# Patient Record
Sex: FEMALE | Race: WHITE | NOT HISPANIC OR LATINO | URBAN - METROPOLITAN AREA
[De-identification: names, ages, dates, MRNs, and addresses within clinical notes are randomized per-mention and may not be internally consistent; named-entity substitution may affect disease eponyms.]

---

## 2017-02-06 ENCOUNTER — OFFICE (OUTPATIENT)
Dept: URBAN - METROPOLITAN AREA CLINIC 64 | Facility: CLINIC | Age: 57
End: 2017-02-06

## 2017-02-06 VITALS
HEIGHT: 63 IN | SYSTOLIC BLOOD PRESSURE: 129 MMHG | WEIGHT: 228 LBS | HEART RATE: 112 BPM | DIASTOLIC BLOOD PRESSURE: 77 MMHG

## 2017-02-06 DIAGNOSIS — K21.9 GASTRO-ESOPHAGEAL REFLUX DISEASE WITHOUT ESOPHAGITIS: ICD-10-CM

## 2017-02-06 DIAGNOSIS — G47.00 INSOMNIA, UNSPECIFIED: ICD-10-CM

## 2017-02-06 DIAGNOSIS — K59.00 CONSTIPATION, UNSPECIFIED: ICD-10-CM

## 2017-02-06 DIAGNOSIS — R11.2 NAUSEA WITH VOMITING, UNSPECIFIED: ICD-10-CM

## 2017-02-06 PROCEDURE — 99213 OFFICE O/P EST LOW 20 MIN: CPT | Performed by: INTERNAL MEDICINE

## 2017-02-06 RX ORDER — PROMETHAZINE HYDROCHLORIDE 50 MG/1
TABLET ORAL
Qty: 90 | Refills: 0 | Status: ACTIVE

## 2017-02-06 RX ORDER — DOXEPIN HYDROCHLORIDE 100 MG/1
100 CAPSULE ORAL
Qty: 30 | Refills: 11 | Status: COMPLETED
Start: 2017-02-06 | End: 2018-05-01

## 2017-06-02 ENCOUNTER — HOSPITAL ENCOUNTER (OUTPATIENT)
Dept: FAMILY MEDICINE CLINIC | Facility: CLINIC | Age: 57
Setting detail: SPECIMEN
Discharge: HOME | End: 2017-06-02
Attending: FAMILY MEDICINE | Admitting: FAMILY MEDICINE

## 2017-06-02 LAB
ALBUMIN SERPL-MCNC: 3.8 G/DL (ref 3.5–4.8)
ALBUMIN/GLOB SERPL: 1.4 {RATIO} (ref 1–1.7)
ALP SERPL-CCNC: 53 IU/L (ref 32–91)
ALT SERPL-CCNC: 19 IU/L (ref 14–54)
ANION GAP SERPL CALC-SCNC: 15.1 MMOL/L (ref 10–20)
AST SERPL-CCNC: 21 IU/L (ref 15–41)
BILIRUB SERPL-MCNC: 0.4 MG/DL (ref 0.3–1.2)
BUN SERPL-MCNC: 13 MG/DL (ref 8–20)
BUN/CREAT SERPL: 14.4 (ref 5.4–26.2)
CALCIUM SERPL-MCNC: 9.5 MG/DL (ref 8.9–10.3)
CHLORIDE SERPL-SCNC: 108 MMOL/L (ref 101–111)
CHOLEST SERPL-MCNC: 133 MG/DL
CHOLEST/HDLC SERPL: 4.4 {RATIO}
CONV CO2: 24 MMOL/L (ref 22–32)
CONV LDL CHOLESTEROL DIRECT: 76 MG/DL (ref 0–100)
CONV TOTAL PROTEIN: 6.5 G/DL (ref 6.1–7.9)
CREAT UR-MCNC: 0.9 MG/DL (ref 0.4–1)
GLOBULIN UR ELPH-MCNC: 2.7 G/DL (ref 2.5–3.8)
GLUCOSE SERPL-MCNC: 130 MG/DL (ref 65–99)
HDLC SERPL-MCNC: 31 MG/DL
LDLC/HDLC SERPL: 2.5 {RATIO}
LIPID INTERPRETATION: ABNORMAL
POTASSIUM SERPL-SCNC: 4.1 MMOL/L (ref 3.6–5.1)
SODIUM SERPL-SCNC: 143 MMOL/L (ref 136–144)
TRIGL SERPL-MCNC: 173 MG/DL
VLDLC SERPL CALC-MCNC: 26.2 MG/DL

## 2017-12-20 ENCOUNTER — HOSPITAL ENCOUNTER (OUTPATIENT)
Dept: FAMILY MEDICINE CLINIC | Facility: CLINIC | Age: 57
Setting detail: SPECIMEN
Discharge: HOME | End: 2017-12-20
Attending: FAMILY MEDICINE | Admitting: FAMILY MEDICINE

## 2017-12-20 LAB
ALBUMIN SERPL-MCNC: 3.8 G/DL (ref 3.5–4.8)
ALBUMIN/GLOB SERPL: 1.4 {RATIO} (ref 1–1.7)
ALP SERPL-CCNC: 53 IU/L (ref 32–91)
ALT SERPL-CCNC: 26 IU/L (ref 14–54)
ANION GAP SERPL CALC-SCNC: 11.9 MMOL/L (ref 10–20)
AST SERPL-CCNC: 27 IU/L (ref 15–41)
BASOPHILS # BLD AUTO: 0 10*3/UL (ref 0–0.2)
BASOPHILS NFR BLD AUTO: 1 % (ref 0–2)
BILIRUB SERPL-MCNC: 0.4 MG/DL (ref 0.3–1.2)
BUN SERPL-MCNC: 12 MG/DL (ref 8–20)
BUN/CREAT SERPL: 15 (ref 5.4–26.2)
CALCIUM SERPL-MCNC: 9.1 MG/DL (ref 8.9–10.3)
CHLORIDE SERPL-SCNC: 106 MMOL/L (ref 101–111)
CHOLEST SERPL-MCNC: 120 MG/DL
CHOLEST/HDLC SERPL: 4.1 {RATIO}
CONV CO2: 25 MMOL/L (ref 22–32)
CONV LDL CHOLESTEROL DIRECT: 62 MG/DL (ref 0–100)
CONV TOTAL PROTEIN: 6.5 G/DL (ref 6.1–7.9)
CREAT UR-MCNC: 0.8 MG/DL (ref 0.4–1)
DIFFERENTIAL METHOD BLD: (no result)
EOSINOPHIL # BLD AUTO: 0.1 10*3/UL (ref 0–0.3)
EOSINOPHIL # BLD AUTO: 2 % (ref 0–3)
ERYTHROCYTE [DISTWIDTH] IN BLOOD BY AUTOMATED COUNT: 14.8 % (ref 11.5–14.5)
GLOBULIN UR ELPH-MCNC: 2.7 G/DL (ref 2.5–3.8)
GLUCOSE SERPL-MCNC: 122 MG/DL (ref 65–99)
HCT VFR BLD AUTO: 40.4 % (ref 35–49)
HDLC SERPL-MCNC: 29 MG/DL
HGB BLD-MCNC: 13.5 G/DL (ref 12–15)
LDLC/HDLC SERPL: 2.1 {RATIO}
LIPID INTERPRETATION: ABNORMAL
LYMPHOCYTES # BLD AUTO: 2.6 10*3/UL (ref 0.8–4.8)
LYMPHOCYTES NFR BLD AUTO: 40 % (ref 18–42)
MCH RBC QN AUTO: 29.7 PG (ref 26–32)
MCHC RBC AUTO-ENTMCNC: 33.3 G/DL (ref 32–36)
MCV RBC AUTO: 89 FL (ref 80–94)
MONOCYTES # BLD AUTO: 0.4 10*3/UL (ref 0.1–1.3)
MONOCYTES NFR BLD AUTO: 6 % (ref 2–11)
NEUTROPHILS # BLD AUTO: 3.4 10*3/UL (ref 2.3–8.6)
NEUTROPHILS NFR BLD AUTO: 51 % (ref 50–75)
NRBC BLD AUTO-RTO: 0 /100{WBCS}
NRBC/RBC NFR BLD MANUAL: 0 10*3/UL
PLATELET # BLD AUTO: 167 10*3/UL (ref 150–450)
PMV BLD AUTO: 8.5 FL (ref 7.4–10.4)
POTASSIUM SERPL-SCNC: 3.9 MMOL/L (ref 3.6–5.1)
RBC # BLD AUTO: 4.54 10*6/UL (ref 4–5.4)
SODIUM SERPL-SCNC: 139 MMOL/L (ref 136–144)
TRIGL SERPL-MCNC: 216 MG/DL
VLDLC SERPL CALC-MCNC: 28.7 MG/DL
WBC # BLD AUTO: 6.6 10*3/UL (ref 4.5–11.5)

## 2018-05-01 ENCOUNTER — OFFICE (OUTPATIENT)
Dept: URBAN - METROPOLITAN AREA CLINIC 64 | Facility: CLINIC | Age: 58
End: 2018-05-01

## 2018-05-01 VITALS
HEART RATE: 101 BPM | HEIGHT: 63 IN | SYSTOLIC BLOOD PRESSURE: 142 MMHG | DIASTOLIC BLOOD PRESSURE: 86 MMHG | WEIGHT: 250 LBS

## 2018-05-01 DIAGNOSIS — K21.9 GASTRO-ESOPHAGEAL REFLUX DISEASE WITHOUT ESOPHAGITIS: ICD-10-CM

## 2018-05-01 DIAGNOSIS — F41.9 ANXIETY DISORDER, UNSPECIFIED: ICD-10-CM

## 2018-05-01 DIAGNOSIS — G47.00 INSOMNIA, UNSPECIFIED: ICD-10-CM

## 2018-05-01 DIAGNOSIS — K59.00 CONSTIPATION, UNSPECIFIED: ICD-10-CM

## 2018-05-01 PROCEDURE — 99214 OFFICE O/P EST MOD 30 MIN: CPT | Performed by: INTERNAL MEDICINE

## 2018-05-01 RX ORDER — LINACLOTIDE 290 UG/1
290 CAPSULE, GELATIN COATED ORAL
Qty: 90 | Refills: 4 | Status: ACTIVE
Start: 2018-05-01

## 2018-05-01 RX ORDER — TRAZODONE HYDROCHLORIDE 100 MG/1
TABLET, FILM COATED ORAL
Qty: 90 | Refills: 4 | Status: ACTIVE
Start: 2018-05-01

## 2018-05-02 ENCOUNTER — HOSPITAL ENCOUNTER (OUTPATIENT)
Dept: FAMILY MEDICINE CLINIC | Facility: CLINIC | Age: 58
Setting detail: SPECIMEN
Discharge: HOME | End: 2018-05-02
Attending: FAMILY MEDICINE | Admitting: FAMILY MEDICINE

## 2018-05-02 LAB
ALBUMIN SERPL-MCNC: 4.1 G/DL (ref 3.5–4.8)
ALBUMIN/GLOB SERPL: 1.4 {RATIO} (ref 1–1.7)
ALP SERPL-CCNC: 66 IU/L (ref 32–91)
ALT SERPL-CCNC: 26 IU/L (ref 14–54)
ANION GAP SERPL CALC-SCNC: 12 MMOL/L (ref 10–20)
AST SERPL-CCNC: 31 IU/L (ref 15–41)
BASOPHILS # BLD AUTO: 0 10*3/UL (ref 0–0.2)
BASOPHILS NFR BLD AUTO: 1 % (ref 0–2)
BILIRUB SERPL-MCNC: 0.6 MG/DL (ref 0.3–1.2)
BUN SERPL-MCNC: 14 MG/DL (ref 8–20)
BUN/CREAT SERPL: 17.5 (ref 5.4–26.2)
CALCIUM SERPL-MCNC: 9.5 MG/DL (ref 8.9–10.3)
CHLORIDE SERPL-SCNC: 103 MMOL/L (ref 101–111)
CHOLEST SERPL-MCNC: 162 MG/DL
CHOLEST/HDLC SERPL: 6.1 {RATIO}
CONV CO2: 26 MMOL/L (ref 22–32)
CONV LDL CHOLESTEROL DIRECT: 108 MG/DL (ref 0–100)
CONV TOTAL PROTEIN: 7.1 G/DL (ref 6.1–7.9)
CREAT UR-MCNC: 0.8 MG/DL (ref 0.4–1)
DIFFERENTIAL METHOD BLD: (no result)
EOSINOPHIL # BLD AUTO: 0.1 10*3/UL (ref 0–0.3)
EOSINOPHIL # BLD AUTO: 1 % (ref 0–3)
ERYTHROCYTE [DISTWIDTH] IN BLOOD BY AUTOMATED COUNT: 15 % (ref 11.5–14.5)
GLOBULIN UR ELPH-MCNC: 3 G/DL (ref 2.5–3.8)
GLUCOSE SERPL-MCNC: 133 MG/DL (ref 65–99)
HCT VFR BLD AUTO: 44.1 % (ref 35–49)
HDLC SERPL-MCNC: 27 MG/DL
HGB BLD-MCNC: 14.8 G/DL (ref 12–15)
LDLC/HDLC SERPL: 4.1 {RATIO}
LIPID INTERPRETATION: ABNORMAL
LYMPHOCYTES # BLD AUTO: 1.7 10*3/UL (ref 0.8–4.8)
LYMPHOCYTES NFR BLD AUTO: 25 % (ref 18–42)
MCH RBC QN AUTO: 29.3 PG (ref 26–32)
MCHC RBC AUTO-ENTMCNC: 33.5 G/DL (ref 32–36)
MCV RBC AUTO: 87.4 FL (ref 80–94)
MONOCYTES # BLD AUTO: 0.3 10*3/UL (ref 0.1–1.3)
MONOCYTES NFR BLD AUTO: 4 % (ref 2–11)
NEUTROPHILS # BLD AUTO: 4.6 10*3/UL (ref 2.3–8.6)
NEUTROPHILS NFR BLD AUTO: 69 % (ref 50–75)
NRBC BLD AUTO-RTO: 0 /100{WBCS}
NRBC/RBC NFR BLD MANUAL: 0 10*3/UL
PLATELET # BLD AUTO: 180 10*3/UL (ref 150–450)
PMV BLD AUTO: 8.2 FL (ref 7.4–10.4)
POTASSIUM SERPL-SCNC: 4 MMOL/L (ref 3.6–5.1)
RBC # BLD AUTO: 5.04 10*6/UL (ref 4–5.4)
SODIUM SERPL-SCNC: 137 MMOL/L (ref 136–144)
TRIGL SERPL-MCNC: 167 MG/DL
VLDLC SERPL CALC-MCNC: 27.5 MG/DL
WBC # BLD AUTO: 6.7 10*3/UL (ref 4.5–11.5)

## 2018-07-19 ENCOUNTER — OFFICE (OUTPATIENT)
Dept: URBAN - METROPOLITAN AREA CLINIC 64 | Facility: CLINIC | Age: 58
End: 2018-07-19

## 2018-07-19 VITALS
DIASTOLIC BLOOD PRESSURE: 94 MMHG | HEIGHT: 63 IN | WEIGHT: 232 LBS | HEART RATE: 102 BPM | SYSTOLIC BLOOD PRESSURE: 147 MMHG

## 2018-07-19 DIAGNOSIS — K59.00 CONSTIPATION, UNSPECIFIED: ICD-10-CM

## 2018-07-19 DIAGNOSIS — K21.9 GASTRO-ESOPHAGEAL REFLUX DISEASE WITHOUT ESOPHAGITIS: ICD-10-CM

## 2018-07-19 DIAGNOSIS — F41.9 ANXIETY DISORDER, UNSPECIFIED: ICD-10-CM

## 2018-07-19 PROCEDURE — 99213 OFFICE O/P EST LOW 20 MIN: CPT | Performed by: INTERNAL MEDICINE

## 2018-07-19 RX ORDER — PROMETHAZINE HYDROCHLORIDE 50 MG/1
TABLET ORAL
Qty: 90 | Refills: 0 | Status: ACTIVE

## 2018-07-19 RX ORDER — TRAZODONE HYDROCHLORIDE 100 MG/1
TABLET, FILM COATED ORAL
Qty: 90 | Refills: 4 | Status: ACTIVE
Start: 2018-05-01

## 2018-10-23 ENCOUNTER — OFFICE (OUTPATIENT)
Dept: URBAN - METROPOLITAN AREA CLINIC 64 | Facility: CLINIC | Age: 58
End: 2018-10-23

## 2018-10-23 VITALS
HEIGHT: 63 IN | DIASTOLIC BLOOD PRESSURE: 86 MMHG | HEART RATE: 103 BPM | WEIGHT: 224 LBS | SYSTOLIC BLOOD PRESSURE: 146 MMHG

## 2018-10-23 DIAGNOSIS — K59.00 CONSTIPATION, UNSPECIFIED: ICD-10-CM

## 2018-10-23 DIAGNOSIS — G47.00 INSOMNIA, UNSPECIFIED: ICD-10-CM

## 2018-10-23 DIAGNOSIS — K21.9 GASTRO-ESOPHAGEAL REFLUX DISEASE WITHOUT ESOPHAGITIS: ICD-10-CM

## 2018-10-23 DIAGNOSIS — F41.9 ANXIETY DISORDER, UNSPECIFIED: ICD-10-CM

## 2018-10-23 PROCEDURE — 99213 OFFICE O/P EST LOW 20 MIN: CPT | Performed by: INTERNAL MEDICINE

## 2018-10-23 RX ORDER — ESOMEPRAZOLE MAGNESIUM 40 MG/1
80 CAPSULE, DELAYED RELEASE ORAL
Qty: 60 | Refills: 12 | Status: COMPLETED
Start: 2018-10-23 | End: 2019-05-09

## 2018-11-06 ENCOUNTER — HOSPITAL ENCOUNTER (OUTPATIENT)
Dept: FAMILY MEDICINE CLINIC | Facility: CLINIC | Age: 58
Setting detail: SPECIMEN
Discharge: HOME | End: 2018-11-06
Attending: FAMILY MEDICINE | Admitting: FAMILY MEDICINE

## 2018-11-06 LAB
ALBUMIN SERPL-MCNC: 4.2 G/DL (ref 3.5–4.8)
ALBUMIN/GLOB SERPL: 1.4 {RATIO} (ref 1–1.7)
ALP SERPL-CCNC: 62 IU/L (ref 32–91)
ALT SERPL-CCNC: 29 IU/L (ref 14–54)
ANION GAP SERPL CALC-SCNC: 11.2 MMOL/L (ref 10–20)
AST SERPL-CCNC: 25 IU/L (ref 15–41)
BASOPHILS # BLD AUTO: 0 10*3/UL (ref 0–0.2)
BASOPHILS NFR BLD AUTO: 1 % (ref 0–2)
BILIRUB SERPL-MCNC: 0.5 MG/DL (ref 0.3–1.2)
BUN SERPL-MCNC: 9 MG/DL (ref 8–20)
BUN/CREAT SERPL: 12.9 (ref 5.4–26.2)
CALCIUM SERPL-MCNC: 9.8 MG/DL (ref 8.9–10.3)
CHLORIDE SERPL-SCNC: 107 MMOL/L (ref 101–111)
CHOLEST SERPL-MCNC: 160 MG/DL
CHOLEST/HDLC SERPL: 4.5 {RATIO}
CONV CO2: 26 MMOL/L (ref 22–32)
CONV LDL CHOLESTEROL DIRECT: 105 MG/DL (ref 0–100)
CONV TOTAL PROTEIN: 7.1 G/DL (ref 6.1–7.9)
CREAT UR-MCNC: 0.7 MG/DL (ref 0.4–1)
DIFFERENTIAL METHOD BLD: (no result)
EOSINOPHIL # BLD AUTO: 0.1 10*3/UL (ref 0–0.3)
EOSINOPHIL # BLD AUTO: 2 % (ref 0–3)
ERYTHROCYTE [DISTWIDTH] IN BLOOD BY AUTOMATED COUNT: 15.2 % (ref 11.5–14.5)
GLOBULIN UR ELPH-MCNC: 2.9 G/DL (ref 2.5–3.8)
GLUCOSE SERPL-MCNC: 120 MG/DL (ref 65–99)
HCT VFR BLD AUTO: 41.9 % (ref 35–49)
HDLC SERPL-MCNC: 36 MG/DL
HGB BLD-MCNC: 14.3 G/DL (ref 12–15)
IRON SATN MFR SERPL: 13 % (ref 15–50)
IRON SERPL-MCNC: 53 UG/DL (ref 28–170)
LDLC/HDLC SERPL: 2.9 {RATIO}
LIPID INTERPRETATION: ABNORMAL
LYMPHOCYTES # BLD AUTO: 1.8 10*3/UL (ref 0.8–4.8)
LYMPHOCYTES NFR BLD AUTO: 25 % (ref 18–42)
MAGNESIUM SERPL-MCNC: 1.7 MG/DL (ref 1.8–2.5)
MCH RBC QN AUTO: 29.9 PG (ref 26–32)
MCHC RBC AUTO-ENTMCNC: 34.1 G/DL (ref 32–36)
MCV RBC AUTO: 87.7 FL (ref 80–94)
MONOCYTES # BLD AUTO: 0.3 10*3/UL (ref 0.1–1.3)
MONOCYTES NFR BLD AUTO: 4 % (ref 2–11)
NEUTROPHILS # BLD AUTO: 4.9 10*3/UL (ref 2.3–8.6)
NEUTROPHILS NFR BLD AUTO: 68 % (ref 50–75)
NRBC BLD AUTO-RTO: 0 /100{WBCS}
NRBC/RBC NFR BLD MANUAL: 0 10*3/UL
PLATELET # BLD AUTO: 189 10*3/UL (ref 150–450)
PMV BLD AUTO: 8 FL (ref 7.4–10.4)
POTASSIUM SERPL-SCNC: 4.2 MMOL/L (ref 3.6–5.1)
RBC # BLD AUTO: 4.77 10*6/UL (ref 4–5.4)
SODIUM SERPL-SCNC: 140 MMOL/L (ref 136–144)
TIBC SERPL-MCNC: 417 UG/DL (ref 228–428)
TRIGL SERPL-MCNC: 160 MG/DL
VLDLC SERPL CALC-MCNC: 19.8 MG/DL
WBC # BLD AUTO: 7.1 10*3/UL (ref 4.5–11.5)

## 2019-05-09 ENCOUNTER — OFFICE (OUTPATIENT)
Dept: URBAN - METROPOLITAN AREA CLINIC 64 | Facility: CLINIC | Age: 59
End: 2019-05-09

## 2019-05-09 VITALS
HEIGHT: 63 IN | SYSTOLIC BLOOD PRESSURE: 165 MMHG | DIASTOLIC BLOOD PRESSURE: 99 MMHG | WEIGHT: 231 LBS | HEART RATE: 89 BPM

## 2019-05-09 DIAGNOSIS — G47.00 INSOMNIA, UNSPECIFIED: ICD-10-CM

## 2019-05-09 DIAGNOSIS — K21.9 GASTRO-ESOPHAGEAL REFLUX DISEASE WITHOUT ESOPHAGITIS: ICD-10-CM

## 2019-05-09 DIAGNOSIS — R11.2 NAUSEA WITH VOMITING, UNSPECIFIED: ICD-10-CM

## 2019-05-09 DIAGNOSIS — K59.00 CONSTIPATION, UNSPECIFIED: ICD-10-CM

## 2019-05-09 PROCEDURE — 99213 OFFICE O/P EST LOW 20 MIN: CPT | Performed by: INTERNAL MEDICINE

## 2019-05-09 RX ORDER — PROMETHAZINE HYDROCHLORIDE 50 MG/1
TABLET ORAL
Qty: 90 | Refills: 0 | Status: ACTIVE

## 2019-05-09 RX ORDER — TRAZODONE HYDROCHLORIDE 100 MG/1
TABLET, FILM COATED ORAL
Qty: 90 | Refills: 4 | Status: ACTIVE
Start: 2018-05-01

## 2019-05-09 RX ORDER — DEXLANSOPRAZOLE 60 MG/1
CAPSULE, DELAYED RELEASE ORAL
Qty: 90 | Refills: 4 | Status: COMPLETED
End: 2021-10-28

## 2019-05-09 RX ORDER — ESOMEPRAZOLE MAGNESIUM 40 MG/1
CAPSULE, DELAYED RELEASE ORAL
Qty: 0 | Refills: 0 | Status: ACTIVE

## 2019-06-06 ENCOUNTER — HOSPITAL ENCOUNTER (OUTPATIENT)
Dept: LAB | Facility: HOSPITAL | Age: 59
Setting detail: SPECIMEN
Discharge: HOME | End: 2019-06-06
Attending: INTERNAL MEDICINE | Admitting: INTERNAL MEDICINE

## 2019-06-06 LAB
BUN SERPL-MCNC: 16 MG/DL (ref 8–20)
CREAT UR-MCNC: 0.8 MG/DL (ref 0.4–1)

## 2019-11-22 RX ORDER — PAROXETINE HYDROCHLORIDE 40 MG/1
40 TABLET, FILM COATED ORAL EVERY 24 HOURS
COMMUNITY
Start: 2018-11-06 | End: 2019-11-22 | Stop reason: SDUPTHER

## 2019-11-22 RX ORDER — PAROXETINE HYDROCHLORIDE 40 MG/1
40 TABLET, FILM COATED ORAL EVERY 24 HOURS
Qty: 30 TABLET | Refills: 0 | Status: SHIPPED | OUTPATIENT
Start: 2019-11-22 | End: 2020-02-26

## 2019-12-31 ENCOUNTER — TRANSCRIBE ORDERS (OUTPATIENT)
Dept: ADMINISTRATIVE | Facility: HOSPITAL | Age: 59
End: 2019-12-31

## 2019-12-31 ENCOUNTER — HOSPITAL ENCOUNTER (OUTPATIENT)
Dept: RESPIRATORY THERAPY | Facility: HOSPITAL | Age: 59
Discharge: HOME | End: 2019-12-31
Admitting: INTERNAL MEDICINE

## 2019-12-31 DIAGNOSIS — R07.9 CHEST PAIN, UNSPECIFIED TYPE: ICD-10-CM

## 2019-12-31 DIAGNOSIS — R07.9 CHEST PAIN, UNSPECIFIED TYPE: Primary | ICD-10-CM

## 2019-12-31 PROCEDURE — 93226 XTRNL ECG REC<48 HR SCAN A/R: CPT

## 2020-01-10 PROCEDURE — 93227 XTRNL ECG REC<48 HR R&I: CPT | Performed by: INTERNAL MEDICINE

## 2020-01-24 ENCOUNTER — TRANSCRIBE ORDERS (OUTPATIENT)
Dept: ADMINISTRATIVE | Facility: HOSPITAL | Age: 60
End: 2020-01-24

## 2020-01-24 DIAGNOSIS — R07.9 CHEST PAIN, UNSPECIFIED TYPE: Primary | ICD-10-CM

## 2020-01-24 DIAGNOSIS — R53.83 LETHARGIC: ICD-10-CM

## 2020-01-24 DIAGNOSIS — R00.2 PALPITATIONS: ICD-10-CM

## 2020-01-31 PROBLEM — J44.9 CHRONIC OBSTRUCTIVE PULMONARY DISEASE (HCC): Status: ACTIVE | Noted: 2020-01-31

## 2020-01-31 PROBLEM — E78.5 HLD (HYPERLIPIDEMIA): Status: ACTIVE | Noted: 2020-01-31

## 2020-01-31 RX ORDER — BLOOD-GLUCOSE METER
KIT MISCELLANEOUS
COMMUNITY
Start: 2018-08-17 | End: 2020-02-26

## 2020-01-31 RX ORDER — PROMETHAZINE HCL 50 MG
50 TABLET ORAL EVERY 6 HOURS PRN
COMMUNITY
Start: 2019-12-23

## 2020-01-31 RX ORDER — NITROGLYCERIN 0.4 MG/1
0.4 TABLET SUBLINGUAL
COMMUNITY
Start: 2019-09-17 | End: 2020-09-16

## 2020-01-31 RX ORDER — DIAZEPAM 10 MG/1
TABLET ORAL
COMMUNITY
Start: 2019-12-13 | End: 2020-02-26

## 2020-01-31 RX ORDER — ESOMEPRAZOLE MAGNESIUM 40 MG/1
CAPSULE, DELAYED RELEASE ORAL
Status: ON HOLD | COMMUNITY
Start: 2019-12-23 | End: 2022-03-29

## 2020-01-31 RX ORDER — VORTIOXETINE 10 MG/1
1 TABLET, FILM COATED ORAL DAILY
Status: ON HOLD | COMMUNITY
Start: 2019-11-17 | End: 2022-03-29

## 2020-01-31 RX ORDER — LISINOPRIL 40 MG/1
40 TABLET ORAL EVERY 24 HOURS
COMMUNITY
Start: 2017-07-24

## 2020-01-31 RX ORDER — MORPHINE SULFATE 30 MG/1
TABLET, FILM COATED, EXTENDED RELEASE ORAL
COMMUNITY
Start: 2019-08-08 | End: 2020-02-26

## 2020-01-31 RX ORDER — LINACLOTIDE 290 UG/1
290 CAPSULE, GELATIN COATED ORAL DAILY PRN
COMMUNITY
Start: 2019-12-21

## 2020-01-31 RX ORDER — DOXEPIN HYDROCHLORIDE 50 MG/1
50 CAPSULE ORAL NIGHTLY PRN
COMMUNITY
Start: 2019-12-16

## 2020-01-31 RX ORDER — AMLODIPINE BESYLATE 10 MG/1
10 TABLET ORAL DAILY
COMMUNITY
Start: 2019-07-16 | End: 2020-02-26

## 2020-01-31 RX ORDER — MULTIVITAMIN
1 TABLET ORAL DAILY
COMMUNITY
End: 2022-11-14

## 2020-01-31 RX ORDER — VENLAFAXINE HYDROCHLORIDE 37.5 MG/1
CAPSULE, EXTENDED RELEASE ORAL
Status: ON HOLD | COMMUNITY
Start: 2018-11-06 | End: 2022-03-29

## 2020-01-31 RX ORDER — HYDROCHLOROTHIAZIDE 25 MG/1
25 TABLET ORAL DAILY
Status: ON HOLD | COMMUNITY
Start: 2017-08-25 | End: 2022-06-27

## 2020-01-31 RX ORDER — GABAPENTIN 300 MG/1
300 CAPSULE ORAL
Status: ON HOLD | COMMUNITY
Start: 2019-05-28 | End: 2022-03-29

## 2020-01-31 RX ORDER — ATORVASTATIN CALCIUM 10 MG/1
TABLET, FILM COATED ORAL EVERY 24 HOURS
Status: ON HOLD | COMMUNITY
Start: 2019-01-14 | End: 2022-03-29

## 2020-01-31 RX ORDER — OXYCODONE AND ACETAMINOPHEN 10; 325 MG/1; MG/1
1 TABLET ORAL EVERY 4 HOURS PRN
COMMUNITY
Start: 2019-12-03

## 2020-01-31 RX ORDER — OMEGA-3-ACID ETHYL ESTERS 1 G/1
1 CAPSULE, LIQUID FILLED ORAL DAILY
Status: ON HOLD | COMMUNITY
Start: 2014-10-20 | End: 2022-03-29

## 2020-01-31 RX ORDER — CA/D3/MAG OX/ZINC/COP/MANG/BOR 600 MG-800
1 TABLET,CHEWABLE ORAL DAILY
COMMUNITY
Start: 2017-09-18

## 2020-01-31 RX ORDER — GEMFIBROZIL 600 MG/1
600 TABLET, FILM COATED ORAL 2 TIMES DAILY
Status: ON HOLD | COMMUNITY
Start: 2019-11-25 | End: 2022-03-29

## 2020-01-31 RX ORDER — TRAZODONE HYDROCHLORIDE 100 MG/1
100 TABLET ORAL NIGHTLY
COMMUNITY
Start: 2019-12-21

## 2020-01-31 RX ORDER — ROPINIROLE 5 MG/1
5 TABLET, FILM COATED ORAL NIGHTLY
Status: ON HOLD | COMMUNITY
Start: 2019-12-21 | End: 2022-06-27

## 2020-01-31 RX ORDER — ALBUTEROL SULFATE 90 UG/1
AEROSOL, METERED RESPIRATORY (INHALATION)
COMMUNITY
Start: 2018-11-06 | End: 2020-02-26

## 2020-01-31 RX ORDER — TOPIRAMATE 50 MG/1
50 TABLET, FILM COATED ORAL DAILY
COMMUNITY
Start: 2019-11-17

## 2020-01-31 RX ORDER — CHLORPROMAZINE HYDROCHLORIDE 100 MG/1
TABLET, FILM COATED ORAL
COMMUNITY
Start: 2019-12-26 | End: 2020-02-26

## 2020-01-31 RX ORDER — ZOLPIDEM TARTRATE 10 MG/1
10 TABLET ORAL NIGHTLY PRN
Status: ON HOLD | COMMUNITY
Start: 2019-12-14 | End: 2022-03-29

## 2020-01-31 RX ORDER — LANCETS 28 GAUGE
EACH MISCELLANEOUS
COMMUNITY
Start: 2018-08-15 | End: 2020-02-26

## 2020-02-13 PROBLEM — G47.33 OBSTRUCTIVE SLEEP APNEA: Status: ACTIVE | Noted: 2018-03-06

## 2020-02-26 ENCOUNTER — OFFICE VISIT (OUTPATIENT)
Dept: CARDIOLOGY | Facility: CLINIC | Age: 60
End: 2020-02-26

## 2020-02-26 VITALS
BODY MASS INDEX: 41.83 KG/M2 | SYSTOLIC BLOOD PRESSURE: 170 MMHG | HEART RATE: 118 BPM | HEIGHT: 64 IN | OXYGEN SATURATION: 87 % | DIASTOLIC BLOOD PRESSURE: 91 MMHG | WEIGHT: 245 LBS

## 2020-02-26 DIAGNOSIS — E11.9 TYPE 2 DIABETES MELLITUS WITHOUT COMPLICATION, WITHOUT LONG-TERM CURRENT USE OF INSULIN (HCC): ICD-10-CM

## 2020-02-26 DIAGNOSIS — E78.00 PURE HYPERCHOLESTEROLEMIA: ICD-10-CM

## 2020-02-26 DIAGNOSIS — R06.02 SHORTNESS OF BREATH: ICD-10-CM

## 2020-02-26 DIAGNOSIS — I10 ESSENTIAL HYPERTENSION: ICD-10-CM

## 2020-02-26 DIAGNOSIS — I20.9 ANGINA PECTORIS (HCC): Primary | ICD-10-CM

## 2020-02-26 DIAGNOSIS — R00.2 PALPITATIONS: ICD-10-CM

## 2020-02-26 PROCEDURE — 93000 ELECTROCARDIOGRAM COMPLETE: CPT | Performed by: INTERNAL MEDICINE

## 2020-02-26 PROCEDURE — 99204 OFFICE O/P NEW MOD 45 MIN: CPT | Performed by: INTERNAL MEDICINE

## 2020-02-26 RX ORDER — CLONIDINE HYDROCHLORIDE 0.2 MG/1
0.2 TABLET ORAL EVERY 6 HOURS PRN
COMMUNITY
Start: 2020-02-21 | End: 2022-04-05 | Stop reason: HOSPADM

## 2020-02-26 NOTE — PROGRESS NOTES
"    Subjective:     Encounter Date:02/26/2020      Patient ID: Ann Álvarez is a 59 y.o. female.    Chief Complaint:  History of Present Illness 59-year-old white female with history of hypertension diabetes hyperlipidemia tobacco use and strong family still coronary disease and probably sleep apnea and COPD presents to my office for new consultation.  Patient has been having symptoms of chest pain or shortness of breath along with some palpitations.  Patient chest pain is mostly substernal with radiation to the neck and to the left arm and also with shortness of breath.  She also has been having some palpitations without any dizziness or syncope.  She has some swelling of the feet.  She is taking her medicines regularly.  She still continues to smoke.  She had a recent Holter monitor which showed occasional PACs.  She had a sinus tachycardia.  /91 (BP Location: Left arm, Patient Position: Sitting)   Pulse 118   Ht 161.3 cm (63.5\")   Wt 111 kg (245 lb)   SpO2 (!) 87%   BMI 42.72 kg/m²     The following portions of the patient's history were reviewed and updated as appropriate: allergies, current medications, past family history, past medical history, past social history, past surgical history and problem list.  Past Medical History:   Diagnosis Date   • Hyperlipidemia    • Hypertension      Past Surgical History:   Procedure Laterality Date   • BACK SURGERY     • CHOLECYSTECTOMY     • HYSTERECTOMY     • TUMOR REMOVAL       Social History     Socioeconomic History   • Marital status:      Spouse name: Not on file   • Number of children: Not on file   • Years of education: Not on file   • Highest education level: Not on file   Tobacco Use   • Smoking status: Current Every Day Smoker   • Smokeless tobacco: Never Used   Substance and Sexual Activity   • Alcohol use: Not Currently     Family History   Problem Relation Age of Onset   • Heart disease Mother    • Heart disease Father        Current " Outpatient Medications:   •  atorvastatin (LIPITOR) 10 MG tablet, Daily., Disp: , Rfl:   •  chlorproMAZINE (THORAZINE) 200 MG tablet, TK 1 T PO QID PRN, Disp: , Rfl:   •  cloNIDine (CATAPRES) 0.2 MG tablet, TK 1 T PO HS PRN, Disp: , Rfl:   •  doxepin (SINEquan) 50 MG capsule, TK 1 TO 2 CS PO HS, Disp: , Rfl:   •  esomeprazole (nexIUM) 40 MG capsule, TK 1 C PO BID, Disp: , Rfl:   •  gabapentin (NEURONTIN) 300 MG capsule, Take 300 mg by mouth., Disp: , Rfl:   •  gemfibrozil (LOPID) 600 MG tablet, Take 600 mg by mouth 2 (Two) Times a Day., Disp: , Rfl:   •  hydroCHLOROthiazide (HYDRODIURIL) 25 MG tablet, Daily., Disp: , Rfl:   •  LINZESS 290 MCG capsule capsule, TK ONE C PO ONCE A DAY 30 MIN AC, Disp: , Rfl:   •  lisinopril (PRINIVIL,ZESTRIL) 40 MG tablet, Daily., Disp: , Rfl:   •  Multiple Vitamin (MULTIVITAMIN) tablet, Take 1 tablet by mouth Daily., Disp: , Rfl:   •  nitroglycerin (NITROSTAT) 0.4 MG SL tablet, Place 0.4 mg under the tongue., Disp: , Rfl:   •  omega-3 acid ethyl esters (LOVAZA) 1 g capsule, , Disp: , Rfl:   •  oxyCODONE-acetaminophen (PERCOCET)  MG per tablet, TK 1 T PO Q 4 H, Disp: , Rfl:   •  Probiotic Product (PROBIOTIC ADVANCED) capsule, PROBIOTIC ADVANCED CAPS, Disp: , Rfl:   •  promethazine (PHENERGAN) 50 MG tablet, TK 1 T PO Q 6 H PRF NAUSEA OR VOM, Disp: , Rfl:   •  rOPINIRole (REQUIP) 5 MG tablet, TK 1 T PO Q NIGHT, Disp: , Rfl:   •  topiramate (TOPAMAX) 50 MG tablet, Take 50 mg by mouth Daily., Disp: , Rfl:   •  traZODone (DESYREL) 100 MG tablet, TK 1 T PO HS FOR 30 DAYS, Disp: , Rfl:   •  TRINTELLIX 10 MG tablet, Take 1 tablet by mouth Daily., Disp: , Rfl:   •  venlafaxine XR (EFFEXOR-XR) 37.5 MG 24 hr capsule, VENLAFAXINE HCL ER 37.5 MG XR24H-CAP, Disp: , Rfl:   •  zolpidem (AMBIEN) 10 MG tablet, TK 1 T PO HS PRN, Disp: , Rfl:   Allergies   Allergen Reactions   • Codeine Hives, Itching and Nausea And Vomiting       Review of Systems   Constitution: Positive for chills. Negative for  fever and malaise/fatigue.   HENT: Negative for ear pain and nosebleeds.    Eyes: Negative for blurred vision and double vision.   Cardiovascular: Positive for chest pain, dyspnea on exertion, leg swelling and palpitations.   Respiratory: Negative for cough and shortness of breath.    Skin: Negative for rash.   Musculoskeletal: Negative for joint pain.   Gastrointestinal: Positive for nausea. Negative for abdominal pain and vomiting.   Neurological: Positive for numbness. Negative for dizziness, focal weakness, headaches and light-headedness.   Psychiatric/Behavioral: Negative for depression. The patient is not nervous/anxious.    All other systems reviewed and are negative.             Objective:     Physical Exam   Constitutional: She appears well-developed and well-nourished.   HENT:   Head: Normocephalic and atraumatic.   Eyes: Pupils are equal, round, and reactive to light. Conjunctivae and EOM are normal. No scleral icterus.   Neck: Normal range of motion. Neck supple. No JVD present. Carotid bruit is not present.   Cardiovascular: Normal rate, regular rhythm, S1 normal, S2 normal, normal heart sounds and intact distal pulses. PMI is not displaced.   Pulmonary/Chest: Effort normal and breath sounds normal. She has no wheezes. She has no rales.   Abdominal: Soft. Bowel sounds are normal.   Musculoskeletal: Normal range of motion.   Neurological: She is alert. She has normal strength.   No focal deficits   Skin: Skin is warm and dry. No rash noted.   Psychiatric: She has a normal mood and affect.         ECG 12 Lead  Date/Time: 2/26/2020 10:32 AM  Performed by: Heraclio Rizzo MD  Authorized by: Heraclio Rizzo MD   Comments: Sinus tachycardia with nonspecific ST segment amenity  Abnormal EKG  No previous EKGs available            Lab Review:       Assessment:          Diagnosis Plan   1. Angina pectoris (CMS/HCC)     2. Palpitations     3. Shortness of breath     4. Pure hypercholesterolemia     5. Essential  hypertension     6. Type 2 diabetes mellitus without complication, without long-term current use of insulin (CMS/[facility])            Plan:       Patient presented with chest pain shortness of breath and palpitations and has risk factors for coronary disease  Patient had Holter monitor which showed sinus tachycardia and occasional PACs  Patient will have an echocardiogram for LV function valve abnormalities  Patient will have a [employer] Myoview because she cannot walk secondary diabetic neuropathy  Patient's blood pressure and heart rate are slightly high and will adjust her medicines mostly with beta-blockers after this test is complete  Patient's lipid levels and sugar levels are followed by the primary care doctor  Patient is advised to stop smoking.

## 2020-03-06 ENCOUNTER — HOSPITAL ENCOUNTER (OUTPATIENT)
Dept: CARDIOLOGY | Facility: HOSPITAL | Age: 60
Discharge: HOME | End: 2020-03-06

## 2020-03-06 ENCOUNTER — HOSPITAL ENCOUNTER (OUTPATIENT)
Dept: CARDIOLOGY | Facility: HOSPITAL | Age: 60
Discharge: HOME | End: 2020-03-06
Admitting: INTERNAL MEDICINE

## 2020-03-06 VITALS
SYSTOLIC BLOOD PRESSURE: 182 MMHG | DIASTOLIC BLOOD PRESSURE: 92 MMHG | WEIGHT: 220 LBS | BODY MASS INDEX: 38.98 KG/M2 | HEIGHT: 63 IN

## 2020-03-06 DIAGNOSIS — I10 ESSENTIAL HYPERTENSION: ICD-10-CM

## 2020-03-06 DIAGNOSIS — R00.2 PALPITATIONS: ICD-10-CM

## 2020-03-06 DIAGNOSIS — I20.9 ANGINA PECTORIS (HCC): ICD-10-CM

## 2020-03-06 DIAGNOSIS — E78.00 PURE HYPERCHOLESTEROLEMIA: ICD-10-CM

## 2020-03-06 DIAGNOSIS — E11.9 TYPE 2 DIABETES MELLITUS WITHOUT COMPLICATION, WITHOUT LONG-TERM CURRENT USE OF INSULIN (HCC): ICD-10-CM

## 2020-03-06 DIAGNOSIS — R06.02 SHORTNESS OF BREATH: ICD-10-CM

## 2020-03-06 LAB
BH CV STRESS COMMENTS STAGE 1: NORMAL
BH CV STRESS DOSE REGADENOSON STAGE 1: 0.4
BH CV STRESS DURATION MIN STAGE 1: 0
BH CV STRESS DURATION SEC STAGE 1: 10
BH CV STRESS PROTOCOL 1: NORMAL
BH CV STRESS RECOVERY BP: NORMAL MMHG
BH CV STRESS RECOVERY HR: 104 BPM
BH CV STRESS STAGE 1: 1
LV EF NUC BP: 56 %
MAXIMAL PREDICTED HEART RATE: 160 BPM
STRESS BASELINE BP: NORMAL MMHG
STRESS BASELINE HR: 98 BPM
STRESS TARGET HR: 136 BPM

## 2020-03-06 PROCEDURE — A9500 TC99M SESTAMIBI: HCPCS | Performed by: INTERNAL MEDICINE

## 2020-03-06 PROCEDURE — 93017 CV STRESS TEST TRACING ONLY: CPT

## 2020-03-06 PROCEDURE — 93016 CV STRESS TEST SUPVJ ONLY: CPT | Performed by: INTERNAL MEDICINE

## 2020-03-06 PROCEDURE — 25010000002 REGADENOSON 0.4 MG/5ML SOLUTION: Performed by: INTERNAL MEDICINE

## 2020-03-06 PROCEDURE — 78452 HT MUSCLE IMAGE SPECT MULT: CPT | Performed by: INTERNAL MEDICINE

## 2020-03-06 PROCEDURE — 78452 HT MUSCLE IMAGE SPECT MULT: CPT

## 2020-03-06 PROCEDURE — 0 TECHNETIUM SESTAMIBI: Performed by: INTERNAL MEDICINE

## 2020-03-06 PROCEDURE — 93018 CV STRESS TEST I&R ONLY: CPT | Performed by: INTERNAL MEDICINE

## 2020-03-06 PROCEDURE — 93306 TTE W/DOPPLER COMPLETE: CPT

## 2020-03-06 RX ADMIN — TECHNETIUM TC 99M SESTAMIBI 1 DOSE: 1 INJECTION INTRAVENOUS at 13:30

## 2020-03-06 RX ADMIN — REGADENOSON 0.4 MG: 0.08 INJECTION, SOLUTION INTRAVENOUS at 14:45

## 2020-03-11 LAB
BH CV ECHO MEAS - AO MAX PG (FULL): 4.1 MMHG
BH CV ECHO MEAS - AO MAX PG: 7 MMHG
BH CV ECHO MEAS - AO MEAN PG (FULL): 2.5 MMHG
BH CV ECHO MEAS - AO MEAN PG: 4.1 MMHG
BH CV ECHO MEAS - AO ROOT AREA: 5.3 CM^2
BH CV ECHO MEAS - AO ROOT DIAM: 2.6 CM
BH CV ECHO MEAS - AO V2 MAX: 132.4 CM/SEC
BH CV ECHO MEAS - AO V2 MEAN: 96.7 CM/SEC
BH CV ECHO MEAS - AO V2 VTI: 27.7 CM
BH CV ECHO MEAS - ASC AORTA: 2.9 CM
BH CV ECHO MEAS - AVA(I,A): 1.7 CM^2
BH CV ECHO MEAS - AVA(I,D): 1.7 CM^2
BH CV ECHO MEAS - AVA(V,A): 1.8 CM^2
BH CV ECHO MEAS - AVA(V,D): 1.8 CM^2
BH CV ECHO MEAS - EDV(CUBED): 99.1 ML
BH CV ECHO MEAS - EDV(MOD-SP4): 44.8 ML
BH CV ECHO MEAS - EDV(TEICH): 98.7 ML
BH CV ECHO MEAS - EF(CUBED): 67.8 %
BH CV ECHO MEAS - EF(MOD-SP4): 58 %
BH CV ECHO MEAS - EF(TEICH): 59.4 %
BH CV ECHO MEAS - ESV(CUBED): 31.9 ML
BH CV ECHO MEAS - ESV(MOD-SP4): 18.8 ML
BH CV ECHO MEAS - ESV(TEICH): 40.1 ML
BH CV ECHO MEAS - FS: 31.5 %
BH CV ECHO MEAS - IVS/LVPW: 1.1
BH CV ECHO MEAS - IVSD: 1.1 CM
BH CV ECHO MEAS - LA DIMENSION: 3.7 CM
BH CV ECHO MEAS - LA/AO: 1.4
BH CV ECHO MEAS - LV MASS(C)D: 169.8 GRAMS
BH CV ECHO MEAS - LV MAX PG: 2.9 MMHG
BH CV ECHO MEAS - LV MEAN PG: 1.6 MMHG
BH CV ECHO MEAS - LV V1 MAX: 85.6 CM/SEC
BH CV ECHO MEAS - LV V1 MEAN: 60 CM/SEC
BH CV ECHO MEAS - LV V1 VTI: 17.4 CM
BH CV ECHO MEAS - LVIDD: 4.6 CM
BH CV ECHO MEAS - LVIDS: 3.2 CM
BH CV ECHO MEAS - LVOT AREA: 2.8 CM^2
BH CV ECHO MEAS - LVOT DIAM: 1.9 CM
BH CV ECHO MEAS - LVPWD: 0.97 CM
BH CV ECHO MEAS - MV A MAX VEL: 77.9 CM/SEC
BH CV ECHO MEAS - MV DEC SLOPE: 477.4 CM/SEC^2
BH CV ECHO MEAS - MV DEC TIME: 0.16 SEC
BH CV ECHO MEAS - MV E MAX VEL: 76.9 CM/SEC
BH CV ECHO MEAS - MV E/A: 0.99
BH CV ECHO MEAS - MV MAX PG: 3.9 MMHG
BH CV ECHO MEAS - MV MEAN PG: 2.5 MMHG
BH CV ECHO MEAS - MV V2 MAX: 98.4 CM/SEC
BH CV ECHO MEAS - MV V2 MEAN: 76.5 CM/SEC
BH CV ECHO MEAS - MV V2 VTI: 18.7 CM
BH CV ECHO MEAS - MVA(VTI): 2.6 CM^2
BH CV ECHO MEAS - RAP SYSTOLE: 3 MMHG
BH CV ECHO MEAS - RVDD: 2.7 CM
BH CV ECHO MEAS - RVSP: 47.6 MMHG
BH CV ECHO MEAS - SV(AO): 146.6 ML
BH CV ECHO MEAS - SV(CUBED): 67.2 ML
BH CV ECHO MEAS - SV(LVOT): 48.3 ML
BH CV ECHO MEAS - SV(MOD-SP4): 26 ML
BH CV ECHO MEAS - SV(TEICH): 58.6 ML
BH CV ECHO MEAS - TR MAX VEL: 332.6 CM/SEC
MAXIMAL PREDICTED HEART RATE: 160 BPM
STRESS TARGET HR: 136 BPM

## 2020-03-11 PROCEDURE — 93306 TTE W/DOPPLER COMPLETE: CPT | Performed by: INTERNAL MEDICINE

## 2020-09-08 ENCOUNTER — OFFICE (OUTPATIENT)
Dept: URBAN - METROPOLITAN AREA CLINIC 64 | Facility: CLINIC | Age: 60
End: 2020-09-08

## 2020-09-08 VITALS
HEART RATE: 123 BPM | WEIGHT: 220 LBS | SYSTOLIC BLOOD PRESSURE: 196 MMHG | DIASTOLIC BLOOD PRESSURE: 103 MMHG | HEIGHT: 63 IN

## 2020-09-08 DIAGNOSIS — K59.00 CONSTIPATION, UNSPECIFIED: ICD-10-CM

## 2020-09-08 DIAGNOSIS — K21.9 GASTRO-ESOPHAGEAL REFLUX DISEASE WITHOUT ESOPHAGITIS: ICD-10-CM

## 2020-09-08 DIAGNOSIS — R10.13 EPIGASTRIC PAIN: ICD-10-CM

## 2020-09-08 DIAGNOSIS — R11.2 NAUSEA WITH VOMITING, UNSPECIFIED: ICD-10-CM

## 2020-09-08 PROCEDURE — 99214 OFFICE O/P EST MOD 30 MIN: CPT | Performed by: INTERNAL MEDICINE

## 2020-09-08 RX ORDER — TRAZODONE HYDROCHLORIDE 100 MG/1
TABLET, FILM COATED ORAL
Qty: 90 | Refills: 4 | Status: ACTIVE
Start: 2018-05-01

## 2020-09-08 RX ORDER — ONDANSETRON HYDROCHLORIDE 4 MG/1
24 TABLET, FILM COATED ORAL
Qty: 60 | Refills: 6 | Status: ACTIVE
Start: 2020-09-08

## 2020-09-08 RX ORDER — LINACLOTIDE 290 UG/1
290 CAPSULE, GELATIN COATED ORAL
Qty: 90 | Refills: 4 | Status: ACTIVE
Start: 2018-05-01

## 2020-09-08 RX ORDER — DEXLANSOPRAZOLE 60 MG/1
CAPSULE, DELAYED RELEASE ORAL
Qty: 90 | Refills: 4 | Status: COMPLETED
End: 2021-10-28

## 2020-09-08 RX ORDER — DOXYCYCLINE 100 MG/1
200 TABLET, FILM COATED ORAL
Qty: 20 | Refills: 1 | Status: COMPLETED
Start: 2020-09-08 | End: 2021-04-20

## 2021-04-20 ENCOUNTER — OFFICE (OUTPATIENT)
Dept: URBAN - METROPOLITAN AREA CLINIC 64 | Facility: CLINIC | Age: 61
End: 2021-04-20

## 2021-04-20 VITALS
HEART RATE: 66 BPM | WEIGHT: 230 LBS | SYSTOLIC BLOOD PRESSURE: 183 MMHG | DIASTOLIC BLOOD PRESSURE: 105 MMHG | HEIGHT: 63 IN

## 2021-04-20 DIAGNOSIS — K59.00 CONSTIPATION, UNSPECIFIED: ICD-10-CM

## 2021-04-20 DIAGNOSIS — R10.32 LEFT LOWER QUADRANT PAIN: ICD-10-CM

## 2021-04-20 DIAGNOSIS — R11.2 NAUSEA WITH VOMITING, UNSPECIFIED: ICD-10-CM

## 2021-04-20 DIAGNOSIS — K21.9 GASTRO-ESOPHAGEAL REFLUX DISEASE WITHOUT ESOPHAGITIS: ICD-10-CM

## 2021-04-20 PROCEDURE — 99213 OFFICE O/P EST LOW 20 MIN: CPT | Performed by: INTERNAL MEDICINE

## 2021-04-20 RX ORDER — DICYCLOMINE HYDROCHLORIDE 20 MG/1
TABLET ORAL
Qty: 0 | Refills: 0 | Status: ACTIVE

## 2021-04-20 RX ORDER — PROMETHAZINE HYDROCHLORIDE 50 MG/1
TABLET ORAL
Qty: 90 | Refills: 0 | Status: ACTIVE

## 2021-10-28 ENCOUNTER — TRANSCRIBE ORDERS (OUTPATIENT)
Dept: ADMINISTRATIVE | Facility: HOSPITAL | Age: 61
End: 2021-10-28

## 2021-10-28 ENCOUNTER — OFFICE (OUTPATIENT)
Dept: URBAN - METROPOLITAN AREA CLINIC 64 | Facility: CLINIC | Age: 61
End: 2021-10-28

## 2021-10-28 VITALS
HEIGHT: 63 IN | HEART RATE: 116 BPM | WEIGHT: 224 LBS | DIASTOLIC BLOOD PRESSURE: 101 MMHG | SYSTOLIC BLOOD PRESSURE: 154 MMHG

## 2021-10-28 DIAGNOSIS — R11.2 NAUSEA WITH VOMITING, UNSPECIFIED: ICD-10-CM

## 2021-10-28 DIAGNOSIS — R10.32 LEFT LOWER QUADRANT PAIN: ICD-10-CM

## 2021-10-28 DIAGNOSIS — R10.32 ABDOMINAL PAIN, LEFT LOWER QUADRANT: Primary | ICD-10-CM

## 2021-10-28 DIAGNOSIS — K59.00 CONSTIPATION, UNSPECIFIED: ICD-10-CM

## 2021-10-28 DIAGNOSIS — K21.9 GASTRO-ESOPHAGEAL REFLUX DISEASE WITHOUT ESOPHAGITIS: ICD-10-CM

## 2021-10-28 PROCEDURE — 99214 OFFICE O/P EST MOD 30 MIN: CPT | Performed by: INTERNAL MEDICINE

## 2021-10-28 RX ORDER — SCOLOPAMINE TRANSDERMAL SYSTEM 1 MG/1
PATCH, EXTENDED RELEASE TRANSDERMAL
Qty: 0 | Refills: 0 | Status: ACTIVE

## 2021-11-10 ENCOUNTER — APPOINTMENT (OUTPATIENT)
Dept: CT IMAGING | Facility: HOSPITAL | Age: 61
End: 2021-11-10

## 2021-12-27 ENCOUNTER — TRANSCRIBE ORDERS (OUTPATIENT)
Dept: ADMINISTRATIVE | Facility: HOSPITAL | Age: 61
End: 2021-12-27

## 2021-12-27 DIAGNOSIS — M54.16 LUMBAR RADICULOPATHY: Primary | ICD-10-CM

## 2022-01-14 ENCOUNTER — APPOINTMENT (OUTPATIENT)
Dept: MRI IMAGING | Facility: HOSPITAL | Age: 62
End: 2022-01-14

## 2022-03-28 ENCOUNTER — APPOINTMENT (OUTPATIENT)
Dept: GENERAL RADIOLOGY | Facility: HOSPITAL | Age: 62
End: 2022-03-28

## 2022-03-28 ENCOUNTER — APPOINTMENT (OUTPATIENT)
Dept: CT IMAGING | Facility: HOSPITAL | Age: 62
End: 2022-03-28

## 2022-03-28 ENCOUNTER — APPOINTMENT (OUTPATIENT)
Dept: CARDIOLOGY | Facility: HOSPITAL | Age: 62
End: 2022-03-28

## 2022-03-28 ENCOUNTER — HOSPITAL ENCOUNTER (INPATIENT)
Facility: HOSPITAL | Age: 62
LOS: 7 days | Discharge: HOME | End: 2022-04-05
Attending: EMERGENCY MEDICINE | Admitting: INTERNAL MEDICINE

## 2022-03-28 DIAGNOSIS — N39.0 ACUTE UTI: ICD-10-CM

## 2022-03-28 DIAGNOSIS — J44.1 COPD EXACERBATION: Primary | ICD-10-CM

## 2022-03-28 DIAGNOSIS — I16.0 HYPERTENSIVE URGENCY: ICD-10-CM

## 2022-03-28 DIAGNOSIS — R06.02 SHORTNESS OF BREATH: ICD-10-CM

## 2022-03-28 DIAGNOSIS — R07.9 CHEST PAIN, UNSPECIFIED TYPE: ICD-10-CM

## 2022-03-28 DIAGNOSIS — R06.2 WHEEZING: ICD-10-CM

## 2022-03-28 LAB
ALBUMIN SERPL-MCNC: 3.6 G/DL (ref 3.5–5.2)
ALBUMIN/GLOB SERPL: 1.2 G/DL
ALP SERPL-CCNC: 66 U/L (ref 39–117)
ALT SERPL W P-5'-P-CCNC: 15 U/L (ref 1–33)
ANION GAP SERPL CALCULATED.3IONS-SCNC: 8 MMOL/L (ref 5–15)
AST SERPL-CCNC: 15 U/L (ref 1–32)
B PARAPERT DNA SPEC QL NAA+PROBE: NOT DETECTED
B PERT DNA SPEC QL NAA+PROBE: NOT DETECTED
BACTERIA UR QL AUTO: ABNORMAL /HPF
BASOPHILS # BLD AUTO: 0.1 10*3/MM3 (ref 0–0.2)
BASOPHILS NFR BLD AUTO: 2.2 % (ref 0–1.5)
BH CV LOWER VASCULAR LEFT COMMON FEMORAL AUGMENT: NORMAL
BH CV LOWER VASCULAR LEFT COMMON FEMORAL COMPETENT: NORMAL
BH CV LOWER VASCULAR LEFT COMMON FEMORAL COMPRESS: NORMAL
BH CV LOWER VASCULAR LEFT COMMON FEMORAL PHASIC: NORMAL
BH CV LOWER VASCULAR LEFT COMMON FEMORAL SPONT: NORMAL
BH CV LOWER VASCULAR LEFT DISTAL FEMORAL COMPRESS: NORMAL
BH CV LOWER VASCULAR LEFT GASTRONEMIUS COMPRESS: NORMAL
BH CV LOWER VASCULAR LEFT GREATER SAPH AK COMPRESS: NORMAL
BH CV LOWER VASCULAR LEFT GREATER SAPH BK COMPRESS: NORMAL
BH CV LOWER VASCULAR LEFT LESSER SAPH COMPRESS: NORMAL
BH CV LOWER VASCULAR LEFT MID FEMORAL AUGMENT: NORMAL
BH CV LOWER VASCULAR LEFT MID FEMORAL COMPETENT: NORMAL
BH CV LOWER VASCULAR LEFT MID FEMORAL COMPRESS: NORMAL
BH CV LOWER VASCULAR LEFT MID FEMORAL PHASIC: NORMAL
BH CV LOWER VASCULAR LEFT MID FEMORAL SPONT: NORMAL
BH CV LOWER VASCULAR LEFT PERONEAL COMPRESS: NORMAL
BH CV LOWER VASCULAR LEFT POPLITEAL AUGMENT: NORMAL
BH CV LOWER VASCULAR LEFT POPLITEAL COMPETENT: NORMAL
BH CV LOWER VASCULAR LEFT POPLITEAL COMPRESS: NORMAL
BH CV LOWER VASCULAR LEFT POPLITEAL PHASIC: NORMAL
BH CV LOWER VASCULAR LEFT POPLITEAL SPONT: NORMAL
BH CV LOWER VASCULAR LEFT POSTERIOR TIBIAL COMPRESS: NORMAL
BH CV LOWER VASCULAR LEFT PROXIMAL FEMORAL COMPRESS: NORMAL
BH CV LOWER VASCULAR LEFT SAPHENOFEMORAL JUNCTION COMPRESS: NORMAL
BH CV LOWER VASCULAR RIGHT COMMON FEMORAL AUGMENT: NORMAL
BH CV LOWER VASCULAR RIGHT COMMON FEMORAL COMPETENT: NORMAL
BH CV LOWER VASCULAR RIGHT COMMON FEMORAL COMPRESS: NORMAL
BH CV LOWER VASCULAR RIGHT COMMON FEMORAL PHASIC: NORMAL
BH CV LOWER VASCULAR RIGHT COMMON FEMORAL SPONT: NORMAL
BH CV LOWER VASCULAR RIGHT DISTAL FEMORAL COMPRESS: NORMAL
BH CV LOWER VASCULAR RIGHT GASTRONEMIUS COMPRESS: NORMAL
BH CV LOWER VASCULAR RIGHT GREATER SAPH AK COMPRESS: NORMAL
BH CV LOWER VASCULAR RIGHT GREATER SAPH BK COMPRESS: NORMAL
BH CV LOWER VASCULAR RIGHT LESSER SAPH COMPRESS: NORMAL
BH CV LOWER VASCULAR RIGHT MID FEMORAL AUGMENT: NORMAL
BH CV LOWER VASCULAR RIGHT MID FEMORAL COMPETENT: NORMAL
BH CV LOWER VASCULAR RIGHT MID FEMORAL COMPRESS: NORMAL
BH CV LOWER VASCULAR RIGHT MID FEMORAL PHASIC: NORMAL
BH CV LOWER VASCULAR RIGHT MID FEMORAL SPONT: NORMAL
BH CV LOWER VASCULAR RIGHT PERONEAL COMPRESS: NORMAL
BH CV LOWER VASCULAR RIGHT POPLITEAL AUGMENT: NORMAL
BH CV LOWER VASCULAR RIGHT POPLITEAL COMPETENT: NORMAL
BH CV LOWER VASCULAR RIGHT POPLITEAL COMPRESS: NORMAL
BH CV LOWER VASCULAR RIGHT POPLITEAL PHASIC: NORMAL
BH CV LOWER VASCULAR RIGHT POPLITEAL SPONT: NORMAL
BH CV LOWER VASCULAR RIGHT POSTERIOR TIBIAL COMPRESS: NORMAL
BH CV LOWER VASCULAR RIGHT PROXIMAL FEMORAL COMPRESS: NORMAL
BH CV LOWER VASCULAR RIGHT SAPHENOFEMORAL JUNCTION COMPRESS: NORMAL
BILIRUB SERPL-MCNC: 0.2 MG/DL (ref 0–1.2)
BILIRUB UR QL STRIP: NEGATIVE
BUN SERPL-MCNC: 9 MG/DL (ref 8–23)
BUN/CREAT SERPL: 13.2 (ref 7–25)
C PNEUM DNA NPH QL NAA+NON-PROBE: NOT DETECTED
CALCIUM SPEC-SCNC: 9.2 MG/DL (ref 8.6–10.5)
CHLORIDE SERPL-SCNC: 100 MMOL/L (ref 98–107)
CLARITY UR: ABNORMAL
CO2 SERPL-SCNC: 34 MMOL/L (ref 22–29)
COLOR UR: YELLOW
CREAT SERPL-MCNC: 0.68 MG/DL (ref 0.57–1)
CRP SERPL-MCNC: 2.87 MG/DL (ref 0–0.5)
D DIMER PPP FEU-MCNC: 1.22 MG/L (FEU) (ref 0–0.59)
DEPRECATED RDW RBC AUTO: 40.7 FL (ref 37–54)
EGFRCR SERPLBLD CKD-EPI 2021: 98.6 ML/MIN/1.73
EOSINOPHIL # BLD AUTO: 0.1 10*3/MM3 (ref 0–0.4)
EOSINOPHIL NFR BLD AUTO: 1.7 % (ref 0.3–6.2)
ERYTHROCYTE [DISTWIDTH] IN BLOOD BY AUTOMATED COUNT: 14 % (ref 12.3–15.4)
FLUAV SUBTYP SPEC NAA+PROBE: NOT DETECTED
FLUBV RNA ISLT QL NAA+PROBE: NOT DETECTED
GLOBULIN UR ELPH-MCNC: 3 GM/DL
GLUCOSE SERPL-MCNC: 190 MG/DL (ref 65–99)
GLUCOSE UR STRIP-MCNC: NEGATIVE MG/DL
HADV DNA SPEC NAA+PROBE: NOT DETECTED
HCOV 229E RNA SPEC QL NAA+PROBE: NOT DETECTED
HCOV HKU1 RNA SPEC QL NAA+PROBE: NOT DETECTED
HCOV NL63 RNA SPEC QL NAA+PROBE: NOT DETECTED
HCOV OC43 RNA SPEC QL NAA+PROBE: NOT DETECTED
HCT VFR BLD AUTO: 33.4 % (ref 34–46.6)
HGB BLD-MCNC: 11.6 G/DL (ref 12–15.9)
HGB UR QL STRIP.AUTO: ABNORMAL
HMPV RNA NPH QL NAA+NON-PROBE: NOT DETECTED
HPIV1 RNA ISLT QL NAA+PROBE: NOT DETECTED
HPIV2 RNA SPEC QL NAA+PROBE: NOT DETECTED
HPIV3 RNA NPH QL NAA+PROBE: DETECTED
HPIV4 P GENE NPH QL NAA+PROBE: NOT DETECTED
HYALINE CASTS UR QL AUTO: ABNORMAL /LPF
KETONES UR QL STRIP: ABNORMAL
LEUKOCYTE ESTERASE UR QL STRIP.AUTO: ABNORMAL
LYMPHOCYTES # BLD AUTO: 1.9 10*3/MM3 (ref 0.7–3.1)
LYMPHOCYTES NFR BLD AUTO: 29.3 % (ref 19.6–45.3)
M PNEUMO IGG SER IA-ACNC: NOT DETECTED
MAXIMAL PREDICTED HEART RATE: 158 BPM
MCH RBC QN AUTO: 28.4 PG (ref 26.6–33)
MCHC RBC AUTO-ENTMCNC: 34.6 G/DL (ref 31.5–35.7)
MCV RBC AUTO: 81.8 FL (ref 79–97)
MONOCYTES # BLD AUTO: 0.2 10*3/MM3 (ref 0.1–0.9)
MONOCYTES NFR BLD AUTO: 3.2 % (ref 5–12)
NEUTROPHILS NFR BLD AUTO: 4.1 10*3/MM3 (ref 1.7–7)
NEUTROPHILS NFR BLD AUTO: 63.6 % (ref 42.7–76)
NITRITE UR QL STRIP: POSITIVE
NRBC BLD AUTO-RTO: 0.2 /100 WBC (ref 0–0.2)
NT-PROBNP SERPL-MCNC: 898.8 PG/ML (ref 0–900)
PH UR STRIP.AUTO: 5.5 [PH] (ref 5–8)
PLATELET # BLD AUTO: 223 10*3/MM3 (ref 140–450)
PMV BLD AUTO: 6.8 FL (ref 6–12)
POTASSIUM SERPL-SCNC: 4 MMOL/L (ref 3.5–5.2)
PROT SERPL-MCNC: 6.6 G/DL (ref 6–8.5)
PROT UR QL STRIP: ABNORMAL
RBC # BLD AUTO: 4.08 10*6/MM3 (ref 3.77–5.28)
RBC # UR STRIP: ABNORMAL /HPF
REF LAB TEST METHOD: ABNORMAL
RHINOVIRUS RNA SPEC NAA+PROBE: NOT DETECTED
RSV RNA NPH QL NAA+NON-PROBE: NOT DETECTED
SARS-COV-2 RNA NPH QL NAA+NON-PROBE: NOT DETECTED
SODIUM SERPL-SCNC: 142 MMOL/L (ref 136–145)
SP GR UR STRIP: 1.02 (ref 1–1.03)
SQUAMOUS #/AREA URNS HPF: ABNORMAL /HPF
STRESS TARGET HR: 134 BPM
TROPONIN T SERPL-MCNC: <0.01 NG/ML (ref 0–0.03)
TROPONIN T SERPL-MCNC: <0.01 NG/ML (ref 0–0.03)
UROBILINOGEN UR QL STRIP: ABNORMAL
WBC # UR STRIP: ABNORMAL /HPF
WBC NRBC COR # BLD: 6.4 10*3/MM3 (ref 3.4–10.8)

## 2022-03-28 PROCEDURE — G0378 HOSPITAL OBSERVATION PER HR: HCPCS

## 2022-03-28 PROCEDURE — 81001 URINALYSIS AUTO W/SCOPE: CPT | Performed by: PHYSICIAN ASSISTANT

## 2022-03-28 PROCEDURE — 80053 COMPREHEN METABOLIC PANEL: CPT | Performed by: PHYSICIAN ASSISTANT

## 2022-03-28 PROCEDURE — 0 IOPAMIDOL PER 1 ML: Performed by: EMERGENCY MEDICINE

## 2022-03-28 PROCEDURE — 71275 CT ANGIOGRAPHY CHEST: CPT

## 2022-03-28 PROCEDURE — 93005 ELECTROCARDIOGRAM TRACING: CPT

## 2022-03-28 PROCEDURE — 99285 EMERGENCY DEPT VISIT HI MDM: CPT

## 2022-03-28 PROCEDURE — 94799 UNLISTED PULMONARY SVC/PX: CPT

## 2022-03-28 PROCEDURE — 85379 FIBRIN DEGRADATION QUANT: CPT | Performed by: PHYSICIAN ASSISTANT

## 2022-03-28 PROCEDURE — 71045 X-RAY EXAM CHEST 1 VIEW: CPT

## 2022-03-28 PROCEDURE — 84484 ASSAY OF TROPONIN QUANT: CPT | Performed by: PHYSICIAN ASSISTANT

## 2022-03-28 PROCEDURE — 0202U NFCT DS 22 TRGT SARS-COV-2: CPT | Performed by: PHYSICIAN ASSISTANT

## 2022-03-28 PROCEDURE — 85025 COMPLETE CBC W/AUTO DIFF WBC: CPT | Performed by: PHYSICIAN ASSISTANT

## 2022-03-28 PROCEDURE — 93005 ELECTROCARDIOGRAM TRACING: CPT | Performed by: EMERGENCY MEDICINE

## 2022-03-28 PROCEDURE — 87077 CULTURE AEROBIC IDENTIFY: CPT | Performed by: PHYSICIAN ASSISTANT

## 2022-03-28 PROCEDURE — 25010000002 METHYLPREDNISOLONE PER 125 MG: Performed by: PHYSICIAN ASSISTANT

## 2022-03-28 PROCEDURE — 87186 SC STD MICRODIL/AGAR DIL: CPT | Performed by: PHYSICIAN ASSISTANT

## 2022-03-28 PROCEDURE — 93970 EXTREMITY STUDY: CPT

## 2022-03-28 PROCEDURE — 86140 C-REACTIVE PROTEIN: CPT | Performed by: PHYSICIAN ASSISTANT

## 2022-03-28 PROCEDURE — 25010000002 KETOROLAC TROMETHAMINE PER 15 MG: Performed by: PHYSICIAN ASSISTANT

## 2022-03-28 PROCEDURE — 74177 CT ABD & PELVIS W/CONTRAST: CPT

## 2022-03-28 PROCEDURE — 94640 AIRWAY INHALATION TREATMENT: CPT

## 2022-03-28 PROCEDURE — 87086 URINE CULTURE/COLONY COUNT: CPT | Performed by: PHYSICIAN ASSISTANT

## 2022-03-28 PROCEDURE — 83880 ASSAY OF NATRIURETIC PEPTIDE: CPT | Performed by: PHYSICIAN ASSISTANT

## 2022-03-28 RX ORDER — TOPIRAMATE 25 MG/1
50 TABLET ORAL NIGHTLY
Status: DISCONTINUED | OUTPATIENT
Start: 2022-03-28 | End: 2022-03-29

## 2022-03-28 RX ORDER — MAGNESIUM SULFATE HEPTAHYDRATE 40 MG/ML
2 INJECTION, SOLUTION INTRAVENOUS AS NEEDED
Status: DISCONTINUED | OUTPATIENT
Start: 2022-03-28 | End: 2022-04-05 | Stop reason: HOSPADM

## 2022-03-28 RX ORDER — ZOLPIDEM TARTRATE 5 MG/1
12.5 TABLET ORAL NIGHTLY PRN
Status: DISCONTINUED | OUTPATIENT
Start: 2022-03-28 | End: 2022-03-29

## 2022-03-28 RX ORDER — ONDANSETRON 4 MG/1
4 TABLET, FILM COATED ORAL EVERY 6 HOURS PRN
Status: DISCONTINUED | OUTPATIENT
Start: 2022-03-28 | End: 2022-03-31

## 2022-03-28 RX ORDER — POTASSIUM CHLORIDE 20 MEQ/1
40 TABLET, EXTENDED RELEASE ORAL AS NEEDED
Status: DISCONTINUED | OUTPATIENT
Start: 2022-03-28 | End: 2022-04-05 | Stop reason: HOSPADM

## 2022-03-28 RX ORDER — CEFDINIR 300 MG/1
300 CAPSULE ORAL EVERY 12 HOURS SCHEDULED
Status: DISCONTINUED | OUTPATIENT
Start: 2022-03-28 | End: 2022-03-28

## 2022-03-28 RX ORDER — ALBUTEROL SULFATE 90 UG/1
2 AEROSOL, METERED RESPIRATORY (INHALATION) ONCE
Status: COMPLETED | OUTPATIENT
Start: 2022-03-28 | End: 2022-03-28

## 2022-03-28 RX ORDER — IPRATROPIUM BROMIDE AND ALBUTEROL SULFATE 2.5; .5 MG/3ML; MG/3ML
3 SOLUTION RESPIRATORY (INHALATION)
Status: DISCONTINUED | OUTPATIENT
Start: 2022-03-28 | End: 2022-03-30

## 2022-03-28 RX ORDER — NITROGLYCERIN 0.4 MG/1
0.4 TABLET SUBLINGUAL
Status: DISCONTINUED | OUTPATIENT
Start: 2022-03-28 | End: 2022-04-05

## 2022-03-28 RX ORDER — ALUMINA, MAGNESIA, AND SIMETHICONE 2400; 2400; 240 MG/30ML; MG/30ML; MG/30ML
15 SUSPENSION ORAL EVERY 6 HOURS PRN
Status: DISCONTINUED | OUTPATIENT
Start: 2022-03-28 | End: 2022-04-05 | Stop reason: HOSPADM

## 2022-03-28 RX ORDER — GUAIFENESIN 600 MG/1
1200 TABLET, EXTENDED RELEASE ORAL EVERY 12 HOURS
Status: DISCONTINUED | OUTPATIENT
Start: 2022-03-28 | End: 2022-04-05 | Stop reason: HOSPADM

## 2022-03-28 RX ORDER — SODIUM CHLORIDE 0.9 % (FLUSH) 0.9 %
10 SYRINGE (ML) INJECTION EVERY 12 HOURS SCHEDULED
Status: DISCONTINUED | OUTPATIENT
Start: 2022-03-28 | End: 2022-04-05 | Stop reason: HOSPADM

## 2022-03-28 RX ORDER — BENZONATATE 100 MG/1
200 CAPSULE ORAL 3 TIMES DAILY PRN
Status: DISCONTINUED | OUTPATIENT
Start: 2022-03-28 | End: 2022-04-05 | Stop reason: HOSPADM

## 2022-03-28 RX ORDER — ACETAMINOPHEN 325 MG/1
650 TABLET ORAL EVERY 4 HOURS PRN
Status: DISCONTINUED | OUTPATIENT
Start: 2022-03-28 | End: 2022-04-05 | Stop reason: HOSPADM

## 2022-03-28 RX ORDER — CHOLECALCIFEROL (VITAMIN D3) 125 MCG
5 CAPSULE ORAL NIGHTLY PRN
Status: DISCONTINUED | OUTPATIENT
Start: 2022-03-28 | End: 2022-04-05 | Stop reason: HOSPADM

## 2022-03-28 RX ORDER — ACETAMINOPHEN 650 MG/1
650 SUPPOSITORY RECTAL EVERY 4 HOURS PRN
Status: DISCONTINUED | OUTPATIENT
Start: 2022-03-28 | End: 2022-04-05 | Stop reason: HOSPADM

## 2022-03-28 RX ORDER — KETOROLAC TROMETHAMINE 30 MG/ML
30 INJECTION, SOLUTION INTRAMUSCULAR; INTRAVENOUS ONCE
Status: COMPLETED | OUTPATIENT
Start: 2022-03-28 | End: 2022-03-28

## 2022-03-28 RX ORDER — ONDANSETRON 2 MG/ML
4 INJECTION INTRAMUSCULAR; INTRAVENOUS EVERY 6 HOURS PRN
Status: DISCONTINUED | OUTPATIENT
Start: 2022-03-28 | End: 2022-03-31

## 2022-03-28 RX ORDER — IPRATROPIUM BROMIDE AND ALBUTEROL SULFATE 2.5; .5 MG/3ML; MG/3ML
3 SOLUTION RESPIRATORY (INHALATION)
Status: DISCONTINUED | OUTPATIENT
Start: 2022-03-28 | End: 2022-03-31

## 2022-03-28 RX ORDER — SODIUM CHLORIDE 0.9 % (FLUSH) 0.9 %
10 SYRINGE (ML) INJECTION AS NEEDED
Status: DISCONTINUED | OUTPATIENT
Start: 2022-03-28 | End: 2022-04-05 | Stop reason: HOSPADM

## 2022-03-28 RX ORDER — TRAZODONE HYDROCHLORIDE 100 MG/1
100 TABLET ORAL NIGHTLY
Status: DISCONTINUED | OUTPATIENT
Start: 2022-03-28 | End: 2022-03-29

## 2022-03-28 RX ORDER — METHYLPREDNISOLONE SODIUM SUCCINATE 125 MG/2ML
125 INJECTION, POWDER, LYOPHILIZED, FOR SOLUTION INTRAMUSCULAR; INTRAVENOUS ONCE
Status: COMPLETED | OUTPATIENT
Start: 2022-03-28 | End: 2022-03-28

## 2022-03-28 RX ORDER — OXYCODONE HYDROCHLORIDE 5 MG/1
10 TABLET ORAL EVERY 4 HOURS PRN
Status: DISCONTINUED | OUTPATIENT
Start: 2022-03-28 | End: 2022-04-02

## 2022-03-28 RX ORDER — POTASSIUM CHLORIDE 1.5 G/1.77G
40 POWDER, FOR SOLUTION ORAL AS NEEDED
Status: DISCONTINUED | OUTPATIENT
Start: 2022-03-28 | End: 2022-04-05 | Stop reason: HOSPADM

## 2022-03-28 RX ORDER — CEFDINIR 300 MG/1
300 CAPSULE ORAL ONCE
Status: COMPLETED | OUTPATIENT
Start: 2022-03-28 | End: 2022-03-28

## 2022-03-28 RX ORDER — ROPINIROLE 5 MG/1
5 TABLET, FILM COATED ORAL NIGHTLY
Status: DISCONTINUED | OUTPATIENT
Start: 2022-03-28 | End: 2022-03-29

## 2022-03-28 RX ORDER — METHYLPREDNISOLONE SODIUM SUCCINATE 40 MG/ML
40 INJECTION, POWDER, LYOPHILIZED, FOR SOLUTION INTRAMUSCULAR; INTRAVENOUS EVERY 8 HOURS
Status: DISCONTINUED | OUTPATIENT
Start: 2022-03-29 | End: 2022-03-29

## 2022-03-28 RX ORDER — MAGNESIUM SULFATE HEPTAHYDRATE 40 MG/ML
4 INJECTION, SOLUTION INTRAVENOUS AS NEEDED
Status: DISCONTINUED | OUTPATIENT
Start: 2022-03-28 | End: 2022-04-05 | Stop reason: HOSPADM

## 2022-03-28 RX ORDER — ASPIRIN 81 MG/1
324 TABLET, CHEWABLE ORAL ONCE
Status: COMPLETED | OUTPATIENT
Start: 2022-03-28 | End: 2022-03-28

## 2022-03-28 RX ORDER — ACETAMINOPHEN 160 MG/5ML
650 SOLUTION ORAL EVERY 4 HOURS PRN
Status: DISCONTINUED | OUTPATIENT
Start: 2022-03-28 | End: 2022-04-05 | Stop reason: HOSPADM

## 2022-03-28 RX ORDER — CEFDINIR 300 MG/1
300 CAPSULE ORAL EVERY 12 HOURS
Status: DISCONTINUED | OUTPATIENT
Start: 2022-03-29 | End: 2022-03-29

## 2022-03-28 RX ADMIN — Medication 10 ML: at 21:18

## 2022-03-28 RX ADMIN — TOPIRAMATE 50 MG: 25 TABLET, FILM COATED ORAL at 22:59

## 2022-03-28 RX ADMIN — BENZONATATE 200 MG: 100 CAPSULE ORAL at 21:18

## 2022-03-28 RX ADMIN — KETOROLAC TROMETHAMINE 30 MG: 30 INJECTION, SOLUTION INTRAMUSCULAR; INTRAVENOUS at 19:14

## 2022-03-28 RX ADMIN — IOPAMIDOL 100 ML: 755 INJECTION, SOLUTION INTRAVENOUS at 17:43

## 2022-03-28 RX ADMIN — IPRATROPIUM BROMIDE AND ALBUTEROL SULFATE 3 ML: .5; 3 SOLUTION RESPIRATORY (INHALATION) at 22:40

## 2022-03-28 RX ADMIN — Medication 10 ML: at 18:58

## 2022-03-28 RX ADMIN — CEFDINIR 300 MG: 300 CAPSULE ORAL at 18:58

## 2022-03-28 RX ADMIN — TRAZODONE HYDROCHLORIDE 100 MG: 100 TABLET ORAL at 22:59

## 2022-03-28 RX ADMIN — ZOLPIDEM TARTRATE 12.5 MG: 5 TABLET ORAL at 22:59

## 2022-03-28 RX ADMIN — METHYLPREDNISOLONE SODIUM SUCCINATE 125 MG: 125 INJECTION, POWDER, FOR SOLUTION INTRAMUSCULAR; INTRAVENOUS at 18:58

## 2022-03-28 RX ADMIN — ACETAMINOPHEN 650 MG: 325 TABLET ORAL at 21:18

## 2022-03-28 RX ADMIN — ALBUTEROL SULFATE 2 PUFF: 108 INHALANT RESPIRATORY (INHALATION) at 14:48

## 2022-03-28 RX ADMIN — ASPIRIN 324 MG: 81 TABLET, CHEWABLE ORAL at 15:10

## 2022-03-28 RX ADMIN — GUAIFENESIN 1200 MG: 600 TABLET, EXTENDED RELEASE ORAL at 21:18

## 2022-03-28 RX ADMIN — OXYCODONE 10 MG: 5 TABLET ORAL at 22:59

## 2022-03-29 ENCOUNTER — APPOINTMENT (OUTPATIENT)
Dept: CARDIOLOGY | Facility: HOSPITAL | Age: 62
End: 2022-03-29

## 2022-03-29 ENCOUNTER — APPOINTMENT (OUTPATIENT)
Dept: GENERAL RADIOLOGY | Facility: HOSPITAL | Age: 62
End: 2022-03-29

## 2022-03-29 PROBLEM — R73.9 ACUTE HYPERGLYCEMIA: Status: ACTIVE | Noted: 2022-03-29

## 2022-03-29 PROBLEM — R73.03 PREDIABETES: Chronic | Status: ACTIVE | Noted: 2018-11-06

## 2022-03-29 PROBLEM — E87.20 LACTIC ACIDOSIS: Status: ACTIVE | Noted: 2022-03-29

## 2022-03-29 PROBLEM — J96.01 ACUTE RESPIRATORY FAILURE WITH HYPOXIA AND HYPERCAPNIA (HCC): Status: ACTIVE | Noted: 2022-03-29

## 2022-03-29 PROBLEM — E66.01 OBESITY, CLASS III, BMI 40-49.9 (MORBID OBESITY) (HCC): Chronic | Status: ACTIVE | Noted: 2022-03-29

## 2022-03-29 PROBLEM — J96.02 ACUTE RESPIRATORY FAILURE WITH HYPOXIA AND HYPERCAPNIA (HCC): Status: ACTIVE | Noted: 2022-03-29

## 2022-03-29 PROBLEM — J44.9 CHRONIC OBSTRUCTIVE PULMONARY DISEASE (HCC): Chronic | Status: ACTIVE | Noted: 2020-01-31

## 2022-03-29 PROBLEM — J96.22 ACUTE ON CHRONIC RESPIRATORY FAILURE WITH HYPOXIA AND HYPERCAPNIA: Status: ACTIVE | Noted: 2022-03-29

## 2022-03-29 PROBLEM — G25.81 RLS (RESTLESS LEGS SYNDROME): Status: ACTIVE | Noted: 2020-04-22

## 2022-03-29 PROBLEM — N39.0 ACUTE UTI (URINARY TRACT INFECTION): Status: ACTIVE | Noted: 2022-03-29

## 2022-03-29 PROBLEM — J20.4 ACUTE BRONCHITIS DUE TO PARAINFLUENZA VIRUS: Status: ACTIVE | Noted: 2022-03-29

## 2022-03-29 PROBLEM — R09.02 HYPOXIA: Status: ACTIVE | Noted: 2018-03-06

## 2022-03-29 PROBLEM — G25.81 RLS (RESTLESS LEGS SYNDROME): Chronic | Status: ACTIVE | Noted: 2020-04-22

## 2022-03-29 PROBLEM — J96.21 ACUTE ON CHRONIC RESPIRATORY FAILURE WITH HYPOXIA AND HYPERCAPNIA (HCC): Status: ACTIVE | Noted: 2022-03-29

## 2022-03-29 PROBLEM — R73.03 PREDIABETES: Status: ACTIVE | Noted: 2018-11-06

## 2022-03-29 PROBLEM — I16.0 HYPERTENSIVE URGENCY: Status: ACTIVE | Noted: 2022-03-29

## 2022-03-29 PROBLEM — F41.1 GAD (GENERALIZED ANXIETY DISORDER): Status: ACTIVE | Noted: 2022-03-29

## 2022-03-29 PROBLEM — F41.1 GAD (GENERALIZED ANXIETY DISORDER): Chronic | Status: ACTIVE | Noted: 2022-03-29

## 2022-03-29 PROBLEM — J96.11 CHRONIC RESPIRATORY FAILURE WITH HYPOXIA (HCC): Chronic | Status: ACTIVE | Noted: 2018-03-06

## 2022-03-29 PROBLEM — G47.00 INSOMNIA: Chronic | Status: ACTIVE | Noted: 2017-06-02

## 2022-03-29 LAB
ALBUMIN SERPL-MCNC: 3.9 G/DL (ref 3.5–5.2)
ALP SERPL-CCNC: 73 U/L (ref 39–117)
ALT SERPL W P-5'-P-CCNC: 17 U/L (ref 1–33)
ANION GAP SERPL CALCULATED.3IONS-SCNC: 12 MMOL/L (ref 5–15)
ARTERIAL PATENCY WRIST A: POSITIVE
ARTERIAL PATENCY WRIST A: POSITIVE
AST SERPL-CCNC: 19 U/L (ref 1–32)
ATMOSPHERIC PRESS: ABNORMAL MM[HG]
ATMOSPHERIC PRESS: ABNORMAL MM[HG]
BASE EXCESS BLDA CALC-SCNC: 5.1 MMOL/L (ref 0–3)
BASE EXCESS BLDA CALC-SCNC: 5.9 MMOL/L (ref 0–3)
BASOPHILS # BLD AUTO: 0 10*3/MM3 (ref 0–0.2)
BASOPHILS NFR BLD AUTO: 0.7 % (ref 0–1.5)
BDY SITE: ABNORMAL
BDY SITE: ABNORMAL
BH CV ECHO MEAS - ACS: 1.99 CM
BH CV ECHO MEAS - AO MAX PG: 10.1 MMHG
BH CV ECHO MEAS - AO MEAN PG: 6 MMHG
BH CV ECHO MEAS - AO ROOT DIAM: 2.7 CM
BH CV ECHO MEAS - AO V2 MAX: 159 CM/SEC
BH CV ECHO MEAS - AO V2 VTI: 31.7 CM
BH CV ECHO MEAS - AVA(I,D): 1.81 CM2
BH CV ECHO MEAS - EDV(CUBED): 107.2 ML
BH CV ECHO MEAS - EDV(MOD-SP4): 98.1 ML
BH CV ECHO MEAS - EF(MOD-BP): 73 %
BH CV ECHO MEAS - EF(MOD-SP4): 73.6 %
BH CV ECHO MEAS - ESV(CUBED): 30.4 ML
BH CV ECHO MEAS - ESV(MOD-SP4): 25.9 ML
BH CV ECHO MEAS - FS: 34.3 %
BH CV ECHO MEAS - IVS/LVPW: 0.83 CM
BH CV ECHO MEAS - IVSD: 1.08 CM
BH CV ECHO MEAS - LA DIMENSION(2D): 4.3 CM
BH CV ECHO MEAS - LV DIASTOLIC VOL/BSA (35-75): 48 CM2
BH CV ECHO MEAS - LV MASS(C)D: 212 GRAMS
BH CV ECHO MEAS - LV MAX PG: 3.2 MMHG
BH CV ECHO MEAS - LV MEAN PG: 2.16 MMHG
BH CV ECHO MEAS - LV SYSTOLIC VOL/BSA (12-30): 12.7 CM2
BH CV ECHO MEAS - LV V1 MAX: 89 CM/SEC
BH CV ECHO MEAS - LV V1 VTI: 18.2 CM
BH CV ECHO MEAS - LVIDD: 4.8 CM
BH CV ECHO MEAS - LVIDS: 3.1 CM
BH CV ECHO MEAS - LVOT AREA: 3.2 CM2
BH CV ECHO MEAS - LVOT DIAM: 2 CM
BH CV ECHO MEAS - LVPWD: 1.3 CM
BH CV ECHO MEAS - MR MAX PG: 129.6 MMHG
BH CV ECHO MEAS - MR MAX VEL: 566.2 CM/SEC
BH CV ECHO MEAS - MV DEC TIME: 0.12 MSEC
BH CV ECHO MEAS - MV E MAX VEL: 138 CM/SEC
BH CV ECHO MEAS - MV MAX PG: 11.3 MMHG
BH CV ECHO MEAS - MV MEAN PG: 6.7 MMHG
BH CV ECHO MEAS - MV V2 VTI: 35.9 CM
BH CV ECHO MEAS - MVA(VTI): 1.6 CM2
BH CV ECHO MEAS - PA V2 MAX: 145 CM/SEC
BH CV ECHO MEAS - RAP SYSTOLE: 8 MMHG
BH CV ECHO MEAS - RV MAX PG: 1.82 MMHG
BH CV ECHO MEAS - RV V1 MAX: 67.5 CM/SEC
BH CV ECHO MEAS - RV V1 VTI: 10.9 CM
BH CV ECHO MEAS - RVDD: 2.9 CM
BH CV ECHO MEAS - RVSP: 56.6 MMHG
BH CV ECHO MEAS - SI(MOD-SP4): 35.3 ML/M2
BH CV ECHO MEAS - SV(LVOT): 57.4 ML
BH CV ECHO MEAS - SV(MOD-SP4): 72.2 ML
BH CV ECHO MEAS - TR MAX PG: 48.6 MMHG
BH CV ECHO MEAS - TR MAX VEL: 347.9 CM/SEC
BILIRUB CONJ SERPL-MCNC: <0.2 MG/DL (ref 0–0.3)
BILIRUB INDIRECT SERPL-MCNC: NORMAL MG/DL
BILIRUB SERPL-MCNC: 0.3 MG/DL (ref 0–1.2)
BUN SERPL-MCNC: 12 MG/DL (ref 8–23)
BUN/CREAT SERPL: 13.2 (ref 7–25)
CALCIUM SPEC-SCNC: 9.3 MG/DL (ref 8.6–10.5)
CHLORIDE SERPL-SCNC: 93 MMOL/L (ref 98–107)
CO2 BLDA-SCNC: 33.9 MMOL/L (ref 22–29)
CO2 BLDA-SCNC: 36.2 MMOL/L (ref 22–29)
CO2 SERPL-SCNC: 31 MMOL/L (ref 22–29)
CREAT SERPL-MCNC: 0.91 MG/DL (ref 0.57–1)
D-LACTATE SERPL-SCNC: 2.4 MMOL/L (ref 0.5–2)
D-LACTATE SERPL-SCNC: 3.1 MMOL/L (ref 0.5–2)
D-LACTATE SERPL-SCNC: 4 MMOL/L (ref 0.5–2)
D-LACTATE SERPL-SCNC: 4 MMOL/L (ref 0.5–2)
D-LACTATE SERPL-SCNC: 4.3 MMOL/L (ref 0.5–2)
DEPRECATED RDW RBC AUTO: 41.6 FL (ref 37–54)
EGFRCR SERPLBLD CKD-EPI 2021: 71.5 ML/MIN/1.73
EOSINOPHIL # BLD AUTO: 0 10*3/MM3 (ref 0–0.4)
EOSINOPHIL NFR BLD AUTO: 0.6 % (ref 0.3–6.2)
ERYTHROCYTE [DISTWIDTH] IN BLOOD BY AUTOMATED COUNT: 14.1 % (ref 12.3–15.4)
GLUCOSE BLDC GLUCOMTR-MCNC: 211 MG/DL (ref 70–105)
GLUCOSE BLDC GLUCOMTR-MCNC: 241 MG/DL (ref 70–105)
GLUCOSE BLDC GLUCOMTR-MCNC: 241 MG/DL (ref 70–105)
GLUCOSE BLDC GLUCOMTR-MCNC: 265 MG/DL (ref 70–105)
GLUCOSE BLDC GLUCOMTR-MCNC: 326 MG/DL (ref 70–105)
GLUCOSE BLDC GLUCOMTR-MCNC: 339 MG/DL (ref 70–105)
GLUCOSE SERPL-MCNC: 332 MG/DL (ref 65–99)
HBA1C MFR BLD: 6.3 % (ref 3.5–5.6)
HCO3 BLDA-SCNC: 32.1 MMOL/L (ref 21–28)
HCO3 BLDA-SCNC: 34.1 MMOL/L (ref 21–28)
HCT VFR BLD AUTO: 36.1 % (ref 34–46.6)
HEMODILUTION: NO
HEMODILUTION: NO
HGB BLD-MCNC: 12.5 G/DL (ref 12–15.9)
INHALED O2 CONCENTRATION: 28 %
INHALED O2 CONCENTRATION: 36 %
LYMPHOCYTES # BLD AUTO: 0.9 10*3/MM3 (ref 0.7–3.1)
LYMPHOCYTES NFR BLD AUTO: 13.1 % (ref 19.6–45.3)
MAXIMAL PREDICTED HEART RATE: 158 BPM
MCH RBC QN AUTO: 28.4 PG (ref 26.6–33)
MCHC RBC AUTO-ENTMCNC: 34.5 G/DL (ref 31.5–35.7)
MCV RBC AUTO: 82.2 FL (ref 79–97)
MODALITY: ABNORMAL
MODALITY: ABNORMAL
MONOCYTES # BLD AUTO: 0.1 10*3/MM3 (ref 0.1–0.9)
MONOCYTES NFR BLD AUTO: 1.7 % (ref 5–12)
NEUTROPHILS NFR BLD AUTO: 6 10*3/MM3 (ref 1.7–7)
NEUTROPHILS NFR BLD AUTO: 83.9 % (ref 42.7–76)
NRBC BLD AUTO-RTO: 0 /100 WBC (ref 0–0.2)
NT-PROBNP SERPL-MCNC: 741.6 PG/ML (ref 0–900)
PCO2 BLDA: 57.2 MM HG (ref 35–48)
PCO2 BLDA: 67.4 MM HG (ref 35–48)
PH BLDA: 7.31 PH UNITS (ref 7.35–7.45)
PH BLDA: 7.36 PH UNITS (ref 7.35–7.45)
PLATELET # BLD AUTO: 223 10*3/MM3 (ref 140–450)
PMV BLD AUTO: 7 FL (ref 6–12)
PO2 BLDA: 50.7 MM HG (ref 83–108)
PO2 BLDA: 54.9 MM HG (ref 83–108)
POTASSIUM SERPL-SCNC: 4.1 MMOL/L (ref 3.5–5.2)
PROCALCITONIN SERPL-MCNC: 0.05 NG/ML (ref 0–0.25)
PROT SERPL-MCNC: 7 G/DL (ref 6–8.5)
RBC # BLD AUTO: 4.4 10*6/MM3 (ref 3.77–5.28)
SAO2 % BLDCOA: 82.8 % (ref 94–98)
SAO2 % BLDCOA: 83.8 % (ref 94–98)
SODIUM SERPL-SCNC: 136 MMOL/L (ref 136–145)
STRESS TARGET HR: 134 BPM
WBC NRBC COR # BLD: 7.1 10*3/MM3 (ref 3.4–10.8)
WHOLE BLOOD HOLD SPECIMEN: NORMAL

## 2022-03-29 PROCEDURE — 93306 TTE W/DOPPLER COMPLETE: CPT | Performed by: INTERNAL MEDICINE

## 2022-03-29 PROCEDURE — 83605 ASSAY OF LACTIC ACID: CPT | Performed by: EMERGENCY MEDICINE

## 2022-03-29 PROCEDURE — 25010000002 METHYLPREDNISOLONE PER 40 MG: Performed by: PHYSICIAN ASSISTANT

## 2022-03-29 PROCEDURE — 99233 SBSQ HOSP IP/OBS HIGH 50: CPT | Performed by: HOSPITALIST

## 2022-03-29 PROCEDURE — 0 MORPHINE SULFATE 4 MG/ML SOLUTION: Performed by: PHYSICIAN ASSISTANT

## 2022-03-29 PROCEDURE — 83735 ASSAY OF MAGNESIUM: CPT | Performed by: HOSPITALIST

## 2022-03-29 PROCEDURE — 85025 COMPLETE CBC W/AUTO DIFF WBC: CPT | Performed by: NURSE PRACTITIONER

## 2022-03-29 PROCEDURE — 36600 WITHDRAWAL OF ARTERIAL BLOOD: CPT

## 2022-03-29 PROCEDURE — 94799 UNLISTED PULMONARY SVC/PX: CPT

## 2022-03-29 PROCEDURE — 25010000002 VANCOMYCIN 10 G RECONSTITUTED SOLUTION

## 2022-03-29 PROCEDURE — 80048 BASIC METABOLIC PNL TOTAL CA: CPT | Performed by: NURSE PRACTITIONER

## 2022-03-29 PROCEDURE — 94660 CPAP INITIATION&MGMT: CPT

## 2022-03-29 PROCEDURE — 82962 GLUCOSE BLOOD TEST: CPT

## 2022-03-29 PROCEDURE — 83605 ASSAY OF LACTIC ACID: CPT

## 2022-03-29 PROCEDURE — 93306 TTE W/DOPPLER COMPLETE: CPT

## 2022-03-29 PROCEDURE — 63710000001 INSULIN LISPRO (HUMAN) PER 5 UNITS: Performed by: NURSE PRACTITIONER

## 2022-03-29 PROCEDURE — 83880 ASSAY OF NATRIURETIC PEPTIDE: CPT | Performed by: EMERGENCY MEDICINE

## 2022-03-29 PROCEDURE — 80076 HEPATIC FUNCTION PANEL: CPT | Performed by: HOSPITALIST

## 2022-03-29 PROCEDURE — 25010000002 ONDANSETRON PER 1 MG: Performed by: PHYSICIAN ASSISTANT

## 2022-03-29 PROCEDURE — 82803 BLOOD GASES ANY COMBINATION: CPT

## 2022-03-29 PROCEDURE — 25010000002 FUROSEMIDE PER 20 MG: Performed by: PHYSICIAN ASSISTANT

## 2022-03-29 PROCEDURE — 25010000002 METHYLPREDNISOLONE PER 125 MG: Performed by: NURSE PRACTITIONER

## 2022-03-29 PROCEDURE — 94664 DEMO&/EVAL PT USE INHALER: CPT

## 2022-03-29 PROCEDURE — 25010000002 CEFEPIME PER 500 MG: Performed by: PHYSICIAN ASSISTANT

## 2022-03-29 PROCEDURE — 25010000002 HYDRALAZINE PER 20 MG: Performed by: NURSE PRACTITIONER

## 2022-03-29 PROCEDURE — 87040 BLOOD CULTURE FOR BACTERIA: CPT | Performed by: PHYSICIAN ASSISTANT

## 2022-03-29 PROCEDURE — 83036 HEMOGLOBIN GLYCOSYLATED A1C: CPT | Performed by: PHYSICIAN ASSISTANT

## 2022-03-29 PROCEDURE — 25010000002 CEFEPIME PER 500 MG: Performed by: NURSE PRACTITIONER

## 2022-03-29 PROCEDURE — 85025 COMPLETE CBC W/AUTO DIFF WBC: CPT | Performed by: PHYSICIAN ASSISTANT

## 2022-03-29 PROCEDURE — 84145 PROCALCITONIN (PCT): CPT | Performed by: NURSE PRACTITIONER

## 2022-03-29 PROCEDURE — 83605 ASSAY OF LACTIC ACID: CPT | Performed by: NURSE PRACTITIONER

## 2022-03-29 PROCEDURE — 80048 BASIC METABOLIC PNL TOTAL CA: CPT | Performed by: PHYSICIAN ASSISTANT

## 2022-03-29 PROCEDURE — 63710000001 INSULIN LISPRO (HUMAN) PER 5 UNITS: Performed by: PHYSICIAN ASSISTANT

## 2022-03-29 PROCEDURE — 71045 X-RAY EXAM CHEST 1 VIEW: CPT

## 2022-03-29 RX ORDER — INSULIN LISPRO 100 [IU]/ML
0-9 INJECTION, SOLUTION INTRAVENOUS; SUBCUTANEOUS
Status: DISCONTINUED | OUTPATIENT
Start: 2022-03-29 | End: 2022-03-29

## 2022-03-29 RX ORDER — OXYCODONE HYDROCHLORIDE 5 MG/1
10 TABLET ORAL EVERY 4 HOURS PRN
Status: DISCONTINUED | OUTPATIENT
Start: 2022-03-29 | End: 2022-03-29

## 2022-03-29 RX ORDER — INSULIN LISPRO 100 [IU]/ML
0-24 INJECTION, SOLUTION INTRAVENOUS; SUBCUTANEOUS AS NEEDED
Status: DISCONTINUED | OUTPATIENT
Start: 2022-03-29 | End: 2022-04-05 | Stop reason: HOSPADM

## 2022-03-29 RX ORDER — CLONIDINE HYDROCHLORIDE 0.1 MG/1
0.2 TABLET ORAL EVERY 12 HOURS SCHEDULED
Status: DISCONTINUED | OUTPATIENT
Start: 2022-03-29 | End: 2022-04-01

## 2022-03-29 RX ORDER — METOPROLOL TARTRATE 50 MG/1
50 TABLET, FILM COATED ORAL EVERY 12 HOURS SCHEDULED
Status: DISCONTINUED | OUTPATIENT
Start: 2022-03-29 | End: 2022-03-31

## 2022-03-29 RX ORDER — ZOLPIDEM TARTRATE 12.5 MG/1
12.5 TABLET, FILM COATED, EXTENDED RELEASE ORAL NIGHTLY PRN
COMMUNITY
End: 2022-11-14

## 2022-03-29 RX ORDER — LISINOPRIL 20 MG/1
40 TABLET ORAL EVERY 24 HOURS
Status: DISCONTINUED | OUTPATIENT
Start: 2022-03-29 | End: 2022-04-05 | Stop reason: HOSPADM

## 2022-03-29 RX ORDER — OLANZAPINE 10 MG/2ML
1 INJECTION, POWDER, LYOPHILIZED, FOR SOLUTION INTRAMUSCULAR
Status: DISCONTINUED | OUTPATIENT
Start: 2022-03-29 | End: 2022-04-05 | Stop reason: HOSPADM

## 2022-03-29 RX ORDER — DEXTROSE MONOHYDRATE 25 G/50ML
25 INJECTION, SOLUTION INTRAVENOUS
Status: DISCONTINUED | OUTPATIENT
Start: 2022-03-29 | End: 2022-04-05 | Stop reason: HOSPADM

## 2022-03-29 RX ORDER — NICOTINE POLACRILEX 4 MG
15 LOZENGE BUCCAL
Status: DISCONTINUED | OUTPATIENT
Start: 2022-03-29 | End: 2022-03-29 | Stop reason: SDUPTHER

## 2022-03-29 RX ORDER — SCOLOPAMINE TRANSDERMAL SYSTEM 1 MG/1
1 PATCH, EXTENDED RELEASE TRANSDERMAL
Status: DISCONTINUED | OUTPATIENT
Start: 2022-03-29 | End: 2022-04-05 | Stop reason: HOSPADM

## 2022-03-29 RX ORDER — TRAZODONE HYDROCHLORIDE 100 MG/1
100 TABLET ORAL NIGHTLY
Status: DISCONTINUED | OUTPATIENT
Start: 2022-03-29 | End: 2022-03-31

## 2022-03-29 RX ORDER — INSULIN LISPRO 100 [IU]/ML
0-9 INJECTION, SOLUTION INTRAVENOUS; SUBCUTANEOUS
Status: DISCONTINUED | OUTPATIENT
Start: 2022-03-29 | End: 2022-03-29 | Stop reason: ALTCHOICE

## 2022-03-29 RX ORDER — LABETALOL HYDROCHLORIDE 5 MG/ML
20 INJECTION, SOLUTION INTRAVENOUS ONCE
Status: COMPLETED | OUTPATIENT
Start: 2022-03-29 | End: 2022-03-29

## 2022-03-29 RX ORDER — DEXTROSE MONOHYDRATE 25 G/50ML
25 INJECTION, SOLUTION INTRAVENOUS
Status: DISCONTINUED | OUTPATIENT
Start: 2022-03-29 | End: 2022-03-29 | Stop reason: SDUPTHER

## 2022-03-29 RX ORDER — INSULIN LISPRO 100 [IU]/ML
0-9 INJECTION, SOLUTION INTRAVENOUS; SUBCUTANEOUS AS NEEDED
Status: DISCONTINUED | OUTPATIENT
Start: 2022-03-29 | End: 2022-03-29

## 2022-03-29 RX ORDER — INSULIN LISPRO 100 [IU]/ML
0-24 INJECTION, SOLUTION INTRAVENOUS; SUBCUTANEOUS
Status: DISCONTINUED | OUTPATIENT
Start: 2022-03-29 | End: 2022-04-05 | Stop reason: HOSPADM

## 2022-03-29 RX ORDER — INSULIN LISPRO 100 [IU]/ML
0-9 INJECTION, SOLUTION INTRAVENOUS; SUBCUTANEOUS AS NEEDED
Status: DISCONTINUED | OUTPATIENT
Start: 2022-03-29 | End: 2022-03-29 | Stop reason: ALTCHOICE

## 2022-03-29 RX ORDER — METHYLPREDNISOLONE SODIUM SUCCINATE 125 MG/2ML
60 INJECTION, POWDER, LYOPHILIZED, FOR SOLUTION INTRAMUSCULAR; INTRAVENOUS EVERY 8 HOURS
Status: DISCONTINUED | OUTPATIENT
Start: 2022-03-29 | End: 2022-04-01

## 2022-03-29 RX ORDER — OLANZAPINE 10 MG/2ML
1 INJECTION, POWDER, LYOPHILIZED, FOR SOLUTION INTRAMUSCULAR
Status: DISCONTINUED | OUTPATIENT
Start: 2022-03-29 | End: 2022-03-29 | Stop reason: SDUPTHER

## 2022-03-29 RX ORDER — SODIUM CHLORIDE 9 MG/ML
100 INJECTION, SOLUTION INTRAVENOUS CONTINUOUS
Status: DISCONTINUED | OUTPATIENT
Start: 2022-03-29 | End: 2022-03-31

## 2022-03-29 RX ORDER — HYDRALAZINE HYDROCHLORIDE 20 MG/ML
20 INJECTION INTRAMUSCULAR; INTRAVENOUS EVERY 6 HOURS PRN
Status: DISCONTINUED | OUTPATIENT
Start: 2022-03-29 | End: 2022-04-05 | Stop reason: HOSPADM

## 2022-03-29 RX ORDER — ZOLPIDEM TARTRATE 5 MG/1
5 TABLET ORAL NIGHTLY PRN
Status: DISCONTINUED | OUTPATIENT
Start: 2022-03-29 | End: 2022-04-04

## 2022-03-29 RX ORDER — LIDOCAINE 50 MG/G
2 PATCH TOPICAL
Status: DISCONTINUED | OUTPATIENT
Start: 2022-03-29 | End: 2022-04-05 | Stop reason: HOSPADM

## 2022-03-29 RX ORDER — DOXEPIN HYDROCHLORIDE 25 MG/1
50 CAPSULE ORAL NIGHTLY PRN
Status: DISCONTINUED | OUTPATIENT
Start: 2022-03-29 | End: 2022-03-29

## 2022-03-29 RX ORDER — HYDROCHLOROTHIAZIDE 25 MG/1
25 TABLET ORAL DAILY
Status: DISCONTINUED | OUTPATIENT
Start: 2022-03-29 | End: 2022-03-29

## 2022-03-29 RX ORDER — ROPINIROLE 5 MG/1
5 TABLET, FILM COATED ORAL NIGHTLY
Status: DISCONTINUED | OUTPATIENT
Start: 2022-03-29 | End: 2022-04-05 | Stop reason: HOSPADM

## 2022-03-29 RX ORDER — OLANZAPINE 10 MG/2ML
1 INJECTION, POWDER, LYOPHILIZED, FOR SOLUTION INTRAMUSCULAR AS NEEDED
Status: DISCONTINUED | OUTPATIENT
Start: 2022-03-29 | End: 2022-04-05 | Stop reason: HOSPADM

## 2022-03-29 RX ORDER — LACTULOSE 10 G/15ML
20 SOLUTION ORAL 2 TIMES DAILY
Status: DISCONTINUED | OUTPATIENT
Start: 2022-03-29 | End: 2022-04-05 | Stop reason: HOSPADM

## 2022-03-29 RX ORDER — MORPHINE SULFATE 4 MG/ML
4 INJECTION, SOLUTION INTRAMUSCULAR; INTRAVENOUS ONCE AS NEEDED
Status: COMPLETED | OUTPATIENT
Start: 2022-03-29 | End: 2022-03-29

## 2022-03-29 RX ORDER — NICOTINE POLACRILEX 4 MG
15 LOZENGE BUCCAL
Status: DISCONTINUED | OUTPATIENT
Start: 2022-03-29 | End: 2022-04-05 | Stop reason: HOSPADM

## 2022-03-29 RX ORDER — KETOROLAC TROMETHAMINE 15 MG/ML
15 INJECTION, SOLUTION INTRAMUSCULAR; INTRAVENOUS EVERY 6 HOURS PRN
Status: DISCONTINUED | OUTPATIENT
Start: 2022-03-29 | End: 2022-03-31

## 2022-03-29 RX ORDER — ZOLPIDEM TARTRATE 5 MG/1
5 TABLET ORAL NIGHTLY
Status: DISCONTINUED | OUTPATIENT
Start: 2022-03-29 | End: 2022-03-31

## 2022-03-29 RX ORDER — NICOTINE POLACRILEX 4 MG
15 LOZENGE BUCCAL
Status: DISCONTINUED | OUTPATIENT
Start: 2022-03-29 | End: 2022-03-29 | Stop reason: ALTCHOICE

## 2022-03-29 RX ORDER — CLONIDINE HYDROCHLORIDE 0.1 MG/1
0.2 TABLET ORAL EVERY 6 HOURS PRN
Status: DISCONTINUED | OUTPATIENT
Start: 2022-03-29 | End: 2022-03-29

## 2022-03-29 RX ORDER — TOPIRAMATE 25 MG/1
50 TABLET ORAL DAILY
Status: DISCONTINUED | OUTPATIENT
Start: 2022-03-29 | End: 2022-04-05 | Stop reason: HOSPADM

## 2022-03-29 RX ORDER — LACTULOSE 10 G/15ML
20 SOLUTION ORAL ONCE
Status: COMPLETED | OUTPATIENT
Start: 2022-03-29 | End: 2022-03-29

## 2022-03-29 RX ORDER — FUROSEMIDE 10 MG/ML
80 INJECTION INTRAMUSCULAR; INTRAVENOUS ONCE
Status: COMPLETED | OUTPATIENT
Start: 2022-03-29 | End: 2022-03-29

## 2022-03-29 RX ADMIN — MORPHINE SULFATE 4 MG: 4 INJECTION INTRAVENOUS at 11:22

## 2022-03-29 RX ADMIN — FUROSEMIDE 80 MG: 10 INJECTION, SOLUTION INTRAMUSCULAR; INTRAVENOUS at 01:16

## 2022-03-29 RX ADMIN — SODIUM CHLORIDE 1000 ML: 9 INJECTION, SOLUTION INTRAVENOUS at 15:25

## 2022-03-29 RX ADMIN — TRAZODONE HYDROCHLORIDE 100 MG: 100 TABLET ORAL at 20:58

## 2022-03-29 RX ADMIN — IPRATROPIUM BROMIDE AND ALBUTEROL SULFATE 3 ML: .5; 3 SOLUTION RESPIRATORY (INHALATION) at 18:40

## 2022-03-29 RX ADMIN — GUAIFENESIN 1200 MG: 600 TABLET, EXTENDED RELEASE ORAL at 20:56

## 2022-03-29 RX ADMIN — CEFEPIME HYDROCHLORIDE 2 G: 2 INJECTION, POWDER, FOR SOLUTION INTRAVENOUS at 12:42

## 2022-03-29 RX ADMIN — METHYLPREDNISOLONE SODIUM SUCCINATE 40 MG: 40 INJECTION, POWDER, FOR SOLUTION INTRAMUSCULAR; INTRAVENOUS at 01:16

## 2022-03-29 RX ADMIN — ROPINIROLE 5 MG: 5 TABLET, FILM COATED ORAL at 20:56

## 2022-03-29 RX ADMIN — Medication 10 ML: at 20:57

## 2022-03-29 RX ADMIN — INSULIN LISPRO 4 UNITS: 100 INJECTION, SOLUTION INTRAVENOUS; SUBCUTANEOUS at 12:41

## 2022-03-29 RX ADMIN — CEFEPIME HYDROCHLORIDE 2 G: 2 INJECTION, POWDER, FOR SOLUTION INTRAVENOUS at 02:27

## 2022-03-29 RX ADMIN — CLONIDINE HYDROCHLORIDE 0.2 MG: 0.1 TABLET ORAL at 11:22

## 2022-03-29 RX ADMIN — CLONIDINE HYDROCHLORIDE 0.2 MG: 0.1 TABLET ORAL at 20:57

## 2022-03-29 RX ADMIN — SCOPALAMINE 1 PATCH: 1 PATCH, EXTENDED RELEASE TRANSDERMAL at 20:55

## 2022-03-29 RX ADMIN — IPRATROPIUM BROMIDE AND ALBUTEROL SULFATE 3 ML: .5; 3 SOLUTION RESPIRATORY (INHALATION) at 10:55

## 2022-03-29 RX ADMIN — LISINOPRIL 40 MG: 20 TABLET ORAL at 11:22

## 2022-03-29 RX ADMIN — Medication 10 ML: at 08:30

## 2022-03-29 RX ADMIN — HYDRALAZINE HYDROCHLORIDE 20 MG: 20 INJECTION INTRAMUSCULAR; INTRAVENOUS at 18:34

## 2022-03-29 RX ADMIN — INSULIN LISPRO 7 UNITS: 100 INJECTION, SOLUTION INTRAVENOUS; SUBCUTANEOUS at 01:16

## 2022-03-29 RX ADMIN — NITROGLYCERIN 1 INCH: 20 OINTMENT TOPICAL at 15:51

## 2022-03-29 RX ADMIN — LIDOCAINE 2 PATCH: 50 PATCH TOPICAL at 20:55

## 2022-03-29 RX ADMIN — CEFEPIME HYDROCHLORIDE 2 G: 2 INJECTION, POWDER, FOR SOLUTION INTRAVENOUS at 20:57

## 2022-03-29 RX ADMIN — GUAIFENESIN 1200 MG: 600 TABLET, EXTENDED RELEASE ORAL at 08:30

## 2022-03-29 RX ADMIN — ACETAMINOPHEN 650 MG: 325 TABLET ORAL at 05:21

## 2022-03-29 RX ADMIN — METHYLPREDNISOLONE SODIUM SUCCINATE 40 MG: 40 INJECTION, POWDER, FOR SOLUTION INTRAMUSCULAR; INTRAVENOUS at 10:29

## 2022-03-29 RX ADMIN — OXYCODONE 10 MG: 5 TABLET ORAL at 15:24

## 2022-03-29 RX ADMIN — INSULIN LISPRO 8 UNITS: 100 INJECTION, SOLUTION INTRAVENOUS; SUBCUTANEOUS at 17:06

## 2022-03-29 RX ADMIN — Medication 1500 MG: at 03:10

## 2022-03-29 RX ADMIN — HYDRALAZINE HYDROCHLORIDE 20 MG: 20 INJECTION INTRAMUSCULAR; INTRAVENOUS at 11:22

## 2022-03-29 RX ADMIN — IPRATROPIUM BROMIDE AND ALBUTEROL SULFATE 3 ML: .5; 3 SOLUTION RESPIRATORY (INHALATION) at 02:30

## 2022-03-29 RX ADMIN — LABETALOL 20 MG/4 ML (5 MG/ML) INTRAVENOUS SYRINGE 20 MG: at 00:19

## 2022-03-29 RX ADMIN — OXYCODONE 10 MG: 5 TABLET ORAL at 20:56

## 2022-03-29 RX ADMIN — LACTULOSE 20 G: 20 SOLUTION ORAL at 12:42

## 2022-03-29 RX ADMIN — IPRATROPIUM BROMIDE AND ALBUTEROL SULFATE 3 ML: .5; 3 SOLUTION RESPIRATORY (INHALATION) at 23:52

## 2022-03-29 RX ADMIN — ONDANSETRON 4 MG: 2 INJECTION INTRAMUSCULAR; INTRAVENOUS at 10:29

## 2022-03-29 RX ADMIN — OXYCODONE 10 MG: 5 TABLET ORAL at 05:21

## 2022-03-29 RX ADMIN — TOPIRAMATE 50 MG: 25 TABLET, FILM COATED ORAL at 11:23

## 2022-03-29 RX ADMIN — IPRATROPIUM BROMIDE AND ALBUTEROL SULFATE 3 ML: .5; 3 SOLUTION RESPIRATORY (INHALATION) at 07:02

## 2022-03-29 RX ADMIN — LABETALOL 20 MG/4 ML (5 MG/ML) INTRAVENOUS SYRINGE 20 MG: at 05:21

## 2022-03-29 RX ADMIN — METOPROLOL TARTRATE 50 MG: 50 TABLET, FILM COATED ORAL at 15:51

## 2022-03-29 RX ADMIN — SODIUM CHLORIDE 100 ML/HR: 9 INJECTION, SOLUTION INTRAVENOUS at 15:30

## 2022-03-29 RX ADMIN — INSULIN LISPRO 6 UNITS: 100 INJECTION, SOLUTION INTRAVENOUS; SUBCUTANEOUS at 08:30

## 2022-03-29 RX ADMIN — METHYLPREDNISOLONE SODIUM SUCCINATE 60 MG: 125 INJECTION, POWDER, FOR SOLUTION INTRAMUSCULAR; INTRAVENOUS at 17:06

## 2022-03-29 RX ADMIN — ZOLPIDEM TARTRATE 5 MG: 5 TABLET ORAL at 20:56

## 2022-03-30 LAB
ANION GAP SERPL CALCULATED.3IONS-SCNC: 13 MMOL/L (ref 5–15)
ANION GAP SERPL CALCULATED.3IONS-SCNC: 14 MMOL/L (ref 5–15)
ARTERIAL PATENCY WRIST A: POSITIVE
ATMOSPHERIC PRESS: ABNORMAL MM[HG]
BACTERIA SPEC AEROBE CULT: ABNORMAL
BASE EXCESS BLDA CALC-SCNC: 3.3 MMOL/L (ref 0–3)
BASOPHILS # BLD AUTO: 0 10*3/MM3 (ref 0–0.2)
BASOPHILS NFR BLD AUTO: 0.2 % (ref 0–1.5)
BDY SITE: ABNORMAL
BUN SERPL-MCNC: 19 MG/DL (ref 8–23)
BUN SERPL-MCNC: 21 MG/DL (ref 8–23)
BUN/CREAT SERPL: 20.2 (ref 7–25)
BUN/CREAT SERPL: 20.4 (ref 7–25)
CA-I BLDA-SCNC: 1.18 MMOL/L (ref 1.15–1.33)
CALCIUM SPEC-SCNC: 8.6 MG/DL (ref 8.6–10.5)
CALCIUM SPEC-SCNC: 9.1 MG/DL (ref 8.6–10.5)
CHLORIDE SERPL-SCNC: 100 MMOL/L (ref 98–107)
CHLORIDE SERPL-SCNC: 97 MMOL/L (ref 98–107)
CO2 BLDA-SCNC: 31.7 MMOL/L (ref 22–29)
CO2 SERPL-SCNC: 27 MMOL/L (ref 22–29)
CO2 SERPL-SCNC: 29 MMOL/L (ref 22–29)
CREAT SERPL-MCNC: 0.94 MG/DL (ref 0.57–1)
CREAT SERPL-MCNC: 1.03 MG/DL (ref 0.57–1)
D-LACTATE SERPL-SCNC: 2 MMOL/L (ref 0.5–2)
D-LACTATE SERPL-SCNC: 2.3 MMOL/L (ref 0.5–2)
D-LACTATE SERPL-SCNC: 2.4 MMOL/L (ref 0.5–2)
D-LACTATE SERPL-SCNC: 2.6 MMOL/L (ref 0.5–2)
D-LACTATE SERPL-SCNC: 3.2 MMOL/L (ref 0.5–2)
DEPRECATED RDW RBC AUTO: 41.6 FL (ref 37–54)
DEPRECATED RDW RBC AUTO: 44.2 FL (ref 37–54)
EGFRCR SERPLBLD CKD-EPI 2021: 61.6 ML/MIN/1.73
EGFRCR SERPLBLD CKD-EPI 2021: 68.7 ML/MIN/1.73
EOSINOPHIL # BLD AUTO: 0 10*3/MM3 (ref 0–0.4)
EOSINOPHIL NFR BLD AUTO: 0 % (ref 0.3–6.2)
ERYTHROCYTE [DISTWIDTH] IN BLOOD BY AUTOMATED COUNT: 14.1 % (ref 12.3–15.4)
ERYTHROCYTE [DISTWIDTH] IN BLOOD BY AUTOMATED COUNT: 14.6 % (ref 12.3–15.4)
GLUCOSE BLDC GLUCOMTR-MCNC: 201 MG/DL (ref 70–105)
GLUCOSE BLDC GLUCOMTR-MCNC: 207 MG/DL (ref 70–105)
GLUCOSE BLDC GLUCOMTR-MCNC: 214 MG/DL (ref 70–105)
GLUCOSE BLDC GLUCOMTR-MCNC: 217 MG/DL (ref 70–105)
GLUCOSE BLDC GLUCOMTR-MCNC: 238 MG/DL (ref 74–100)
GLUCOSE BLDC GLUCOMTR-MCNC: 238 MG/DL (ref 74–100)
GLUCOSE SERPL-MCNC: 236 MG/DL (ref 65–99)
GLUCOSE SERPL-MCNC: 264 MG/DL (ref 65–99)
HCO3 BLDA-SCNC: 30 MMOL/L (ref 21–28)
HCT VFR BLD AUTO: 35 % (ref 34–46.6)
HCT VFR BLD AUTO: 36.8 % (ref 34–46.6)
HCT VFR BLDA CALC: 39 % (ref 38–51)
HEMODILUTION: NO
HGB BLD-MCNC: 11.9 G/DL (ref 12–15.9)
HGB BLD-MCNC: 12.1 G/DL (ref 12–15.9)
HGB BLDA-MCNC: 13.3 G/DL (ref 12–17)
INHALED O2 CONCENTRATION: 30 %
LYMPHOCYTES # BLD AUTO: 0.8 10*3/MM3 (ref 0.7–3.1)
LYMPHOCYTES NFR BLD AUTO: 7 % (ref 19.6–45.3)
MAGNESIUM SERPL-MCNC: 1.6 MG/DL (ref 1.6–2.4)
MCH RBC QN AUTO: 27.8 PG (ref 26.6–33)
MCH RBC QN AUTO: 28.6 PG (ref 26.6–33)
MCHC RBC AUTO-ENTMCNC: 32.8 G/DL (ref 31.5–35.7)
MCHC RBC AUTO-ENTMCNC: 34 G/DL (ref 31.5–35.7)
MCV RBC AUTO: 84 FL (ref 79–97)
MCV RBC AUTO: 84.9 FL (ref 79–97)
MODALITY: ABNORMAL
MONOCYTES # BLD AUTO: 0.2 10*3/MM3 (ref 0.1–0.9)
MONOCYTES NFR BLD AUTO: 1.5 % (ref 5–12)
NEUTROPHILS NFR BLD AUTO: 10.9 10*3/MM3 (ref 1.7–7)
NEUTROPHILS NFR BLD AUTO: 91.3 % (ref 42.7–76)
NRBC BLD AUTO-RTO: 0.1 /100 WBC (ref 0–0.2)
PCO2 BLDA: 53.5 MM HG (ref 35–48)
PEEP RESPIRATORY: 6 CM[H2O]
PH BLDA: 7.36 PH UNITS (ref 7.35–7.45)
PLATELET # BLD AUTO: 263 10*3/MM3 (ref 140–450)
PLATELET # BLD AUTO: 280 10*3/MM3 (ref 140–450)
PMV BLD AUTO: 7.2 FL (ref 6–12)
PMV BLD AUTO: 7.3 FL (ref 6–12)
PO2 BLDA: 57.1 MM HG (ref 83–108)
POTASSIUM BLDA-SCNC: 4 MMOL/L (ref 3.5–4.5)
POTASSIUM SERPL-SCNC: 4.2 MMOL/L (ref 3.5–5.2)
POTASSIUM SERPL-SCNC: 4.2 MMOL/L (ref 3.5–5.2)
QT INTERVAL: 322 MS
RBC # BLD AUTO: 4.17 10*6/MM3 (ref 3.77–5.28)
RBC # BLD AUTO: 4.34 10*6/MM3 (ref 3.77–5.28)
RESPIRATORY RATE: 16
SAO2 % BLDCOA: 87.4 % (ref 94–98)
SODIUM BLD-SCNC: 141 MMOL/L (ref 138–146)
SODIUM SERPL-SCNC: 139 MMOL/L (ref 136–145)
SODIUM SERPL-SCNC: 141 MMOL/L (ref 136–145)
WBC NRBC COR # BLD: 11.9 10*3/MM3 (ref 3.4–10.8)
WBC NRBC COR # BLD: 14.4 10*3/MM3 (ref 3.4–10.8)

## 2022-03-30 PROCEDURE — 80048 BASIC METABOLIC PNL TOTAL CA: CPT | Performed by: HOSPITALIST

## 2022-03-30 PROCEDURE — 94660 CPAP INITIATION&MGMT: CPT

## 2022-03-30 PROCEDURE — 94799 UNLISTED PULMONARY SVC/PX: CPT

## 2022-03-30 PROCEDURE — 80051 ELECTROLYTE PANEL: CPT

## 2022-03-30 PROCEDURE — 36600 WITHDRAWAL OF ARTERIAL BLOOD: CPT

## 2022-03-30 PROCEDURE — 85027 COMPLETE CBC AUTOMATED: CPT | Performed by: HOSPITALIST

## 2022-03-30 PROCEDURE — 82962 GLUCOSE BLOOD TEST: CPT

## 2022-03-30 PROCEDURE — 25010000002 PROMETHAZINE PER 50 MG: Performed by: HOSPITALIST

## 2022-03-30 PROCEDURE — 99233 SBSQ HOSP IP/OBS HIGH 50: CPT | Performed by: HOSPITALIST

## 2022-03-30 PROCEDURE — 25010000002 METHYLPREDNISOLONE PER 125 MG: Performed by: NURSE PRACTITIONER

## 2022-03-30 PROCEDURE — 83605 ASSAY OF LACTIC ACID: CPT | Performed by: NURSE PRACTITIONER

## 2022-03-30 PROCEDURE — 94761 N-INVAS EAR/PLS OXIMETRY MLT: CPT

## 2022-03-30 PROCEDURE — 63710000001 INSULIN REGULAR HUMAN PER 5 UNITS: Performed by: HOSPITALIST

## 2022-03-30 PROCEDURE — 25010000002 CEFTRIAXONE SODIUM-DEXTROSE 1-3.74 GM-%(50ML) RECONSTITUTED SOLUTION: Performed by: HOSPITALIST

## 2022-03-30 PROCEDURE — 25010000002 CEFEPIME PER 500 MG: Performed by: NURSE PRACTITIONER

## 2022-03-30 PROCEDURE — 25010000002 ONDANSETRON PER 1 MG: Performed by: PHYSICIAN ASSISTANT

## 2022-03-30 PROCEDURE — 82330 ASSAY OF CALCIUM: CPT

## 2022-03-30 PROCEDURE — 25010000002 HYDRALAZINE PER 20 MG: Performed by: NURSE PRACTITIONER

## 2022-03-30 PROCEDURE — 85018 HEMOGLOBIN: CPT

## 2022-03-30 PROCEDURE — 63710000001 INSULIN LISPRO (HUMAN) PER 5 UNITS: Performed by: NURSE PRACTITIONER

## 2022-03-30 PROCEDURE — 25010000002 VANCOMYCIN HCL 1.25 G RECONSTITUTED SOLUTION 1 EACH VIAL: Performed by: NURSE PRACTITIONER

## 2022-03-30 PROCEDURE — 83605 ASSAY OF LACTIC ACID: CPT

## 2022-03-30 PROCEDURE — 82803 BLOOD GASES ANY COMBINATION: CPT

## 2022-03-30 RX ORDER — METOPROLOL TARTRATE 5 MG/5ML
10 INJECTION INTRAVENOUS
Status: DISCONTINUED | OUTPATIENT
Start: 2022-03-30 | End: 2022-04-05 | Stop reason: HOSPADM

## 2022-03-30 RX ORDER — CEFTRIAXONE 1 G/50ML
1 INJECTION, SOLUTION INTRAVENOUS EVERY 24 HOURS
Status: DISCONTINUED | OUTPATIENT
Start: 2022-03-30 | End: 2022-04-04

## 2022-03-30 RX ORDER — IPRATROPIUM BROMIDE AND ALBUTEROL SULFATE 2.5; .5 MG/3ML; MG/3ML
3 SOLUTION RESPIRATORY (INHALATION)
Status: DISCONTINUED | OUTPATIENT
Start: 2022-03-30 | End: 2022-03-31

## 2022-03-30 RX ADMIN — SODIUM CHLORIDE 100 ML/HR: 9 INJECTION, SOLUTION INTRAVENOUS at 18:16

## 2022-03-30 RX ADMIN — NITROGLYCERIN 1 INCH: 20 OINTMENT TOPICAL at 17:05

## 2022-03-30 RX ADMIN — METOPROLOL TARTRATE 10 MG: 1 INJECTION, SOLUTION INTRAVENOUS at 20:01

## 2022-03-30 RX ADMIN — ONDANSETRON 4 MG: 2 INJECTION INTRAMUSCULAR; INTRAVENOUS at 12:38

## 2022-03-30 RX ADMIN — LISINOPRIL 40 MG: 20 TABLET ORAL at 11:44

## 2022-03-30 RX ADMIN — METOPROLOL TARTRATE 50 MG: 50 TABLET, FILM COATED ORAL at 21:39

## 2022-03-30 RX ADMIN — SODIUM CHLORIDE 100 ML/HR: 9 INJECTION, SOLUTION INTRAVENOUS at 07:12

## 2022-03-30 RX ADMIN — Medication 10 ML: at 11:16

## 2022-03-30 RX ADMIN — TRAZODONE HYDROCHLORIDE 100 MG: 100 TABLET ORAL at 20:09

## 2022-03-30 RX ADMIN — OXYCODONE 10 MG: 5 TABLET ORAL at 17:05

## 2022-03-30 RX ADMIN — Medication 10 ML: at 20:08

## 2022-03-30 RX ADMIN — METOPROLOL TARTRATE 10 MG: 1 INJECTION, SOLUTION INTRAVENOUS at 17:05

## 2022-03-30 RX ADMIN — TOPIRAMATE 50 MG: 25 TABLET, FILM COATED ORAL at 11:14

## 2022-03-30 RX ADMIN — HYDRALAZINE HYDROCHLORIDE 20 MG: 20 INJECTION INTRAMUSCULAR; INTRAVENOUS at 11:26

## 2022-03-30 RX ADMIN — IPRATROPIUM BROMIDE AND ALBUTEROL SULFATE 3 ML: .5; 3 SOLUTION RESPIRATORY (INHALATION) at 06:51

## 2022-03-30 RX ADMIN — VANCOMYCIN HYDROCHLORIDE 1250 MG: 1.25 INJECTION, POWDER, LYOPHILIZED, FOR SOLUTION INTRAVENOUS at 05:07

## 2022-03-30 RX ADMIN — OXYCODONE 10 MG: 5 TABLET ORAL at 11:14

## 2022-03-30 RX ADMIN — CLONIDINE HYDROCHLORIDE 0.2 MG: 0.1 TABLET ORAL at 21:39

## 2022-03-30 RX ADMIN — GUAIFENESIN 1200 MG: 600 TABLET, EXTENDED RELEASE ORAL at 11:14

## 2022-03-30 RX ADMIN — CEFEPIME HYDROCHLORIDE 2 G: 2 INJECTION, POWDER, FOR SOLUTION INTRAVENOUS at 06:31

## 2022-03-30 RX ADMIN — INSULIN LISPRO 8 UNITS: 100 INJECTION, SOLUTION INTRAVENOUS; SUBCUTANEOUS at 18:16

## 2022-03-30 RX ADMIN — METHYLPREDNISOLONE SODIUM SUCCINATE 60 MG: 125 INJECTION, POWDER, FOR SOLUTION INTRAMUSCULAR; INTRAVENOUS at 11:14

## 2022-03-30 RX ADMIN — NITROGLYCERIN 1 INCH: 20 OINTMENT TOPICAL at 11:38

## 2022-03-30 RX ADMIN — INSULIN LISPRO 8 UNITS: 100 INJECTION, SOLUTION INTRAVENOUS; SUBCUTANEOUS at 11:14

## 2022-03-30 RX ADMIN — IPRATROPIUM BROMIDE AND ALBUTEROL SULFATE 3 ML: .5; 3 SOLUTION RESPIRATORY (INHALATION) at 15:39

## 2022-03-30 RX ADMIN — ZOLPIDEM TARTRATE 5 MG: 5 TABLET ORAL at 20:09

## 2022-03-30 RX ADMIN — NITROGLYCERIN 1 INCH: 20 OINTMENT TOPICAL at 06:31

## 2022-03-30 RX ADMIN — METHYLPREDNISOLONE SODIUM SUCCINATE 60 MG: 125 INJECTION, POWDER, FOR SOLUTION INTRAMUSCULAR; INTRAVENOUS at 17:05

## 2022-03-30 RX ADMIN — INSULIN HUMAN 6 UNITS: 100 INJECTION, SOLUTION PARENTERAL at 17:05

## 2022-03-30 RX ADMIN — HYDRALAZINE HYDROCHLORIDE 20 MG: 20 INJECTION INTRAMUSCULAR; INTRAVENOUS at 22:24

## 2022-03-30 RX ADMIN — ROPINIROLE 5 MG: 5 TABLET, FILM COATED ORAL at 20:09

## 2022-03-30 RX ADMIN — PROMETHAZINE HYDROCHLORIDE 12.5 MG: 25 INJECTION INTRAMUSCULAR; INTRAVENOUS at 15:45

## 2022-03-30 RX ADMIN — METHYLPREDNISOLONE SODIUM SUCCINATE 60 MG: 125 INJECTION, POWDER, FOR SOLUTION INTRAMUSCULAR; INTRAVENOUS at 02:57

## 2022-03-30 RX ADMIN — METOPROLOL TARTRATE 10 MG: 1 INJECTION, SOLUTION INTRAVENOUS at 23:01

## 2022-03-30 RX ADMIN — IPRATROPIUM BROMIDE AND ALBUTEROL SULFATE 3 ML: .5; 3 SOLUTION RESPIRATORY (INHALATION) at 03:44

## 2022-03-30 RX ADMIN — IPRATROPIUM BROMIDE AND ALBUTEROL SULFATE 3 ML: .5; 3 SOLUTION RESPIRATORY (INHALATION) at 20:52

## 2022-03-30 RX ADMIN — ONDANSETRON 4 MG: 2 INJECTION INTRAMUSCULAR; INTRAVENOUS at 21:37

## 2022-03-30 RX ADMIN — CLONIDINE HYDROCHLORIDE 0.2 MG: 0.1 TABLET ORAL at 11:14

## 2022-03-30 RX ADMIN — CEFTRIAXONE 1 G: 1 INJECTION, SOLUTION INTRAVENOUS at 13:21

## 2022-03-30 RX ADMIN — LIDOCAINE 2 PATCH: 50 PATCH TOPICAL at 11:15

## 2022-03-30 RX ADMIN — METOPROLOL TARTRATE 50 MG: 50 TABLET, FILM COATED ORAL at 11:14

## 2022-03-30 RX ADMIN — PROMETHAZINE HYDROCHLORIDE 12.5 MG: 25 INJECTION INTRAMUSCULAR; INTRAVENOUS at 22:42

## 2022-03-30 RX ADMIN — GUAIFENESIN 1200 MG: 600 TABLET, EXTENDED RELEASE ORAL at 20:09

## 2022-03-31 ENCOUNTER — APPOINTMENT (OUTPATIENT)
Dept: GENERAL RADIOLOGY | Facility: HOSPITAL | Age: 62
End: 2022-03-31

## 2022-03-31 LAB
ARTERIAL PATENCY WRIST A: ABNORMAL
ATMOSPHERIC PRESS: ABNORMAL MM[HG]
ATMOSPHERIC PRESS: ABNORMAL MM[HG]
BASE EXCESS BLDA CALC-SCNC: 4.8 MMOL/L (ref 0–3)
BASE EXCESS BLDV CALC-SCNC: 3.4 MMOL/L (ref -2–2)
BDY SITE: ABNORMAL
BDY SITE: ABNORMAL
CO2 BLDA-SCNC: 33.1 MMOL/L (ref 22–29)
CO2 BLDA-SCNC: 33.4 MMOL/L (ref 22–29)
D-LACTATE SERPL-SCNC: 0.7 MMOL/L (ref 0.5–2)
D-LACTATE SERPL-SCNC: 1.2 MMOL/L (ref 0.5–2)
GLUCOSE BLDC GLUCOMTR-MCNC: 172 MG/DL (ref 70–105)
GLUCOSE BLDC GLUCOMTR-MCNC: 183 MG/DL (ref 70–105)
GLUCOSE BLDC GLUCOMTR-MCNC: 192 MG/DL (ref 70–105)
GLUCOSE BLDC GLUCOMTR-MCNC: 208 MG/DL (ref 70–105)
HCO3 BLDA-SCNC: 31.7 MMOL/L (ref 21–28)
HCO3 BLDV-SCNC: 31.2 MMOL/L (ref 22–26)
HEMODILUTION: NO
INHALED O2 CONCENTRATION: 30 %
INHALED O2 CONCENTRATION: 40 %
MODALITY: ABNORMAL
MODALITY: ABNORMAL
PAW @ PEAK INSP FLOW SETTING VENT: 14 CMH2O
PCO2 BLDA: 56.1 MM HG (ref 35–48)
PCO2 BLDV: 61.4 MM HG (ref 42–51)
PEEP RESPIRATORY: 6 CM[H2O]
PH BLDA: 7.36 PH UNITS (ref 7.35–7.45)
PH BLDV: 7.31 PH UNITS (ref 7.32–7.43)
PO2 BLDA: 62.7 MM HG (ref 83–108)
PO2 BLDV: 45.1 MM HG (ref 40–42)
SAO2 % BLDCOA: 90.1 % (ref 94–98)
SAO2 % BLDCOV: 75.2 % (ref 45–75)

## 2022-03-31 PROCEDURE — 25010000002 ONDANSETRON PER 1 MG: Performed by: PHYSICIAN ASSISTANT

## 2022-03-31 PROCEDURE — 94799 UNLISTED PULMONARY SVC/PX: CPT

## 2022-03-31 PROCEDURE — 84145 PROCALCITONIN (PCT): CPT | Performed by: HOSPITALIST

## 2022-03-31 PROCEDURE — 0 MORPHINE SULFATE 4 MG/ML SOLUTION: Performed by: HOSPITALIST

## 2022-03-31 PROCEDURE — 25010000002 ENOXAPARIN PER 10 MG: Performed by: NURSE PRACTITIONER

## 2022-03-31 PROCEDURE — 84100 ASSAY OF PHOSPHORUS: CPT | Performed by: HOSPITALIST

## 2022-03-31 PROCEDURE — 99222 1ST HOSP IP/OBS MODERATE 55: CPT | Performed by: PHYSICIAN ASSISTANT

## 2022-03-31 PROCEDURE — 94761 N-INVAS EAR/PLS OXIMETRY MLT: CPT

## 2022-03-31 PROCEDURE — 25010000002 METHYLPREDNISOLONE PER 125 MG: Performed by: NURSE PRACTITIONER

## 2022-03-31 PROCEDURE — 84484 ASSAY OF TROPONIN QUANT: CPT | Performed by: HOSPITALIST

## 2022-03-31 PROCEDURE — 82962 GLUCOSE BLOOD TEST: CPT

## 2022-03-31 PROCEDURE — 80048 BASIC METABOLIC PNL TOTAL CA: CPT | Performed by: HOSPITALIST

## 2022-03-31 PROCEDURE — 63710000001 PROMETHAZINE PER 25 MG: Performed by: HOSPITALIST

## 2022-03-31 PROCEDURE — 4A133J1 MONITORING OF ARTERIAL PULSE, PERIPHERAL, PERCUTANEOUS APPROACH: ICD-10-PCS | Performed by: NURSE PRACTITIONER

## 2022-03-31 PROCEDURE — 99233 SBSQ HOSP IP/OBS HIGH 50: CPT | Performed by: HOSPITALIST

## 2022-03-31 PROCEDURE — 25010000002 MORPHINE PER 10 MG: Performed by: HOSPITALIST

## 2022-03-31 PROCEDURE — 25010000002 HYDRALAZINE PER 20 MG: Performed by: NURSE PRACTITIONER

## 2022-03-31 PROCEDURE — 63710000001 INSULIN LISPRO (HUMAN) PER 5 UNITS: Performed by: NURSE PRACTITIONER

## 2022-03-31 PROCEDURE — 83735 ASSAY OF MAGNESIUM: CPT | Performed by: HOSPITALIST

## 2022-03-31 PROCEDURE — 25010000002 CEFTRIAXONE SODIUM-DEXTROSE 1-3.74 GM-%(50ML) RECONSTITUTED SOLUTION: Performed by: HOSPITALIST

## 2022-03-31 PROCEDURE — 63710000001 INSULIN REGULAR HUMAN PER 5 UNITS: Performed by: HOSPITALIST

## 2022-03-31 PROCEDURE — 25010000002 HALOPERIDOL LACTATE PER 5 MG: Performed by: PHYSICIAN ASSISTANT

## 2022-03-31 PROCEDURE — 36620 INSERTION CATHETER ARTERY: CPT | Performed by: NURSE PRACTITIONER

## 2022-03-31 PROCEDURE — 25010000002 ONDANSETRON PER 1 MG: Performed by: NURSE PRACTITIONER

## 2022-03-31 PROCEDURE — 82803 BLOOD GASES ANY COMBINATION: CPT

## 2022-03-31 PROCEDURE — 93005 ELECTROCARDIOGRAM TRACING: CPT | Performed by: HOSPITALIST

## 2022-03-31 PROCEDURE — 4A133B1 MONITORING OF ARTERIAL PRESSURE, PERIPHERAL, PERCUTANEOUS APPROACH: ICD-10-PCS | Performed by: NURSE PRACTITIONER

## 2022-03-31 PROCEDURE — 94660 CPAP INITIATION&MGMT: CPT

## 2022-03-31 PROCEDURE — 83605 ASSAY OF LACTIC ACID: CPT | Performed by: NURSE PRACTITIONER

## 2022-03-31 PROCEDURE — 71045 X-RAY EXAM CHEST 1 VIEW: CPT

## 2022-03-31 RX ORDER — HALOPERIDOL 5 MG/ML
2 INJECTION INTRAMUSCULAR EVERY 6 HOURS PRN
Status: DISCONTINUED | OUTPATIENT
Start: 2022-03-31 | End: 2022-04-05 | Stop reason: HOSPADM

## 2022-03-31 RX ORDER — HYDRALAZINE HYDROCHLORIDE 25 MG/1
50 TABLET, FILM COATED ORAL EVERY 12 HOURS SCHEDULED
Status: DISCONTINUED | OUTPATIENT
Start: 2022-03-31 | End: 2022-04-03

## 2022-03-31 RX ORDER — MORPHINE SULFATE 4 MG/ML
5 INJECTION, SOLUTION INTRAMUSCULAR; INTRAVENOUS EVERY 4 HOURS PRN
Status: DISCONTINUED | OUTPATIENT
Start: 2022-03-31 | End: 2022-04-05 | Stop reason: HOSPADM

## 2022-03-31 RX ORDER — METOPROLOL TARTRATE 50 MG/1
100 TABLET, FILM COATED ORAL EVERY 12 HOURS SCHEDULED
Status: DISCONTINUED | OUTPATIENT
Start: 2022-03-31 | End: 2022-03-31

## 2022-03-31 RX ORDER — ZOLPIDEM TARTRATE 5 MG/1
10 TABLET ORAL NIGHTLY
Status: DISCONTINUED | OUTPATIENT
Start: 2022-03-31 | End: 2022-04-05 | Stop reason: HOSPADM

## 2022-03-31 RX ORDER — METOPROLOL TARTRATE 50 MG/1
100 TABLET, FILM COATED ORAL EVERY 12 HOURS SCHEDULED
Status: DISCONTINUED | OUTPATIENT
Start: 2022-03-31 | End: 2022-04-05 | Stop reason: HOSPADM

## 2022-03-31 RX ORDER — PROMETHAZINE HYDROCHLORIDE 25 MG/1
50 TABLET ORAL EVERY 8 HOURS
Status: DISCONTINUED | OUTPATIENT
Start: 2022-03-31 | End: 2022-04-05 | Stop reason: HOSPADM

## 2022-03-31 RX ORDER — FENTANYL 12 UG/H
1 PATCH TRANSDERMAL
Status: DISCONTINUED | OUTPATIENT
Start: 2022-03-31 | End: 2022-04-02

## 2022-03-31 RX ORDER — BUDESONIDE 0.5 MG/2ML
0.5 INHALANT ORAL
Status: DISCONTINUED | OUTPATIENT
Start: 2022-03-31 | End: 2022-04-05 | Stop reason: HOSPADM

## 2022-03-31 RX ORDER — ONDANSETRON 2 MG/ML
8 INJECTION INTRAMUSCULAR; INTRAVENOUS EVERY 6 HOURS PRN
Status: DISCONTINUED | OUTPATIENT
Start: 2022-03-31 | End: 2022-04-05 | Stop reason: HOSPADM

## 2022-03-31 RX ORDER — NITROGLYCERIN 20 MG/100ML
5-200 INJECTION INTRAVENOUS
Status: DISCONTINUED | OUTPATIENT
Start: 2022-03-31 | End: 2022-04-01

## 2022-03-31 RX ORDER — IPRATROPIUM BROMIDE AND ALBUTEROL SULFATE 2.5; .5 MG/3ML; MG/3ML
3 SOLUTION RESPIRATORY (INHALATION) EVERY 4 HOURS PRN
Status: DISCONTINUED | OUTPATIENT
Start: 2022-03-31 | End: 2022-04-05 | Stop reason: HOSPADM

## 2022-03-31 RX ORDER — HYDROCHLOROTHIAZIDE 25 MG/1
25 TABLET ORAL DAILY
Status: DISCONTINUED | OUTPATIENT
Start: 2022-03-31 | End: 2022-04-05 | Stop reason: HOSPADM

## 2022-03-31 RX ORDER — ARFORMOTEROL TARTRATE 15 UG/2ML
15 SOLUTION RESPIRATORY (INHALATION)
Status: DISCONTINUED | OUTPATIENT
Start: 2022-03-31 | End: 2022-04-04

## 2022-03-31 RX ORDER — METOPROLOL TARTRATE 50 MG/1
50 TABLET, FILM COATED ORAL ONCE
Status: COMPLETED | OUTPATIENT
Start: 2022-03-31 | End: 2022-03-31

## 2022-03-31 RX ORDER — PANTOPRAZOLE SODIUM 40 MG/10ML
40 INJECTION, POWDER, LYOPHILIZED, FOR SOLUTION INTRAVENOUS
Status: DISCONTINUED | OUTPATIENT
Start: 2022-03-31 | End: 2022-03-31

## 2022-03-31 RX ORDER — PROMETHAZINE HYDROCHLORIDE 25 MG/1
50 TABLET ORAL EVERY 6 HOURS PRN
Status: DISCONTINUED | OUTPATIENT
Start: 2022-03-31 | End: 2022-03-31

## 2022-03-31 RX ORDER — ONDANSETRON 4 MG/1
4 TABLET, FILM COATED ORAL EVERY 6 HOURS PRN
Status: DISCONTINUED | OUTPATIENT
Start: 2022-03-31 | End: 2022-04-05 | Stop reason: HOSPADM

## 2022-03-31 RX ADMIN — INSULIN HUMAN 6 UNITS: 100 INJECTION, SOLUTION PARENTERAL at 02:10

## 2022-03-31 RX ADMIN — NITROGLYCERIN 1 INCH: 20 OINTMENT TOPICAL at 08:49

## 2022-03-31 RX ADMIN — BUDESONIDE 0.5 MG: 0.5 INHALANT RESPIRATORY (INHALATION) at 21:09

## 2022-03-31 RX ADMIN — INSULIN LISPRO 4 UNITS: 100 INJECTION, SOLUTION INTRAVENOUS; SUBCUTANEOUS at 17:53

## 2022-03-31 RX ADMIN — HYDRALAZINE HYDROCHLORIDE 50 MG: 25 TABLET, FILM COATED ORAL at 20:18

## 2022-03-31 RX ADMIN — METOPROLOL TARTRATE 10 MG: 1 INJECTION, SOLUTION INTRAVENOUS at 05:52

## 2022-03-31 RX ADMIN — INSULIN HUMAN 12 UNITS: 100 INJECTION, SOLUTION PARENTERAL at 17:53

## 2022-03-31 RX ADMIN — METHYLPREDNISOLONE SODIUM SUCCINATE 60 MG: 125 INJECTION, POWDER, FOR SOLUTION INTRAMUSCULAR; INTRAVENOUS at 09:02

## 2022-03-31 RX ADMIN — CLONIDINE HYDROCHLORIDE 0.2 MG: 0.1 TABLET ORAL at 08:49

## 2022-03-31 RX ADMIN — GUAIFENESIN 1200 MG: 600 TABLET, EXTENDED RELEASE ORAL at 08:49

## 2022-03-31 RX ADMIN — ZOLPIDEM TARTRATE 10 MG: 5 TABLET ORAL at 20:17

## 2022-03-31 RX ADMIN — METHYLPREDNISOLONE SODIUM SUCCINATE 60 MG: 125 INJECTION, POWDER, FOR SOLUTION INTRAMUSCULAR; INTRAVENOUS at 02:09

## 2022-03-31 RX ADMIN — CLONIDINE HYDROCHLORIDE 0.2 MG: 0.1 TABLET ORAL at 20:18

## 2022-03-31 RX ADMIN — HALOPERIDOL LACTATE 2 MG: 5 INJECTION, SOLUTION INTRAMUSCULAR at 23:17

## 2022-03-31 RX ADMIN — NICARDIPINE HYDROCHLORIDE 15 MG/HR: 25 INJECTION, SOLUTION INTRAVENOUS at 22:19

## 2022-03-31 RX ADMIN — IPRATROPIUM BROMIDE AND ALBUTEROL SULFATE 3 ML: .5; 3 SOLUTION RESPIRATORY (INHALATION) at 11:42

## 2022-03-31 RX ADMIN — METOPROLOL TARTRATE 10 MG: 1 INJECTION, SOLUTION INTRAVENOUS at 11:24

## 2022-03-31 RX ADMIN — NICARDIPINE HYDROCHLORIDE 15 MG/HR: 25 INJECTION, SOLUTION INTRAVENOUS at 20:17

## 2022-03-31 RX ADMIN — ONDANSETRON 8 MG: 2 INJECTION INTRAMUSCULAR; INTRAVENOUS at 20:34

## 2022-03-31 RX ADMIN — Medication 10 ML: at 08:50

## 2022-03-31 RX ADMIN — ENOXAPARIN SODIUM 40 MG: 40 INJECTION SUBCUTANEOUS at 20:18

## 2022-03-31 RX ADMIN — LISINOPRIL 40 MG: 20 TABLET ORAL at 10:32

## 2022-03-31 RX ADMIN — MORPHINE SULFATE 5 MG: 4 INJECTION INTRAVENOUS at 13:00

## 2022-03-31 RX ADMIN — HYDROCHLOROTHIAZIDE 25 MG: 25 TABLET ORAL at 15:56

## 2022-03-31 RX ADMIN — INSULIN LISPRO 4 UNITS: 100 INJECTION, SOLUTION INTRAVENOUS; SUBCUTANEOUS at 11:20

## 2022-03-31 RX ADMIN — ONDANSETRON 4 MG: 2 INJECTION INTRAMUSCULAR; INTRAVENOUS at 13:24

## 2022-03-31 RX ADMIN — METOPROLOL TARTRATE 10 MG: 1 INJECTION, SOLUTION INTRAVENOUS at 02:09

## 2022-03-31 RX ADMIN — NITROGLYCERIN 5 MCG/MIN: 20 INJECTION INTRAVENOUS at 10:17

## 2022-03-31 RX ADMIN — FENTANYL 1 PATCH: 12 PATCH, EXTENDED RELEASE TRANSDERMAL at 13:53

## 2022-03-31 RX ADMIN — INSULIN LISPRO 4 UNITS: 100 INJECTION, SOLUTION INTRAVENOUS; SUBCUTANEOUS at 08:49

## 2022-03-31 RX ADMIN — NICARDIPINE HYDROCHLORIDE 5 MG/HR: 25 INJECTION, SOLUTION INTRAVENOUS at 15:01

## 2022-03-31 RX ADMIN — HYDRALAZINE HYDROCHLORIDE 20 MG: 20 INJECTION INTRAMUSCULAR; INTRAVENOUS at 12:34

## 2022-03-31 RX ADMIN — TOPIRAMATE 50 MG: 25 TABLET, FILM COATED ORAL at 08:49

## 2022-03-31 RX ADMIN — METOPROLOL TARTRATE 50 MG: 50 TABLET, FILM COATED ORAL at 08:50

## 2022-03-31 RX ADMIN — Medication 10 ML: at 20:19

## 2022-03-31 RX ADMIN — GUAIFENESIN 1200 MG: 600 TABLET, EXTENDED RELEASE ORAL at 20:18

## 2022-03-31 RX ADMIN — ARFORMOTEROL TARTRATE 15 MCG: 15 SOLUTION RESPIRATORY (INHALATION) at 12:24

## 2022-03-31 RX ADMIN — HYDRALAZINE HYDROCHLORIDE 50 MG: 25 TABLET, FILM COATED ORAL at 15:56

## 2022-03-31 RX ADMIN — ARFORMOTEROL TARTRATE 15 MCG: 15 SOLUTION RESPIRATORY (INHALATION) at 21:09

## 2022-03-31 RX ADMIN — PROMETHAZINE HYDROCHLORIDE 50 MG: 25 TABLET ORAL at 13:53

## 2022-03-31 RX ADMIN — METOPROLOL TARTRATE 100 MG: 50 TABLET, FILM COATED ORAL at 20:17

## 2022-03-31 RX ADMIN — INSULIN HUMAN 6 UNITS: 100 INJECTION, SOLUTION PARENTERAL at 09:02

## 2022-03-31 RX ADMIN — MORPHINE SULFATE 5 MG: 4 INJECTION INTRAVENOUS at 20:17

## 2022-03-31 RX ADMIN — METOPROLOL TARTRATE 50 MG: 50 TABLET, FILM COATED ORAL at 17:53

## 2022-03-31 RX ADMIN — OXYCODONE 10 MG: 5 TABLET ORAL at 11:20

## 2022-03-31 RX ADMIN — ROPINIROLE 5 MG: 5 TABLET, FILM COATED ORAL at 20:18

## 2022-03-31 RX ADMIN — METHYLPREDNISOLONE SODIUM SUCCINATE 60 MG: 125 INJECTION, POWDER, FOR SOLUTION INTRAMUSCULAR; INTRAVENOUS at 17:53

## 2022-03-31 RX ADMIN — LIDOCAINE 2 PATCH: 50 PATCH TOPICAL at 08:51

## 2022-03-31 RX ADMIN — NICARDIPINE HYDROCHLORIDE 15 MG/HR: 25 INJECTION, SOLUTION INTRAVENOUS at 17:56

## 2022-03-31 RX ADMIN — CEFTRIAXONE 1 G: 1 INJECTION, SOLUTION INTRAVENOUS at 12:34

## 2022-03-31 RX ADMIN — IPRATROPIUM BROMIDE AND ALBUTEROL SULFATE 3 ML: .5; 3 SOLUTION RESPIRATORY (INHALATION) at 07:01

## 2022-04-01 LAB
ANION GAP SERPL CALCULATED.3IONS-SCNC: 10 MMOL/L (ref 5–15)
BASOPHILS # BLD AUTO: 0.1 10*3/MM3 (ref 0–0.2)
BASOPHILS NFR BLD AUTO: 0.5 % (ref 0–1.5)
BUN SERPL-MCNC: 20 MG/DL (ref 8–23)
BUN/CREAT SERPL: 28.6 (ref 7–25)
CALCIUM SPEC-SCNC: 8.5 MG/DL (ref 8.6–10.5)
CHLORIDE SERPL-SCNC: 99 MMOL/L (ref 98–107)
CO2 SERPL-SCNC: 26 MMOL/L (ref 22–29)
CREAT SERPL-MCNC: 0.7 MG/DL (ref 0.57–1)
DEPRECATED RDW RBC AUTO: 43.8 FL (ref 37–54)
EGFRCR SERPLBLD CKD-EPI 2021: 97.9 ML/MIN/1.73
EOSINOPHIL # BLD AUTO: 0 10*3/MM3 (ref 0–0.4)
EOSINOPHIL NFR BLD AUTO: 0.3 % (ref 0.3–6.2)
ERYTHROCYTE [DISTWIDTH] IN BLOOD BY AUTOMATED COUNT: 14.6 % (ref 12.3–15.4)
GLUCOSE BLDC GLUCOMTR-MCNC: 164 MG/DL (ref 70–105)
GLUCOSE BLDC GLUCOMTR-MCNC: 177 MG/DL (ref 70–105)
GLUCOSE BLDC GLUCOMTR-MCNC: 244 MG/DL (ref 70–105)
GLUCOSE BLDC GLUCOMTR-MCNC: 276 MG/DL (ref 70–105)
GLUCOSE SERPL-MCNC: 178 MG/DL (ref 65–99)
HCT VFR BLD AUTO: 31.9 % (ref 34–46.6)
HGB BLD-MCNC: 10.8 G/DL (ref 12–15.9)
LYMPHOCYTES # BLD AUTO: 0.9 10*3/MM3 (ref 0.7–3.1)
LYMPHOCYTES NFR BLD AUTO: 9.1 % (ref 19.6–45.3)
MAGNESIUM SERPL-MCNC: 1.9 MG/DL (ref 1.6–2.4)
MCH RBC QN AUTO: 28.4 PG (ref 26.6–33)
MCHC RBC AUTO-ENTMCNC: 33.7 G/DL (ref 31.5–35.7)
MCV RBC AUTO: 84.3 FL (ref 79–97)
MONOCYTES # BLD AUTO: 0.5 10*3/MM3 (ref 0.1–0.9)
MONOCYTES NFR BLD AUTO: 4.6 % (ref 5–12)
NEUTROPHILS NFR BLD AUTO: 8.8 10*3/MM3 (ref 1.7–7)
NEUTROPHILS NFR BLD AUTO: 85.5 % (ref 42.7–76)
NRBC BLD AUTO-RTO: 0.1 /100 WBC (ref 0–0.2)
PHOSPHATE SERPL-MCNC: 2.3 MG/DL (ref 2.5–4.5)
PLATELET # BLD AUTO: 217 10*3/MM3 (ref 140–450)
PMV BLD AUTO: 6.7 FL (ref 6–12)
POTASSIUM SERPL-SCNC: 4 MMOL/L (ref 3.5–5.2)
PROCALCITONIN SERPL-MCNC: 0.05 NG/ML (ref 0–0.25)
RBC # BLD AUTO: 3.79 10*6/MM3 (ref 3.77–5.28)
SODIUM SERPL-SCNC: 135 MMOL/L (ref 136–145)
TROPONIN T SERPL-MCNC: <0.01 NG/ML (ref 0–0.03)
VIT B12 BLD-MCNC: 489 PG/ML (ref 211–946)
WBC NRBC COR # BLD: 10.3 10*3/MM3 (ref 3.4–10.8)

## 2022-04-01 PROCEDURE — 85025 COMPLETE CBC W/AUTO DIFF WBC: CPT | Performed by: HOSPITALIST

## 2022-04-01 PROCEDURE — 63710000001 PROMETHAZINE PER 25 MG: Performed by: HOSPITALIST

## 2022-04-01 PROCEDURE — 25010000002 METHYLPREDNISOLONE PER 125 MG: Performed by: NURSE PRACTITIONER

## 2022-04-01 PROCEDURE — 94799 UNLISTED PULMONARY SVC/PX: CPT

## 2022-04-01 PROCEDURE — 82962 GLUCOSE BLOOD TEST: CPT

## 2022-04-01 PROCEDURE — 25010000002 CEFTRIAXONE SODIUM-DEXTROSE 1-3.74 GM-%(50ML) RECONSTITUTED SOLUTION: Performed by: HOSPITALIST

## 2022-04-01 PROCEDURE — 94660 CPAP INITIATION&MGMT: CPT

## 2022-04-01 PROCEDURE — 25010000002 ENOXAPARIN PER 10 MG: Performed by: NURSE PRACTITIONER

## 2022-04-01 PROCEDURE — 63710000001 INSULIN LISPRO (HUMAN) PER 5 UNITS: Performed by: NURSE PRACTITIONER

## 2022-04-01 PROCEDURE — 63710000001 INSULIN REGULAR HUMAN PER 5 UNITS: Performed by: HOSPITALIST

## 2022-04-01 PROCEDURE — 82607 VITAMIN B-12: CPT | Performed by: HOSPITALIST

## 2022-04-01 PROCEDURE — 99232 SBSQ HOSP IP/OBS MODERATE 35: CPT | Performed by: HOSPITALIST

## 2022-04-01 PROCEDURE — 25010000002 METHYLPREDNISOLONE PER 40 MG: Performed by: INTERNAL MEDICINE

## 2022-04-01 PROCEDURE — 99232 SBSQ HOSP IP/OBS MODERATE 35: CPT | Performed by: PSYCHIATRY & NEUROLOGY

## 2022-04-01 RX ORDER — RISPERIDONE 0.25 MG/1
0.25 TABLET ORAL 2 TIMES DAILY PRN
Status: DISCONTINUED | OUTPATIENT
Start: 2022-04-01 | End: 2022-04-03

## 2022-04-01 RX ORDER — CLONIDINE HYDROCHLORIDE 0.1 MG/1
0.2 TABLET ORAL EVERY 8 HOURS SCHEDULED
Status: DISCONTINUED | OUTPATIENT
Start: 2022-04-01 | End: 2022-04-02

## 2022-04-01 RX ORDER — METHYLPREDNISOLONE SODIUM SUCCINATE 40 MG/ML
40 INJECTION, POWDER, LYOPHILIZED, FOR SOLUTION INTRAMUSCULAR; INTRAVENOUS EVERY 12 HOURS
Status: DISCONTINUED | OUTPATIENT
Start: 2022-04-01 | End: 2022-04-05 | Stop reason: HOSPADM

## 2022-04-01 RX ORDER — AMLODIPINE BESYLATE 5 MG/1
5 TABLET ORAL
Status: DISCONTINUED | OUTPATIENT
Start: 2022-04-01 | End: 2022-04-04

## 2022-04-01 RX ADMIN — METOPROLOL TARTRATE 100 MG: 50 TABLET, FILM COATED ORAL at 09:01

## 2022-04-01 RX ADMIN — INSULIN LISPRO 12 UNITS: 100 INJECTION, SOLUTION INTRAVENOUS; SUBCUTANEOUS at 11:26

## 2022-04-01 RX ADMIN — INSULIN LISPRO 4 UNITS: 100 INJECTION, SOLUTION INTRAVENOUS; SUBCUTANEOUS at 17:23

## 2022-04-01 RX ADMIN — NICARDIPINE HYDROCHLORIDE 7.5 MG/HR: 25 INJECTION, SOLUTION INTRAVENOUS at 03:21

## 2022-04-01 RX ADMIN — AMLODIPINE BESYLATE 5 MG: 5 TABLET ORAL at 13:32

## 2022-04-01 RX ADMIN — LISINOPRIL 40 MG: 20 TABLET ORAL at 11:26

## 2022-04-01 RX ADMIN — ENOXAPARIN SODIUM 40 MG: 40 INJECTION SUBCUTANEOUS at 20:35

## 2022-04-01 RX ADMIN — CLONIDINE HYDROCHLORIDE 0.2 MG: 0.1 TABLET ORAL at 21:02

## 2022-04-01 RX ADMIN — OXYCODONE 10 MG: 5 TABLET ORAL at 19:07

## 2022-04-01 RX ADMIN — INSULIN HUMAN 12 UNITS: 100 INJECTION, SOLUTION PARENTERAL at 02:53

## 2022-04-01 RX ADMIN — METHYLPREDNISOLONE SODIUM SUCCINATE 60 MG: 125 INJECTION, POWDER, FOR SOLUTION INTRAMUSCULAR; INTRAVENOUS at 09:02

## 2022-04-01 RX ADMIN — NICARDIPINE HYDROCHLORIDE 10 MG/HR: 25 INJECTION, SOLUTION INTRAVENOUS at 14:46

## 2022-04-01 RX ADMIN — HYDROCHLOROTHIAZIDE 25 MG: 25 TABLET ORAL at 09:03

## 2022-04-01 RX ADMIN — INSULIN LISPRO 4 UNITS: 100 INJECTION, SOLUTION INTRAVENOUS; SUBCUTANEOUS at 09:02

## 2022-04-01 RX ADMIN — ARFORMOTEROL TARTRATE 15 MCG: 15 SOLUTION RESPIRATORY (INHALATION) at 19:57

## 2022-04-01 RX ADMIN — GUAIFENESIN 1200 MG: 600 TABLET, EXTENDED RELEASE ORAL at 20:36

## 2022-04-01 RX ADMIN — BUDESONIDE 0.5 MG: 0.5 INHALANT RESPIRATORY (INHALATION) at 06:30

## 2022-04-01 RX ADMIN — HYDRALAZINE HYDROCHLORIDE 50 MG: 25 TABLET, FILM COATED ORAL at 20:35

## 2022-04-01 RX ADMIN — CLONIDINE HYDROCHLORIDE 0.2 MG: 0.1 TABLET ORAL at 14:46

## 2022-04-01 RX ADMIN — METOPROLOL TARTRATE 100 MG: 50 TABLET, FILM COATED ORAL at 20:36

## 2022-04-01 RX ADMIN — BUDESONIDE 0.5 MG: 0.5 INHALANT RESPIRATORY (INHALATION) at 19:57

## 2022-04-01 RX ADMIN — INSULIN HUMAN 12 UNITS: 100 INJECTION, SOLUTION PARENTERAL at 09:02

## 2022-04-01 RX ADMIN — LIDOCAINE 2 PATCH: 50 PATCH TOPICAL at 09:10

## 2022-04-01 RX ADMIN — OXYCODONE 10 MG: 5 TABLET ORAL at 13:32

## 2022-04-01 RX ADMIN — SCOPALAMINE 1 PATCH: 1 PATCH, EXTENDED RELEASE TRANSDERMAL at 19:08

## 2022-04-01 RX ADMIN — PROMETHAZINE HYDROCHLORIDE 50 MG: 25 TABLET ORAL at 20:34

## 2022-04-01 RX ADMIN — PROMETHAZINE HYDROCHLORIDE 50 MG: 25 TABLET ORAL at 07:16

## 2022-04-01 RX ADMIN — ENOXAPARIN SODIUM 40 MG: 40 INJECTION SUBCUTANEOUS at 09:01

## 2022-04-01 RX ADMIN — GUAIFENESIN 1200 MG: 600 TABLET, EXTENDED RELEASE ORAL at 09:02

## 2022-04-01 RX ADMIN — NICARDIPINE HYDROCHLORIDE 15 MG/HR: 25 INJECTION, SOLUTION INTRAVENOUS at 00:45

## 2022-04-01 RX ADMIN — INSULIN HUMAN 8 UNITS: 100 INJECTION, SOLUTION PARENTERAL at 20:38

## 2022-04-01 RX ADMIN — ROPINIROLE 5 MG: 5 TABLET, FILM COATED ORAL at 20:36

## 2022-04-01 RX ADMIN — METHYLPREDNISOLONE SODIUM SUCCINATE 40 MG: 40 INJECTION, POWDER, FOR SOLUTION INTRAMUSCULAR; INTRAVENOUS at 20:35

## 2022-04-01 RX ADMIN — ARFORMOTEROL TARTRATE 15 MCG: 15 SOLUTION RESPIRATORY (INHALATION) at 06:30

## 2022-04-01 RX ADMIN — NICARDIPINE HYDROCHLORIDE 10 MG/HR: 25 INJECTION, SOLUTION INTRAVENOUS at 23:42

## 2022-04-01 RX ADMIN — CLONIDINE HYDROCHLORIDE 0.2 MG: 0.1 TABLET ORAL at 09:02

## 2022-04-01 RX ADMIN — NICARDIPINE HYDROCHLORIDE 5 MG/HR: 25 INJECTION, SOLUTION INTRAVENOUS at 17:25

## 2022-04-01 RX ADMIN — METHYLPREDNISOLONE SODIUM SUCCINATE 60 MG: 125 INJECTION, POWDER, FOR SOLUTION INTRAMUSCULAR; INTRAVENOUS at 02:53

## 2022-04-01 RX ADMIN — NICARDIPINE HYDROCHLORIDE 10 MG/HR: 25 INJECTION, SOLUTION INTRAVENOUS at 11:26

## 2022-04-01 RX ADMIN — TOPIRAMATE 50 MG: 25 TABLET, FILM COATED ORAL at 09:01

## 2022-04-01 RX ADMIN — CEFTRIAXONE 1 G: 1 INJECTION, SOLUTION INTRAVENOUS at 13:33

## 2022-04-01 RX ADMIN — NICARDIPINE HYDROCHLORIDE 10 MG/HR: 25 INJECTION, SOLUTION INTRAVENOUS at 07:14

## 2022-04-01 RX ADMIN — BENZONATATE 200 MG: 100 CAPSULE ORAL at 20:34

## 2022-04-01 RX ADMIN — Medication 10 ML: at 09:03

## 2022-04-01 RX ADMIN — HYDRALAZINE HYDROCHLORIDE 50 MG: 25 TABLET, FILM COATED ORAL at 09:02

## 2022-04-01 RX ADMIN — ZOLPIDEM TARTRATE 10 MG: 5 TABLET ORAL at 20:36

## 2022-04-01 RX ADMIN — NICARDIPINE HYDROCHLORIDE 15 MG/HR: 25 INJECTION, SOLUTION INTRAVENOUS at 09:15

## 2022-04-01 RX ADMIN — Medication 10 ML: at 23:43

## 2022-04-01 RX ADMIN — NICARDIPINE HYDROCHLORIDE 12.5 MG/HR: 25 INJECTION, SOLUTION INTRAVENOUS at 20:58

## 2022-04-01 RX ADMIN — PROMETHAZINE HYDROCHLORIDE 50 MG: 25 TABLET ORAL at 13:32

## 2022-04-01 NOTE — PLAN OF CARE
Goal Outcome Evaluation:     Pt A/Ox4 on 5L NC. Pt has not had any confused episodes today. Cardene gtt running at 5mg/hr BP is doing better. VSS with no complaints.    Problem: Adult Inpatient Plan of Care  Goal: Plan of Care Review  Outcome: Ongoing, Progressing

## 2022-04-01 NOTE — PLAN OF CARE
Goal Outcome Evaluation:  Plan of Care Reviewed With: patient        Progress: no change  Outcome Evaluation: pt oriented to self only, but able to carry on conversations. Pt became extremely confused and wanting to get out of bed to use the bathroom. Pt was not able to void on own so bladder scanner was used. Pt was symptomatic of urinary retention so nurse straight cathed and got out 500 one time and 550 the next. after that pt was able to void on own with external cath on with no issues. when pt was confused and restless she was given haldol which calmed her down and helped her go to sleep. pt still has arterial line in and cardene gtt on. cardene gtt was titrated multiple times throughout shift. pt complained of chest pain; stat ekg and troponin were ordered. troponin was normal and ekg showed normal sinur rhythm. pt did not complain of chest pain anymore afterwards, but went to sleep. pt on 5 liters of oxygen. daughter remained at bedside throughout the night.

## 2022-04-01 NOTE — CASE MANAGEMENT/SOCIAL WORK
Continued Stay Note  ROBBIE Krueger     Patient Name: Ann Álvarez  MRN: [id number]  Today's Date: 4/1/2022    Admit Date: 3/28/2022     Discharge Plan     Row Name 04/01/22 1609       Plan    Plan DC Plan: Routine Home with family. HomeO2@2L with [employer]    Plan Comments O2 up to 6 L pnc. Cardene gtt. Psych consult.            Chart review only.                 Isaac David RN

## 2022-04-01 NOTE — PROGRESS NOTES
Daily Progress Note        Acute on chronic respiratory failure with hypoxia and hypercapnia (HCC)    Chronic obstructive pulmonary disease (HCC)    Primary hypertension    Tobacco abuse    Obesity, Class III, BMI 40-49.9 (morbid obesity) (HCC)    COPD exacerbation (HCC)    Acute bronchitis due to parainfluenza virus    Hypertensive urgency    Acute UTI (urinary tract infection)    Lactic acidosis    Insomnia    PTSD (post-traumatic stress disorder)    Chronic back pain    Chronic respiratory failure with hypoxia (HCC)    Prediabetes    RLS (restless legs syndrome)    KELTON (generalized anxiety disorder)    Acute hyperglycemia      Assessment  Acute on chronic respiratory failure with hypoxia and hypercapnia  COPD exacerbation  Sepsis  UTI  HTN  Hypertensive urgency  PTSD  Insomnia  RLS  Chronic constipation     Home oxygen at 2L with [employer]  Current smoker  Dyslipidemia  Lower extremity edema  Obesity  Arthritis  Vitamin D deficiency     3/28/2022 respiratory panel positive for parainfluenza virus 3  3/28/2022 urine culture positive for E. coli        Echo 3/29/2022  -EF 66 to 70%  -Right ventricular cavity is mildly dilated  -Moderate tricuspid valve regurg  -RVSP 56.6    Plan    Continue to titrate oxygen- Currently on 4L NC and BiPAP at bedtime/as needed  Rocephin-finishes 4/5  Solu-Medrol 60 mg every 8 hours: Decreased to 40 mg every 12  Pulmicort nebulizer 2 times a day  Brovana nebulizers 3 times a day  DuoNebs as needed  Mucinex    Cardene for BP control: Add amlodipine and increased dose of clonidine and try to wean off Cardene in preparation for discharge in a day or 2  -A-line in place for more accurate monitoring    Electrolyte/Glycemic control  DVT/GI Prophylaxis-Lovenox and Protonix     DC plan-return home with family.     3/31/2022                                                             3/29/2022       3/28/2022       LOS: 3 days     Subjective     Patient feeling better today.  She looks a lot  better today.  She is wanting to go home.    Objective     Vital signs for last 24 hours:  Vitals:    04/01/22 0636 04/01/22 0641 04/01/22 0900 04/01/22 0949   BP:   166/66 144/49   BP Location:   Other (Comment)    Patient Position:   Lying    Pulse: 74 73 77 79   Resp: 18 18 22 18   Temp:    98.3 °F (36.8 °C)   TempSrc:    Oral   SpO2: 97% 97% 91% 93%   Weight:       Height:           Intake/Output last 3 shifts:  I/O last 3 completed shifts:  In: 120 [P.O.:120]  Out: 2450 [Urine:2450]  Intake/Output this shift:  I/O this shift:  In: 240 [P.O.:240]  Out: -       Radiology  Imaging Results (Last 24 Hours)     ** No results found for the last 24 hours. **          Labs:  Results from last 7 days   Lab Units 04/01/22  0256   WBC 10*3/mm3 10.30   HEMOGLOBIN g/dL 10.8*   HEMATOCRIT % 31.9*   PLATELETS 10*3/mm3 217     Results from last 7 days   Lab Units 03/31/22  2333 03/29/22  2337 03/29/22  0014   SODIUM mmol/L 135*   < > 136   POTASSIUM mmol/L 4.0   < > 4.1   CHLORIDE mmol/L 99   < > 93*   CO2 mmol/L 26.0   < > 31.0*   BUN mg/dL 20   < > 12   CREATININE mg/dL 0.70   < > 0.91   CALCIUM mg/dL 8.5*   < > 9.3   BILIRUBIN mg/dL  --   --  0.3   ALK PHOS U/L  --   --  73   ALT (SGPT) U/L  --   --  17   AST (SGOT) U/L  --   --  19   GLUCOSE mg/dL 178*   < > 332*    < > = values in this interval not displayed.     Results from last 7 days   Lab Units 03/31/22  1524   PH, ARTERIAL pH units 7.360   PO2 ART mm Hg 62.7*   PCO2, ARTERIAL mm Hg 56.1*   HCO3 ART mmol/L 31.7*     Results from last 7 days   Lab Units 03/29/22  0014 03/28/22  1525   ALBUMIN g/dL 3.90 3.60     Results from last 7 days   Lab Units 03/31/22  2333 03/28/22  2109 03/28/22  1525   TROPONIN T ng/mL <0.010 <0.010 <0.010         Results from last 7 days   Lab Units 03/31/22  2333   MAGNESIUM mg/dL 1.9                   Meds:   SCHEDULE  arformoterol, 15 mcg, Nebulization, BID - RT  budesonide, 0.5 mg, Nebulization, BID - RT  cefTRIAXone, 1 g, Intravenous,  Q24H  fentaNYL, 1 patch, Transdermal, Q72H   And  Check Fentanyl Patch Placement, 1 each, Does not apply, Q12H  cloNIDine, 0.2 mg, Oral, Q12H  enoxaparin, 40 mg, Subcutaneous, Q12H  guaiFENesin, 1,200 mg, Oral, Q12H  hydrALAZINE, 50 mg, Oral, Q12H  hydroCHLOROthiazide, 25 mg, Oral, Daily  insulin lispro, 0-24 Units, Subcutaneous, TID AC  insulin regular, 12 Units, Subcutaneous, Q8H  lactulose, 20 g, Oral, BID  lidocaine, 2 patch, Transdermal, Q24H  lisinopril, 40 mg, Oral, Q24H  methylPREDNISolone sodium succinate, 60 mg, Intravenous, Q8H  metoprolol tartrate, 100 mg, Oral, Q12H  promethazine, 50 mg, Oral, Q8H  rOPINIRole, 5 mg, Oral, Nightly  Scopolamine, 1 patch, Transdermal, Q72H  sodium chloride, 10 mL, Intravenous, Q12H  topiramate, 50 mg, Oral, Daily  zolpidem, 10 mg, Oral, Nightly      Infusions  niCARdipine, 5-15 mg/hr, Last Rate: 15 mg/hr (04/01/22 0915)  Pharmacy Consult - Steroid Insulin Protocol,   Pharmacy to Dose enoxaparin (LOVENOX),       PRNs  •  acetaminophen **OR** acetaminophen **OR** acetaminophen  •  aluminum-magnesium hydroxide-simethicone  •  benzonatate  •  dextrose  •  dextrose  •  dextrose  •  glucagon (human recombinant)  •  glucagon (human recombinant)  •  haloperidol lactate  •  hydrALAZINE  •  insulin lispro **AND** insulin lispro  •  ipratropium-albuterol  •  magnesium sulfate **OR** magnesium sulfate **OR** magnesium sulfate  •  melatonin  •  metoprolol tartrate  •  Morphine  •  nitroglycerin  •  ondansetron **OR** ondansetron  •  oxyCODONE  •  Pharmacy Consult - Steroid Insulin Protocol  •  Pharmacy to Dose enoxaparin (LOVENOX)  •  potassium chloride  •  potassium chloride  •  promethazine  •  sodium chloride  •  sodium chloride  •  zolpidem    Physical Exam:  Physical Exam  General Appearance:  Alert.  Obese.  HEENT:  Normocephalic, without obvious abnormality, Conjunctiva/corneas clear,.  Normal external ear canals, Nares normal, no drainage     Neck:  Supple, symmetrical, trachea  midline. No JVD.  Lungs /Chest wall: Minimal bilateral wheezing, labored respirations, using abdominal muscles heart:  Regular rate and rhythm, systolic murmur PMI left sternal border  Abdomen: Soft, non-tender, no masses, no organomegaly.    Extremities: Positive edema bilateral hands, no edema bilateral lower extremities, no clubbing or cyanosis      ROS  Review of Systems  Constitutional: Negative for chills, fever and malaise/fatigue.   HENT: Negative.    Eyes: Negative.    Cardiovascular: Negative.    Respiratory: Positive for cough and shortness of breath.    Skin: Negative.    Musculoskeletal: Negative.    Gastrointestinal: Negative.    Genitourinary: Negative.    Neurological: Negative.    Psychiatric/Behavioral: Negative.        I have reviewed current clinicals.     Electronically signed by CORI Bruce, 04/01/22, 10:48 AM EDT.     The NP scribed the note for me, I have examined the patient and reviewed and verified the above findings and and I formulated the assessment and plan as documented

## 2022-04-01 NOTE — PROGRESS NOTES
[facility] Medicine Services Daily Progress Note    Patient Name: Ann Álvarez  : 1960  MRN: [id number]  Primary Care Physician:  Jose Enrique Walters MD  Date of admission: 3/28/2022      Subjective      Chief Complaint:  Shortness of breath      2022: Patient reports that her chronic pain in her back is more tolerable with the current pain medication regimen.  No complaints of headache, chest pain, shortness of breath, sweats, GI or  complaints.  Reports cough and shortness of breath have improved as well.      Review of Systems   Respiratory: Positive for cough and shortness of breath.    Musculoskeletal: Positive for back pain.   All other systems reviewed and are negative.      Objective      Vitals:   Temp:  [97.8 °F (36.6 °C)-98.3 °F (36.8 °C)] 98 °F (36.7 °C)  Heart Rate:  [55-79] 75  Resp:  [18-24] 18  BP: (101-169)/(46-95) 162/67  Flow (L/min):  [5-6] 6    Physical Exam    Vital signs and nurses notes reviewed.  Well-developed overnourished female sitting up in bed eating, comfortable on nasal cannula O2, awake and alert in no acute distress; sclera anicteric, mucous membranes moist; lungs with decreased air entry and scattered rhonchi bilaterally; CV tachycardic; abdomen soft and nontender nondistended; extremities with decreased bilateral lower leg and pedal edema; no cyanosis or calf tenderness; no Ramires catheter.    Result Review    Result Review:  I have personally reviewed the results from the time of this admission to 2022 16:52 EDT and agree with these findings:  [x]  Laboratory  [x]  Microbiology  [x]  Radiology  []  EKG/Telemetry   []  Cardiology/Vascular   []  Pathology  [x]  Old records  []  Other:    Most notable findings discussed in the assessment and plan.    Assessment/Plan      Brief Patient Summary:  Ann Álvarez is a 61 y.o. female with a history of COPD, chronic hypoxic respiratory failure on home oxygen as needed, HTN, chronic back pain, PTSD, anxiety,  and insomnia who presented to the ER at [facility] on 03/28/2022 complaining of shortness of breath. Workup in the ER revealed COPD exacerbation and acute urinary tract infection. The patient was admitted to the ED Observation Unit for further treatment.  Dates of follow-up visits:  03/29/22:  The patient's respiratory status worsened overnight and she was found to have worsening lactic acidosis. She was given Lasix for possible fluid overload, but her lactic acid continues to elevate and she appears dry. She was admitted to the Hospitalist group for further evaluation and treatment. An ABG was repeated and she was placed on BiPAP. She will be given IV fluids and labs repeated later this afternoon. She was started on empiric antibiotics for possible sepsis.   3/30/2022  Patient was seen earlier and reported feeling improvement in her shortness of air.  She reported ongoing chronic back pain.  I was called back after a fast team was called because the patient was having tremors. The patient was able to talk during these tremor episodes.  There was no evidence of any seizure like activity.  3/31/2022  Patient complained of severe uncontrolled acute on chronic back pain.  She was having tremors and tachycardia which was felt to be due to uncontrolled pain.  She also had accelerated hypertension.      Inpatient medications include:  amLODIPine, 5 mg, Oral, Q24H  arformoterol, 15 mcg, Nebulization, BID - RT  budesonide, 0.5 mg, Nebulization, BID - RT  cefTRIAXone, 1 g, Intravenous, Q24H  fentaNYL, 1 patch, Transdermal, Q72H   And  Check Fentanyl Patch Placement, 1 each, Does not apply, Q12H  cloNIDine, 0.2 mg, Oral, Q8H  enoxaparin, 40 mg, Subcutaneous, Q12H  guaiFENesin, 1,200 mg, Oral, Q12H  hydrALAZINE, 50 mg, Oral, Q12H  hydroCHLOROthiazide, 25 mg, Oral, Daily  insulin lispro, 0-24 Units, Subcutaneous, TID AC  insulin regular, 8 Units, Subcutaneous, Q12H  lactulose, 20 g, Oral, BID  lidocaine, 2 patch,  Transdermal, Q24H  lisinopril, 40 mg, Oral, Q24H  methylPREDNISolone sodium succinate, 40 mg, Intravenous, Q12H  metoprolol tartrate, 100 mg, Oral, Q12H  promethazine, 50 mg, Oral, Q8H  rOPINIRole, 5 mg, Oral, Nightly  Scopolamine, 1 patch, Transdermal, Q72H  sodium chloride, 10 mL, Intravenous, Q12H  topiramate, 50 mg, Oral, Daily  zolpidem, 10 mg, Oral, Nightly       niCARdipine, 5-15 mg/hr, Last Rate: 10 mg/hr (04/01/22 1446)  Pharmacy Consult - Steroid Insulin Protocol,   Pharmacy to Dose enoxaparin (LOVENOX),          Active Hospital Problems:  Active Hospital Problems    Diagnosis    • **Acute on chronic respiratory failure with hypoxia and hypercapnia ([facility])    • Acute bronchitis due to parainfluenza virus    • Hypertensive urgency    • Acute UTI (urinary tract infection)    • Lactic acidosis    • Obesity, Class III, BMI 40-49.9 (morbid obesity) ([facility])    • KELTON (generalized anxiety disorder)    • Acute hyperglycemia    • PTSD (post-traumatic stress disorder)    • Chronic back pain    • COPD exacerbation ([facility])    • RLS (restless legs syndrome)    • Chronic obstructive pulmonary disease ([facility])    • Prediabetes    • Chronic respiratory failure with hypoxia ([facility])    • Insomnia    • Tobacco abuse    • Primary hypertension      Plan:     Hypertensive emergency on top of chronic essential hypertension  -Blood pressure has been very difficult to control despite clonidine, lisinopril and metoprolol with nitroglycerin ointment  -Nitroglycerin ointment discontinued 3/31/2022 and Tridil drip initiated for improved blood pressure control  -Oral hydralazine added 3/31/2022  -arterial line necessary for closer BP monitoring and the cuff was pumping up to such high pressure it was severely painful  -Tridil drip was not controlling the patient's blood pressure so Cardene drip was initiated  -Amlodipine was added on 4/1/2022  -Wean Cardene as tolerated    Episodes of tremors, new problem 3/30/2022 and fast team was called  -No  "evidence of seizure activity  -Likely secondary to severe uncontrolled pain and anxiety  -Patient and nursing staff were counseled and reassured    Acute on chronic respiratory failure with hypoxia and hypercapnia due to COPD exacerbation and Acute bronchitis due to parainfluenza virus  Chronic hypoxic respiratory failure  Tobacco abuse  -baseline:  O2 @ 2 L per NC PRN  -currently on BiPAP  -titrate O2 to keep sat 90-95%  -IV steroids; wean as tolerated  -scheduled and PRN DuoNebs  -Mucinex BID  -Continue Brovana and budesonide  -contact/droplet isolation   -encourage tobacco cessation     Acute E. coli UTI (urinary tract infection)  -patient is asymptomatic  -urine culture showed pansensitive E. coli  -Cefepime and vancomycin discontinued and Rocephin initiated; Rocephin subsequently changed to Omnicef patient completed antibiotic course    Lactic acidosis felt to be secondary to hypoxia; sepsis is felt to have been ruled out  -likely secondary to respiratory failure, worsened with diuresis  -Patient received IV fluids  -blood cultures no growth and pending  -cefepime and vancomycin discontinued    Acute hyperglycemia with underlying Prediabetes  -likely exacerbated by steroids  -A1c 6.3%  -accu checks AC&HS with SSI  -pharmacy consulted for steroid-induced hyperglycemia protocol    Obesity, Class III, BMI 40-49.9 (morbid obesity)   -BMI 40.39 on admission  -encourage lifestyle modifications     Insomnia  -continue Ambien (INSPECT reviewed)    PTSD (post-traumatic stress disorder) / KELTON (generalized anxiety disorder) /depression  -PRN clonidine on hold; on scheduled as above  -continue Topamax and trazodone    -Psychiatry consulting: \"She is still on steroids, c/o increased anxiety,   QTc 449, use low dose of risperidone 0.25 mg po BID PRN for anxiety   No benzo recommended due to opioid use and respiratory problems   The pt is off her regular antidepressants now, trintellix is not on formulary in the " "hospital\"    Chronic back pain  -continue oxycodone (INSPECT reviewed)  -Low-dose fentanyl patch added  -IV morphine as needed for breakthrough pain    RLS (restless legs syndrome)  -continue Requip     This patient is high risk.      DVT prophylaxis:  Medical and mechanical DVT prophylaxis orders are present.    CODE STATUS:    Code Status (Patient has no pulse and is not breathing): CPR (Attempt to Resuscitate)  Medical Interventions (Patient has pulse or is breathing): Full Support      Disposition:  I expect patient to be discharged 2-3 days.    This patient has been examined wearing appropriate Personal Protective Equipment. 04/01/22      Electronically signed by Rafaela Nagy MD, 04/01/22, 16:52 EDT.  [facility] Hospitalist Team           "

## 2022-04-01 NOTE — PROGRESS NOTES
Chief complaint anxiety     Subjective .     History of present illness:  The patient is a 61 y.o. female who was admitted secondary to  COPD/sepsis. PMHx: arthritis, back pain, COPD, HTN, PTSD, obesity, HLD, RLS and insomnia. Psych consult was requested by Dr Yakov watson to combative behavior.  The pt was confused, experienced visual hallucinations, was combative and required soft restraints for safety.   Today the pt reported long hx of depression/ anxiety, was on different meds in the past, sees Dr Maddox via telehealth at [facility], taking effexor, trintellix and topamax, trazodone and ambien .  Today the pt was alert and awake, she had uneventful night,  C/o increased anxiety, tension, unable to stop worrying about her health , difficult to relax, when anxious - increased SOA   Past psych hx: depression, anxiety, ptsd , no hx of SAs       Review of Systems   All systems were reviewed and negative except for:  Constitution:  positive for fatigue  Respiratory: positive for  shortness of air  Gastrointestinal: positive for  nausea  Musculoskeletal: positive for  back pain  Behavioral/Psych: positive for  anxiety    History       Medications Prior to Admission   Medication Sig Dispense Refill Last Dose   • cloNIDine (CATAPRES) 0.2 MG tablet Take 0.2 mg by mouth Every 6 (Six) Hours As Needed (anxiety/stress).      • doxepin (SINEquan) 50 MG capsule Take 50 mg by mouth At Night As Needed.   3/27/2022 at 2100   • hydroCHLOROthiazide (HYDRODIURIL) 25 MG tablet Take 25 mg by mouth Daily.   3/28/2022 at 0900   • LINZESS 290 MCG capsule capsule Take 290 mcg by mouth Daily As Needed.   Past Month at Unknown time   • lisinopril (PRINIVIL,ZESTRIL) 40 MG tablet Take 40 mg by mouth Daily.   3/28/2022 at 0900   • Multiple Vitamin (MULTIVITAMIN) tablet Take 1 tablet by mouth Daily.   3/28/2022 at 0900   • oxyCODONE-acetaminophen (PERCOCET)  MG per tablet Take 1 tablet by mouth Every 4 (Four) Hours As Needed for  Moderate Pain .   3/27/2022 at 2100   • Probiotic Product (PROBIOTIC ADVANCED) capsule Take 1 capsule by mouth Daily.   3/28/2022 at 0900   • rOPINIRole (REQUIP) 5 MG tablet Take 5 mg by mouth Every Night.   3/27/2022 at 2100   • topiramate (TOPAMAX) 50 MG tablet Take 50 mg by mouth Daily.   3/27/2022 at 2100   • traZODone (DESYREL) 100 MG tablet Take 100 mg by mouth Every Night.   3/27/2022 at 2100   • zolpidem CR (Ambien CR) 12.5 MG CR tablet Take 12.5 mg by mouth At Night As Needed for Sleep.      • promethazine (PHENERGAN) 50 MG tablet Take 50 mg by mouth Every 6 (Six) Hours As Needed.   3/26/2022        Scheduled Meds:  arformoterol, 15 mcg, Nebulization, BID - RT  budesonide, 0.5 mg, Nebulization, BID - RT  cefTRIAXone, 1 g, Intravenous, Q24H  fentaNYL, 1 patch, Transdermal, Q72H   And  Check Fentanyl Patch Placement, 1 each, Does not apply, Q12H  cloNIDine, 0.2 mg, Oral, Q12H  enoxaparin, 40 mg, Subcutaneous, Q12H  guaiFENesin, 1,200 mg, Oral, Q12H  hydrALAZINE, 50 mg, Oral, Q12H  hydroCHLOROthiazide, 25 mg, Oral, Daily  insulin lispro, 0-24 Units, Subcutaneous, TID AC  insulin regular, 12 Units, Subcutaneous, Q8H  lactulose, 20 g, Oral, BID  lidocaine, 2 patch, Transdermal, Q24H  lisinopril, 40 mg, Oral, Q24H  methylPREDNISolone sodium succinate, 60 mg, Intravenous, Q8H  metoprolol tartrate, 100 mg, Oral, Q12H  promethazine, 50 mg, Oral, Q8H  rOPINIRole, 5 mg, Oral, Nightly  Scopolamine, 1 patch, Transdermal, Q72H  sodium chloride, 10 mL, Intravenous, Q12H  topiramate, 50 mg, Oral, Daily  zolpidem, 10 mg, Oral, Nightly         Continuous Infusions:  niCARdipine, 5-15 mg/hr, Last Rate: 15 mg/hr (04/01/22 0915)  Pharmacy Consult - Steroid Insulin Protocol,   Pharmacy to Dose enoxaparin (LOVENOX),         PRN Meds:  •  acetaminophen **OR** acetaminophen **OR** acetaminophen  •  aluminum-magnesium hydroxide-simethicone  •  benzonatate  •  dextrose  •  dextrose  •  dextrose  •  glucagon (human recombinant)  •   "glucagon (human recombinant)  •  haloperidol lactate  •  hydrALAZINE  •  insulin lispro **AND** insulin lispro  •  ipratropium-albuterol  •  magnesium sulfate **OR** magnesium sulfate **OR** magnesium sulfate  •  melatonin  •  metoprolol tartrate  •  Morphine  •  nitroglycerin  •  ondansetron **OR** ondansetron  •  oxyCODONE  •  Pharmacy Consult - Steroid Insulin Protocol  •  Pharmacy to Dose enoxaparin (LOVENOX)  •  potassium chloride  •  potassium chloride  •  promethazine  •  sodium chloride  •  sodium chloride  •  zolpidem      Allergies:  Codeine      Objective     Vital Signs   /49   Pulse 79   Temp 98.3 °F (36.8 °C) (Oral)   Resp 18   Ht 160 cm (63\")   Wt 103 kg (227 lb 15.3 oz)   SpO2 93%   BMI 40.38 kg/m²     Physical Exam:     General Appearance:    In NAD       Mental Status Exam:    Hygiene:   good  Cooperation:  Cooperative  Eye Contact:  Good  Psychomotor Behavior:  Appropriate  Affect:  Appropriate   Mood: anxious   Hopelessness: Denies  Speech:  Normal  Thought Progress:  Goal directed and Linear  Thought Content:  Mood congruent  Suicidal:  None  Homicidal:  None  Hallucinations:  None  Delusion:  None  Memory:  Intact  Orientation:  Person, Place, Time and Situation  Reliability:  fair  Insight:  Fair  Judgement:  Fair  Impulse Control:  Fair  Physical/Medical Issues:  Yes      Medications and allergies reviewed     Lab Results   Component Value Date    GLUCOSE 178 (H) 03/31/2022    CALCIUM 8.5 (L) 03/31/2022     (L) 03/31/2022    K 4.0 03/31/2022    CO2 26.0 03/31/2022    CL 99 03/31/2022    BUN 20 03/31/2022    CREATININE 0.70 03/31/2022    BCR 28.6 (H) 03/31/2022    ANIONGAP 10.0 03/31/2022       Last Urine Toxicity    There is no flowsheet data to display.         No results found for: PHENYTOIN, PHENOBARB, VALPROATE, CBMZ    Lab Results   Component Value Date     (L) 03/31/2022    BUN 20 03/31/2022    CREATININE 0.70 03/31/2022    WBC 10.30 04/01/2022       Brief Urine " Lab Results  (Last result in the past 365 days)      Color   Clarity   Blood   Leuk Est   Nitrite   Protein   CREAT   Urine HCG        03/28/22 1730 Yellow   Cloudy   Small (1+)   Moderate (2+)   Positive   >=300 mg/dL (3+)               EKG 4/1/22 , QTc 449    Assessment/Plan       Acute on chronic respiratory failure with hypoxia and hypercapnia (HCC)    Chronic obstructive pulmonary disease (HCC)    Primary hypertension    Tobacco abuse    Obesity, Class III, BMI 40-49.9 (morbid obesity) (HCC)    COPD exacerbation (HCC)    Acute bronchitis due to parainfluenza virus    Hypertensive urgency    Acute UTI (urinary tract infection)    Lactic acidosis    Insomnia    PTSD (post-traumatic stress disorder)    Chronic back pain    Chronic respiratory failure with hypoxia (HCC)    Prediabetes    RLS (restless legs syndrome)    KELTON (generalized anxiety disorder)    Acute hyperglycemia       Assessment: Delirium due to medical condition (Hypoxia/sepsis) vs medication (steroids) resolving   PTSD chronic   Generalized anxiety d/o   Treatment Plan: the pt is alert and awake, she had uneventful night  She is still on steroids , c/o increased anxiety ,   QTc 449, use low dose of risperidone 0.25 mg po BID PRN for anxiety   No benzo recommended due to opioid use and respiratory problems   The pt is off her regular antidepressants now, trintellix is not on formulary in the hospital   The pt can be d/c when medically stable  Will follow       Treatment Plan discussed with: Patient and nursing     I discussed the patients findings and my recommendations with patient and nursing staff    I have reviewed and approved the behavioral health treatment plans and problem list. Yes     Referring MD has access to consult report and progress notes in EMR     Elissa Freed MD  04/01/22  10:35 EDT

## 2022-04-02 LAB
GLUCOSE BLDC GLUCOMTR-MCNC: 189 MG/DL (ref 70–105)
GLUCOSE BLDC GLUCOMTR-MCNC: 209 MG/DL (ref 70–105)
GLUCOSE BLDC GLUCOMTR-MCNC: 230 MG/DL (ref 70–105)
GLUCOSE BLDC GLUCOMTR-MCNC: 278 MG/DL (ref 70–105)
QT INTERVAL: 449 MS

## 2022-04-02 PROCEDURE — 94761 N-INVAS EAR/PLS OXIMETRY MLT: CPT

## 2022-04-02 PROCEDURE — 94799 UNLISTED PULMONARY SVC/PX: CPT

## 2022-04-02 PROCEDURE — 82962 GLUCOSE BLOOD TEST: CPT

## 2022-04-02 PROCEDURE — 25010000002 ENOXAPARIN PER 10 MG: Performed by: NURSE PRACTITIONER

## 2022-04-02 PROCEDURE — 94660 CPAP INITIATION&MGMT: CPT

## 2022-04-02 PROCEDURE — 25010000002 CEFTRIAXONE SODIUM-DEXTROSE 1-3.74 GM-%(50ML) RECONSTITUTED SOLUTION: Performed by: HOSPITALIST

## 2022-04-02 PROCEDURE — 63710000001 INSULIN LISPRO (HUMAN) PER 5 UNITS: Performed by: NURSE PRACTITIONER

## 2022-04-02 PROCEDURE — 63710000001 PROMETHAZINE PER 25 MG: Performed by: HOSPITALIST

## 2022-04-02 PROCEDURE — 0 MORPHINE SULFATE 4 MG/ML SOLUTION: Performed by: HOSPITALIST

## 2022-04-02 PROCEDURE — 25010000002 METHYLPREDNISOLONE PER 40 MG: Performed by: INTERNAL MEDICINE

## 2022-04-02 PROCEDURE — 99232 SBSQ HOSP IP/OBS MODERATE 35: CPT | Performed by: HOSPITALIST

## 2022-04-02 PROCEDURE — 63710000001 INSULIN REGULAR HUMAN PER 5 UNITS: Performed by: HOSPITALIST

## 2022-04-02 RX ORDER — FENTANYL 25 UG/H
1 PATCH TRANSDERMAL
Status: DISCONTINUED | OUTPATIENT
Start: 2022-04-02 | End: 2022-04-05 | Stop reason: HOSPADM

## 2022-04-02 RX ORDER — CLONIDINE HYDROCHLORIDE 0.1 MG/1
0.3 TABLET ORAL EVERY 8 HOURS SCHEDULED
Status: DISCONTINUED | OUTPATIENT
Start: 2022-04-02 | End: 2022-04-05 | Stop reason: HOSPADM

## 2022-04-02 RX ORDER — HYDROCODONE BITARTRATE AND ACETAMINOPHEN 10; 325 MG/1; MG/1
1 TABLET ORAL EVERY 4 HOURS PRN
Status: DISCONTINUED | OUTPATIENT
Start: 2022-04-02 | End: 2022-04-05 | Stop reason: HOSPADM

## 2022-04-02 RX ADMIN — METOPROLOL TARTRATE 100 MG: 50 TABLET, FILM COATED ORAL at 09:37

## 2022-04-02 RX ADMIN — NICARDIPINE HYDROCHLORIDE 15 MG/HR: 25 INJECTION, SOLUTION INTRAVENOUS at 18:09

## 2022-04-02 RX ADMIN — Medication 10 ML: at 20:14

## 2022-04-02 RX ADMIN — HYDROCODONE BITARTRATE AND ACETAMINOPHEN 1 TABLET: 10; 325 TABLET ORAL at 20:13

## 2022-04-02 RX ADMIN — Medication 10 ML: at 09:37

## 2022-04-02 RX ADMIN — NICARDIPINE HYDROCHLORIDE 15 MG/HR: 25 INJECTION, SOLUTION INTRAVENOUS at 13:59

## 2022-04-02 RX ADMIN — NICARDIPINE HYDROCHLORIDE 10 MG/HR: 25 INJECTION, SOLUTION INTRAVENOUS at 07:49

## 2022-04-02 RX ADMIN — AMLODIPINE BESYLATE 5 MG: 5 TABLET ORAL at 09:37

## 2022-04-02 RX ADMIN — NICARDIPINE HYDROCHLORIDE 15 MG/HR: 25 INJECTION, SOLUTION INTRAVENOUS at 15:54

## 2022-04-02 RX ADMIN — BUDESONIDE 0.5 MG: 0.5 INHALANT RESPIRATORY (INHALATION) at 07:16

## 2022-04-02 RX ADMIN — BUDESONIDE 0.5 MG: 0.5 INHALANT RESPIRATORY (INHALATION) at 18:57

## 2022-04-02 RX ADMIN — HYDROCHLOROTHIAZIDE 25 MG: 25 TABLET ORAL at 09:37

## 2022-04-02 RX ADMIN — MORPHINE SULFATE 5 MG: 4 INJECTION INTRAVENOUS at 16:19

## 2022-04-02 RX ADMIN — MORPHINE SULFATE 5 MG: 4 INJECTION INTRAVENOUS at 21:36

## 2022-04-02 RX ADMIN — CLONIDINE HYDROCHLORIDE 0.3 MG: 0.1 TABLET ORAL at 13:21

## 2022-04-02 RX ADMIN — FENTANYL 1 PATCH: 25 PATCH, EXTENDED RELEASE TRANSDERMAL at 16:42

## 2022-04-02 RX ADMIN — INSULIN LISPRO 4 UNITS: 100 INJECTION, SOLUTION INTRAVENOUS; SUBCUTANEOUS at 07:48

## 2022-04-02 RX ADMIN — ENOXAPARIN SODIUM 40 MG: 40 INJECTION SUBCUTANEOUS at 09:37

## 2022-04-02 RX ADMIN — INSULIN HUMAN 8 UNITS: 100 INJECTION, SOLUTION PARENTERAL at 09:37

## 2022-04-02 RX ADMIN — ARFORMOTEROL TARTRATE 15 MCG: 15 SOLUTION RESPIRATORY (INHALATION) at 07:12

## 2022-04-02 RX ADMIN — NICARDIPINE HYDROCHLORIDE 10 MG/HR: 25 INJECTION, SOLUTION INTRAVENOUS at 04:37

## 2022-04-02 RX ADMIN — METHYLPREDNISOLONE SODIUM SUCCINATE 40 MG: 40 INJECTION, POWDER, FOR SOLUTION INTRAMUSCULAR; INTRAVENOUS at 20:13

## 2022-04-02 RX ADMIN — ARFORMOTEROL TARTRATE 15 MCG: 15 SOLUTION RESPIRATORY (INHALATION) at 18:52

## 2022-04-02 RX ADMIN — NICARDIPINE HYDROCHLORIDE 15 MG/HR: 25 INJECTION, SOLUTION INTRAVENOUS at 11:36

## 2022-04-02 RX ADMIN — PROMETHAZINE HYDROCHLORIDE 50 MG: 25 TABLET ORAL at 13:55

## 2022-04-02 RX ADMIN — NICARDIPINE HYDROCHLORIDE 10 MG/HR: 25 INJECTION, SOLUTION INTRAVENOUS at 01:50

## 2022-04-02 RX ADMIN — CLONIDINE HYDROCHLORIDE 0.3 MG: 0.1 TABLET ORAL at 22:08

## 2022-04-02 RX ADMIN — GUAIFENESIN 1200 MG: 600 TABLET, EXTENDED RELEASE ORAL at 09:37

## 2022-04-02 RX ADMIN — OXYCODONE 10 MG: 5 TABLET ORAL at 06:14

## 2022-04-02 RX ADMIN — NICARDIPINE HYDROCHLORIDE 15 MG/HR: 25 INJECTION, SOLUTION INTRAVENOUS at 20:31

## 2022-04-02 RX ADMIN — INSULIN HUMAN 16 UNITS: 100 INJECTION, SOLUTION PARENTERAL at 22:16

## 2022-04-02 RX ADMIN — PROMETHAZINE HYDROCHLORIDE 50 MG: 25 TABLET ORAL at 06:14

## 2022-04-02 RX ADMIN — METHYLPREDNISOLONE SODIUM SUCCINATE 40 MG: 40 INJECTION, POWDER, FOR SOLUTION INTRAMUSCULAR; INTRAVENOUS at 09:37

## 2022-04-02 RX ADMIN — HYDRALAZINE HYDROCHLORIDE 50 MG: 25 TABLET, FILM COATED ORAL at 09:37

## 2022-04-02 RX ADMIN — LISINOPRIL 40 MG: 20 TABLET ORAL at 11:31

## 2022-04-02 RX ADMIN — HYDRALAZINE HYDROCHLORIDE 50 MG: 25 TABLET, FILM COATED ORAL at 20:11

## 2022-04-02 RX ADMIN — ENOXAPARIN SODIUM 40 MG: 40 INJECTION SUBCUTANEOUS at 20:11

## 2022-04-02 RX ADMIN — ROPINIROLE 5 MG: 5 TABLET, FILM COATED ORAL at 20:14

## 2022-04-02 RX ADMIN — TOPIRAMATE 50 MG: 25 TABLET, FILM COATED ORAL at 09:37

## 2022-04-02 RX ADMIN — ZOLPIDEM TARTRATE 10 MG: 5 TABLET ORAL at 20:31

## 2022-04-02 RX ADMIN — GUAIFENESIN 1200 MG: 600 TABLET, EXTENDED RELEASE ORAL at 20:11

## 2022-04-02 RX ADMIN — PROMETHAZINE HYDROCHLORIDE 50 MG: 25 TABLET ORAL at 22:17

## 2022-04-02 RX ADMIN — METOPROLOL TARTRATE 100 MG: 50 TABLET, FILM COATED ORAL at 20:13

## 2022-04-02 RX ADMIN — INSULIN LISPRO 8 UNITS: 100 INJECTION, SOLUTION INTRAVENOUS; SUBCUTANEOUS at 17:01

## 2022-04-02 RX ADMIN — INSULIN LISPRO 8 UNITS: 100 INJECTION, SOLUTION INTRAVENOUS; SUBCUTANEOUS at 11:31

## 2022-04-02 RX ADMIN — LACTULOSE 20 G: 20 SOLUTION ORAL at 09:37

## 2022-04-02 RX ADMIN — LIDOCAINE 2 PATCH: 50 PATCH TOPICAL at 09:38

## 2022-04-02 RX ADMIN — CEFTRIAXONE 1 G: 1 INJECTION, SOLUTION INTRAVENOUS at 13:21

## 2022-04-02 RX ADMIN — OXYCODONE 10 MG: 5 TABLET ORAL at 13:55

## 2022-04-02 RX ADMIN — CLONIDINE HYDROCHLORIDE 0.2 MG: 0.1 TABLET ORAL at 06:13

## 2022-04-02 NOTE — PROGRESS NOTES
[facility] Medicine Services Daily Progress Note    Patient Name: Ann Álvarez  : 1960  MRN: [id number]  Primary Care Physician:  Jose Enrique Walters MD  Date of admission: 3/28/2022      Subjective      Chief Complaint:  Shortness of breath      2022:   Patient reports ongoing intolerable back pain which she reports is her usual chronic pain but worse because of being in the hospital bed.  She reports occasional cough and improvement in her shortness of breath.  She reports that she has had a bowel movement recently and urinating normally.  Patient's blood pressure is fluctuating and she is still requiring a Cardene drip.  Work-up for secondary causes of hypertension has been ordered, and cardiology has been consulted.      Review of Systems   Respiratory: Positive for cough and shortness of breath.    Musculoskeletal: Positive for back pain.   All other systems reviewed and are negative.      Objective      Vitals:   Temp:  [97.8 °F (36.6 °C)-98.1 °F (36.7 °C)] 98.1 °F (36.7 °C)  Heart Rate:  [51-79] 66  Resp:  [16-20] 20  BP: ()/(42-71) 166/68  Flow (L/min):  [5-6] 5    Physical Exam    Vital signs and nurses notes reviewed.  Well-developed overnourished female sitting up in bed eating, comfortable on nasal cannula O2, awake and alert in no acute distress; sclera anicteric, mucous membranes moist; lungs with decreased air entry and scattered rhonchi bilaterally; CV tachycardic; abdomen soft and nontender nondistended; extremities with decreased bilateral lower leg and pedal edema; no cyanosis or calf tenderness; no Ramires catheter.  Exam unchanged from 2022    Result Review    Result Review:  I have personally reviewed the results from the time of this admission to 2022 10:17 EDT and agree with these findings:  [x]  Laboratory  [x]  Microbiology  [x]  Radiology  []  EKG/Telemetry   []  Cardiology/Vascular   []  Pathology  [x]  Old records  []  Other:    Most notable  findings discussed in the assessment and plan.    Assessment/Plan      Brief Patient Summary:  Ann Álvarez is a 61 y.o. female with a history of COPD, chronic hypoxic respiratory failure on home oxygen as needed, HTN, chronic back pain, PTSD, anxiety, and insomnia who presented to the ER at [facility] on 03/28/2022 complaining of shortness of breath. Workup in the ER revealed COPD exacerbation and acute urinary tract infection. The patient was admitted to the ED Observation Unit for further treatment.  Dates of follow-up visits:  03/29/22:  The patient's respiratory status worsened overnight and she was found to have worsening lactic acidosis. She was given Lasix for possible fluid overload, but her lactic acid continues to elevate and she appears dry. She was admitted to the Hospitalist group for further evaluation and treatment. An ABG was repeated and she was placed on BiPAP. She will be given IV fluids and labs repeated later this afternoon. She was started on empiric antibiotics for possible sepsis.   3/30/2022  Patient was seen earlier and reported feeling improvement in her shortness of air.  She reported ongoing chronic back pain.  I was called back after a fast team was called because the patient was having tremors. The patient was able to talk during these tremor episodes.  There was no evidence of any seizure like activity.  3/31/2022  Patient complained of severe uncontrolled acute on chronic back pain.  She was having tremors and tachycardia which was felt to be due to uncontrolled pain.  She also had accelerated hypertension.  4/1/2022: Patient reports that her chronic pain in her back is more tolerable with the current pain medication regimen.  No complaints of headache, chest pain, shortness of breath, sweats, GI or  complaints.  Reports cough and shortness of breath have improved as well.    Inpatient medications include:  amLODIPine, 5 mg, Oral, Q24H  arformoterol, 15 mcg, Nebulization,  BID - RT  budesonide, 0.5 mg, Nebulization, BID - RT  cefTRIAXone, 1 g, Intravenous, Q24H  fentaNYL, 1 patch, Transdermal, Q72H   And  Check Fentanyl Patch Placement, 1 each, Does not apply, Q12H  cloNIDine, 0.2 mg, Oral, Q8H  enoxaparin, 40 mg, Subcutaneous, Q12H  guaiFENesin, 1,200 mg, Oral, Q12H  hydrALAZINE, 50 mg, Oral, Q12H  hydroCHLOROthiazide, 25 mg, Oral, Daily  insulin lispro, 0-24 Units, Subcutaneous, TID AC  insulin regular, 8 Units, Subcutaneous, Q12H  lactulose, 20 g, Oral, BID  lidocaine, 2 patch, Transdermal, Q24H  lisinopril, 40 mg, Oral, Q24H  methylPREDNISolone sodium succinate, 40 mg, Intravenous, Q12H  metoprolol tartrate, 100 mg, Oral, Q12H  promethazine, 50 mg, Oral, Q8H  rOPINIRole, 5 mg, Oral, Nightly  Scopolamine, 1 patch, Transdermal, Q72H  sodium chloride, 10 mL, Intravenous, Q12H  topiramate, 50 mg, Oral, Daily  zolpidem, 10 mg, Oral, Nightly       niCARdipine, 5-15 mg/hr, Last Rate: 10 mg/hr (04/02/22 0749)  Pharmacy Consult - Steroid Insulin Protocol,   Pharmacy to Dose enoxaparin (LOVENOX),          Active Hospital Problems:  Active Hospital Problems    Diagnosis    • **Acute on chronic respiratory failure with hypoxia and hypercapnia ([facility])    • Acute bronchitis due to parainfluenza virus    • Hypertensive urgency    • Acute UTI (urinary tract infection)    • Lactic acidosis    • Obesity, Class III, BMI 40-49.9 (morbid obesity) ([facility])    • KELTON (generalized anxiety disorder)    • Acute hyperglycemia    • PTSD (post-traumatic stress disorder)    • Chronic back pain    • COPD exacerbation ([facility])    • RLS (restless legs syndrome)    • Chronic obstructive pulmonary disease ([facility])    • Prediabetes    • Chronic respiratory failure with hypoxia ([facility])    • Insomnia    • Tobacco abuse    • Primary hypertension      Plan:     Hypertensive emergency on top of chronic essential hypertension, rule out secondary causes for hypertension  -Blood pressure has been very difficult to control despite  clonidine, lisinopril and metoprolol with nitroglycerin ointment  -Nitroglycerin ointment discontinued 3/31/2022 and Tridil drip initiated for improved blood pressure control  -Oral hydralazine added 3/31/2022  -arterial line necessary for closer BP monitoring and the cuff was pumping up to such high pressure it was severely painful  -Tridil drip was not controlling the patient's blood pressure so Cardene drip was initiated  -Amlodipine was added on 4/1/2022  -Wean Cardene as tolerated  -24-hour urine catecholamines/VMA and renal perfusion study ordered    Episodes of tremors, new problem 3/30/2022 and fast team was called  -No evidence of seizure activity  -Likely secondary to severe uncontrolled pain and anxiety  -These episodes resolve when the patient's pain and anxiety were improved    Acute on chronic respiratory failure with hypoxia and hypercapnia due to COPD exacerbation and Acute bronchitis due to parainfluenza virus  Chronic hypoxic respiratory failure  Tobacco abuse  -baseline:  O2 @ 2 L per NC PRN  -currently on BiPAP  -titrate O2 to keep sat 90-95%  -IV steroids; wean as tolerated  -scheduled and PRN DuoNebs  -Mucinex BID  -Continue Brovana and budesonide  -contact/droplet isolation   -encourage tobacco cessation     Acute E. coli urinary tract infection  -patient is asymptomatic  -urine culture showed pansensitive E. coli  -Cefepime and vancomycin discontinued and Rocephin initiated; Rocephin subsequently changed to Omnicef patient completed antibiotic course    Lactic acidosis felt to be secondary to hypoxia; sepsis is felt to have been ruled out  -likely secondary to respiratory failure, worsened with diuresis  -Patient received IV fluids  -blood cultures no growth and pending  -cefepime and vancomycin discontinued    Acute hyperglycemia with underlying Prediabetes  -likely exacerbated by steroids  -A1c 6.3%  -accu checks AC&HS with SSI  -pharmacy consulted for steroid-induced hyperglycemia  "protocol    Obesity, Class III, BMI 40-49.9 (morbid obesity)   -BMI 40.39 on admission  -encourage lifestyle modifications     Insomnia  -continue Ambien (INSPECT reviewed)    Post-traumatic stress disorder / generalized anxiety disorder / depression  -PRN clonidine on hold; on scheduled as above  -continue Topamax and trazodone    -Psychiatry consulting: \"She is still on steroids, c/o increased anxiety,   QTc 449, use low dose of risperidone 0.25 mg po BID PRN for anxiety   No benzo recommended due to opioid use and respiratory problems   The pt is off her regular antidepressants now, trintellix is not on formulary in the hospital\"    Chronic back pain  -Patient complains that her usual home oxycodone is not working for her anymore and it was changed to hydrocodone  -Low-dose fentanyl patch added and dosage increased on 4/2/2022  -IV morphine as needed for breakthrough pain    RLS (restless legs syndrome)  -continue Requip     This patient is high risk.      DVT prophylaxis:  Medical and mechanical DVT prophylaxis orders are present.    CODE STATUS:    Code Status (Patient has no pulse and is not breathing): CPR (Attempt to Resuscitate)  Medical Interventions (Patient has pulse or is breathing): Full Support      Disposition:  I expect patient to be discharged 2-3 days.    This patient has been examined wearing appropriate Personal Protective Equipment. 04/02/22      Electronically signed by Rafaela Nagy MD, 04/02/22, 10:17 EDT.  [facility] Hospitalist Team           "

## 2022-04-02 NOTE — PROGRESS NOTES
Daily Progress Note        Acute on chronic respiratory failure with hypoxia and hypercapnia (HCC)    Chronic obstructive pulmonary disease (HCC)    Primary hypertension    Tobacco abuse    Obesity, Class III, BMI 40-49.9 (morbid obesity) (HCC)    COPD exacerbation (HCC)    Acute bronchitis due to parainfluenza virus    Hypertensive urgency    Acute UTI (urinary tract infection)    Lactic acidosis    Insomnia    PTSD (post-traumatic stress disorder)    Chronic back pain    Chronic respiratory failure with hypoxia (HCC)    Prediabetes    RLS (restless legs syndrome)    KELTON (generalized anxiety disorder)    Acute hyperglycemia      Assessment  Acute on chronic respiratory failure with hypoxia and hypercapnia  COPD exacerbation  Sepsis  UTI  HTN  Hypertensive urgency  PTSD  Insomnia  RLS  Chronic constipation     Home oxygen at 2L with [employer]  Current smoker  Dyslipidemia  Lower extremity edema  Obesity  Arthritis  Vitamin D deficiency     3/28/2022 respiratory panel positive for parainfluenza virus 3  3/28/2022 urine culture positive for E. coli        Echo 3/29/2022  -EF 66 to 70%  -Right ventricular cavity is mildly dilated  -Moderate tricuspid valve regurg  -RVSP 56.6    Plan    Continue to titrate oxygen- Currently on 4L NC and BiPAP at bedtime/as needed  Rocephin-finishes 4/5  Solu-Medrol 40 mg every 12  Pulmicort nebulizer 2 times a day  Brovana nebulizers 3 times a day  DuoNebs as needed  Mucinex  Hydrochlorothiazide 25 mg daily     BP control: amlodipine and increased dose of clonidine again and try to wean off Cardene in preparation for discharge in a day or 2  -A-line in place for more accurate monitoring    Electrolyte/Glycemic control  DVT/GI Prophylaxis-Lovenox and Protonix     DC plan-return home with family.     3/31/2022                                                             3/29/2022       3/28/2022       LOS: 4 days     Subjective     Patient reports not feeling as well today.    Objective      Vital signs for last 24 hours:  Vitals:    04/02/22 0715 04/02/22 0716 04/02/22 0719 04/02/22 0933   BP:    166/68   BP Location:       Patient Position:       Pulse: 68 70 69 66   Resp: 16 16 16 20   Temp:    98.1 °F (36.7 °C)   TempSrc:    Oral   SpO2: 95% 95%  94%   Weight:       Height:           Intake/Output last 3 shifts:  I/O last 3 completed shifts:  In: 600 [P.O.:600]  Out: 4200 [Urine:4200]  Intake/Output this shift:  No intake/output data recorded.      Radiology  Imaging Results (Last 24 Hours)     ** No results found for the last 24 hours. **          Labs:  Results from last 7 days   Lab Units 04/01/22  0256   WBC 10*3/mm3 10.30   HEMOGLOBIN g/dL 10.8*   HEMATOCRIT % 31.9*   PLATELETS 10*3/mm3 217     Results from last 7 days   Lab Units 03/31/22  2333 03/29/22 2337 03/29/22  0014   SODIUM mmol/L 135*   < > 136   POTASSIUM mmol/L 4.0   < > 4.1   CHLORIDE mmol/L 99   < > 93*   CO2 mmol/L 26.0   < > 31.0*   BUN mg/dL 20   < > 12   CREATININE mg/dL 0.70   < > 0.91   CALCIUM mg/dL 8.5*   < > 9.3   BILIRUBIN mg/dL  --   --  0.3   ALK PHOS U/L  --   --  73   ALT (SGPT) U/L  --   --  17   AST (SGOT) U/L  --   --  19   GLUCOSE mg/dL 178*   < > 332*    < > = values in this interval not displayed.     Results from last 7 days   Lab Units 03/31/22  1524   PH, ARTERIAL pH units 7.360   PO2 ART mm Hg 62.7*   PCO2, ARTERIAL mm Hg 56.1*   HCO3 ART mmol/L 31.7*     Results from last 7 days   Lab Units 03/29/22  0014 03/28/22  1525   ALBUMIN g/dL 3.90 3.60     Results from last 7 days   Lab Units 03/31/22  2333 03/28/22  2109 03/28/22  1525   TROPONIN T ng/mL <0.010 <0.010 <0.010         Results from last 7 days   Lab Units 03/31/22  2333   MAGNESIUM mg/dL 1.9                   Meds:   SCHEDULE  amLODIPine, 5 mg, Oral, Q24H  arformoterol, 15 mcg, Nebulization, BID - RT  budesonide, 0.5 mg, Nebulization, BID - RT  cefTRIAXone, 1 g, Intravenous, Q24H  fentaNYL, 1 patch, Transdermal, Q72H   And  Check Fentanyl Patch  Placement, 1 each, Does not apply, Q12H  cloNIDine, 0.2 mg, Oral, Q8H  enoxaparin, 40 mg, Subcutaneous, Q12H  guaiFENesin, 1,200 mg, Oral, Q12H  hydrALAZINE, 50 mg, Oral, Q12H  hydroCHLOROthiazide, 25 mg, Oral, Daily  insulin lispro, 0-24 Units, Subcutaneous, TID AC  insulin regular, 8 Units, Subcutaneous, Q12H  lactulose, 20 g, Oral, BID  lidocaine, 2 patch, Transdermal, Q24H  lisinopril, 40 mg, Oral, Q24H  methylPREDNISolone sodium succinate, 40 mg, Intravenous, Q12H  metoprolol tartrate, 100 mg, Oral, Q12H  promethazine, 50 mg, Oral, Q8H  rOPINIRole, 5 mg, Oral, Nightly  Scopolamine, 1 patch, Transdermal, Q72H  sodium chloride, 10 mL, Intravenous, Q12H  topiramate, 50 mg, Oral, Daily  zolpidem, 10 mg, Oral, Nightly      Infusions  niCARdipine, 5-15 mg/hr, Last Rate: 10 mg/hr (04/02/22 0749)  Pharmacy Consult - Steroid Insulin Protocol,   Pharmacy to Dose enoxaparin (LOVENOX),       PRNs  •  acetaminophen **OR** acetaminophen **OR** acetaminophen  •  aluminum-magnesium hydroxide-simethicone  •  benzonatate  •  dextrose  •  dextrose  •  dextrose  •  glucagon (human recombinant)  •  glucagon (human recombinant)  •  haloperidol lactate  •  hydrALAZINE  •  insulin lispro **AND** insulin lispro  •  ipratropium-albuterol  •  magnesium sulfate **OR** magnesium sulfate **OR** magnesium sulfate  •  melatonin  •  metoprolol tartrate  •  Morphine  •  nitroglycerin  •  ondansetron **OR** ondansetron  •  oxyCODONE  •  Pharmacy Consult - Steroid Insulin Protocol  •  Pharmacy to Dose enoxaparin (LOVENOX)  •  potassium chloride  •  potassium chloride  •  promethazine  •  risperiDONE  •  sodium chloride  •  sodium chloride  •  zolpidem    Physical Exam:  Physical Exam  General Appearance:  Alert.  Obese.  No distress noted.  HEENT:  Normocephalic, without obvious abnormality, Conjunctiva/corneas clear,.  Normal external ear canals, Nares normal, no drainage     Neck:  Supple, symmetrical, trachea midline. No JVD.  Lungs /Chest  wall:  bilateral wheezing, labored respirations, using abdominal muscles heart:  Regular rate and rhythm, systolic murmur PMI left sternal border  Abdomen: Soft, non-tender, no masses, no organomegaly.    Extremities: Positive edema bilateral hands, no edema bilateral lower extremities, no clubbing or cyanosis      ROS  Review of Systems  Constitutional: Negative for chills, fever and malaise/fatigue.   HENT: Negative.    Eyes: Negative.    Cardiovascular: Negative.    Respiratory: Positive for cough and shortness of breath.    Skin: Negative.    Musculoskeletal: Negative.    Gastrointestinal: Negative.    Genitourinary: Negative.    Neurological: Negative.    Psychiatric/Behavioral: Negative.      I have reviewed current clinicals.     Electronically signed by CORI Bruce, 04/02/22, 10:28 AM EDT.     The NP scribed the note for me, I have examined the patient and reviewed and verified the above findings and and I formulated the assessment and plan as documented

## 2022-04-02 NOTE — PLAN OF CARE
Problem: Adult Inpatient Plan of Care  Goal: Plan of Care Review  Outcome: Ongoing, Progressing  Goal: Patient-Specific Goal (Individualized)  Outcome: Ongoing, Progressing  Goal: Absence of Hospital-Acquired Illness or Injury  Outcome: Ongoing, Progressing  Intervention: Identify and Manage Fall Risk  Recent Flowsheet Documentation  Taken 4/2/2022 0000 by Alessia Landry RN  Safety Promotion/Fall Prevention:   safety round/check completed   room organization consistent   nonskid shoes/slippers when out of bed   assistive device/personal items within reach   clutter free environment maintained   fall prevention program maintained  Taken 4/1/2022 2200 by Alessia Landry RN  Safety Promotion/Fall Prevention:   safety round/check completed   room organization consistent   clutter free environment maintained   assistive device/personal items within reach   fall prevention program maintained   nonskid shoes/slippers when out of bed  Taken 4/1/2022 2000 by Alessia Landry RN  Safety Promotion/Fall Prevention:   safety round/check completed   room organization consistent   nonskid shoes/slippers when out of bed   fall prevention program maintained   clutter free environment maintained   assistive device/personal items within reach  Intervention: Prevent Skin Injury  Recent Flowsheet Documentation  Taken 4/1/2022 2000 by Alessia Landry RN  Skin Protection:   adhesive use limited   tubing/devices free from skin contact  Intervention: Prevent Infection  Recent Flowsheet Documentation  Taken 4/1/2022 2200 by Alessia Landry RN  Infection Prevention:   single patient room provided   personal protective equipment utilized   hand hygiene promoted   equipment surfaces disinfected  Taken 4/1/2022 2000 by Alessia Landry RN  Infection Prevention:   single patient room provided   personal protective equipment utilized   hand hygiene promoted   equipment surfaces disinfected  Goal: Optimal Comfort and  Wellbeing  Outcome: Ongoing, Progressing  Intervention: Provide Person-Centered Care  Recent Flowsheet Documentation  Taken 4/1/2022 2000 by Alessia Landry RN  Trust Relationship/Rapport:   care explained   questions answered  Goal: Readiness for Transition of Care  Outcome: Ongoing, Progressing     Problem: Breathing Pattern Ineffective  Goal: Effective Breathing Pattern  Outcome: Ongoing, Progressing     Problem: Pain Chronic (Persistent)  Goal: Acceptable Pain Control and Functional Ability  Outcome: Ongoing, Progressing  Intervention: Manage Persistent Pain  Recent Flowsheet Documentation  Taken 4/1/2022 2200 by Alessia Landry RN  Medication Review/Management: medications reviewed  Taken 4/1/2022 2000 by Alessia Landry RN  Bowel Elimination Promotion: adequate fluid intake promoted  Medication Review/Management: medications reviewed     Problem: Adjustment to Illness COPD (Chronic Obstructive Pulmonary Disease)  Goal: Optimal Chronic Illness Coping  Outcome: Ongoing, Progressing     Problem: Functional Ability Impaired COPD (Chronic Obstructive Pulmonary Disease)  Goal: Optimal Level of Functional [place]  Outcome: Ongoing, Progressing     Problem: Infection COPD (Chronic Obstructive Pulmonary Disease)  Goal: Absence of Infection Signs and Symptoms  Outcome: Ongoing, Progressing  Intervention: Prevent or Manage Infection  Recent Flowsheet Documentation  Taken 4/2/2022 0000 by Alessia Landry RN  Isolation Precautions:   precautions maintained   droplet   contact  Taken 4/1/2022 2200 by Alessia Landry RN  Isolation Precautions:   precautions maintained   contact   droplet  Taken 4/1/2022 2000 by Alessia Landry RN  Isolation Precautions:   precautions maintained   droplet   contact     Problem: Oral Intake Inadequate COPD (Chronic Obstructive Pulmonary Disease)  Goal: Improved Nutrition Intake  Outcome: Ongoing, Progressing     Problem: Respiratory Compromise COPD (Chronic  Obstructive Pulmonary Disease)  Goal: Effective Oxygenation and Ventilation  Outcome: Ongoing, Progressing  Intervention: Promote Airway Secretion Clearance  Recent Flowsheet Documentation  Taken 4/1/2022 2000 by Alessia Landry RN  Cough And Deep Breathing: done independently per patient     Problem: Behavioral Health Comorbidity  Goal: Maintenance of Behavioral Health Symptom Control  Outcome: Ongoing, Progressing  Intervention: Maintain Behavioral Health Symptom Control  Recent Flowsheet Documentation  Taken 4/1/2022 2200 by Alessia Landry RN  Medication Review/Management: medications reviewed  Taken 4/1/2022 2000 by Alessia Landry RN  Medication Review/Management: medications reviewed     Problem: COPD (Chronic Obstructive Pulmonary Disease) Comorbidity  Goal: Maintenance of COPD Symptom Control  Outcome: Ongoing, Progressing  Intervention: Maintain COPD-Symptom Control  Recent Flowsheet Documentation  Taken 4/1/2022 2200 by Alessia Landry RN  Medication Review/Management: medications reviewed  Taken 4/1/2022 2000 by Alessia Landry RN  Medication Review/Management: medications reviewed     Problem: Diabetes Comorbidity  Goal: Blood Glucose Level Within Targeted Range  Outcome: Ongoing, Progressing  Intervention: Monitor and Manage Glycemia  Recent Flowsheet Documentation  Taken 4/1/2022 2000 by Alessia Landry RN  Glycemic Management:   blood glucose monitored   supplemental insulin given     Problem: Hypertension Comorbidity  Goal: Blood Pressure in Desired Range  Outcome: Ongoing, Progressing  Intervention: Maintain Blood Pressure Management  Recent Flowsheet Documentation  Taken 4/1/2022 2200 by Alessia Landry RN  Medication Review/Management: medications reviewed  Taken 4/1/2022 2000 by Alessia Landry RN  Medication Review/Management: medications reviewed     Problem: Pain Chronic (Persistent) (Comorbidity Management)  Goal: Acceptable Pain Control and Functional  Ability  Outcome: Ongoing, Progressing  Intervention: Manage Persistent Pain  Recent Flowsheet Documentation  Taken 4/1/2022 2200 by Alessia Landry RN  Medication Review/Management: medications reviewed  Taken 4/1/2022 2000 by Alessia Landry RN  Bowel Elimination Promotion: adequate fluid intake promoted  Medication Review/Management: medications reviewed     Problem: Fall Injury Risk  Goal: Absence of Fall and Fall-Related Injury  Outcome: Ongoing, Progressing  Intervention: Identify and Manage Contributors  Recent Flowsheet Documentation  Taken 4/1/2022 2200 by Alessia Landry RN  Medication Review/Management: medications reviewed  Taken 4/1/2022 2000 by Alessia Landry RN  Medication Review/Management: medications reviewed  Intervention: Promote Injury-Free Environment  Recent Flowsheet Documentation  Taken 4/2/2022 0000 by Alessia Landry RN  Safety Promotion/Fall Prevention:   safety round/check completed   room organization consistent   nonskid shoes/slippers when out of bed   assistive device/personal items within reach   clutter free environment maintained   fall prevention program maintained  Taken 4/1/2022 2200 by Alessia Landry RN  Safety Promotion/Fall Prevention:   safety round/check completed   room organization consistent   clutter free environment maintained   assistive device/personal items within reach   fall prevention program maintained   nonskid shoes/slippers when out of bed  Taken 4/1/2022 2000 by Alessia Landry RN  Safety Promotion/Fall Prevention:   safety round/check completed   room organization consistent   nonskid shoes/slippers when out of bed   fall prevention program maintained   clutter free environment maintained   assistive device/personal items within reach     Problem: Restraint, Nonbehavioral (Nonviolent)  Goal: Absence of Harm or Injury  Outcome: Ongoing, Progressing  Intervention: Implement Least Restrictive Safety Strategies  Recent Flowsheet  Documentation  Taken 4/2/2022 0000 by Alessia Landry RN  Medical Device Protection: tubing secured  Taken 4/1/2022 2200 by Alessia Landry RN  Medical Device Protection: tubing secured  Taken 4/1/2022 2000 by Alessia Landry RN  Medical Device Protection: tubing secured  Intervention: Protect Dignity, Rights, and Personal Wellbeing  Recent Flowsheet Documentation  Taken 4/1/2022 2000 by Alessia Landry RN  Trust Relationship/Rapport:   care explained   questions answered     Problem: Skin Injury Risk Increased  Goal: Skin Health and Integrity  Outcome: Ongoing, Progressing  Intervention: Optimize Skin Protection  Recent Flowsheet Documentation  Taken 4/1/2022 2000 by Alessia Landry RN  Pressure Reduction Techniques: frequent weight shift encouraged  Pressure Reduction Devices: pressure-redistributing mattress utilized  Skin Protection:   adhesive use limited   tubing/devices free from skin contact   Goal Outcome Evaluation:

## 2022-04-03 ENCOUNTER — APPOINTMENT (OUTPATIENT)
Dept: NUCLEAR MEDICINE | Facility: HOSPITAL | Age: 62
End: 2022-04-03

## 2022-04-03 ENCOUNTER — APPOINTMENT (OUTPATIENT)
Dept: CT IMAGING | Facility: HOSPITAL | Age: 62
End: 2022-04-03

## 2022-04-03 LAB
BACTERIA SPEC AEROBE CULT: NORMAL
BACTERIA SPEC AEROBE CULT: NORMAL
GLUCOSE BLDC GLUCOMTR-MCNC: 172 MG/DL (ref 70–105)
GLUCOSE BLDC GLUCOMTR-MCNC: 173 MG/DL (ref 70–105)

## 2022-04-03 PROCEDURE — A9562 TC99M MERTIATIDE: HCPCS | Performed by: HOSPITALIST

## 2022-04-03 PROCEDURE — 63710000001 ONDANSETRON PER 8 MG: Performed by: NURSE PRACTITIONER

## 2022-04-03 PROCEDURE — 78707 K FLOW/FUNCT IMAGE W/O DRUG: CPT

## 2022-04-03 PROCEDURE — 0 TECHNETIUM MERTIATIDE: Performed by: HOSPITALIST

## 2022-04-03 PROCEDURE — 94799 UNLISTED PULMONARY SVC/PX: CPT

## 2022-04-03 PROCEDURE — 99233 SBSQ HOSP IP/OBS HIGH 50: CPT | Performed by: HOSPITALIST

## 2022-04-03 PROCEDURE — 82962 GLUCOSE BLOOD TEST: CPT

## 2022-04-03 PROCEDURE — 0 IOPAMIDOL PER 1 ML: Performed by: HOSPITALIST

## 2022-04-03 PROCEDURE — C1751 CATH, INF, PER/CENT/MIDLINE: HCPCS

## 2022-04-03 PROCEDURE — 36410 VNPNXR 3YR/> PHY/QHP DX/THER: CPT

## 2022-04-03 PROCEDURE — 74175 CTA ABDOMEN W/CONTRAST: CPT

## 2022-04-03 PROCEDURE — 63710000001 INSULIN REGULAR HUMAN PER 5 UNITS: Performed by: HOSPITALIST

## 2022-04-03 PROCEDURE — 94660 CPAP INITIATION&MGMT: CPT

## 2022-04-03 PROCEDURE — 0 MORPHINE SULFATE 4 MG/ML SOLUTION: Performed by: HOSPITALIST

## 2022-04-03 PROCEDURE — 63710000001 PROMETHAZINE PER 25 MG: Performed by: HOSPITALIST

## 2022-04-03 PROCEDURE — 84585 ASSAY OF URINE VMA: CPT | Performed by: HOSPITALIST

## 2022-04-03 PROCEDURE — 25010000002 ENOXAPARIN PER 10 MG: Performed by: NURSE PRACTITIONER

## 2022-04-03 PROCEDURE — 25010000002 PROMETHAZINE PER 50 MG: Performed by: HOSPITALIST

## 2022-04-03 PROCEDURE — 25010000002 METHYLPREDNISOLONE PER 40 MG: Performed by: INTERNAL MEDICINE

## 2022-04-03 PROCEDURE — 82384 ASSAY THREE CATECHOLAMINES: CPT | Performed by: HOSPITALIST

## 2022-04-03 PROCEDURE — 82570 ASSAY OF URINE CREATININE: CPT | Performed by: HOSPITALIST

## 2022-04-03 PROCEDURE — 63710000001 INSULIN LISPRO (HUMAN) PER 5 UNITS: Performed by: NURSE PRACTITIONER

## 2022-04-03 PROCEDURE — 99222 1ST HOSP IP/OBS MODERATE 55: CPT | Performed by: INTERNAL MEDICINE

## 2022-04-03 PROCEDURE — 81050 URINALYSIS VOLUME MEASURE: CPT | Performed by: HOSPITALIST

## 2022-04-03 PROCEDURE — 25010000002 CEFTRIAXONE SODIUM-DEXTROSE 1-3.74 GM-%(50ML) RECONSTITUTED SOLUTION: Performed by: HOSPITALIST

## 2022-04-03 PROCEDURE — 99232 SBSQ HOSP IP/OBS MODERATE 35: CPT | Performed by: PHYSICIAN ASSISTANT

## 2022-04-03 RX ORDER — HYDRALAZINE HYDROCHLORIDE 25 MG/1
25 TABLET, FILM COATED ORAL ONCE
Status: COMPLETED | OUTPATIENT
Start: 2022-04-03 | End: 2022-04-03

## 2022-04-03 RX ORDER — SODIUM CHLORIDE 0.9 % (FLUSH) 0.9 %
10 SYRINGE (ML) INJECTION EVERY 12 HOURS SCHEDULED
Status: DISCONTINUED | OUTPATIENT
Start: 2022-04-03 | End: 2022-04-05 | Stop reason: HOSPADM

## 2022-04-03 RX ORDER — RISPERIDONE 0.25 MG/1
0.5 TABLET ORAL EVERY 12 HOURS SCHEDULED
Status: DISCONTINUED | OUTPATIENT
Start: 2022-04-03 | End: 2022-04-05 | Stop reason: HOSPADM

## 2022-04-03 RX ORDER — HYDRALAZINE HYDROCHLORIDE 25 MG/1
75 TABLET, FILM COATED ORAL EVERY 12 HOURS SCHEDULED
Status: DISCONTINUED | OUTPATIENT
Start: 2022-04-03 | End: 2022-04-05 | Stop reason: HOSPADM

## 2022-04-03 RX ORDER — SODIUM CHLORIDE 0.9 % (FLUSH) 0.9 %
10 SYRINGE (ML) INJECTION AS NEEDED
Status: DISCONTINUED | OUTPATIENT
Start: 2022-04-03 | End: 2022-04-05 | Stop reason: HOSPADM

## 2022-04-03 RX ADMIN — CLONIDINE HYDROCHLORIDE 0.3 MG: 0.1 TABLET ORAL at 06:24

## 2022-04-03 RX ADMIN — ROPINIROLE 5 MG: 5 TABLET, FILM COATED ORAL at 23:15

## 2022-04-03 RX ADMIN — PROMETHAZINE HYDROCHLORIDE 12.5 MG: 25 INJECTION INTRAMUSCULAR; INTRAVENOUS at 10:42

## 2022-04-03 RX ADMIN — TECHNESCAN TC 99M MERTIATIDE 1 DOSE: 1 INJECTION, POWDER, LYOPHILIZED, FOR SOLUTION INTRAVENOUS at 12:36

## 2022-04-03 RX ADMIN — GUAIFENESIN 1200 MG: 600 TABLET, EXTENDED RELEASE ORAL at 08:04

## 2022-04-03 RX ADMIN — Medication 10 ML: at 07:30

## 2022-04-03 RX ADMIN — PROMETHAZINE HYDROCHLORIDE 50 MG: 25 TABLET ORAL at 17:37

## 2022-04-03 RX ADMIN — CLONIDINE HYDROCHLORIDE 0.3 MG: 0.1 TABLET ORAL at 13:31

## 2022-04-03 RX ADMIN — HYDROCODONE BITARTRATE AND ACETAMINOPHEN 1 TABLET: 10; 325 TABLET ORAL at 07:19

## 2022-04-03 RX ADMIN — AMLODIPINE BESYLATE 5 MG: 5 TABLET ORAL at 08:04

## 2022-04-03 RX ADMIN — GUAIFENESIN 1200 MG: 600 TABLET, EXTENDED RELEASE ORAL at 23:12

## 2022-04-03 RX ADMIN — ARFORMOTEROL TARTRATE 15 MCG: 15 SOLUTION RESPIRATORY (INHALATION) at 20:45

## 2022-04-03 RX ADMIN — HYDRALAZINE HYDROCHLORIDE 75 MG: 25 TABLET, FILM COATED ORAL at 23:13

## 2022-04-03 RX ADMIN — BUDESONIDE 0.5 MG: 0.5 INHALANT RESPIRATORY (INHALATION) at 08:19

## 2022-04-03 RX ADMIN — INSULIN HUMAN 16 UNITS: 100 INJECTION, SOLUTION PARENTERAL at 08:05

## 2022-04-03 RX ADMIN — PROMETHAZINE HYDROCHLORIDE 50 MG: 25 TABLET ORAL at 06:25

## 2022-04-03 RX ADMIN — RISPERIDONE 0.5 MG: 0.25 TABLET ORAL at 13:31

## 2022-04-03 RX ADMIN — BUDESONIDE 0.5 MG: 0.5 INHALANT RESPIRATORY (INHALATION) at 20:51

## 2022-04-03 RX ADMIN — ENOXAPARIN SODIUM 40 MG: 40 INJECTION SUBCUTANEOUS at 23:12

## 2022-04-03 RX ADMIN — MORPHINE SULFATE 5 MG: 4 INJECTION INTRAVENOUS at 10:41

## 2022-04-03 RX ADMIN — LIDOCAINE 2 PATCH: 50 PATCH TOPICAL at 08:03

## 2022-04-03 RX ADMIN — CEFTRIAXONE 1 G: 1 INJECTION, SOLUTION INTRAVENOUS at 13:30

## 2022-04-03 RX ADMIN — IOPAMIDOL 100 ML: 755 INJECTION, SOLUTION INTRAVENOUS at 13:23

## 2022-04-03 RX ADMIN — METOPROLOL TARTRATE 100 MG: 50 TABLET, FILM COATED ORAL at 23:15

## 2022-04-03 RX ADMIN — INSULIN LISPRO 4 UNITS: 100 INJECTION, SOLUTION INTRAVENOUS; SUBCUTANEOUS at 07:20

## 2022-04-03 RX ADMIN — Medication 10 ML: at 23:18

## 2022-04-03 RX ADMIN — ZOLPIDEM TARTRATE 10 MG: 5 TABLET ORAL at 23:15

## 2022-04-03 RX ADMIN — ENOXAPARIN SODIUM 40 MG: 40 INJECTION SUBCUTANEOUS at 08:04

## 2022-04-03 RX ADMIN — HYDROCHLOROTHIAZIDE 25 MG: 25 TABLET ORAL at 08:04

## 2022-04-03 RX ADMIN — CLONIDINE HYDROCHLORIDE 0.3 MG: 0.1 TABLET ORAL at 23:11

## 2022-04-03 RX ADMIN — TOPIRAMATE 50 MG: 25 TABLET, FILM COATED ORAL at 08:04

## 2022-04-03 RX ADMIN — Medication 10 ML: at 13:43

## 2022-04-03 RX ADMIN — LISINOPRIL 40 MG: 20 TABLET ORAL at 10:41

## 2022-04-03 RX ADMIN — METHYLPREDNISOLONE SODIUM SUCCINATE 40 MG: 40 INJECTION, POWDER, FOR SOLUTION INTRAMUSCULAR; INTRAVENOUS at 23:14

## 2022-04-03 RX ADMIN — INSULIN HUMAN 20 UNITS: 100 INJECTION, SOLUTION PARENTERAL at 23:13

## 2022-04-03 RX ADMIN — RISPERIDONE 0.5 MG: 0.25 TABLET ORAL at 23:27

## 2022-04-03 RX ADMIN — ARFORMOTEROL TARTRATE 15 MCG: 15 SOLUTION RESPIRATORY (INHALATION) at 08:14

## 2022-04-03 RX ADMIN — NICARDIPINE HYDROCHLORIDE 5 MG/HR: 25 INJECTION, SOLUTION INTRAVENOUS at 23:19

## 2022-04-03 RX ADMIN — INSULIN LISPRO 16 UNITS: 100 INJECTION, SOLUTION INTRAVENOUS; SUBCUTANEOUS at 18:13

## 2022-04-03 RX ADMIN — NICARDIPINE HYDROCHLORIDE 15 MG/HR: 25 INJECTION, SOLUTION INTRAVENOUS at 01:21

## 2022-04-03 RX ADMIN — INSULIN LISPRO 4 UNITS: 100 INJECTION, SOLUTION INTRAVENOUS; SUBCUTANEOUS at 14:05

## 2022-04-03 RX ADMIN — METHYLPREDNISOLONE SODIUM SUCCINATE 40 MG: 40 INJECTION, POWDER, FOR SOLUTION INTRAMUSCULAR; INTRAVENOUS at 10:43

## 2022-04-03 RX ADMIN — ONDANSETRON HYDROCHLORIDE 4 MG: 4 TABLET, FILM COATED ORAL at 07:19

## 2022-04-03 RX ADMIN — MORPHINE SULFATE 5 MG: 4 INJECTION INTRAVENOUS at 21:43

## 2022-04-03 RX ADMIN — NICARDIPINE HYDROCHLORIDE 15 MG/HR: 25 INJECTION, SOLUTION INTRAVENOUS at 07:04

## 2022-04-03 RX ADMIN — METOPROLOL TARTRATE 10 MG: 1 INJECTION, SOLUTION INTRAVENOUS at 14:05

## 2022-04-03 RX ADMIN — HYDRALAZINE HYDROCHLORIDE 25 MG: 25 TABLET, FILM COATED ORAL at 13:31

## 2022-04-03 RX ADMIN — HYDRALAZINE HYDROCHLORIDE 50 MG: 25 TABLET, FILM COATED ORAL at 08:04

## 2022-04-03 RX ADMIN — MORPHINE SULFATE 5 MG: 4 INJECTION INTRAVENOUS at 15:23

## 2022-04-03 RX ADMIN — HYDROCODONE BITARTRATE AND ACETAMINOPHEN 1 TABLET: 10; 325 TABLET ORAL at 14:17

## 2022-04-03 RX ADMIN — PROMETHAZINE HYDROCHLORIDE 50 MG: 25 TABLET ORAL at 21:43

## 2022-04-03 RX ADMIN — METOPROLOL TARTRATE 100 MG: 50 TABLET, FILM COATED ORAL at 08:04

## 2022-04-03 RX ADMIN — NICARDIPINE HYDROCHLORIDE 15 MG/HR: 25 INJECTION, SOLUTION INTRAVENOUS at 04:17

## 2022-04-03 NOTE — PLAN OF CARE
Problem: Adult Inpatient Plan of Care  Goal: Plan of Care Review  Outcome: Unable to Meet, Plan Revised  Goal: Patient-Specific Goal (Individualized)  Outcome: Unable to Meet, Plan Revised  Goal: Absence of Hospital-Acquired Illness or Injury  Outcome: Unable to Meet, Plan Revised  Intervention: Identify and Manage Fall Risk  Recent Flowsheet Documentation  Taken 4/3/2022 0600 by Silvina Cook RN  Safety Promotion/Fall Prevention: safety round/check completed  Taken 4/3/2022 0400 by Silvina Cook RN  Safety Promotion/Fall Prevention: safety round/check completed  Taken 4/3/2022 0200 by Silvina Cook RN  Safety Promotion/Fall Prevention: safety round/check completed  Taken 4/3/2022 0030 by Silvina Cook RN  Safety Promotion/Fall Prevention: assistive device/personal items within reach  Taken 4/2/2022 2200 by Silvina Cook RN  Safety Promotion/Fall Prevention: assistive device/personal items within reach  Taken 4/2/2022 2030 by Silvina Cook RN  Safety Promotion/Fall Prevention: assistive device/personal items within reach  Intervention: Prevent Skin Injury  Recent Flowsheet Documentation  Taken 4/3/2022 0400 by Silvina Cook RN  Body Position: position changed independently  Skin Protection: adhesive use limited  Taken 4/3/2022 0030 by Silvina Cook RN  Body Position: position changed independently  Skin Protection: adhesive use limited  Taken 4/2/2022 2030 by Silvina Cook RN  Body Position: position changed independently  Skin Protection: adhesive use limited  Intervention: Prevent and Manage VTE (Venous Thromboembolism) Risk  Recent Flowsheet Documentation  Taken 4/3/2022 0400 by Silvina Cook RN  Activity Management: activity adjusted per tolerance  VTE Prevention/Management: dorsiflexion/plantar flexion performed  Taken 4/3/2022 0030 by Silvina Cook RN  Activity Management: activity adjusted per tolerance  Taken 4/2/2022 2030 by  Silvina Cook RN  Activity Management: activity adjusted per tolerance  VTE Prevention/Management:   bilateral   dorsiflexion/plantar flexion performed  Intervention: Prevent Infection  Recent Flowsheet Documentation  Taken 4/3/2022 0400 by Silvina Cook RN  Infection Prevention: cohorting utilized  Taken 4/3/2022 0030 by Silvina Cook RN  Infection Prevention: cohorting utilized  Taken 4/2/2022 2030 by Silvina Cook RN  Infection Prevention: cohorting utilized  Goal: Optimal Comfort and Wellbeing  Outcome: Unable to Meet, Plan Revised  Intervention: Provide Person-Centered Care  Recent Flowsheet Documentation  Taken 4/3/2022 0400 by Silvina Cook RN  Trust Relationship/Rapport: care explained  Taken 4/3/2022 0030 by Silvina Cook RN  Trust Relationship/Rapport: care explained  Taken 4/2/2022 2030 by Silvina Cook RN  Trust Relationship/Rapport: care explained  Goal: Readiness for Transition of Care  Outcome: Unable to Meet, Plan Revised   Goal Outcome Evaluation:                 Problem: Adult Inpatient Plan of Care  Goal: Plan of Care Review  Outcome: Unable to Meet, Plan Revised  Goal: Patient-Specific Goal (Individualized)  Outcome: Unable to Meet, Plan Revised  Goal: Absence of Hospital-Acquired Illness or Injury  Outcome: Unable to Meet, Plan Revised  Goal: Optimal Comfort and Wellbeing  Outcome: Unable to Meet, Plan Revised  Goal: Readiness for Transition of Care  Outcome: Unable to Meet, Plan Revised     Problem: Breathing Pattern Ineffective  Goal: Effective Breathing Pattern  Outcome: Unable to Meet, Plan Revised     Problem: Pain Chronic (Persistent)  Goal: Acceptable Pain Control and Functional Ability  Outcome: Unable to Meet, Plan Revised     Problem: Adjustment to Illness COPD (Chronic Obstructive Pulmonary Disease)  Goal: Optimal Chronic Illness Coping  Outcome: Unable to Meet, Plan Revised     Problem: Functional Ability Impaired COPD (Chronic  Obstructive Pulmonary Disease)  Goal: Optimal Level of Functional [place]  Outcome: Unable to Meet, Plan Revised     Problem: Infection COPD (Chronic Obstructive Pulmonary Disease)  Goal: Absence of Infection Signs and Symptoms  Outcome: Unable to Meet, Plan Revised     Problem: Oral Intake Inadequate COPD (Chronic Obstructive Pulmonary Disease)  Goal: Improved Nutrition Intake  Outcome: Unable to Meet, Plan Revised     Problem: Respiratory Compromise COPD (Chronic Obstructive Pulmonary Disease)  Goal: Effective Oxygenation and Ventilation  Outcome: Unable to Meet, Plan Revised     Problem: Behavioral Health Comorbidity  Goal: Maintenance of Behavioral Health Symptom Control  Outcome: Unable to Meet, Plan Revised     Problem: COPD (Chronic Obstructive Pulmonary Disease) Comorbidity  Goal: Maintenance of COPD Symptom Control  Outcome: Unable to Meet, Plan Revised     Problem: Diabetes Comorbidity  Goal: Blood Glucose Level Within Targeted Range  Outcome: Unable to Meet, Plan Revised     Problem: Hypertension Comorbidity  Goal: Blood Pressure in Desired Range  Outcome: Unable to Meet, Plan Revised     Problem: Pain Chronic (Persistent) (Comorbidity Management)  Goal: Acceptable Pain Control and Functional Ability  Outcome: Unable to Meet, Plan Revised     Problem: Fall Injury Risk  Goal: Absence of Fall and Fall-Related Injury  Outcome: Unable to Meet, Plan Revised     Problem: Restraint, Nonbehavioral (Nonviolent)  Goal: Absence of Harm or Injury  Outcome: Unable to Meet, Plan Revised     Problem: Skin Injury Risk Increased  Goal: Skin Health and Integrity  Outcome: Unable to Meet, Plan Revised

## 2022-04-03 NOTE — NURSING NOTE
Around 2230 patient alarm went off she was out of bed over on c [facility] iv out and has an a line on left wrist.   Got her back in bed she is confused.    Then 2340 she got out of bed on the right side still waiting for new iv. She is non compliant at this time will not do or say what you ask her notified  and he is coming back to stay with her.  Iv was put in at 0030 right . [facility] here about 0130  Patient fell asleep before he got here.

## 2022-04-03 NOTE — PROGRESS NOTES
[facility] Daily Progress Note    Patient Name: Ann Álvarez  : 1960  MRN: [id number]  Primary Care Physician:  Jose Enrique Walters MD  Date of admission: 3/28/2022      Subjective      Chief Complaint:  Shortness of breath      4/3/2022: Patient reports feeling some better today but she is discouraged because she had to be put back on the Cardene for her hypertension.  She reports that her chronic back pain is inadequately controlled.  She feels the oxycodone's effectiveness has waned over time since she has been on it long-term.  Her and her  were counseled extensively and all questions were answered.      Review of Systems   Musculoskeletal: Positive for back pain.   All other systems reviewed and are negative.      Objective      Vitals:   Temp:  [97.5 °F (36.4 °C)-98.4 °F (36.9 °C)] 98.2 °F (36.8 °C)  Heart Rate:  [] 76  Resp:  [16-24] 20  BP: (120-178)/() 166/105  Flow (L/min):  [2-5] 2    Physical Exam    Vital signs and nurses notes reviewed.  Well-developed overnourished female sitting up in bed eating, comfortable on nasal cannula O2, awake and alert in no acute distress; sclera anicteric, mucous membranes moist; lungs with decreased air entry and scattered rhonchi bilaterally; CV tachycardic; abdomen soft and nontender nondistended; extremities with decreased bilateral lower leg and pedal edema; no cyanosis or calf tenderness; no Ramires catheter.  Exam unchanged from 2022.    Result Review    Result Review:  I have personally reviewed the results from the time of this admission to 4/3/2022 14:14 EDT and agree with these findings:  [x]  Laboratory  [x]  Microbiology  [x]  Radiology  []  EKG/Telemetry   []  Cardiology/Vascular   []  Pathology  [x]  Old records  []  Other:    Most notable findings discussed in the assessment and plan.    Assessment/Plan      Brief Patient Summary:  Ann Álvarez is a 61 y.o. female with a history of COPD, chronic  hypoxic respiratory failure on home oxygen as needed, HTN, chronic back pain, PTSD, anxiety, and insomnia who presented to the ER at [facility] on 03/28/2022 complaining of shortness of breath. Workup in the ER revealed COPD exacerbation and acute urinary tract infection. The patient was admitted to the ED Observation Unit for further treatment.  Dates of follow-up visits:  03/29/22:  The patient's respiratory status worsened overnight and she was found to have worsening lactic acidosis. She was given Lasix for possible fluid overload, but her lactic acid continues to elevate and she appears dry. She was admitted to the Hospitalist group for further evaluation and treatment. An ABG was repeated and she was placed on BiPAP. She will be given IV fluids and labs repeated later this afternoon. She was started on empiric antibiotics for possible sepsis.   3/30/2022  Patient was seen earlier and reported feeling improvement in her shortness of air.  She reported ongoing chronic back pain.  I was called back after a fast team was called because the patient was having tremors. The patient was able to talk during these tremor episodes.  There was no evidence of any seizure like activity.  3/31/2022  Patient complained of severe uncontrolled acute on chronic back pain.  She was having tremors and tachycardia which was felt to be due to uncontrolled pain.  She also had accelerated hypertension.  4/1/2022: Patient reports that her chronic pain in her back is more tolerable with the current pain medication regimen.  No complaints of headache, chest pain, shortness of breath, sweats, GI or  complaints.  Reports cough and shortness of breath have improved as well.  4/2/2022:   Patient reports ongoing intolerable back pain which she reports is her usual chronic pain but worse because of being in the hospital bed.  She reports occasional cough and improvement in her shortness of breath.  She reports that she has had a  bowel movement recently and urinating normally.  Patient's blood pressure is fluctuating and she is still requiring a Cardene drip.  Work-up for secondary causes of hypertension has been ordered, and cardiology has been consulted.      Inpatient medications include:  amLODIPine, 5 mg, Oral, Q24H  arformoterol, 15 mcg, Nebulization, BID - RT  budesonide, 0.5 mg, Nebulization, BID - RT  cefTRIAXone, 1 g, Intravenous, Q24H  Check Fentanyl Patch Placement, 1 each, Does not apply, Q12H  fentaNYL, 1 patch, Transdermal, Q72H   And  Check Fentanyl Patch Placement, 1 each, Does not apply, Q12H  cloNIDine, 0.3 mg, Oral, Q8H  enoxaparin, 40 mg, Subcutaneous, Q12H  guaiFENesin, 1,200 mg, Oral, Q12H  hydrALAZINE, 75 mg, Oral, Q12H  hydroCHLOROthiazide, 25 mg, Oral, Daily  insulin lispro, 0-24 Units, Subcutaneous, TID AC  insulin regular, 20 Units, Subcutaneous, Q12H  lactulose, 20 g, Oral, BID  lidocaine, 2 patch, Transdermal, Q24H  lisinopril, 40 mg, Oral, Q24H  methylPREDNISolone sodium succinate, 40 mg, Intravenous, Q12H  metoprolol tartrate, 100 mg, Oral, Q12H  promethazine, 50 mg, Oral, Q8H  risperiDONE, 0.5 mg, Oral, Q12H  rOPINIRole, 5 mg, Oral, Nightly  Scopolamine, 1 patch, Transdermal, Q72H  sodium chloride, 10 mL, Intravenous, Q12H  sodium chloride, 10 mL, Intravenous, Q12H  topiramate, 50 mg, Oral, Daily  zolpidem, 10 mg, Oral, Nightly       niCARdipine, 5-15 mg/hr, Last Rate: Stopped (04/03/22 0730)  Pharmacy Consult - Steroid Insulin Protocol,   Pharmacy to Dose enoxaparin (LOVENOX),          Active Hospital Problems:  Active Hospital Problems    Diagnosis    • **Acute on chronic respiratory failure with hypoxia and hypercapnia (HCC)    • Acute bronchitis due to parainfluenza virus    • Hypertensive urgency    • Acute UTI (urinary tract infection)    • Lactic acidosis    • Obesity, Class III, BMI 40-49.9 (morbid obesity) ([facility])    • KELTON (generalized anxiety disorder)    • Acute hyperglycemia    • PTSD  (post-traumatic stress disorder)    • Chronic back pain    • COPD exacerbation ([facility])    • RLS (restless legs syndrome)    • Chronic obstructive pulmonary disease (HCC)    • Prediabetes    • Chronic respiratory failure with hypoxia ([facility])    • Insomnia    • Tobacco abuse    • Primary hypertension      Plan:     Hypertensive emergency on top of chronic essential hypertension, rule out secondary causes for hypertension  -Blood pressure has been very difficult to control despite clonidine, lisinopril and metoprolol with nitroglycerin ointment  -Nitroglycerin ointment discontinued 3/31/2022 and Tridil drip initiated for improved blood pressure control  -Oral hydralazine added 3/31/2022  -arterial line necessary for closer BP monitoring and the cuff was pumping up to such high pressure it was severely painful  -Tridil drip was not controlling the patient's blood pressure so Cardene drip was initiated  -Amlodipine was added on 4/1/2022  -Wean Cardene as tolerated  -24-hour urine catecholamines/VMA and renal perfusion study ordered    Episodes of tremors, new problem 3/30/2022 and fast team was called now resolved  -No evidence of seizure activity  -Likely secondary to severe uncontrolled pain and anxiety  -These episodes resolved when the patient's pain and anxiety were improved    Acute on chronic respiratory failure with hypoxia and hypercapnia due to COPD exacerbation and Acute bronchitis due to parainfluenza virus  Chronic hypoxic respiratory failure  Tobacco abuse  -baseline:  O2 @ 2 L per NC PRN  -currently on BiPAP  -titrate O2 to keep sat 90-95%  -IV steroids; wean as tolerated  -scheduled and PRN DuoNebs  -Mucinex BID  -Continue Brovana and budesonide  -contact/droplet isolation   -encourage tobacco cessation     Acute E. coli urinary tract infection  -patient is asymptomatic  -urine culture showed pansensitive E. coli  -Cefepime and vancomycin discontinued and Rocephin initiated; Rocephin subsequently changed to  "Omnicef patient completed antibiotic course    Lactic acidosis felt to be secondary to hypoxia; sepsis is felt to have been ruled out  -likely secondary to respiratory failure, worsened with diuresis  -Patient received IV fluids  -blood cultures no growth and pending  -cefepime and vancomycin discontinued    Acute hyperglycemia with underlying Prediabetes  -likely exacerbated by steroids  -A1c 6.3%  -accu checks AC&HS with SSI  -pharmacy consulted for steroid-induced hyperglycemia protocol    Obesity, Class III, BMI 40-49.9 (morbid obesity)   -BMI 40.39 on admission  -encourage lifestyle modifications     Insomnia  -continue Ambien (INSPECT reviewed)    Post-traumatic stress disorder / generalized anxiety disorder / depression  -PRN clonidine on hold; on scheduled as above  -continue Topamax and trazodone    -Psychiatry consulting: \"She is still on steroids, c/o increased anxiety,   QTc 449, use low dose of risperidone 0.25 mg po BID PRN for anxiety   No benzo recommended due to opioid use and respiratory problems   The pt is off her regular antidepressants now, trintellix is not on formulary in the hospital\"  -Risperidone was subsequently increased because of uncontrolled symptoms    Chronic back pain  -Patient complains that her usual home oxycodone is not working for her anymore and it was changed to hydrocodone  -Low-dose fentanyl patch added and dosage increased on 4/2/2022 to 25 mcg daily  -IV morphine as needed for breakthrough pain    RLS (restless legs syndrome)  -continue Requip     This patient is high risk.      DVT prophylaxis:  Medical and mechanical DVT prophylaxis orders are present.    CODE STATUS:    Code Status (Patient has no pulse and is not breathing): CPR (Attempt to Resuscitate)  Medical Interventions (Patient has pulse or is breathing): Full Support      Disposition:  I expect patient to be discharged 2-3 days.    This patient has been examined wearing appropriate Personal Protective " Equipment. 04/03/22      Electronically signed by Rafaela Nagy MD, 04/03/22, 14:14 EDT.  [facility] Hospitalist Team

## 2022-04-03 NOTE — PROGRESS NOTES
Daily Progress Note        Acute on chronic respiratory failure with hypoxia and hypercapnia (HCC)    Chronic obstructive pulmonary disease (HCC)    Primary hypertension    Tobacco abuse    Obesity, Class III, BMI 40-49.9 (morbid obesity) (HCC)    COPD exacerbation (HCC)    Acute bronchitis due to parainfluenza virus    Hypertensive urgency    Acute UTI (urinary tract infection)    Lactic acidosis    Insomnia    PTSD (post-traumatic stress disorder)    Chronic back pain    Chronic respiratory failure with hypoxia (HCC)    Prediabetes    RLS (restless legs syndrome)    KELTON (generalized anxiety disorder)    Acute hyperglycemia      Assessment  Acute on chronic respiratory failure with hypoxia and hypercapnia  COPD exacerbation  Sepsis  UTI  HTN  Hypertensive urgency  PTSD  Insomnia  RLS  Chronic constipation     Home oxygen at 2L with [employer]  Current smoker  Dyslipidemia  Lower extremity edema  Obesity  Arthritis  Vitamin D deficiency     3/28/2022 respiratory panel positive for parainfluenza virus 3  3/28/2022 urine culture positive for E. coli        Echo 3/29/2022  -EF 66 to 70%  -Right ventricular cavity is mildly dilated  -Moderate tricuspid valve regurg  -RVSP 56.6    Plan    Continue to titrate oxygen- Currently on  2 L NC and BiPAP at bedtime/as needed  Rocephin-finishes 4/5  Solu-Medrol 40 mg every 12  Pulmicort nebulizer 2 times a day  Brovana nebulizers 3 times a day  DuoNebs as needed  Mucinex  Hydrochlorothiazide 25 mg daily     BP control: amlodipine and clonidine and try to wean off Cardene in preparation for discharge in a day or 2  -A-line in place for more accurate monitoring    Electrolyte/Glycemic control  DVT/GI Prophylaxis-Lovenox and Protonix     DC plan-return home with family.     3/31/2022                                                             3/29/2022       3/28/2022       LOS: 5 days     Subjective     Patient reports not feeling as well today.    Objective     Vital signs for last  24 hours:  Vitals:    04/03/22 0818 04/03/22 0819 04/03/22 0824 04/03/22 0944   BP:    177/71   BP Location:    Other (Comment)   Patient Position:    Lying   Pulse: 69 69 73 74   Resp: 16 16 16 20   Temp:    98.2 °F (36.8 °C)   TempSrc:    Oral   SpO2: 99% 99% 100% 96%   Weight:       Height:           Intake/Output last 3 shifts:  I/O last 3 completed shifts:  In: 1440 [P.O.:1440]  Out: 2200 [Urine:2200]  Intake/Output this shift:  I/O this shift:  In: 240 [P.O.:240]  Out: -       Radiology  Imaging Results (Last 24 Hours)     ** No results found for the last 24 hours. **          Labs:  Results from last 7 days   Lab Units 04/01/22  0256   WBC 10*3/mm3 10.30   HEMOGLOBIN g/dL 10.8*   HEMATOCRIT % 31.9*   PLATELETS 10*3/mm3 217     Results from last 7 days   Lab Units 03/31/22  2333 03/29/22  2337 03/29/22  0014   SODIUM mmol/L 135*   < > 136   POTASSIUM mmol/L 4.0   < > 4.1   CHLORIDE mmol/L 99   < > 93*   CO2 mmol/L 26.0   < > 31.0*   BUN mg/dL 20   < > 12   CREATININE mg/dL 0.70   < > 0.91   CALCIUM mg/dL 8.5*   < > 9.3   BILIRUBIN mg/dL  --   --  0.3   ALK PHOS U/L  --   --  73   ALT (SGPT) U/L  --   --  17   AST (SGOT) U/L  --   --  19   GLUCOSE mg/dL 178*   < > 332*    < > = values in this interval not displayed.     Results from last 7 days   Lab Units 03/31/22  1524   PH, ARTERIAL pH units 7.360   PO2 ART mm Hg 62.7*   PCO2, ARTERIAL mm Hg 56.1*   HCO3 ART mmol/L 31.7*     Results from last 7 days   Lab Units 03/29/22  0014 03/28/22  1525   ALBUMIN g/dL 3.90 3.60     Results from last 7 days   Lab Units 03/31/22  2333 03/28/22  2109 03/28/22  1525   TROPONIN T ng/mL <0.010 <0.010 <0.010         Results from last 7 days   Lab Units 03/31/22  2333   MAGNESIUM mg/dL 1.9                   Meds:   SCHEDULE  amLODIPine, 5 mg, Oral, Q24H  arformoterol, 15 mcg, Nebulization, BID - RT  budesonide, 0.5 mg, Nebulization, BID - RT  cefTRIAXone, 1 g, Intravenous, Q24H  Check Fentanyl Patch Placement, 1 each, Does not  apply, Q12H  fentaNYL, 1 patch, Transdermal, Q72H   And  Check Fentanyl Patch Placement, 1 each, Does not apply, Q12H  cloNIDine, 0.3 mg, Oral, Q8H  enoxaparin, 40 mg, Subcutaneous, Q12H  guaiFENesin, 1,200 mg, Oral, Q12H  hydrALAZINE, 50 mg, Oral, Q12H  hydroCHLOROthiazide, 25 mg, Oral, Daily  insulin lispro, 0-24 Units, Subcutaneous, TID AC  insulin regular, 16 Units, Subcutaneous, Q12H  lactulose, 20 g, Oral, BID  lidocaine, 2 patch, Transdermal, Q24H  lisinopril, 40 mg, Oral, Q24H  methylPREDNISolone sodium succinate, 40 mg, Intravenous, Q12H  metoprolol tartrate, 100 mg, Oral, Q12H  promethazine, 50 mg, Oral, Q8H  rOPINIRole, 5 mg, Oral, Nightly  Scopolamine, 1 patch, Transdermal, Q72H  sodium chloride, 10 mL, Intravenous, Q12H  topiramate, 50 mg, Oral, Daily  zolpidem, 10 mg, Oral, Nightly      Infusions  niCARdipine, 5-15 mg/hr, Last Rate: Stopped (04/03/22 0730)  Pharmacy Consult - Steroid Insulin Protocol,   Pharmacy to Dose enoxaparin (LOVENOX),       PRNs  •  acetaminophen **OR** acetaminophen **OR** acetaminophen  •  aluminum-magnesium hydroxide-simethicone  •  benzonatate  •  dextrose  •  dextrose  •  dextrose  •  glucagon (human recombinant)  •  glucagon (human recombinant)  •  haloperidol lactate  •  hydrALAZINE  •  HYDROcodone-acetaminophen  •  insulin lispro **AND** insulin lispro  •  ipratropium-albuterol  •  magnesium sulfate **OR** magnesium sulfate **OR** magnesium sulfate  •  melatonin  •  metoprolol tartrate  •  Morphine  •  nitroglycerin  •  ondansetron **OR** ondansetron  •  Pharmacy Consult - Steroid Insulin Protocol  •  Pharmacy to Dose enoxaparin (LOVENOX)  •  potassium chloride  •  potassium chloride  •  promethazine  •  risperiDONE  •  sodium chloride  •  sodium chloride  •  zolpidem    Physical Exam:  Physical Exam  General Appearance:  Alert.  Obese.  No distress noted.  HEENT:  Normocephalic, without obvious abnormality, Conjunctiva/corneas clear,.  Normal external ear canals, Nares  normal, no drainage     Neck:  Supple, symmetrical, trachea midline. No JVD.  Lungs /Chest wall: CTA bilaterally, labored respirations, using abdominal muscles heart:  Regular rate and rhythm, systolic murmur PMI left sternal border  Abdomen: Soft, non-tender, no masses, no organomegaly.    Extremities: Positive edema bilateral hands and improving, no edema bilateral lower extremities, no clubbing or cyanosis      ROS  Review of Systems  Constitutional: Negative for chills, fever and malaise/fatigue.   HENT: Negative.    Eyes: Negative.    Cardiovascular: Negative.    Respiratory: Positive for cough and shortness of breath.    Skin: Negative.    Musculoskeletal: Negative.    Gastrointestinal: Negative.    Genitourinary: Negative.    Neurological: Negative.    Psychiatric/Behavioral: Negative.      I have reviewed current clinicals.     Electronically signed by OCRI Bruce, 04/03/22, 10:28 AM EDT.     The NP scribed the note for me, I have examined the patient and reviewed and verified the above findings and and I formulated the assessment and plan as documented

## 2022-04-03 NOTE — NURSING NOTE
Patient trying to get out of bed. Confused the left wrist where art line is  It is backing up.  Called nurse from mickie Carrasquillo rn could not flush line..  He changed artline.  Patient did cry little from taking dressing off.

## 2022-04-03 NOTE — PROGRESS NOTES
Chief complaint Anxiety, Hallucinations    Subjective .     History of present illness:  The patient is a 61 y.o. female who was admitted secondary to  COPD/sepsis. PMHx: arthritis, back pain, COPD, HTN, PTSD, obesity, HLD, RLS and insomnia.   Psych consult was requested by Dr Nagy secondary to combative behavior.  The patient was confused, experienced visual hallucinations, was combative at initial consult and required soft restraints for safety. Patient had reported long hx of depression/ anxiety, was on different meds in the past, sees Dr Maddox via telehealth at [facility], taking Effexor, Trintellix and Topamax, Trazodone and Ambien prior to admission.    4/3/22, today the patient was alert but has been more anxious, agitated, confused with her sister at bedside. Per nursing, she has been trying to get out of bed today.  Her sister said she has been adamant about wanting to go to [facility], does not think she is in the hospital, has been seeing spiders and faces on the ceiling.  According to her sister, last night she was paranoid and told their Dad that she is being held captive.   She was given Haldol a couple of nights ago, which her sister said calmed her.  The PRN Risperdal has not been given at all.     Past Psych History: PTSD, anxiety, depression; no hospitalizations, no history of SAs     Review of Systems   All systems were reviewed and negative except for:  Constitution:  positive for fatigue  Respiratory: positive for  shortness of air  Gastrointestinal: positive for  nausea  Musculoskeletal: positive for  back pain  Behavioral/Psych: positive for  anxiety, agitation, hallucinations, paranoia.     History       Medications Prior to Admission   Medication Sig Dispense Refill Last Dose   • cloNIDine (CATAPRES) 0.2 MG tablet Take 0.2 mg by mouth Every 6 (Six) Hours As Needed (anxiety/stress).      • doxepin (SINEquan) 50 MG capsule Take 50 mg by mouth At Night As Needed.   3/27/2022 at 2100   •  hydroCHLOROthiazide (HYDRODIURIL) 25 MG tablet Take 25 mg by mouth Daily.   3/28/2022 at 0900   • LINZESS 290 MCG capsule capsule Take 290 mcg by mouth Daily As Needed.   Past Month at Unknown time   • lisinopril (PRINIVIL,ZESTRIL) 40 MG tablet Take 40 mg by mouth Daily.   3/28/2022 at 0900   • Multiple Vitamin (MULTIVITAMIN) tablet Take 1 tablet by mouth Daily.   3/28/2022 at 0900   • oxyCODONE-acetaminophen (PERCOCET)  MG per tablet Take 1 tablet by mouth Every 4 (Four) Hours As Needed for Moderate Pain .   3/27/2022 at 2100   • Probiotic Product (PROBIOTIC ADVANCED) capsule Take 1 capsule by mouth Daily.   3/28/2022 at 0900   • rOPINIRole (REQUIP) 5 MG tablet Take 5 mg by mouth Every Night.   3/27/2022 at 2100   • topiramate (TOPAMAX) 50 MG tablet Take 50 mg by mouth Daily.   3/27/2022 at 2100   • traZODone (DESYREL) 100 MG tablet Take 100 mg by mouth Every Night.   3/27/2022 at 2100   • zolpidem CR (Ambien CR) 12.5 MG CR tablet Take 12.5 mg by mouth At Night As Needed for Sleep.      • promethazine (PHENERGAN) 50 MG tablet Take 50 mg by mouth Every 6 (Six) Hours As Needed.   3/26/2022        Scheduled Meds:  amLODIPine, 5 mg, Oral, Q24H  arformoterol, 15 mcg, Nebulization, BID - RT  budesonide, 0.5 mg, Nebulization, BID - RT  cefTRIAXone, 1 g, Intravenous, Q24H  Check Fentanyl Patch Placement, 1 each, Does not apply, Q12H  fentaNYL, 1 patch, Transdermal, Q72H   And  Check Fentanyl Patch Placement, 1 each, Does not apply, Q12H  cloNIDine, 0.3 mg, Oral, Q8H  enoxaparin, 40 mg, Subcutaneous, Q12H  guaiFENesin, 1,200 mg, Oral, Q12H  hydrALAZINE, 25 mg, Oral, Once  hydrALAZINE, 75 mg, Oral, Q12H  hydroCHLOROthiazide, 25 mg, Oral, Daily  insulin lispro, 0-24 Units, Subcutaneous, TID AC  insulin regular, 20 Units, Subcutaneous, Q12H  iopamidol, 100 mL, Intravenous, Once in imaging  lactulose, 20 g, Oral, BID  lidocaine, 2 patch, Transdermal, Q24H  lisinopril, 40 mg, Oral, Q24H  methylPREDNISolone sodium  "succinate, 40 mg, Intravenous, Q12H  metoprolol tartrate, 100 mg, Oral, Q12H  promethazine, 50 mg, Oral, Q8H  rOPINIRole, 5 mg, Oral, Nightly  Scopolamine, 1 patch, Transdermal, Q72H  sodium chloride, 10 mL, Intravenous, Q12H  sodium chloride, 10 mL, Intravenous, Q12H  topiramate, 50 mg, Oral, Daily  zolpidem, 10 mg, Oral, Nightly         Continuous Infusions:  niCARdipine, 5-15 mg/hr, Last Rate: Stopped (04/03/22 0730)  Pharmacy Consult - Steroid Insulin Protocol,   Pharmacy to Dose enoxaparin (LOVENOX),         PRN Meds:  •  acetaminophen **OR** acetaminophen **OR** acetaminophen  •  aluminum-magnesium hydroxide-simethicone  •  benzonatate  •  dextrose  •  dextrose  •  dextrose  •  glucagon (human recombinant)  •  glucagon (human recombinant)  •  haloperidol lactate  •  hydrALAZINE  •  HYDROcodone-acetaminophen  •  insulin lispro **AND** insulin lispro  •  ipratropium-albuterol  •  magnesium sulfate **OR** magnesium sulfate **OR** magnesium sulfate  •  melatonin  •  metoprolol tartrate  •  Morphine  •  nitroglycerin  •  ondansetron **OR** ondansetron  •  Pharmacy Consult - Steroid Insulin Protocol  •  Pharmacy to Dose enoxaparin (LOVENOX)  •  potassium chloride  •  potassium chloride  •  promethazine  •  risperiDONE  •  sodium chloride  •  sodium chloride  •  sodium chloride  •  zolpidem      Allergies:  Codeine      Objective     Vital Signs   /79   Pulse 74   Temp 98.2 °F (36.8 °C) (Oral)   Resp 20   Ht 160 cm (63\")   Wt 105 kg (232 lb 5.8 oz)   SpO2 96%   BMI 41.16 kg/m²     Physical Exam:                  General Appearance:    Ill appearing, NAD   Head:    Normocephalic, without obvious abnormality, atraumatic   Eyes:            Lids and lashes normal, conjunctivae and sclerae normal, no   icterus, no pallor, corneas clear, PERRLA   Skin:   No bleeding, bruising or rash          Neurologic:   Cranial nerves 2 - 12 grossly intact, sensation intact, DTR       present and equal bilaterally           "     Musculoskeletal: Muscle tone and strength WNL; gait not assessed, supine in bed    Mental Status Exam:   Hygiene:   good  Cooperation:  Cooperative  Eye Contact:  Fair  Psychomotor Behavior:  Restless  Affect:  Blunted  Mood: anxious   Hopelessness: Denies  Speech:  Normal  Thought Progress:  Goal directed and Linear  Thought Content:  Mood congruent  Suicidal:  None  Homicidal:  None  Hallucinations:  Visual hallucination today  Delusion:  Paranoia  Memory:  Intact  Orientation:  Person  Reliability:  fair  Insight:  Poor  Judgement:  Impaired  Impulse Control:  Impaired  Physical/Medical Issues:  Yes      Medications and allergies reviewed     Lab Results   Component Value Date    GLUCOSE 178 (H) 03/31/2022    CALCIUM 8.5 (L) 03/31/2022     (L) 03/31/2022    K 4.0 03/31/2022    CO2 26.0 03/31/2022    CL 99 03/31/2022    BUN 20 03/31/2022    CREATININE 0.70 03/31/2022    BCR 28.6 (H) 03/31/2022    ANIONGAP 10.0 03/31/2022       Last Urine Toxicity    There is no flowsheet data to display.         No results found for: PHENYTOIN, PHENOBARB, VALPROATE, CBMZ    Lab Results   Component Value Date     (L) 03/31/2022    BUN 20 03/31/2022    CREATININE 0.70 03/31/2022    WBC 10.30 04/01/2022       Brief Urine Lab Results  (Last result in the past 365 days)      Color   Clarity   Blood   Leuk Est   Nitrite   Protein   CREAT   Urine HCG        03/28/22 1730 Yellow   Cloudy   Small (1+)   Moderate (2+)   Positive   >=300 mg/dL (3+)               EKG 4/1/22 , QTc 449    Assessment/Plan       Acute on chronic respiratory failure with hypoxia and hypercapnia (HCC)    Chronic obstructive pulmonary disease (HCC)    Primary hypertension    Tobacco abuse    Obesity, Class III, BMI 40-49.9 (morbid obesity) (HCC)    COPD exacerbation (HCC)    Acute bronchitis due to parainfluenza virus    Hypertensive urgency    Acute UTI (urinary tract infection)    Lactic acidosis    Insomnia    PTSD (post-traumatic stress  disorder)    Chronic back pain    Chronic respiratory failure with hypoxia (HCC)    Prediabetes    RLS (restless legs syndrome)    KELTON (generalized anxiety disorder)    Acute hyperglycemia       Assessment: Delirium due to medical condition (Hypoxia/sepsis) vs medication (steroids) resolving   Chronic Post-traumatic stress disorder  Generalized anxiety disorder    Treatment Plan:   The patient is alert but more confused, irritable, with some visual hallucinations.    She is still on steroids, continued anxiety and now more agitation.   Change Risperidone 0.25 mg po BID PRN to 0.5 mg BID routinely for anxiety/ agitation and check EKG in the AM  No benzo recommended due to opioid use and respiratory problems   The patient is off her regular antidepressants now, trintellix is not on formulary in the hospital.  Continue Topamax and Ambien at her home dosages  Will follow       Treatment Plan discussed with: Patient and nursing and sister.     I discussed the patients findings and my recommendations with patient and nursing staff    I have reviewed and approved the behavioral health treatment plans and problem list. Yes     Referring MD has access to consult report and progress notes in EMR     Tiffany Mcgill PA-C  04/03/22  13:07 EDT

## 2022-04-03 NOTE — CONSULTS
PICC team note: Called to obtain IV access on pt with Hx of difficulties gaining vascular access. Pt noted with infiltration of fluids to prior site at right AC fossa. No appropriate vessels noted to right upper arm. Left arm surveyed with only viable option being the basilic vein. After a procedure timeout, a single lumen Power Glide midline was placed to the left upper arm under sterile technique. Pt tolerated the procedure well. Primary nurse notified that the line is good to use at this time.

## 2022-04-03 NOTE — PLAN OF CARE
Problem: Adult Inpatient Plan of Care  Goal: Plan of Care Review  Outcome: Unable to Meet, Plan Revised  Goal: Patient-Specific Goal (Individualized)  Outcome: Unable to Meet, Plan Revised  Goal: Absence of Hospital-Acquired Illness or Injury  Outcome: Unable to Meet, Plan Revised  Intervention: Identify and Manage Fall Risk  Recent Flowsheet Documentation  Taken 4/3/2022 0600 by Silvina Cook RN  Safety Promotion/Fall Prevention: safety round/check completed  Taken 4/3/2022 0400 by Silvina Cook RN  Safety Promotion/Fall Prevention: safety round/check completed  Taken 4/3/2022 0200 by Silvina Cook RN  Safety Promotion/Fall Prevention: safety round/check completed  Taken 4/3/2022 0030 by Silvina Cook RN  Safety Promotion/Fall Prevention: assistive device/personal items within reach  Taken 4/2/2022 2200 by Silvina Cook RN  Safety Promotion/Fall Prevention: assistive device/personal items within reach  Taken 4/2/2022 2030 by Silvina Cook RN  Safety Promotion/Fall Prevention: assistive device/personal items within reach  Intervention: Prevent Skin Injury  Recent Flowsheet Documentation  Taken 4/3/2022 0400 by Silvina Cook RN  Body Position: position changed independently  Skin Protection: adhesive use limited  Taken 4/3/2022 0030 by Silvina Cook RN  Body Position: position changed independently  Skin Protection: adhesive use limited  Taken 4/2/2022 2030 by Silvina Cook RN  Body Position: position changed independently  Skin Protection: adhesive use limited  Intervention: Prevent and Manage VTE (Venous Thromboembolism) Risk  Recent Flowsheet Documentation  Taken 4/3/2022 0400 by Silvina Cook RN  Activity Management: activity adjusted per tolerance  VTE Prevention/Management: dorsiflexion/plantar flexion performed  Taken 4/3/2022 0030 by Silvina Cook RN  Activity Management: activity adjusted per tolerance  Taken 4/2/2022 2030 by  Silvina Cook RN  Activity Management: activity adjusted per tolerance  VTE Prevention/Management:   bilateral   dorsiflexion/plantar flexion performed  Intervention: Prevent Infection  Recent Flowsheet Documentation  Taken 4/3/2022 0400 by Silvina Cook RN  Infection Prevention: cohorting utilized  Taken 4/3/2022 0030 by Silvina Cook RN  Infection Prevention: cohorting utilized  Taken 4/2/2022 2030 by Silvina Cook RN  Infection Prevention: cohorting utilized  Goal: Optimal Comfort and Wellbeing  Outcome: Unable to Meet, Plan Revised  Intervention: Provide Person-Centered Care  Recent Flowsheet Documentation  Taken 4/3/2022 0400 by Silvina Cook RN  Trust Relationship/Rapport: care explained  Taken 4/3/2022 0030 by Silvina Cook RN  Trust Relationship/Rapport: care explained  Taken 4/2/2022 2030 by Silvina Cook RN  Trust Relationship/Rapport: care explained  Goal: Readiness for Transition of Care  Outcome: Unable to Meet, Plan Revised   Goal Outcome Evaluation:                 Problem: Adult Inpatient Plan of Care  Goal: Plan of Care Review  Outcome: Unable to Meet, Plan Revised  Goal: Patient-Specific Goal (Individualized)  Outcome: Unable to Meet, Plan Revised  Goal: Absence of Hospital-Acquired Illness or Injury  Outcome: Unable to Meet, Plan Revised  Goal: Optimal Comfort and Wellbeing  Outcome: Unable to Meet, Plan Revised  Goal: Readiness for Transition of Care  Outcome: Unable to Meet, Plan Revised     Problem: Breathing Pattern Ineffective  Goal: Effective Breathing Pattern  Outcome: Unable to Meet, Plan Revised     Problem: Pain Chronic (Persistent)  Goal: Acceptable Pain Control and Functional Ability  Outcome: Unable to Meet, Plan Revised     Problem: Adjustment to Illness COPD (Chronic Obstructive Pulmonary Disease)  Goal: Optimal Chronic Illness Coping  Outcome: Unable to Meet, Plan Revised     Problem: Functional Ability Impaired COPD (Chronic  Obstructive Pulmonary Disease)  Goal: Optimal Level of Functional [place]  Outcome: Unable to Meet, Plan Revised     Problem: Infection COPD (Chronic Obstructive Pulmonary Disease)  Goal: Absence of Infection Signs and Symptoms  Outcome: Unable to Meet, Plan Revised     Problem: Oral Intake Inadequate COPD (Chronic Obstructive Pulmonary Disease)  Goal: Improved Nutrition Intake  Outcome: Unable to Meet, Plan Revised     Problem: Respiratory Compromise COPD (Chronic Obstructive Pulmonary Disease)  Goal: Effective Oxygenation and Ventilation  Outcome: Unable to Meet, Plan Revised     Problem: Behavioral Health Comorbidity  Goal: Maintenance of Behavioral Health Symptom Control  Outcome: Unable to Meet, Plan Revised     Problem: COPD (Chronic Obstructive Pulmonary Disease) Comorbidity  Goal: Maintenance of COPD Symptom Control  Outcome: Unable to Meet, Plan Revised     Problem: Diabetes Comorbidity  Goal: Blood Glucose Level Within Targeted Range  Outcome: Unable to Meet, Plan Revised     Problem: Hypertension Comorbidity  Goal: Blood Pressure in Desired Range  Outcome: Unable to Meet, Plan Revised     Problem: Pain Chronic (Persistent) (Comorbidity Management)  Goal: Acceptable Pain Control and Functional Ability  Outcome: Unable to Meet, Plan Revised     Problem: Fall Injury Risk  Goal: Absence of Fall and Fall-Related Injury  Outcome: Unable to Meet, Plan Revised     Problem: Restraint, Nonbehavioral (Nonviolent)  Goal: Absence of Harm or Injury  Outcome: Unable to Meet, Plan Revised     Problem: Skin Injury Risk Increased  Goal: Skin Health and Integrity  Outcome: Unable to Meet, Plan Revised

## 2022-04-03 NOTE — PLAN OF CARE
Problem: Skin Injury Risk Increased  Goal: Skin Health and Integrity  Outcome: Ongoing, Progressing     Problem: Fall Injury Risk  Goal: Absence of Fall and Fall-Related Injury  Outcome: Ongoing, Progressing  Intervention: Promote Injury-Free Environment  Recent Flowsheet Documentation  Taken 4/3/2022 1400 by Kenia Oro RN  Safety Promotion/Fall Prevention:   safety round/check completed   nonskid shoes/slippers when out of bed  Taken 4/3/2022 1200 by Kenia Oro RN  Safety Promotion/Fall Prevention: patient off unit  Taken 4/3/2022 1000 by Kenia Oro RN  Safety Promotion/Fall Prevention:   safety round/check completed   nonskid shoes/slippers when out of bed  Taken 4/3/2022 0800 by Kenia Oro RN  Safety Promotion/Fall Prevention:   safety round/check completed   nonskid shoes/slippers when out of bed   Goal Outcome Evaluation:   Pt off cardene gtt. BP controlled with PO BP medications and prn metoprolol. Pt requiring multiple pain medications for back pain control. Will continue to monitor BP.

## 2022-04-03 NOTE — CONSULTS
Cardiology Consult Note      REQUESTING PHYSICIAN    Rafaela Nagy MD    PATIENT IDENTIFICATION  Name: Ann Álvarez  Age: 61 y.o.  Sex: female  :  1960  MRN: [id number]             REASON FOR CONSULTATION:  61-year-old female with no known history of coronary occlusive disease.  She does have risk factors that include essential hypertension, diabetes mellitus 2, dyslipidemia, tobacco use, strong family history of coronary disease, COPD,?  Sleep apnea.    Patient was seen in new consultation by Dr. Rizzo 2020 and full ischemic work-up was completed at that time.  No follow-up noted.    TTE 3/28/2022: EF 66-70%, RV mildly dilated, mild-mod mitral valve insufficiency, moderate tricuspid valve insufficiency, technically difficult study    Nuclear stress testing 3/6/2020: Normal myocardial perfusion with no evidence of ischemia, low risk study.      CC:  Hypertensive urgency  Uncontrolled hypertension    HISTORY OF PRESENT ILLNESS:   Patient presented to the emergency department at [facility] 3/28/2022 with complaint of shortness of breath worsening over the past 2 weeks and exacerbated proximately 24 hours prior to arrival.  She is on chronic O2 at 2 L and had increased to 4 L at home.  She continues to smoke.  She denies any chest pain, pressure, tightness or palpitations.  She has been seen in consultation by pulmonology and was initially admitted to the intensive care unit.  Respiratory panel positive for parainfluenza virus 3, urine culture positive for E. coli.  She has had labile and difficult to manage blood pressure since admission with highest reading 234/126.  Blood pressure today 170 systolic.  She is currently on amlodipine 5, clonidine 0.3 every 8 hours, hydralazine 50 every 12, lisinopril 40 daily, Lopressor 100 every 12.  Work-up for secondary hypertension initiated by attending.  Cardiology was consulted for further evaluation and recommendations.  Upon my evaluation,  patient is sitting up in bed, awake alert and oriented.  She is able to answer questions but does drift back to sleep.  She is on 2 L nasal cannula with SPO2 96%.    TTE 3/28/2022: EF 66-70%, moderate TR, RVSP 56.6 mmHg      REVIEW OF SYSTEMS:    Review of Systems   Constitutional: Negative for chills and fever.   HENT: Negative for ear discharge and nosebleeds.    Eyes: Negative for discharge and redness.   Cardiovascular: Positive for leg swelling. Negative for chest pain, orthopnea, palpitations, paroxysmal nocturnal dyspnea and syncope.   Respiratory: Positive for shortness of breath. Negative for cough and wheezing.    Endocrine: Negative for heat intolerance.   Skin: Negative for rash.   Musculoskeletal: Positive for arthritis, back pain and joint pain. Negative for myalgias.   Gastrointestinal: Negative for abdominal pain, melena, nausea and vomiting.   Genitourinary: Negative for dysuria and hematuria.   Neurological: Negative for dizziness, light-headedness, numbness and tremors.   Psychiatric/Behavioral: Negative for depression. The patient is not nervous/anxious.            OBJECTIVE       ASSESSMENT  Hypertensive urgency/uncontrolled hypertension  Acute on chronic COPD exacerbation  Parainfluenza  Tricuspid valve insufficiency  Pulmonary hypertension  Tobacco abuse  UTI-E. coli  Chronic back pain      PLAN  Patient admitted with acute on chronic hypoxic/hypercapnic respiratory failure with parainfluenza and respiratory distress.  Blood pressure has been difficult to manage, on multiple antihypertensives.  Work-up initiated for evaluation of secondary hypertension contributing to patient's uncontrolled high blood pressure.  We will also add CTA renals.  Recent echo with tricuspid valve insufficiency/pulmonary hypertension  Patient needs complete smoking cessation, risk factor modification  Continue current antihypertensive plan for now  Antibiotics per attending service/pulmonary  Further recommendations  "following results of diagnostics and evaluation by cardiologist  Dr. Rizzo to resume care in am    Patient is seen and examined and findings are verified.  All data is reviewed by me personally.  Assessment and plan formulated by APC was done after discussion with attending.  I spent more than 50% of time in taking care of the patient.      Patient is presented with shortness of breath.  It is worsening over.  Of 2 to 3 weeks.  Patient denies any active chest pain.  Her blood pressure is noted to be very high.  Patient was put on Cardene.  Cardiology consultation is requested    Blood pressure is still high.  IV for the patient gone bad.  PICC line is being placed.  Intravenous Cardene is on hold.    Normal S1 and S2.  No pericardial rub or murmur abdominal exam is benign leg edema is present    MDM:    1.  Accelerated hypertension:    I would recommend to increase hydralazine to 75 mg twice daily.  I would recommend that patient should have work-up for secondary causes of hypertension.  I would recommend to proceed with CT angiography of renal arteries    2.  Shortness of breath:    I would recommend gentle diuresis.  Diuretics would be added.  Start Lasix 40 mg daily    3.  Ischemic evaluation:    Patient had previous stress test in 2020 and it was unremarkable.  Echocardiogram at that time was normal.    4.  Diabetes mellitus:    Patient is on insulin.    5.  Sleep apnea:    I strongly believe patient has sleep apnea and that should be considered for sleep studies as a outpatient    Electronically signed by Darrin Gant MD, 04/03/22, 12:03 PM EDT.      Vital Signs  Visit Vitals  /79   Pulse 74   Temp 98.2 °F (36.8 °C) (Oral)   Resp 20   Ht 160 cm (63\")   Wt 105 kg (232 lb 5.8 oz)   SpO2 96%   BMI 41.16 kg/m²     Oxygen Therapy  SpO2: 96 %  Pulse Oximetry Type: Continuous  Device (Oxygen Therapy): nasal cannula  Flow (L/min): 2  Oxygen Concentration (%): 36  Flowsheet Rows    Flowsheet Row First Filed " "Value   Admission Height 160 cm (63\") Documented at 03/28/2022 1425   Admission Weight 99.8 kg (220 lb) Documented at 03/28/2022 1425        Intake & Output (last 3 days)       03/31 0701 04/01 0700 04/01 0701  04/02 0700 04/02 0701  04/03 0700 04/03 0701  04/04 0700    P.O.  240    Total Intake(mL/kg) 120 (1.2) 600 (5.8) 1440 (13.7) 240 (2.3)    Urine (mL/kg/hr) 2450 (1) 1750 (0.7) 1450 (0.6) 425 (0.8)    Total Output 2450 1750 1450 425    Net -2330 -1150 -10 -185            Urine Unmeasured Occurrence 2 x           Lines, Drains & Airways     Active LDAs     Name Placement date Placement time Site Days    Peripheral IV 04/03/22 0030 Right Antecubital 04/03/22  0030  Antecubital  less than 1    Arterial Line 03/31/22 Left 03/31/22  1450  --  2                MEDICAL HISTORY    Past Medical History:   Diagnosis Date   • Anxiety    • Arthritis    • Chronic back pain    • Chronic obstructive pulmonary disease ([facility]) 01/31/2020   • Chronic respiratory failure with hypoxia ([facility]) 03/06/2018    O2 @ 2 L per NC as needed   • Dyslipidemia 10/21/2014   • Edema, lower extremity 07/16/2014   • Hypertension    • Insomnia    • Obesity, Class III, BMI 40-49.9 (morbid obesity) ([facility]) 03/29/2022   • Prediabetes 11/06/2018   • Primary hypertension 05/30/2012   • PTSD (post-traumatic stress disorder)    • RLS (restless legs syndrome) 04/22/2020   • Stroke ([facility]) 2018   • Tobacco abuse 10/21/2014   • Vitamin D deficiency 05/30/2012        SURGICAL HISTORY    Past Surgical History:   Procedure Laterality Date   • BACK SURGERY     • CHOLECYSTECTOMY     • HYSTERECTOMY     • TUMOR REMOVAL      benign breats tumor        FAMILY HISTORY    Family History   Problem Relation Age of Onset   • Heart disease Mother    • Heart disease Father        SOCIAL HISTORY    Social History     Tobacco Use   • Smoking status: Current Every Day Smoker     Types: Cigarettes   • Smokeless tobacco: Never Used   Substance Use Topics   • Alcohol use: " "Not Currently        ALLERGIES    Allergies   Allergen Reactions   • Codeine Hives, Itching and Nausea And Vomiting              /79   Pulse 74   Temp 98.2 °F (36.8 °C) (Oral)   Resp 20   Ht 160 cm (63\")   Wt 105 kg (232 lb 5.8 oz)   SpO2 96%   BMI 41.16 kg/m²   Intake/Output last 3 shifts:  I/O last 3 completed shifts:  In: 1440 [P.O.:1440]  Out: 2200 [Urine:2200]  Intake/Output this shift:  I/O this shift:  In: 240 [P.O.:240]  Out: 425 [Urine:425]    PHYSICAL EXAM:    Head: Atraumatic, normocephalic  Eyes: No conjunctival injection or redness noted.  No discharge  Neck: No JVD, no lymphadenopathy or carotid bruit  Chest: No obvious deformity noted  Lungs: Air entry is present in all lung zones.  Decreased breath sounds at the bases.  No crackles or rhonchi  Heart: Normal S1 and S2.  No pericardial rub or murmur  Neuro: Neurological exam was grossly intact.  No focal deficit.  Psychiatric: Patient appears to be emotionally stable.  No depression or anxiety noted  Extremities: Trace leg edema noted      Scheduled Meds:      amLODIPine, 5 mg, Oral, Q24H  arformoterol, 15 mcg, Nebulization, BID - RT  budesonide, 0.5 mg, Nebulization, BID - RT  cefTRIAXone, 1 g, Intravenous, Q24H  Check Fentanyl Patch Placement, 1 each, Does not apply, Q12H  fentaNYL, 1 patch, Transdermal, Q72H   And  Check Fentanyl Patch Placement, 1 each, Does not apply, Q12H  cloNIDine, 0.3 mg, Oral, Q8H  enoxaparin, 40 mg, Subcutaneous, Q12H  guaiFENesin, 1,200 mg, Oral, Q12H  hydrALAZINE, 25 mg, Oral, Once  hydrALAZINE, 75 mg, Oral, Q12H  hydroCHLOROthiazide, 25 mg, Oral, Daily  insulin lispro, 0-24 Units, Subcutaneous, TID AC  insulin regular, 20 Units, Subcutaneous, Q12H  lactulose, 20 g, Oral, BID  lidocaine, 2 patch, Transdermal, Q24H  lisinopril, 40 mg, Oral, Q24H  methylPREDNISolone sodium succinate, 40 mg, Intravenous, Q12H  metoprolol tartrate, 100 mg, Oral, Q12H  promethazine, 50 mg, Oral, Q8H  rOPINIRole, 5 mg, Oral, " Nightly  Scopolamine, 1 patch, Transdermal, Q72H  sodium chloride, 10 mL, Intravenous, Q12H  sodium chloride, 10 mL, Intravenous, Q12H  topiramate, 50 mg, Oral, Daily  zolpidem, 10 mg, Oral, Nightly        Continuous Infusions:    niCARdipine, 5-15 mg/hr, Last Rate: Stopped (04/03/22 0730)  Pharmacy Consult - Steroid Insulin Protocol,   Pharmacy to Dose enoxaparin (LOVENOX),         PRN Meds:    •  acetaminophen **OR** acetaminophen **OR** acetaminophen  •  aluminum-magnesium hydroxide-simethicone  •  benzonatate  •  dextrose  •  dextrose  •  dextrose  •  glucagon (human recombinant)  •  glucagon (human recombinant)  •  haloperidol lactate  •  hydrALAZINE  •  HYDROcodone-acetaminophen  •  insulin lispro **AND** insulin lispro  •  ipratropium-albuterol  •  magnesium sulfate **OR** magnesium sulfate **OR** magnesium sulfate  •  melatonin  •  metoprolol tartrate  •  Morphine  •  nitroglycerin  •  ondansetron **OR** ondansetron  •  Pharmacy Consult - Steroid Insulin Protocol  •  Pharmacy to Dose enoxaparin (LOVENOX)  •  potassium chloride  •  potassium chloride  •  promethazine  •  risperiDONE  •  sodium chloride  •  sodium chloride  •  sodium chloride  •  zolpidem        Results Review:     I reviewed the patient's new clinical results.    CBC    Results from last 7 days   Lab Units 04/01/22  0256 03/30/22  1142 03/30/22  0813 03/29/22  2337 03/29/22  0014 03/28/22  1525   WBC 10*3/mm3 10.30  --  14.40* 11.90* 7.10 6.40   HEMOGLOBIN g/dL 10.8*  --  12.1 11.9* 12.5 11.6*   HEMOGLOBIN, POC g/dL  --  13.3  --   --   --   --    PLATELETS 10*3/mm3 217  --  280 263 223 223     Cr Clearance Estimated Creatinine Clearance: 96.6 mL/min (by C-G formula based on SCr of 0.7 mg/dL).  Coag     HbA1C   Lab Results   Component Value Date    HGBA1C 6.3 (H) 03/29/2022     Blood Glucose   Glucose   Date/Time Value Ref Range Status   04/03/2022 0711 172 (H) 70 - 105 mg/dL Final     Comment:     Serial Number: [id number]:  [id number]    04/02/2022 2050 278 (H) 70 - 105 mg/dL Final     Comment:     Serial Number: [id number]:  [id number]   04/02/2022 1646 209 (H) 70 - 105 mg/dL Final     Comment:     Serial Number: [id number]:  [id number]   04/02/2022 1107 230 (H) 70 - 105 mg/dL Final     Comment:     Serial Number: [id number]:  [id number]   04/02/2022 0727 189 (H) 70 - 105 mg/dL Final     Comment:     Serial Number: [id number]:  [id number]   04/01/2022 2005 244 (H) 70 - 105 mg/dL Final     Comment:     Serial Number: [id number]:  [id number]   04/01/2022 1634 177 (H) 70 - 105 mg/dL Final     Comment:     Serial Number: [id number]:  [id number]   04/01/2022 1120 276 (H) 70 - 105 mg/dL Final     Comment:     Serial Number: [id number]:  [id number]     Infection   Results from last 7 days   Lab Units 03/31/22  2333 03/29/22  0204 03/29/22  0014 03/28/22  1730   BLOODCX   --  No growth at 5 days  No growth at 5 days  --   --    URINECX   --   --   --  >100,000 CFU/mL Escherichia coli*   PROCALCITONIN ng/mL 0.05  --  0.05  --      CMP   Results from last 7 days   Lab Units 03/31/22  2333 03/30/22  0813 03/29/22  2337 03/29/22  0014 03/28/22  1525   SODIUM mmol/L 135* 141 139 136 142   POTASSIUM mmol/L 4.0 4.2 4.2 4.1 4.0   CHLORIDE mmol/L 99 100 97* 93* 100   CO2 mmol/L 26.0 27.0 29.0 31.0* 34.0*   BUN mg/dL 20 21 19 12 9   CREATININE mg/dL 0.70 1.03* 0.94 0.91 0.68   GLUCOSE mg/dL 178* 236* 264* 332* 190*   ALBUMIN g/dL  --   --   --  3.90 3.60   BILIRUBIN mg/dL  --   --   --  0.3 0.2   ALK PHOS U/L  --   --   --  73 66   AST (SGOT) U/L  --   --   --  19 15   ALT (SGPT) U/L  --   --   --  17 15     ABG    Results from last 7 days   Lab Units 03/31/22  1524 03/30/22  1142 03/29/22  1209 03/29/22  0044   PH, ARTERIAL pH units 7.360 7.357 7.357 7.312*   PCO2, ARTERIAL mm Hg 56.1* 53.5* 57.2* 67.4*   PO2 ART mm Hg 62.7* 57.1* 50.7* 54.9*   O2 SATURATION ART % 90.1* 87.4* 82.8* 83.8*   BASE EXCESS ART mmol/L 4.8*  3.3* 5.1* 5.9*     UA    Results from last 7 days   Lab Units 03/28/22  1730   NITRITE UA  Positive*   WBC UA /HPF Too Numerous to Count*   BACTERIA UA /HPF 3+*   SQUAM EPITHEL UA /HPF 0-2   URINECX  >100,000 CFU/mL Escherichia coli*     MARY JO  No results found for: POCMETH, POCAMPHET, POCBARBITUR, POCBENZO, POCCOCAINE, POCOPIATES, POCOXYCODO, POCPHENCYC, POCPROPOXY, POCTHC, POCTRICYC  Lysis Labs   Results from last 7 days   Lab Units 04/01/22  0256 03/31/22  2333 03/30/22  1142 03/30/22  0813 03/29/22  2337 03/29/22  0014 03/28/22  1525   HEMOGLOBIN g/dL 10.8*  --   --  12.1 11.9* 12.5 11.6*   HEMOGLOBIN, POC g/dL  --   --  13.3  --   --   --   --    PLATELETS 10*3/mm3 217  --   --  280 263 223 223   CREATININE mg/dL  --  0.70  --  1.03* 0.94 0.91 0.68     Radiology(recent) No radiology results for the last day      Results from last 7 days   Lab Units 03/31/22  2333   TROPONIN T ng/mL <0.010       Xrays, labs reviewed personally by physician.    ECG/EMG Results (most recent)     Procedure Component Value Units Date/Time    SCANNED - TELEMETRY   [[id number]] Resulted: 03/28/22     Updated: 03/29/22 1420    Adult Transthoracic Echo Complete W/ Cont if Necessary Per Protocol [[id number]] Resulted: 03/29/22 1818     Updated: 03/29/22 1819     Target HR (85%) 134 bpm      Max. Pred. HR (100%) 158 bpm      ACS 1.99 cm      Ao root diam 2.7 cm      Ao pk richard 159.0 cm/sec      Ao V2 VTI 31.7 cm      KONSTANTIN(I,D) 1.81 cm2      EDV(cubed) 107.2 ml      EDV(MOD-sp4) 98.1 ml      EF(MOD-bp) 73.0 %      EF(MOD-sp4) 73.6 %      ESV(cubed) 30.4 ml      ESV(MOD-sp4) 25.9 ml      IVS/LVPW 0.83 cm      LV mass(C)d 212.0 grams      LV V1 max PG 3.2 mmHg      LV V1 mean PG 2.16 mmHg      LV V1 max 89.0 cm/sec      LVPWd 1.30 cm      MR max .6 mmHg      MV dec time 0.12 msec      MV V2 VTI 35.9 cm      MVA(VTI) 1.60 cm2      PA V2 max 145.0 cm/sec      RAP systole 8.0 mmHg      RV V1 max PG 1.82 mmHg      RV V1 max 67.5 cm/sec      RV  V1 VTI 10.9 cm      RVIDd 2.9 cm      RVSP(TR) 56.6 mmHg      SI(MOD-sp4) 35.3 ml/m2      SV(LVOT) 57.4 ml      SV(MOD-sp4) 72.2 ml      TR max PG 48.6 mmHg      Ao max PG 10.1 mmHg      Ao mean PG 6.0 mmHg      FS 34.3 %      IVSd 1.08 cm      LA dimension(2D) 4.3 cm      LV V1 VTI 18.2 cm      LVIDd 4.8 cm      LVIDs 3.1 cm      LVOT area 3.2 cm2      LVOT diam 2.00 cm      MV max PG 11.3 mmHg      MV mean PG 6.7 mmHg      MR max richard 566.2 cm/sec      MV E max richard 138.0 cm/sec      TR max richard 347.9 cm/sec      LV Chahal Vol (BSA corrected) 48.0 cm2      LV Sys Vol (BSA corrected) 12.7 cm2     Narrative:      · Left ventricular ejection fraction appears to be 66 - 70%.  · The right ventricular cavity is mildly dilated.  · Moderate tricuspid valve regurgitation is present.  · No pericardial effusion noted       SCANNED - TELEMETRY   [[id number]] Resulted: 03/28/22     Updated: 03/30/22 1052    SCANNED - TELEMETRY   [[id number]] Resulted: 03/28/22     Updated: 03/30/22 1100    SCANNED - TELEMETRY   [[id number]] Resulted: 03/28/22     Updated: 03/30/22 1113    SCANNED - TELEMETRY   [[id number]] Resulted: 03/28/22     Updated: 03/30/22 1129    SCANNED - TELEMETRY   [[id number]] Resulted: 03/28/22     Updated: 03/30/22 1135    SCANNED - TELEMETRY   [[id number]] Resulted: 03/28/22     Updated: 03/30/22 1136    SCANNED - TELEMETRY   [[id number]] Resulted: 03/28/22     Updated: 03/30/22 1151    SCANNED - TELEMETRY   [[id number]] Resulted: 03/28/22     Updated: 03/30/22 1151    ECG 12 Lead [[id number]] Collected: 03/28/22 1429     Updated: 03/30/22 1312     QT Interval 322 ms     Narrative:      HEART RATE= 120  bpm  RR Interval= 500  ms  IN Interval= 152  ms  P Horizontal Axis= -36  deg  P Front Axis= 104  deg  QRSD Interval= 99  ms  QT Interval= 322  ms  QRS Axis= 27  deg  T Wave Axis= 121  deg  - ABNORMAL ECG -  Sinus tachycardia  Nonspecific T abnormalities, lateral leads  When compared with ECG of 05-Nov-2014  7:54:45,  Significant rate increase  Electronically Signed By: Mahamed Chappell ([facility]) 30-Mar-2022 13:12:28  Date and Time of Study: 2022-03-28 14:29:09    SCANNED - TELEMETRY   [[id number]] Resulted: 03/28/22     Updated: 03/30/22 1428    SCANNED - TELEMETRY   [[id number]] Resulted: 03/28/22     Updated: 03/31/22 1107    SCANNED - TELEMETRY   [[id number]] Resulted: 03/28/22     Updated: 03/31/22 1147    SCANNED - TELEMETRY   [[id number]] Resulted: 03/28/22     Updated: 03/31/22 1212    SCANNED - TELEMETRY   [[id number]] Resulted: 03/28/22     Updated: 03/31/22 1318    SCANNED - TELEMETRY   [[id number]] Resulted: 03/28/22     Updated: 03/31/22 1444    SCANNED - TELEMETRY   [[id number]] Resulted: 03/28/22     Updated: 04/01/22 1254    SCANNED - TELEMETRY   [[id number]] Resulted: 03/28/22     Updated: 04/01/22 1301    SCANNED - TELEMETRY   [[id number]] Resulted: 03/28/22     Updated: 04/01/22 1423    SCANNED - TELEMETRY   [[id number]] Resulted: 03/28/22     Updated: 04/01/22 1432    ECG 12 Lead [[id number]] Collected: 04/01/22 0106     Updated: 04/02/22 1719     QT Interval 449 ms     Narrative:      HEART RATE= 60  bpm  RR Interval= 992  ms  OH Interval= 162  ms  P Horizontal Axis= 142  deg  P Front Axis= 12  deg  QRSD Interval= 87  ms  QT Interval= 449  ms  QRS Axis= 34  deg  T Wave Axis= 92  deg  - NORMAL ECG -  Sinus rhythm  When compared with ECG of 28-Mar-2022 14:29:09,  Significant rate decrease  Significant repolarization change  Electronically Signed By: Darrin Gant ([facility]) 02-Apr-2022 17:19:36  Date and Time of Study: 2022-04-01 01:06:08            Medication Review:   I have reviewed the patient's current medication list  Scheduled Meds:amLODIPine, 5 mg, Oral, Q24H  arformoterol, 15 mcg, Nebulization, BID - RT  budesonide, 0.5 mg, Nebulization, BID - RT  cefTRIAXone, 1 g, Intravenous, Q24H  Check Fentanyl Patch Placement, 1 each, Does not apply, Q12H  fentaNYL, 1 patch, Transdermal, Q72H   And  Check Fentanyl Patch Placement,  1 each, Does not apply, Q12H  cloNIDine, 0.3 mg, Oral, Q8H  enoxaparin, 40 mg, Subcutaneous, Q12H  guaiFENesin, 1,200 mg, Oral, Q12H  hydrALAZINE, 25 mg, Oral, Once  hydrALAZINE, 75 mg, Oral, Q12H  hydroCHLOROthiazide, 25 mg, Oral, Daily  insulin lispro, 0-24 Units, Subcutaneous, TID AC  insulin regular, 20 Units, Subcutaneous, Q12H  lactulose, 20 g, Oral, BID  lidocaine, 2 patch, Transdermal, Q24H  lisinopril, 40 mg, Oral, Q24H  methylPREDNISolone sodium succinate, 40 mg, Intravenous, Q12H  metoprolol tartrate, 100 mg, Oral, Q12H  promethazine, 50 mg, Oral, Q8H  rOPINIRole, 5 mg, Oral, Nightly  Scopolamine, 1 patch, Transdermal, Q72H  sodium chloride, 10 mL, Intravenous, Q12H  sodium chloride, 10 mL, Intravenous, Q12H  topiramate, 50 mg, Oral, Daily  zolpidem, 10 mg, Oral, Nightly      Continuous Infusions:niCARdipine, 5-15 mg/hr, Last Rate: Stopped (04/03/22 0730)  Pharmacy Consult - Steroid Insulin Protocol,   Pharmacy to Dose enoxaparin (LOVENOX),       PRN Meds:.•  acetaminophen **OR** acetaminophen **OR** acetaminophen  •  aluminum-magnesium hydroxide-simethicone  •  benzonatate  •  dextrose  •  dextrose  •  dextrose  •  glucagon (human recombinant)  •  glucagon (human recombinant)  •  haloperidol lactate  •  hydrALAZINE  •  HYDROcodone-acetaminophen  •  insulin lispro **AND** insulin lispro  •  ipratropium-albuterol  •  magnesium sulfate **OR** magnesium sulfate **OR** magnesium sulfate  •  melatonin  •  metoprolol tartrate  •  Morphine  •  nitroglycerin  •  ondansetron **OR** ondansetron  •  Pharmacy Consult - Steroid Insulin Protocol  •  Pharmacy to Dose enoxaparin (LOVENOX)  •  potassium chloride  •  potassium chloride  •  promethazine  •  risperiDONE  •  sodium chloride  •  sodium chloride  •  sodium chloride  •  zolpidem    Imaging:  Imaging Results (Last 72 Hours)     ** No results found for the last 72 hours. **            Darrin Gant MD  04/03/22  11:49 EDT       EMR Dragon/Transcription:  "  \"Dictated utilizing Dragon dictation\".                 Electronically signed by CORI Sutton, 04/03/22, 7:34 AM EDT.    "

## 2022-04-04 LAB
ALBUMIN SERPL-MCNC: 3.9 G/DL (ref 3.5–5.2)
ALP SERPL-CCNC: 55 U/L (ref 39–117)
ALT SERPL W P-5'-P-CCNC: 76 U/L (ref 1–33)
AST SERPL-CCNC: 35 U/L (ref 1–32)
BILIRUB CONJ SERPL-MCNC: <0.2 MG/DL (ref 0–0.3)
BILIRUB INDIRECT SERPL-MCNC: ABNORMAL MG/DL
BILIRUB SERPL-MCNC: 0.3 MG/DL (ref 0–1.2)
COLLECT DURATION TIME UR: 24 HRS
CREAT UR-MCNC: 43.3 MG/DL
CREATINE 24H UR-MRATE: 0.52 G/24 HR (ref 0.7–1.6)
GLUCOSE BLDC GLUCOMTR-MCNC: 147 MG/DL (ref 70–105)
GLUCOSE BLDC GLUCOMTR-MCNC: 179 MG/DL (ref 70–105)
GLUCOSE BLDC GLUCOMTR-MCNC: 226 MG/DL (ref 70–105)
GLUCOSE BLDC GLUCOMTR-MCNC: 239 MG/DL (ref 70–105)
GLUCOSE BLDC GLUCOMTR-MCNC: 288 MG/DL (ref 70–105)
GLUCOSE BLDC GLUCOMTR-MCNC: 302 MG/DL (ref 70–105)
PROT SERPL-MCNC: 6.3 G/DL (ref 6–8.5)
QT INTERVAL: 427 MS
SPECIMEN VOL 24H UR: 1200 ML

## 2022-04-04 PROCEDURE — 63710000001 INSULIN REGULAR HUMAN PER 5 UNITS: Performed by: HOSPITALIST

## 2022-04-04 PROCEDURE — 93005 ELECTROCARDIOGRAM TRACING: CPT | Performed by: PHYSICIAN ASSISTANT

## 2022-04-04 PROCEDURE — 63710000001 PROMETHAZINE PER 25 MG: Performed by: HOSPITALIST

## 2022-04-04 PROCEDURE — 0 MORPHINE SULFATE 4 MG/ML SOLUTION: Performed by: HOSPITALIST

## 2022-04-04 PROCEDURE — 99232 SBSQ HOSP IP/OBS MODERATE 35: CPT | Performed by: INTERNAL MEDICINE

## 2022-04-04 PROCEDURE — 99231 SBSQ HOSP IP/OBS SF/LOW 25: CPT | Performed by: PHYSICIAN ASSISTANT

## 2022-04-04 PROCEDURE — 25010000002 CEFTRIAXONE PER 250 MG: Performed by: HOSPITALIST

## 2022-04-04 PROCEDURE — 63710000001 INSULIN LISPRO (HUMAN) PER 5 UNITS: Performed by: NURSE PRACTITIONER

## 2022-04-04 PROCEDURE — 82962 GLUCOSE BLOOD TEST: CPT

## 2022-04-04 PROCEDURE — 94799 UNLISTED PULMONARY SVC/PX: CPT

## 2022-04-04 PROCEDURE — 25010000002 ENOXAPARIN PER 10 MG: Performed by: NURSE PRACTITIONER

## 2022-04-04 PROCEDURE — 94660 CPAP INITIATION&MGMT: CPT

## 2022-04-04 PROCEDURE — 25010000002 METHYLPREDNISOLONE PER 40 MG: Performed by: INTERNAL MEDICINE

## 2022-04-04 PROCEDURE — 80076 HEPATIC FUNCTION PANEL: CPT | Performed by: INTERNAL MEDICINE

## 2022-04-04 PROCEDURE — 25010000002 HYDRALAZINE PER 20 MG: Performed by: NURSE PRACTITIONER

## 2022-04-04 RX ORDER — AMLODIPINE BESYLATE 5 MG/1
10 TABLET ORAL
Status: DISCONTINUED | OUTPATIENT
Start: 2022-04-05 | End: 2022-04-05 | Stop reason: HOSPADM

## 2022-04-04 RX ADMIN — Medication 10 ML: at 09:28

## 2022-04-04 RX ADMIN — LISINOPRIL 40 MG: 20 TABLET ORAL at 13:12

## 2022-04-04 RX ADMIN — MORPHINE SULFATE 5 MG: 4 INJECTION INTRAVENOUS at 22:18

## 2022-04-04 RX ADMIN — NICARDIPINE HYDROCHLORIDE 5 MG/HR: 25 INJECTION, SOLUTION INTRAVENOUS at 06:43

## 2022-04-04 RX ADMIN — PROMETHAZINE HYDROCHLORIDE 50 MG: 25 TABLET ORAL at 06:42

## 2022-04-04 RX ADMIN — Medication 10 ML: at 22:24

## 2022-04-04 RX ADMIN — INSULIN LISPRO 8 UNITS: 100 INJECTION, SOLUTION INTRAVENOUS; SUBCUTANEOUS at 17:57

## 2022-04-04 RX ADMIN — HYDROCODONE BITARTRATE AND ACETAMINOPHEN 1 TABLET: 10; 325 TABLET ORAL at 09:26

## 2022-04-04 RX ADMIN — INSULIN HUMAN 20 UNITS: 100 INJECTION, SOLUTION PARENTERAL at 09:27

## 2022-04-04 RX ADMIN — MORPHINE SULFATE 5 MG: 4 INJECTION INTRAVENOUS at 13:12

## 2022-04-04 RX ADMIN — Medication 5 MG: at 22:23

## 2022-04-04 RX ADMIN — CLONIDINE HYDROCHLORIDE 0.3 MG: 0.1 TABLET ORAL at 13:11

## 2022-04-04 RX ADMIN — ENOXAPARIN SODIUM 40 MG: 40 INJECTION SUBCUTANEOUS at 22:23

## 2022-04-04 RX ADMIN — GUAIFENESIN 1200 MG: 600 TABLET, EXTENDED RELEASE ORAL at 22:21

## 2022-04-04 RX ADMIN — SCOPALAMINE 1 PATCH: 1 PATCH, EXTENDED RELEASE TRANSDERMAL at 18:02

## 2022-04-04 RX ADMIN — HYDROCODONE BITARTRATE AND ACETAMINOPHEN 1 TABLET: 10; 325 TABLET ORAL at 17:57

## 2022-04-04 RX ADMIN — INSULIN HUMAN 20 UNITS: 100 INJECTION, SOLUTION PARENTERAL at 22:21

## 2022-04-04 RX ADMIN — HYDRALAZINE HYDROCHLORIDE 20 MG: 20 INJECTION INTRAMUSCULAR; INTRAVENOUS at 17:58

## 2022-04-04 RX ADMIN — AMLODIPINE BESYLATE 5 MG: 5 TABLET ORAL at 09:26

## 2022-04-04 RX ADMIN — METOPROLOL TARTRATE 100 MG: 50 TABLET, FILM COATED ORAL at 22:21

## 2022-04-04 RX ADMIN — METHYLPREDNISOLONE SODIUM SUCCINATE 40 MG: 40 INJECTION, POWDER, FOR SOLUTION INTRAMUSCULAR; INTRAVENOUS at 09:26

## 2022-04-04 RX ADMIN — ZOLPIDEM TARTRATE 10 MG: 5 TABLET ORAL at 22:21

## 2022-04-04 RX ADMIN — METHYLPREDNISOLONE SODIUM SUCCINATE 40 MG: 40 INJECTION, POWDER, FOR SOLUTION INTRAMUSCULAR; INTRAVENOUS at 22:21

## 2022-04-04 RX ADMIN — BUDESONIDE 0.5 MG: 0.5 INHALANT RESPIRATORY (INHALATION) at 19:09

## 2022-04-04 RX ADMIN — HYDRALAZINE HYDROCHLORIDE 75 MG: 25 TABLET, FILM COATED ORAL at 22:23

## 2022-04-04 RX ADMIN — METOPROLOL TARTRATE 100 MG: 50 TABLET, FILM COATED ORAL at 09:26

## 2022-04-04 RX ADMIN — CEFTRIAXONE 1 G: 1 INJECTION, POWDER, FOR SOLUTION INTRAMUSCULAR; INTRAVENOUS at 13:12

## 2022-04-04 RX ADMIN — GUAIFENESIN 1200 MG: 600 TABLET, EXTENDED RELEASE ORAL at 09:25

## 2022-04-04 RX ADMIN — ENOXAPARIN SODIUM 40 MG: 40 INJECTION SUBCUTANEOUS at 09:27

## 2022-04-04 RX ADMIN — PROMETHAZINE HYDROCHLORIDE 50 MG: 25 TABLET ORAL at 22:19

## 2022-04-04 RX ADMIN — ROPINIROLE 5 MG: 5 TABLET, FILM COATED ORAL at 22:21

## 2022-04-04 RX ADMIN — CLONIDINE HYDROCHLORIDE 0.3 MG: 0.1 TABLET ORAL at 22:20

## 2022-04-04 RX ADMIN — MORPHINE SULFATE 5 MG: 4 INJECTION INTRAVENOUS at 17:57

## 2022-04-04 RX ADMIN — LIDOCAINE 2 PATCH: 50 PATCH TOPICAL at 09:27

## 2022-04-04 RX ADMIN — PROMETHAZINE HYDROCHLORIDE 50 MG: 25 TABLET ORAL at 13:11

## 2022-04-04 RX ADMIN — TOPIRAMATE 50 MG: 25 TABLET, FILM COATED ORAL at 09:26

## 2022-04-04 RX ADMIN — CLONIDINE HYDROCHLORIDE 0.3 MG: 0.1 TABLET ORAL at 06:38

## 2022-04-04 RX ADMIN — INSULIN LISPRO 4 UNITS: 100 INJECTION, SOLUTION INTRAVENOUS; SUBCUTANEOUS at 13:12

## 2022-04-04 RX ADMIN — Medication 10 ML: at 23:34

## 2022-04-04 RX ADMIN — HYDROCHLOROTHIAZIDE 25 MG: 25 TABLET ORAL at 09:26

## 2022-04-04 RX ADMIN — RISPERIDONE 0.5 MG: 0.25 TABLET ORAL at 22:20

## 2022-04-04 RX ADMIN — BENZONATATE 200 MG: 100 CAPSULE ORAL at 22:21

## 2022-04-04 RX ADMIN — RISPERIDONE 0.5 MG: 0.25 TABLET ORAL at 09:25

## 2022-04-04 RX ADMIN — HYDRALAZINE HYDROCHLORIDE 75 MG: 25 TABLET, FILM COATED ORAL at 09:25

## 2022-04-04 NOTE — PLAN OF CARE
Goal Outcome Evaluation:         Patient requests pain meds for back pain. Cardene gtt stopped and blood pressure medications adjusted. ART line to be removed. Will continue to monitor.

## 2022-04-04 NOTE — PROGRESS NOTES
[facility] Medicine Services Daily Progress Note    Patient Name: Ann Álvarez  : 1960  MRN: [id number]  Primary Care Physician:  Jose Enrique Walters MD  Date of admission: 3/28/2022    Subjective      Chief Complaint: Respiratory failure parainfluenza infection urinary tract infection hallucinations uncontrolled blood pressure      Patient Reports she is doing much better.  Does not recall any hallucinations.  Blood pressure under 160 systolic now.    ROS negative except as above      Objective      Vitals:   Temp:  [97 °F (36.1 °C)-98.6 °F (37 °C)] 98.6 °F (37 °C)  Heart Rate:  [69-85] 74  Resp:  [16-22] 18  BP: (102-193)/(55-95) 193/73  Flow (L/min):  [2] 2    Physical Exam  Vitals reviewed.   Constitutional:       Comments: Wearing nasal cannula   at bedside   HENT:      Head: Normocephalic.      Nose: Nose normal.      Mouth/Throat:      Mouth: Mucous membranes are moist.   Cardiovascular:      Rate and Rhythm: Normal rate.      Pulses: Normal pulses.   Pulmonary:      Effort: Pulmonary effort is normal.   Abdominal:      General: Abdomen is flat.   Musculoskeletal:         General: Normal range of motion.      Cervical back: Normal range of motion.   Skin:     General: Skin is warm.      Capillary Refill: Capillary refill takes less than 2 seconds.   Neurological:      General: No focal deficit present.      Mental Status: She is alert and oriented to person, place, and time.   Psychiatric:         Mood and Affect: Mood normal.         Behavior: Behavior normal.             Result Review    Result Review:  I have personally reviewed the results from the time of this admission to 2022 17:51 EDT and agree with these findings:  [x]  Laboratory  []  Microbiology  []  Radiology  []  EKG/Telemetry   []  Cardiology/Vascular   []  Pathology  []  Old records  []  Other:  Most notable findings include: Mild anemia of 10.8 hemoglobin        Assessment/Plan       Brief Patient  Summary:  Ann Álvarez is a 61 y.o. female with a history of COPD, chronic hypoxic respiratory failure on home oxygen as needed, HTN, chronic back pain, PTSD, anxiety, and insomnia who presented to the ER at [facility] on 03/28/2022 complaining of shortness of breath. Workup in the ER revealed COPD exacerbation and acute urinary tract infection. The patient was admitted to the ED Observation Unit for further treatment.  Dates of follow-up visits:  03/29/22:  The patient's respiratory status worsened overnight and she was found to have worsening lactic acidosis. She was given Lasix for possible fluid overload, but her lactic acid continues to elevate and she appears dry. She was admitted to the Hospitalist group for further evaluation and treatment. An ABG was repeated and she was placed on BiPAP. She will be given IV fluids and labs repeated later this afternoon. She was started on empiric antibiotics for possible sepsis.   3/30/2022  Patient was seen earlier and reported feeling improvement in her shortness of air.  She reported ongoing chronic back pain.  I was called back after a fast team was called because the patient was having tremors. The patient was able to talk during these tremor episodes.  There was no evidence of any seizure like activity.  3/31/2022  Patient complained of severe uncontrolled acute on chronic back pain.  She was having tremors and tachycardia which was felt to be due to uncontrolled pain.  She also had accelerated hypertension.  4/1/2022: Patient reports that her chronic pain in her back is more tolerable with the current pain medication regimen.  No complaints of headache, chest pain, shortness of breath, sweats, GI or  complaints.  Reports cough and shortness of breath have improved as well.  4/2/2022:   Patient reports ongoing intolerable back pain which she reports is her usual chronic pain but worse because of being in the hospital bed.  She reports occasional cough and  improvement in her shortness of breath.  She reports that she has had a bowel movement recently and urinating normally.  Patient's blood pressure is fluctuating and she is still requiring a Cardene drip.  Work-up for secondary causes of hypertension has been ordered, and cardiology has been consulted.    4/3 blood pressure seems to be improving.  Hallucinations seem to be improving.  Getting closer to discharge.  Appreciate pulmonary and cardiology assistance.      [START ON 4/5/2022] amLODIPine, 10 mg, Oral, Q24H  budesonide, 0.5 mg, Nebulization, BID - RT  Check Fentanyl Patch Placement, 1 each, Does not apply, Q12H  fentaNYL, 1 patch, Transdermal, Q72H   And  Check Fentanyl Patch Placement, 1 each, Does not apply, Q12H  cloNIDine, 0.3 mg, Oral, Q8H  enoxaparin, 40 mg, Subcutaneous, Q12H  guaiFENesin, 1,200 mg, Oral, Q12H  hydrALAZINE, 75 mg, Oral, Q12H  hydroCHLOROthiazide, 25 mg, Oral, Daily  insulin lispro, 0-24 Units, Subcutaneous, TID AC  insulin regular, 20 Units, Subcutaneous, Q12H  lactulose, 20 g, Oral, BID  lidocaine, 2 patch, Transdermal, Q24H  lisinopril, 40 mg, Oral, Q24H  methylPREDNISolone sodium succinate, 40 mg, Intravenous, Q12H  metoprolol tartrate, 100 mg, Oral, Q12H  promethazine, 50 mg, Oral, Q8H  risperiDONE, 0.5 mg, Oral, Q12H  rOPINIRole, 5 mg, Oral, Nightly  Scopolamine, 1 patch, Transdermal, Q72H  sodium chloride, 10 mL, Intravenous, Q12H  sodium chloride, 10 mL, Intravenous, Q12H  topiramate, 50 mg, Oral, Daily  zolpidem, 10 mg, Oral, Nightly       Pharmacy Consult - Steroid Insulin Protocol,   Pharmacy to Dose enoxaparin (LOVENOX),          Active Hospital Problems:  Active Hospital Problems    Diagnosis    • **Acute on chronic respiratory failure with hypoxia and hypercapnia (HCC)    • Acute bronchitis due to parainfluenza virus    • Hypertensive urgency    • Acute UTI (urinary tract infection)    • Lactic acidosis    • Obesity, Class III, BMI 40-49.9 (morbid obesity) ([facility])    • KELTON  (generalized anxiety disorder)    • Acute hyperglycemia    • PTSD (post-traumatic stress disorder)    • Chronic back pain    • COPD exacerbation ([facility])    • RLS (restless legs syndrome)    • Chronic obstructive pulmonary disease ([facility])    • Prediabetes    • Chronic respiratory failure with hypoxia ([facility])    • Insomnia    • Tobacco abuse    • Primary hypertension      Plan:      Hypertensive emergency on top of chronic essential hypertension, rule out secondary causes for hypertension  -Blood pressure has been very difficult to control despite clonidine, lisinopril and metoprolol with nitroglycerin ointment  -Nitroglycerin ointment discontinued 3/31/2022 and Tridil drip initiated for improved blood pressure control  -Oral hydralazine added 3/31/2022  -arterial line necessary for closer BP monitoring and the cuff was pumping up to such high pressure it was severely painful  -Tridil drip was not controlling the patient's blood pressure so Cardene drip was initiated  -Amlodipine was added on 4/1/2022  -Wean Cardene as tolerated  -24-hour urine catecholamines/VMA and renal perfusion study ordered  -Blood pressure better on April 4 under 160 systolic     Episodes of tremors, new problem 3/30/2022 and fast team was called now resolved  -No evidence of seizure activity  -Likely secondary to severe uncontrolled pain and anxiety  -These episodes resolved when the patient's pain and anxiety were improved     Acute on chronic respiratory failure with hypoxia and hypercapnia due to COPD exacerbation and Acute bronchitis due to parainfluenza virus  Chronic hypoxic respiratory failure  Tobacco abuse  -baseline:  O2 @ 2 L per NC PRN  -currently on BiPAP  -titrate O2 to keep sat 90-95%  -IV steroids; wean as tolerated  -scheduled and PRN DuoNebs  -Mucinex BID  -Continue Brovana and budesonide  -contact/droplet isolation   -encourage tobacco cessation      Acute E. coli urinary tract infection  -patient is asymptomatic  -urine culture  "showed pansensitive E. coli  -Cefepime and vancomycin discontinued and Rocephin initiated; Rocephin subsequently changed to Omnicef patient completed antibiotic course     Lactic acidosis felt to be secondary to hypoxia; sepsis is felt to have been ruled out  -likely secondary to respiratory failure, worsened with diuresis  -Patient received IV fluids  -blood cultures no growth and pending  -cefepime and vancomycin discontinued     Acute hyperglycemia with underlying Prediabetes  -likely exacerbated by steroids  -A1c 6.3%  -accu checks AC&HS with SSI  -pharmacy consulted for steroid-induced hyperglycemia protocol     Obesity, Class III, BMI 40-49.9 (morbid obesity)   -BMI 40.39 on admission  -encourage lifestyle modifications      Insomnia  -continue Ambien (INSPECT reviewed)  -Patient has been on this medicine for many years and this is unlikely the cause of her hallucinations     Post-traumatic stress disorder / generalized anxiety disorder / depression  -PRN clonidine on hold; on scheduled as above  -continue Topamax and trazodone    -Psychiatry consulting: \"She is still on steroids, c/o increased anxiety,   QTc 449, use low dose of risperidone 0.25 mg po BID PRN for anxiety   No benzo recommended due to opioid use and respiratory problems   The pt is off her regular antidepressants now, trintellix is not on formulary in the hospital\"  -Risperidone was subsequently increased because of uncontrolled symptoms     Chronic back pain  -Patient complains that her usual home oxycodone is not working for her anymore and it was changed to hydrocodone  -Low-dose fentanyl patch added and dosage increased on 4/2/2022 to 25 mcg daily  -IV morphine as needed for breakthrough pain     RLS (restless legs syndrome)  -continue Requip        DVT prophylaxis:  Medical and mechanical DVT prophylaxis orders are present.     CODE STATUS:    Code Status (Patient has no pulse and is not breathing): CPR (Attempt to Resuscitate)  Medical " Interventions (Patient has pulse or is breathing): Full Support          This patient has been examined wearing appropriate Personal Protective Equipment.  04/04/22      Electronically signed by Gabriel Ye MD, 04/04/22, 17:51 EDT.  [facility] Hospitalist Team

## 2022-04-04 NOTE — PROGRESS NOTES
Daily Progress Note        Acute on chronic respiratory failure with hypoxia and hypercapnia (HCC)    Chronic obstructive pulmonary disease (HCC)    Primary hypertension    Tobacco abuse    Obesity, Class III, BMI 40-49.9 (morbid obesity) (HCC)    COPD exacerbation (HCC)    Acute bronchitis due to parainfluenza virus    Hypertensive urgency    Acute UTI (urinary tract infection)    Lactic acidosis    Insomnia    PTSD (post-traumatic stress disorder)    Chronic back pain    Chronic respiratory failure with hypoxia (HCC)    Prediabetes    RLS (restless legs syndrome)    KELTON (generalized anxiety disorder)    Acute hyperglycemia      Assessment    Acute on chronic respiratory failure with hypoxia and hypercapnia  COPD exacerbation, wears 2 L home O2  3/28/2022 respiratory panel positive for parainfluenza virus 3  Pulmonary hypertension    Hypertensive urgency    Urinary tract infection, E. coli    PTSD  Insomnia  Restless leg syndrome  Chronic constipation  Dyslipidemia  Obesity  Arthritis  Vitamin D deficiency  Tobacco dependency     Echo 3/29/2022  -EF 66 to 70%  -Right ventricular cavity is mildly dilated  -Moderate tricuspid valve regurg  -RVSP 56.6     Plan    We will need work-up for obstructive sleep apnea at discharge     Continue to titrate oxygen- Currently on  2 L NC and BiPAP at bedtime/as needed    Discontinue Brovana    Rocephin-finishes 4/5  Solu-Medrol 40 mg every 12   Pulmicort nebulizer 2 times a day  DuoNebs as needed  Mucinex  Hydrochlorothiazide 25 mg daily     BP control  -A-line in place for more accurate monitoring     Electrolyte/Glycemic control  DVT/GI Prophylaxis-Lovenox and Protonix     DC plan-return home with family.     3/31/2022                                                             3/29/2022       3/28/2022       LOS: 6 days     Subjective         Objective     Vital signs for last 24 hours:  Vitals:    04/04/22 0301 04/04/22 0500 04/04/22 0638 04/04/22 0643   BP:  134/94 146/70  "125/95   Pulse: 71 85 71 79   Resp: 22 20     Temp:  97.8 °F (36.6 °C)     TempSrc:  Axillary     SpO2: 98%      Weight:  101 kg (223 lb 1.7 oz)     Height:  160 cm (62.99\")         Intake/Output last 3 shifts:  I/O last 3 completed shifts:  In: 2160 [P.O.:2160]  Out: 1875 [Urine:1875]  Intake/Output this shift:  No intake/output data recorded.      Radiology  Imaging Results (Last 24 Hours)     Procedure Component Value Units Date/Time    CT Angiogram Renal [[id number]] Resulted: 04/03/22 1556     Updated: 04/03/22 1558    Narrative:      No significant renal artery stenosis.  pls see final read/report to follow   by [facility] rad    NM Renal With Flow & Function Without Pharmacological Intervention [[id number]] Resulted: 04/03/22 1419     Updated: 04/03/22 1423    Narrative:      NO ACUTE ABNORMALITY.  GROSSLY NEG. NUC. MED EXAM.  FINAL REPORT WILL   FOLLOW TOMORROW.  D/W DR. HUNTLEY          Labs:  Results from last 7 days   Lab Units 04/01/22  0256   WBC 10*3/mm3 10.30   HEMOGLOBIN g/dL 10.8*   HEMATOCRIT % 31.9*   PLATELETS 10*3/mm3 217     Results from last 7 days   Lab Units 03/31/22  2333 03/29/22  2337 03/29/22  0014   SODIUM mmol/L 135*   < > 136   POTASSIUM mmol/L 4.0   < > 4.1   CHLORIDE mmol/L 99   < > 93*   CO2 mmol/L 26.0   < > 31.0*   BUN mg/dL 20   < > 12   CREATININE mg/dL 0.70   < > 0.91   CALCIUM mg/dL 8.5*   < > 9.3   BILIRUBIN mg/dL  --   --  0.3   ALK PHOS U/L  --   --  73   ALT (SGPT) U/L  --   --  17   AST (SGOT) U/L  --   --  19   GLUCOSE mg/dL 178*   < > 332*    < > = values in this interval not displayed.     Results from last 7 days   Lab Units 03/31/22  1524   PH, ARTERIAL pH units 7.360   PO2 ART mm Hg 62.7*   PCO2, ARTERIAL mm Hg 56.1*   HCO3 ART mmol/L 31.7*     Results from last 7 days   Lab Units 03/29/22  0014 03/28/22  1525   ALBUMIN g/dL 3.90 3.60     Results from last 7 days   Lab Units 03/31/22  2333 03/28/22  2109 03/28/22  1525   TROPONIN T ng/mL <0.010 <0.010 <0.010         Results " from last 7 days   Lab Units 03/31/22  2333   MAGNESIUM mg/dL 1.9                   Meds:   SCHEDULE  amLODIPine, 5 mg, Oral, Q24H  arformoterol, 15 mcg, Nebulization, BID - RT  budesonide, 0.5 mg, Nebulization, BID - RT  cefTRIAXone, 1 g, Intravenous, Q24H  Check Fentanyl Patch Placement, 1 each, Does not apply, Q12H  fentaNYL, 1 patch, Transdermal, Q72H   And  Check Fentanyl Patch Placement, 1 each, Does not apply, Q12H  cloNIDine, 0.3 mg, Oral, Q8H  enoxaparin, 40 mg, Subcutaneous, Q12H  guaiFENesin, 1,200 mg, Oral, Q12H  hydrALAZINE, 75 mg, Oral, Q12H  hydroCHLOROthiazide, 25 mg, Oral, Daily  insulin lispro, 0-24 Units, Subcutaneous, TID AC  insulin regular, 20 Units, Subcutaneous, Q12H  lactulose, 20 g, Oral, BID  lidocaine, 2 patch, Transdermal, Q24H  lisinopril, 40 mg, Oral, Q24H  methylPREDNISolone sodium succinate, 40 mg, Intravenous, Q12H  metoprolol tartrate, 100 mg, Oral, Q12H  promethazine, 50 mg, Oral, Q8H  risperiDONE, 0.5 mg, Oral, Q12H  rOPINIRole, 5 mg, Oral, Nightly  Scopolamine, 1 patch, Transdermal, Q72H  sodium chloride, 10 mL, Intravenous, Q12H  sodium chloride, 10 mL, Intravenous, Q12H  topiramate, 50 mg, Oral, Daily  zolpidem, 10 mg, Oral, Nightly      Infusions  niCARdipine, 5-15 mg/hr, Last Rate: 5 mg/hr (04/04/22 0643)  Pharmacy Consult - Steroid Insulin Protocol,   Pharmacy to Dose enoxaparin (LOVENOX),       PRNs  •  acetaminophen **OR** acetaminophen **OR** acetaminophen  •  aluminum-magnesium hydroxide-simethicone  •  benzonatate  •  dextrose  •  dextrose  •  dextrose  •  glucagon (human recombinant)  •  glucagon (human recombinant)  •  haloperidol lactate  •  hydrALAZINE  •  HYDROcodone-acetaminophen  •  insulin lispro **AND** insulin lispro  •  ipratropium-albuterol  •  magnesium sulfate **OR** magnesium sulfate **OR** magnesium sulfate  •  melatonin  •  metoprolol tartrate  •  Morphine  •  nitroglycerin  •  ondansetron **OR** ondansetron  •  Pharmacy Consult - Steroid Insulin  Protocol  •  Pharmacy to Dose enoxaparin (LOVENOX)  •  potassium chloride  •  potassium chloride  •  promethazine  •  sodium chloride  •  sodium chloride  •  sodium chloride  •  zolpidem    Physical Exam:  Physical Exam  Vitals reviewed.   Pulmonary:      Breath sounds: Decreased breath sounds and rhonchi present.   Skin:     General: Skin is warm and dry.   Neurological:      Mental Status: She is alert and oriented to person, place, and time.         ROS  Review of Systems   Respiratory: Positive for cough and shortness of breath.      I reviewed the patient's new clinical results    Electronically signed by CORI Mckenna

## 2022-04-04 NOTE — PROGRESS NOTES
Referring Provider: Hospitalist    Reason for follow-up: Shortness of breath     Patient Care Team:  Jose Enrique Walters MD as PCP - General (Internal Medicine)  Heraclio Rizzo MD as Consulting Physician (Cardiology)    Subjective .  Patient having some back pain but no shortness of breath today    Objective  Pain in bed comfortably     Review of Systems   Constitutional: Negative for fever and malaise/fatigue.   Cardiovascular: Negative for chest pain, dyspnea on exertion and palpitations.   Respiratory: Negative for cough and shortness of breath.    Skin: Negative for rash.   Gastrointestinal: Negative for abdominal pain, nausea and vomiting.   Neurological: Negative for focal weakness and headaches.   All other systems reviewed and are negative.      Codeine    Scheduled Meds:[START ON 4/5/2022] amLODIPine, 10 mg, Oral, Q24H  arformoterol, 15 mcg, Nebulization, BID - RT  budesonide, 0.5 mg, Nebulization, BID - RT  cefTRIAXone (ROCEPHIN) 1GM IVPB in 50 mL NS (MBP), 1 g, Intravenous, Q24H  Check Fentanyl Patch Placement, 1 each, Does not apply, Q12H  fentaNYL, 1 patch, Transdermal, Q72H   And  Check Fentanyl Patch Placement, 1 each, Does not apply, Q12H  cloNIDine, 0.3 mg, Oral, Q8H  enoxaparin, 40 mg, Subcutaneous, Q12H  guaiFENesin, 1,200 mg, Oral, Q12H  hydrALAZINE, 75 mg, Oral, Q12H  hydroCHLOROthiazide, 25 mg, Oral, Daily  insulin lispro, 0-24 Units, Subcutaneous, TID AC  insulin regular, 20 Units, Subcutaneous, Q12H  lactulose, 20 g, Oral, BID  lidocaine, 2 patch, Transdermal, Q24H  lisinopril, 40 mg, Oral, Q24H  methylPREDNISolone sodium succinate, 40 mg, Intravenous, Q12H  metoprolol tartrate, 100 mg, Oral, Q12H  promethazine, 50 mg, Oral, Q8H  risperiDONE, 0.5 mg, Oral, Q12H  rOPINIRole, 5 mg, Oral, Nightly  Scopolamine, 1 patch, Transdermal, Q72H  sodium chloride, 10 mL, Intravenous, Q12H  sodium chloride, 10 mL, Intravenous, Q12H  topiramate, 50 mg, Oral, Daily  zolpidem, 10 mg, Oral, Nightly      Continuous  "Infusions:niCARdipine, 5-15 mg/hr, Last Rate: Stopped (04/04/22 0922)  Pharmacy Consult - Steroid Insulin Protocol,   Pharmacy to Dose enoxaparin (LOVENOX),       PRN Meds:.•  acetaminophen **OR** acetaminophen **OR** acetaminophen  •  aluminum-magnesium hydroxide-simethicone  •  benzonatate  •  dextrose  •  dextrose  •  dextrose  •  glucagon (human recombinant)  •  glucagon (human recombinant)  •  haloperidol lactate  •  hydrALAZINE  •  HYDROcodone-acetaminophen  •  insulin lispro **AND** insulin lispro  •  ipratropium-albuterol  •  magnesium sulfate **OR** magnesium sulfate **OR** magnesium sulfate  •  melatonin  •  metoprolol tartrate  •  Morphine  •  nitroglycerin  •  ondansetron **OR** ondansetron  •  Pharmacy Consult - Steroid Insulin Protocol  •  Pharmacy to Dose enoxaparin (LOVENOX)  •  potassium chloride  •  potassium chloride  •  promethazine  •  sodium chloride  •  sodium chloride  •  sodium chloride  •  zolpidem        VITAL SIGNS  Vitals:    04/04/22 0500 04/04/22 0638 04/04/22 0643 04/04/22 0947   BP: 134/94 146/70 125/95 167/70   Pulse: 85 71 79 76   Resp: 20   18   Temp: 97.8 °F (36.6 °C)   97.7 °F (36.5 °C)   TempSrc: Axillary   Oral   SpO2:       Weight: 101 kg (223 lb 1.7 oz)      Height: 160 cm (62.99\")          Flowsheet Rows    Flowsheet Row First Filed Value   Admission Height 160 cm (63\") Documented at 03/28/2022 1425   Admission Weight 99.8 kg (220 lb) Documented at 03/28/2022 1425           TELEMETRY: Sinus rhythm    Physical Exam:  Constitutional:       Appearance: Well-developed.   Eyes:      General: No scleral icterus.     Conjunctiva/sclera: Conjunctivae normal.   HENT:      Head: Normocephalic and atraumatic.   Neck:      Vascular: No carotid bruit or JVD.   Pulmonary:      Effort: Pulmonary effort is normal.      Breath sounds: Normal breath sounds. No wheezing. No rales.   Cardiovascular:      Normal rate. Regular rhythm.   Pulses:     Intact distal pulses.   Abdominal:      " General: Bowel sounds are normal.      Palpations: Abdomen is soft.   Musculoskeletal:      Cervical back: Normal range of motion and neck supple. Skin:     General: Skin is warm and dry.      Findings: No rash.   Neurological:      Mental Status: Alert.          Results Review:   I reviewed the patient's new clinical results.  Lab Results (last 24 hours)     Procedure Component Value Units Date/Time    POC Glucose Once [[id number]]  (Abnormal) Collected: 04/04/22 1110    Specimen: Blood Updated: 04/04/22 1119     Glucose 179 mg/dL      Comment: Serial Number: [id number]:  [id number]       Creatinine, Urine, 24 Hour - Urine, Clean Catch [[id number]]  (Abnormal) Collected: 04/03/22 2350    Specimen: 24 Hour Urine from Urine, Clean Catch Updated: 04/04/22 1042     Creatinine, 24H 0.52 g/24 hr      Creatinine, Urine 43.3 mg/dL      24H Urine Volume 1,200 mL      Time (Hours) 24 hrs     POC Glucose Once [[id number]]  (Abnormal) Collected: 04/04/22 0708    Specimen: Blood Updated: 04/04/22 0809     Glucose 147 mg/dL      Comment: Serial Number: [id number]:  [id number]       POC Glucose Once [[id number]]  (Abnormal) Collected: 04/03/22 2046    Specimen: Blood Updated: 04/04/22 0808     Glucose 239 mg/dL      Comment: Serial Number: [id number]:  [id number]       POC Glucose Once [[id number]]  (Abnormal) Collected: 04/03/22 1807    Specimen: Blood Updated: 04/04/22 0807     Glucose 302 mg/dL      Comment: Serial Number: [id number]:  [id number]       Catecholamine+VMA, 24-Hr Urine - Urine, Clean Catch [[id number]] Collected: 04/03/22 2350    Specimen: Urine, Clean Catch Updated: 04/04/22 0018    POC Glucose Once [[id number]]  (Abnormal) Collected: 04/03/22 1400    Specimen: Blood Updated: 04/03/22 1401     Glucose 173 mg/dL      Comment: Serial Number: [id number]:  [id number]             Imaging Results (Last 24 Hours)     Procedure Component Value Units Date/Time    CT Angiogram Renal [[id number]]  Collected: 04/04/22 1007     Updated: 04/04/22 1016    Narrative:         DATE OF EXAM:   4/3/2022 1:06 PM     PROCEDURE:   CT ANGIOGRAM RENAL-     INDICATIONS:   HTN, hilda-vasc unlikely (essential hypertension)     COMPARISON:  CT of the abdomen and pelvis dated 03/28/2022     TECHNIQUE:   Helical imaging of the abdomen and pelvis was performed with an IV bolus  of 100 cc of Isovue-370. Multiplanar reconstructions as well as 2-D and  3-D renderings and computer assisted graphic renderings of the  mesenteric and renal arteries was performed. Dose reduction techniques  including automated dose control and iterative reconstruction were used.     FINDINGS:   Nonvascular: There is bibasilar discoid atelectasis in the lower lobes  which is increased from the previous study. No pleural fluid is  identified. The liver, spleen, adrenal glands, and pancreas appear  unremarkable. The right and left kidney appear normal. The gallbladder  is absent. There is a moderate amount of fecal residue throughout the  colon. No significant bowel dilatation is identified. There are no  intra-abdominal fluid collections. There are a few small lymph nodes  identified in the retroperitoneum which do not appear pathologically  enlarged. The bladder appears normal. There is degenerative disc disease  in the lower lumbar spine.     Vascular: There is atherosclerotic plaque primarily in the infrarenal  abdominal aorta. Celiac axis is widely patent. There is a separate  origin of a common left gastric and left hepatic arteries. The SMA is  widely patent. There is a single left renal artery appears patent  without significant narrowing. There is a main and accessory right renal  artery which also appear patent of significant narrowing Atherosclerotic  plaque is present in the common iliac arteries without significant  stenosis. The YOVANNY is patent.       Impression:      IMPRESSION :      1. No significant renal artery stenosis is identified.  2.  Atherosclerotic plaque in both common iliac arteries without  significant stenosis.  3. Incidental nonvascular findings as described on the patient's  previous CT. There is an increase in atelectasis or airspace disease in  the lung bases.     Electronically Signed By-Polo Segundo MD On:4/4/2022 10:14 AM  This report was finalized on [id number] by  Polo Segundo MD.    NM Renal With Flow & Function Without Pharmacological Intervention [[id number]] Collected: 04/04/22 0835     Updated: 04/04/22 0855    Narrative:      DATE OF EXAM:  4/3/2022 12:33 PM     PROCEDURE:  NM RENAL W FLOW AND FUNCTION WO PHARMACOLOGICAL INTERVENTION-     INDICATIONS:  Renovascular HTN suspected, normal renal function; J44.1-Chronic  obstructive pulmonary disease with (acute) exacerbation;  R06.02-Shortness of breath; R06.2-Wheezing; N39.0-Urinary tract  infection, site not specified; R07.9-Chest pain, unspecified;  I16.0-Hypertensive urgency     COMPARISON:  CT abdomen pelvis from March 28, 2022     TECHNIQUE:   The patient received 7.2 mCi of technetium 99m Mag-3 intravenously and  images were obtained over both kidneys and the aorta in the posterior  projection through 1 minute to evaluate renal perfusion. The study was  then carried out in similar fashion through 30 minutes to evaluate renal  function.     FINDINGS:  There is fairly symmetric arterial perfusion to both kidneys.     The split renal perfusion to both kidneys is 49% to the left kidney, and  51% to the right kidney.     The time to peak is normal measuring 4 minutes on the left, and 4  minutes on the right.     The T 1/2 is normal measuring 7 minutes on the left, and 8 minutes on  the right.        Impression:      Examination appears within normal limits.     Electronically Signed By-Santiago Traylor MD On:4/4/2022 8:53 AM  This report was finalized on [id number] by  Santiago Traylor MD.          EKG      I personally viewed and interpreted the patient's EKG/Telemetry  data:    ECHOCARDIOGRAM:    STRESS MYOVIEW:    CARDIAC CATHETERIZATION:    OTHER:         Assessment/Plan     Principal Problem:    Acute on chronic respiratory failure with hypoxia and hypercapnia ([facility])  Active Problems:    Chronic obstructive pulmonary disease (HCC)    Primary hypertension    Tobacco abuse    Obesity, Class III, BMI 40-49.9 (morbid obesity) (HCC)    COPD exacerbation (HCC)    Acute bronchitis due to parainfluenza virus    Hypertensive urgency    Acute UTI (urinary tract infection)    Lactic acidosis    Insomnia    PTSD (post-traumatic stress disorder)    Chronic back pain    Chronic respiratory failure with hypoxia (HCC)    Prediabetes    RLS (restless legs syndrome)    KELTON (generalized anxiety disorder)    Acute hyperglycemia       Patient presented with shortness of breath and hypoxic respiratory failure  Patient also has parainfluenza and is being treated  Patient an echocardiogram which showed normal LV systolic function but has pulmonary hypertension  Patient had ischemic work-up done couple of years ago and will not need any further cardiac work-up at this time  Patient blood pressure very high and hence her blood pressure medicines have been adjusted and we will stop the IV Cardene drip  Patient has significant sleep apnea and should have a CPAP machine after the work-up is complete  Patient sugar levels and lipid levels are followed by the primary care doctor  I discussed the patients findings and my recommendations with patient and nurse    Heraclio Rizzo MD  04/04/22  11:48 EDT

## 2022-04-04 NOTE — PLAN OF CARE
Problem: Adult Inpatient Plan of Care  Goal: Absence of Hospital-Acquired Illness or Injury  Intervention: Identify and Manage Fall Risk  Recent Flowsheet Documentation  Taken 4/4/2022 0200 by Silvina Cook RN  Safety Promotion/Fall Prevention: safety round/check completed  Taken 4/4/2022 0035 by Silvina Cook RN  Safety Promotion/Fall Prevention: activity supervised  Taken 4/3/2022 2200 by Silvina Cook RN  Safety Promotion/Fall Prevention: activity supervised  Taken 4/3/2022 2015 by Silvina Cook RN  Safety Promotion/Fall Prevention: safety round/check completed  Intervention: Prevent Skin Injury  Recent Flowsheet Documentation  Taken 4/4/2022 0035 by Silvina Cook RN  Body Position: position changed independently  Skin Protection: drying agents applied  Taken 4/3/2022 2015 by Silvina Cook RN  Body Position: position changed independently  Skin Protection: adhesive use limited  Intervention: Prevent and Manage VTE (Venous Thromboembolism) Risk  Recent Flowsheet Documentation  Taken 4/4/2022 0035 by Silvina Cook RN  Activity Management: activity adjusted per tolerance  Taken 4/3/2022 2015 by Silvina Cook RN  Activity Management: activity adjusted per tolerance  VTE Prevention/Management: dorsiflexion/plantar flexion performed  Intervention: Prevent Infection  Recent Flowsheet Documentation  Taken 4/4/2022 0035 by Silvina Cook RN  Infection Prevention: cohorting utilized  Taken 4/3/2022 2015 by Silvina Cook RN  Infection Prevention: cohorting utilized  Goal: Optimal Comfort and Wellbeing  Intervention: Provide Person-Centered Care  Recent Flowsheet Documentation  Taken 4/4/2022 0400 by Silvina Cook RN  Trust Relationship/Rapport: care explained  Taken 4/3/2022 2015 by Silvina Cook RN  Trust Relationship/Rapport: care explained   Goal Outcome Evaluation:

## 2022-04-04 NOTE — PROGRESS NOTES
Chief complaint Anxiety, Hallucinations    Subjective .     History of present illness:  The patient is a 61 y.o. female who was admitted secondary to  COPD/sepsis. PMHx: arthritis, back pain, COPD, HTN, PTSD, obesity, HLD, RLS and insomnia.   Psych consult was requested by Dr Nagy secondary to combative behavior.  The patient was confused, experienced visual hallucinations, was combative at initial consult and required soft restraints for safety. Patient had reported long hx of depression/ anxiety, was on different meds in the past, sees Dr Maddox via telehealth at [facility], taking Effexor, Trintellix and Topamax, Trazodone and Ambien prior to admission.    4/3/22, today the patient was alert but has been more anxious, agitated, confused with her sister at bedside. Per nursing, she has been trying to get out of bed today.  Her daughter said she has been adamant about wanting to go to [facility], does not think she is in the hospital, has been seeing spiders and faces on the ceiling.  According to her daughter, last night she was paranoid and told her dad that she is being held captive.   She was given Haldol a couple of nights ago, which her sister said calmed her.  The PRN Risperdal has not been given at all.     4/4/2022, today the patient is up sitting in her recliner, alert and oriented.  Her  is at bedside.  Per nursing, patient appears to have more trouble in the evening and early morning with the confusion, has not been sleeping with the BiPAP.  She has not had any hallucinations today, no paranoia.  She is tolerating the Risperdal twice daily routinely    Past Psych History: PTSD, anxiety, depression; no hospitalizations, no history of SAs     Review of Systems   All systems were reviewed and negative except for:  Constitution:  positive for fatigue  Respiratory: positive for  shortness of air  Gastrointestinal: positive for  nausea  Musculoskeletal: positive for  back pain  Behavioral/Psych:  positive for  anxiety, agitation, hallucinations, paranoia.     History       Medications Prior to Admission   Medication Sig Dispense Refill Last Dose   • cloNIDine (CATAPRES) 0.2 MG tablet Take 0.2 mg by mouth Every 6 (Six) Hours As Needed (anxiety/stress).      • doxepin (SINEquan) 50 MG capsule Take 50 mg by mouth At Night As Needed.   3/27/2022 at 2100   • hydroCHLOROthiazide (HYDRODIURIL) 25 MG tablet Take 25 mg by mouth Daily.   3/28/2022 at 0900   • LINZESS 290 MCG capsule capsule Take 290 mcg by mouth Daily As Needed.   Past Month at Unknown time   • lisinopril (PRINIVIL,ZESTRIL) 40 MG tablet Take 40 mg by mouth Daily.   3/28/2022 at 0900   • Multiple Vitamin (MULTIVITAMIN) tablet Take 1 tablet by mouth Daily.   3/28/2022 at 0900   • oxyCODONE-acetaminophen (PERCOCET)  MG per tablet Take 1 tablet by mouth Every 4 (Four) Hours As Needed for Moderate Pain .   3/27/2022 at 2100   • Probiotic Product (PROBIOTIC ADVANCED) capsule Take 1 capsule by mouth Daily.   3/28/2022 at 0900   • rOPINIRole (REQUIP) 5 MG tablet Take 5 mg by mouth Every Night.   3/27/2022 at 2100   • topiramate (TOPAMAX) 50 MG tablet Take 50 mg by mouth Daily.   3/27/2022 at 2100   • traZODone (DESYREL) 100 MG tablet Take 100 mg by mouth Every Night.   3/27/2022 at 2100   • zolpidem CR (Ambien CR) 12.5 MG CR tablet Take 12.5 mg by mouth At Night As Needed for Sleep.      • promethazine (PHENERGAN) 50 MG tablet Take 50 mg by mouth Every 6 (Six) Hours As Needed.   3/26/2022        Scheduled Meds:  [START ON 4/5/2022] amLODIPine, 10 mg, Oral, Q24H  arformoterol, 15 mcg, Nebulization, BID - RT  budesonide, 0.5 mg, Nebulization, BID - RT  Check Fentanyl Patch Placement, 1 each, Does not apply, Q12H  fentaNYL, 1 patch, Transdermal, Q72H   And  Check Fentanyl Patch Placement, 1 each, Does not apply, Q12H  cloNIDine, 0.3 mg, Oral, Q8H  enoxaparin, 40 mg, Subcutaneous, Q12H  guaiFENesin, 1,200 mg, Oral, Q12H  hydrALAZINE, 75 mg, Oral,  "Q12H  hydroCHLOROthiazide, 25 mg, Oral, Daily  insulin lispro, 0-24 Units, Subcutaneous, TID AC  insulin regular, 20 Units, Subcutaneous, Q12H  lactulose, 20 g, Oral, BID  lidocaine, 2 patch, Transdermal, Q24H  lisinopril, 40 mg, Oral, Q24H  methylPREDNISolone sodium succinate, 40 mg, Intravenous, Q12H  metoprolol tartrate, 100 mg, Oral, Q12H  promethazine, 50 mg, Oral, Q8H  risperiDONE, 0.5 mg, Oral, Q12H  rOPINIRole, 5 mg, Oral, Nightly  Scopolamine, 1 patch, Transdermal, Q72H  sodium chloride, 10 mL, Intravenous, Q12H  sodium chloride, 10 mL, Intravenous, Q12H  topiramate, 50 mg, Oral, Daily  zolpidem, 10 mg, Oral, Nightly         Continuous Infusions:  niCARdipine, 5-15 mg/hr, Last Rate: Stopped (04/04/22 0922)  Pharmacy Consult - Steroid Insulin Protocol,   Pharmacy to Dose enoxaparin (LOVENOX),         PRN Meds:  •  acetaminophen **OR** acetaminophen **OR** acetaminophen  •  aluminum-magnesium hydroxide-simethicone  •  benzonatate  •  dextrose  •  dextrose  •  dextrose  •  glucagon (human recombinant)  •  glucagon (human recombinant)  •  haloperidol lactate  •  hydrALAZINE  •  HYDROcodone-acetaminophen  •  insulin lispro **AND** insulin lispro  •  ipratropium-albuterol  •  magnesium sulfate **OR** magnesium sulfate **OR** magnesium sulfate  •  melatonin  •  metoprolol tartrate  •  Morphine  •  nitroglycerin  •  ondansetron **OR** ondansetron  •  Pharmacy Consult - Steroid Insulin Protocol  •  Pharmacy to Dose enoxaparin (LOVENOX)  •  potassium chloride  •  potassium chloride  •  promethazine  •  sodium chloride  •  sodium chloride  •  sodium chloride      Allergies:  Codeine      Objective     Vital Signs   /85   Pulse 74   Temp 97.7 °F (36.5 °C) (Oral)   Resp 18   Ht 160 cm (62.99\")   Wt 101 kg (223 lb 1.7 oz)   SpO2 98%   BMI 39.53 kg/m²     Physical Exam:                  General Appearance:    Ill appearing, NAD   Head:    Normocephalic, without obvious abnormality, atraumatic   Eyes:          "   Lids and lashes normal, conjunctivae and sclerae normal, no   icterus, no pallor, corneas clear, PERRLA   Skin:   No bleeding, bruising or rash          Neurologic:   Cranial nerves 2 - 12 grossly intact, sensation intact, DTR       present and equal bilaterally               Musculoskeletal: Muscle tone and strength WNL; gait not assessed, supine in bed    Mental Status Exam:   Hygiene:   good  Cooperation:  Cooperative  Eye Contact: Good  Psychomotor Behavior: Appropriate  Affect:  Blunted  Mood: Pleasant  Hopelessness: Denies  Speech:  Normal  Thought Progress:  Goal directed and Linear  Thought Content:  Mood congruent  Suicidal:  None  Homicidal:  None  Hallucinations: Not demonstrated today  Delusion: None  Memory:  Intact  Orientation:  Person, place, time and situation  Reliability:  fair  Insight: Fair  Judgement: Fair  Impulse Control: Improved, fair  Physical/Medical Issues:  Yes      Medications and allergies reviewed     Lab Results   Component Value Date    GLUCOSE 178 (H) 03/31/2022    CALCIUM 8.5 (L) 03/31/2022     (L) 03/31/2022    K 4.0 03/31/2022    CO2 26.0 03/31/2022    CL 99 03/31/2022    BUN 20 03/31/2022    CREATININE 0.70 03/31/2022    BCR 28.6 (H) 03/31/2022    ANIONGAP 10.0 03/31/2022       Last Urine Toxicity     LAST URINE TOXICITY RESULTS Latest Ref Rng & Units 4/3/2022    CREATININE UR mg/dL 43.3          No results found for: PHENYTOIN, PHENOBARB, VALPROATE, CBMZ    Lab Results   Component Value Date     (L) 03/31/2022    BUN 20 03/31/2022    CREATININE 0.70 03/31/2022    WBC 10.30 04/01/2022       Brief Urine Lab Results  (Last result in the past 365 days)      Color   Clarity   Blood   Leuk Est   Nitrite   Protein   CREAT   Urine HCG        04/03/22 2350             43.3             EKG 4/1/22 , QTc 449    Assessment/Plan       Acute on chronic respiratory failure with hypoxia and hypercapnia (HCC)    Chronic obstructive pulmonary disease (HCC)    Primary  hypertension    Tobacco abuse    Obesity, Class III, BMI 40-49.9 (morbid obesity) (HCC)    COPD exacerbation (HCC)    Acute bronchitis due to parainfluenza virus    Hypertensive urgency    Acute UTI (urinary tract infection)    Lactic acidosis    Insomnia    PTSD (post-traumatic stress disorder)    Chronic back pain    Chronic respiratory failure with hypoxia (HCC)    Prediabetes    RLS (restless legs syndrome)    KELTON (generalized anxiety disorder)    Acute hyperglycemia       Assessment: Delirium due to medical condition (Hypoxia/sepsis) vs medication (steroids) resolving   Chronic Post-traumatic stress disorder  Generalized anxiety disorder    Treatment Plan:   The patient is more alert and oriented today, no hallucinations.  She seems to have more difficulty in the evening time, encouraged her to sleep with the BiPAP  IV steroids have been discontinued, less anxious today, no agitation.   Continue risperidone 0.5 mg BID routinely for anxiety/ agitation  No benzo recommended due to opioid use and respiratory problems   The patient is off her regular antidepressants now, trintellix is not on formulary in the hospital.  Continue Topamax and Ambien at her home dosages  Will follow PRN      Treatment Plan discussed with: Patient and nursing and sister.     I discussed the patients findings and my recommendations with patient and nursing staff    I have reviewed and approved the behavioral health treatment plans and problem list. Yes     Referring MD has access to consult report and progress notes in EMR     Tiffany Mcgill PA-C  04/04/22  13:46 EDT

## 2022-04-04 NOTE — NURSING NOTE
Patient was doing well after spouse left arround 2200. Patient got out of bed went to window talking about seeing a man outside. She is worried her  is not ok. Wanted to smoke thought the nurse was smoking. She wanted to call her  she did and he hold her she is at hospital and those are nurses. She said this is not a hospital. I have never seen a hospital like this.... since then she has been out of bed going toward the windows and seeing people bed alarms on.

## 2022-04-04 NOTE — CASE MANAGEMENT/SOCIAL WORK
Continued Stay Note  ROBBIE Krueger     Patient Name: Ann Álvarez  MRN: [id number]  Today's Date: 4/4/2022    Admit Date: 3/28/2022     Discharge Plan     Row Name 04/04/22 1532       Plan    Plan DC Plan: Anticipate routine home with family, has home O2 through [place].    Plan Comments DC Barriers: Psych following, BP medication adjustments per cardiology. Anticipate dc home once medically cleared by consulting MDs.              Phone communication or documentation only - no physical contact with patient or family.    Ct Devi RN     Office Phone: [phone]  Office Cell: [phone]

## 2022-04-04 NOTE — PROGRESS NOTES
Nutrition Services    Patient Name: Ann Álvarez  YOB: 1960  MRN: [id number]  Admission date: 3/28/2022    PPE Documentation        PPE Worn By Provider Did not enter room for this encounter   PPE Worn By Patient  N/A     PROGRESS NOTE      Encounter Information: Check on for PO intakes.  Patient noted with confusion overnight.        PO Diet: Diet Diabetic/Consistent Carbs; Diabetic - Consistent Carb   PO Supplements: None ordered    PO Intake:  80% average po intakes since last RD review        Nutrition support orders:    Nutrition support review:        Labs (reviewed below): Hyponatremia  Hypocalcemia  Hyperglycemia        GI Function:  Last BM 3/28 (x 7 days) - alerted RN via Secure Chat, she will work to verify validity and need for bowel regimen        Nutrition Intervention: Continue current diet and encourage good po intakes        Results from last 7 days   Lab Units 03/31/22 2333 03/30/22 0813 03/29/22 2337 03/29/22  0014 03/28/22  1525   SODIUM mmol/L 135* 141 139 136 142   POTASSIUM mmol/L 4.0 4.2 4.2 4.1 4.0   CHLORIDE mmol/L 99 100 97* 93* 100   CO2 mmol/L 26.0 27.0 29.0 31.0* 34.0*   BUN mg/dL 20 21 19 12 9   CREATININE mg/dL 0.70 1.03* 0.94 0.91 0.68   CALCIUM mg/dL 8.5* 9.1 8.6 9.3 9.2   BILIRUBIN mg/dL  --   --   --  0.3 0.2   ALK PHOS U/L  --   --   --  73 66   ALT (SGPT) U/L  --   --   --  17 15   AST (SGOT) U/L  --   --   --  19 15   GLUCOSE mg/dL 178* 236* 264* 332* 190*     Results from last 7 days   Lab Units 04/01/22  0256 03/31/22 2333 03/30/22 0813 03/29/22 2337   MAGNESIUM mg/dL  --  1.9  --  1.6   PHOSPHORUS mg/dL  --  2.3*  --   --    HEMOGLOBIN g/dL 10.8*  --    < > 11.9*   HEMOGLOBIN, POC   --   --    < >  --    HEMATOCRIT % 31.9*  --    < > 35.0   HEMATOCRIT POC   --   --    < >  --     < > = values in this interval not displayed.     COVID19   Date Value Ref Range Status   03/28/2022 Not Detected Not Detected - Ref. Range Final     Lab Results   Component Value  Date    HGBA1C 6.3 (H) 03/29/2022       RD to follow up per protocol.    Electronically signed by:  Thelma White RD  04/04/22 09:32 EDT

## 2022-04-05 ENCOUNTER — READMISSION MANAGEMENT (OUTPATIENT)
Dept: CALL CENTER | Facility: HOSPITAL | Age: 62
End: 2022-04-05

## 2022-04-05 VITALS
HEART RATE: 67 BPM | SYSTOLIC BLOOD PRESSURE: 138 MMHG | TEMPERATURE: 98.6 F | HEIGHT: 63 IN | DIASTOLIC BLOOD PRESSURE: 67 MMHG | OXYGEN SATURATION: 100 % | RESPIRATION RATE: 20 BRPM | WEIGHT: 222.22 LBS | BODY MASS INDEX: 39.38 KG/M2

## 2022-04-05 LAB
ANION GAP SERPL CALCULATED.3IONS-SCNC: 9 MMOL/L (ref 5–15)
BUN SERPL-MCNC: 23 MG/DL (ref 8–23)
BUN/CREAT SERPL: 28 (ref 7–25)
CALCIUM SPEC-SCNC: 8.7 MG/DL (ref 8.6–10.5)
CHLORIDE SERPL-SCNC: 96 MMOL/L (ref 98–107)
CK SERPL-CCNC: 28 U/L (ref 20–180)
CO2 SERPL-SCNC: 28 MMOL/L (ref 22–29)
CREAT SERPL-MCNC: 0.82 MG/DL (ref 0.57–1)
CRP SERPL-MCNC: <0.3 MG/DL (ref 0–0.5)
DEPRECATED RDW RBC AUTO: 45.1 FL (ref 37–54)
EGFRCR SERPLBLD CKD-EPI 2021: 81 ML/MIN/1.73
ERYTHROCYTE [DISTWIDTH] IN BLOOD BY AUTOMATED COUNT: 15.1 % (ref 12.3–15.4)
GLUCOSE BLDC GLUCOMTR-MCNC: 145 MG/DL (ref 70–105)
GLUCOSE BLDC GLUCOMTR-MCNC: 156 MG/DL (ref 70–105)
GLUCOSE BLDC GLUCOMTR-MCNC: 161 MG/DL (ref 70–105)
GLUCOSE SERPL-MCNC: 123 MG/DL (ref 65–99)
HCT VFR BLD AUTO: 41.7 % (ref 34–46.6)
HGB BLD-MCNC: 14.1 G/DL (ref 12–15.9)
HOLD SPECIMEN: NORMAL
LYMPHOCYTES # BLD MANUAL: 0.38 10*3/MM3 (ref 0.7–3.1)
LYMPHOCYTES NFR BLD MANUAL: 1 % (ref 5–12)
MAGNESIUM SERPL-MCNC: 2.1 MG/DL (ref 1.6–2.4)
MCH RBC QN AUTO: 28.6 PG (ref 26.6–33)
MCHC RBC AUTO-ENTMCNC: 33.9 G/DL (ref 31.5–35.7)
MCV RBC AUTO: 84.4 FL (ref 79–97)
METAMYELOCYTES NFR BLD MANUAL: 1 % (ref 0–0)
MONOCYTES # BLD: 0.09 10*3/MM3 (ref 0.1–0.9)
MYELOCYTES NFR BLD MANUAL: 1 % (ref 0–0)
NEUTROPHILS # BLD AUTO: 8.74 10*3/MM3 (ref 1.7–7)
NEUTROPHILS NFR BLD MANUAL: 87 % (ref 42.7–76)
NEUTS BAND NFR BLD MANUAL: 6 % (ref 0–5)
PHOSPHATE SERPL-MCNC: 2.9 MG/DL (ref 2.5–4.5)
PLAT MORPH BLD: NORMAL
PLATELET # BLD AUTO: 214 10*3/MM3 (ref 140–450)
PMV BLD AUTO: 7.2 FL (ref 6–12)
POTASSIUM SERPL-SCNC: 4.4 MMOL/L (ref 3.5–5.2)
RBC # BLD AUTO: 4.94 10*6/MM3 (ref 3.77–5.28)
RBC MORPH BLD: NORMAL
SCAN SLIDE: NORMAL
SODIUM SERPL-SCNC: 133 MMOL/L (ref 136–145)
VARIANT LYMPHS NFR BLD MANUAL: 4 % (ref 19.6–45.3)
WBC MORPH BLD: NORMAL
WBC NRBC COR # BLD: 9.4 10*3/MM3 (ref 3.4–10.8)

## 2022-04-05 PROCEDURE — 63710000001 PROMETHAZINE PER 25 MG: Performed by: HOSPITALIST

## 2022-04-05 PROCEDURE — 99232 SBSQ HOSP IP/OBS MODERATE 35: CPT | Performed by: INTERNAL MEDICINE

## 2022-04-05 PROCEDURE — 80048 BASIC METABOLIC PNL TOTAL CA: CPT | Performed by: INTERNAL MEDICINE

## 2022-04-05 PROCEDURE — 82962 GLUCOSE BLOOD TEST: CPT

## 2022-04-05 PROCEDURE — 83735 ASSAY OF MAGNESIUM: CPT | Performed by: INTERNAL MEDICINE

## 2022-04-05 PROCEDURE — 25010000002 METHYLPREDNISOLONE PER 40 MG: Performed by: INTERNAL MEDICINE

## 2022-04-05 PROCEDURE — 82550 ASSAY OF CK (CPK): CPT | Performed by: INTERNAL MEDICINE

## 2022-04-05 PROCEDURE — 94760 N-INVAS EAR/PLS OXIMETRY 1: CPT

## 2022-04-05 PROCEDURE — 0 MORPHINE SULFATE 4 MG/ML SOLUTION: Performed by: HOSPITALIST

## 2022-04-05 PROCEDURE — 63710000001 INSULIN LISPRO (HUMAN) PER 5 UNITS: Performed by: NURSE PRACTITIONER

## 2022-04-05 PROCEDURE — 86140 C-REACTIVE PROTEIN: CPT | Performed by: INTERNAL MEDICINE

## 2022-04-05 PROCEDURE — 84100 ASSAY OF PHOSPHORUS: CPT | Performed by: INTERNAL MEDICINE

## 2022-04-05 PROCEDURE — 63710000001 INSULIN REGULAR HUMAN PER 5 UNITS: Performed by: HOSPITALIST

## 2022-04-05 PROCEDURE — 94660 CPAP INITIATION&MGMT: CPT

## 2022-04-05 PROCEDURE — 94799 UNLISTED PULMONARY SVC/PX: CPT

## 2022-04-05 PROCEDURE — 85007 BL SMEAR W/DIFF WBC COUNT: CPT | Performed by: INTERNAL MEDICINE

## 2022-04-05 PROCEDURE — 85025 COMPLETE CBC W/AUTO DIFF WBC: CPT | Performed by: INTERNAL MEDICINE

## 2022-04-05 PROCEDURE — 25010000002 ENOXAPARIN PER 10 MG: Performed by: NURSE PRACTITIONER

## 2022-04-05 PROCEDURE — 99239 HOSP IP/OBS DSCHRG MGMT >30: CPT | Performed by: INTERNAL MEDICINE

## 2022-04-05 RX ORDER — HYDRALAZINE HYDROCHLORIDE 25 MG/1
75 TABLET, FILM COATED ORAL EVERY 12 HOURS SCHEDULED
Qty: 90 TABLET | Refills: 1 | Status: SHIPPED | OUTPATIENT
Start: 2022-04-05 | End: 2022-11-14

## 2022-04-05 RX ORDER — SILDENAFIL CITRATE 20 MG/1
10 TABLET ORAL EVERY 8 HOURS SCHEDULED
Status: DISCONTINUED | OUTPATIENT
Start: 2022-04-05 | End: 2022-04-05 | Stop reason: HOSPADM

## 2022-04-05 RX ORDER — CLONIDINE HYDROCHLORIDE 0.3 MG/1
0.3 TABLET ORAL EVERY 8 HOURS SCHEDULED
Qty: 90 TABLET | Refills: 1 | Status: SHIPPED | OUTPATIENT
Start: 2022-04-05

## 2022-04-05 RX ORDER — METOPROLOL TARTRATE 100 MG/1
100 TABLET ORAL EVERY 12 HOURS SCHEDULED
Qty: 60 TABLET | Refills: 1 | Status: SHIPPED | OUTPATIENT
Start: 2022-04-05 | End: 2022-11-14

## 2022-04-05 RX ORDER — SILDENAFIL CITRATE 20 MG/1
10 TABLET ORAL EVERY 8 HOURS SCHEDULED
Qty: 90 TABLET | Refills: 1 | Status: SHIPPED | OUTPATIENT
Start: 2022-04-05

## 2022-04-05 RX ORDER — AMLODIPINE BESYLATE 10 MG/1
10 TABLET ORAL
Qty: 30 TABLET | Refills: 1 | Status: SHIPPED | OUTPATIENT
Start: 2022-04-06

## 2022-04-05 RX ORDER — METHYLPREDNISOLONE 4 MG/1
TABLET ORAL
Qty: 21 TABLET | Refills: 0 | Status: SHIPPED | OUTPATIENT
Start: 2022-04-05 | End: 2022-04-15

## 2022-04-05 RX ADMIN — INSULIN LISPRO 4 UNITS: 100 INJECTION, SOLUTION INTRAVENOUS; SUBCUTANEOUS at 08:06

## 2022-04-05 RX ADMIN — INSULIN LISPRO 4 UNITS: 100 INJECTION, SOLUTION INTRAVENOUS; SUBCUTANEOUS at 11:59

## 2022-04-05 RX ADMIN — HYDROCHLOROTHIAZIDE 25 MG: 25 TABLET ORAL at 08:05

## 2022-04-05 RX ADMIN — CLONIDINE HYDROCHLORIDE 0.3 MG: 0.1 TABLET ORAL at 14:12

## 2022-04-05 RX ADMIN — SILDENAFIL 10 MG: 20 TABLET, FILM COATED ORAL at 14:12

## 2022-04-05 RX ADMIN — PROMETHAZINE HYDROCHLORIDE 50 MG: 25 TABLET ORAL at 14:12

## 2022-04-05 RX ADMIN — BUDESONIDE 0.5 MG: 0.5 INHALANT RESPIRATORY (INHALATION) at 08:16

## 2022-04-05 RX ADMIN — FENTANYL 1 PATCH: 25 PATCH, EXTENDED RELEASE TRANSDERMAL at 15:58

## 2022-04-05 RX ADMIN — HYDRALAZINE HYDROCHLORIDE 75 MG: 25 TABLET, FILM COATED ORAL at 08:05

## 2022-04-05 RX ADMIN — METHYLPREDNISOLONE SODIUM SUCCINATE 40 MG: 40 INJECTION, POWDER, FOR SOLUTION INTRAMUSCULAR; INTRAVENOUS at 08:05

## 2022-04-05 RX ADMIN — Medication 10 ML: at 08:07

## 2022-04-05 RX ADMIN — TOPIRAMATE 50 MG: 25 TABLET, FILM COATED ORAL at 08:05

## 2022-04-05 RX ADMIN — RISPERIDONE 0.5 MG: 0.25 TABLET ORAL at 08:05

## 2022-04-05 RX ADMIN — HYDROCODONE BITARTRATE AND ACETAMINOPHEN 1 TABLET: 10; 325 TABLET ORAL at 08:20

## 2022-04-05 RX ADMIN — PROMETHAZINE HYDROCHLORIDE 50 MG: 25 TABLET ORAL at 05:52

## 2022-04-05 RX ADMIN — METOPROLOL TARTRATE 100 MG: 50 TABLET, FILM COATED ORAL at 08:05

## 2022-04-05 RX ADMIN — INSULIN HUMAN 20 UNITS: 100 INJECTION, SOLUTION PARENTERAL at 08:06

## 2022-04-05 RX ADMIN — Medication 10 ML: at 08:05

## 2022-04-05 RX ADMIN — HYDROCODONE BITARTRATE AND ACETAMINOPHEN 1 TABLET: 10; 325 TABLET ORAL at 04:21

## 2022-04-05 RX ADMIN — MORPHINE SULFATE 5 MG: 4 INJECTION INTRAVENOUS at 13:39

## 2022-04-05 RX ADMIN — ENOXAPARIN SODIUM 40 MG: 40 INJECTION SUBCUTANEOUS at 08:04

## 2022-04-05 RX ADMIN — LIDOCAINE 2 PATCH: 50 PATCH TOPICAL at 08:20

## 2022-04-05 RX ADMIN — LISINOPRIL 40 MG: 20 TABLET ORAL at 11:59

## 2022-04-05 RX ADMIN — GUAIFENESIN 1200 MG: 600 TABLET, EXTENDED RELEASE ORAL at 08:05

## 2022-04-05 RX ADMIN — CLONIDINE HYDROCHLORIDE 0.3 MG: 0.1 TABLET ORAL at 05:53

## 2022-04-05 RX ADMIN — AMLODIPINE BESYLATE 10 MG: 5 TABLET ORAL at 08:05

## 2022-04-05 RX ADMIN — HYDROCODONE BITARTRATE AND ACETAMINOPHEN 1 TABLET: 10; 325 TABLET ORAL at 15:39

## 2022-04-05 NOTE — PLAN OF CARE
Problem: Skin Injury Risk Increased  Goal: Skin Health and Integrity  Outcome: Ongoing, Progressing  Intervention: Optimize Skin Protection  Recent Flowsheet Documentation  Taken 4/4/2022 2000 by Alessia Landry RN  Pressure Reduction Techniques: frequent weight shift encouraged  Pressure Reduction Devices: pressure-redistributing mattress utilized  Skin Protection: adhesive use limited     Problem: Fall Injury Risk  Goal: Absence of Fall and Fall-Related Injury  Outcome: Ongoing, Progressing  Intervention: Identify and Manage Contributors  Recent Flowsheet Documentation  Taken 4/5/2022 0000 by Alessia Landry RN  Medication Review/Management: medications reviewed  Intervention: Promote Injury-Free Environment  Recent Flowsheet Documentation  Taken 4/5/2022 0000 by Alessia Landry RN  Safety Promotion/Fall Prevention:   safety round/check completed   room organization consistent   nonskid shoes/slippers when out of bed   assistive device/personal items within reach   clutter free environment maintained   fall prevention program maintained  Taken 4/4/2022 2000 by Alessia Landry RN  Safety Promotion/Fall Prevention:   safety round/check completed   room organization consistent   fall prevention program maintained   assistive device/personal items within reach   clutter free environment maintained   Goal Outcome Evaluation:

## 2022-04-05 NOTE — PROGRESS NOTES
Daily Progress Note        Acute on chronic respiratory failure with hypoxia and hypercapnia (HCC)    Chronic obstructive pulmonary disease (HCC)    Primary hypertension    Tobacco abuse    Obesity, Class III, BMI 40-49.9 (morbid obesity) (HCC)    COPD exacerbation (HCC)    Acute bronchitis due to parainfluenza virus    Hypertensive urgency    Acute UTI (urinary tract infection)    Lactic acidosis    Insomnia    PTSD (post-traumatic stress disorder)    Chronic back pain    Chronic respiratory failure with hypoxia (HCC)    Prediabetes    RLS (restless legs syndrome)    KELTON (generalized anxiety disorder)    Acute hyperglycemia      Assessment    Acute on chronic respiratory failure with hypoxia and hypercapnia  COPD exacerbation, wears 2 L home O2  3/28/2022 respiratory panel positive for parainfluenza virus 3  Pulmonary hypertension    Hypertensive urgency    Urinary tract infection, E. coli    PTSD  Insomnia  Restless leg syndrome  Chronic constipation  Dyslipidemia  Obesity  Arthritis  Vitamin D deficiency  Tobacco dependency     Echo 3/29/2022  -EF 66 to 70%  -Right ventricular cavity is mildly dilated  -Moderate tricuspid valve regurg  -RVSP 56.6     Plan    We will start low-dose Revatio 10 mg 3 times daily  -Avoid use of nitrates     Continue to titrate oxygen- Currently on  2 L NC and BiPAP at bedtime/as needed    Rocephin-finishes 4/5  Solu-Medrol 40 mg every 12   Pulmicort nebulizer 2 times a day  DuoNebs as needed  Mucinex  Hydrochlorothiazide 25 mg daily     Electrolyte/Glycemic control  DVT/GI Prophylaxis-Lovenox and Protonix    We will need work-up for obstructive sleep apnea at discharge.     3/31/2022                                                             3/29/2022       3/28/2022       LOS: 7 days     Subjective         Objective     Vital signs for last 24 hours:  Vitals:    04/05/22 0005 04/05/22 0137 04/05/22 0300 04/05/22 0527   BP:  164/97  159/76   BP Location:  Left arm  Left arm   Patient  Position:  Lying  Lying   Pulse: 77 71 60 61   Resp:  20  18   Temp:  97.9 °F (36.6 °C)  97.4 °F (36.3 °C)   TempSrc:  Oral  Oral   SpO2: 95%  95%    Weight:    101 kg (222 lb 3.6 oz)   Height:           Intake/Output last 3 shifts:  I/O last 3 completed shifts:  In: 1200 [P.O.:1200]  Out: 425 [Urine:425]  Intake/Output this shift:  No intake/output data recorded.      Radiology  Imaging Results (Last 24 Hours)     Procedure Component Value Units Date/Time    CT Angiogram Renal [[id number]] Collected: 04/04/22 1007     Updated: 04/04/22 1016    Narrative:         DATE OF EXAM:   4/3/2022 1:06 PM     PROCEDURE:   CT ANGIOGRAM RENAL-     INDICATIONS:   HTN, hilda-vasc unlikely (essential hypertension)     COMPARISON:  CT of the abdomen and pelvis dated 03/28/2022     TECHNIQUE:   Helical imaging of the abdomen and pelvis was performed with an IV bolus  of 100 cc of Isovue-370. Multiplanar reconstructions as well as 2-D and  3-D renderings and computer assisted graphic renderings of the  mesenteric and renal arteries was performed. Dose reduction techniques  including automated dose control and iterative reconstruction were used.     FINDINGS:   Nonvascular: There is bibasilar discoid atelectasis in the lower lobes  which is increased from the previous study. No pleural fluid is  identified. The liver, spleen, adrenal glands, and pancreas appear  unremarkable. The right and left kidney appear normal. The gallbladder  is absent. There is a moderate amount of fecal residue throughout the  colon. No significant bowel dilatation is identified. There are no  intra-abdominal fluid collections. There are a few small lymph nodes  identified in the retroperitoneum which do not appear pathologically  enlarged. The bladder appears normal. There is degenerative disc disease  in the lower lumbar spine.     Vascular: There is atherosclerotic plaque primarily in the infrarenal  abdominal aorta. Celiac axis is widely patent. There is a  separate  origin of a common left gastric and left hepatic arteries. The SMA is  widely patent. There is a single left renal artery appears patent  without significant narrowing. There is a main and accessory right renal  artery which also appear patent of significant narrowing Atherosclerotic  plaque is present in the common iliac arteries without significant  stenosis. The YOVANNY is patent.       Impression:      IMPRESSION :      1. No significant renal artery stenosis is identified.  2. Atherosclerotic plaque in both common iliac arteries without  significant stenosis.  3. Incidental nonvascular findings as described on the patient's  previous CT. There is an increase in atelectasis or airspace disease in  the lung bases.     Electronically Signed By-Polo Segundo MD On:4/4/2022 10:14 AM  This report was finalized on [id number] by  Polo Segundo MD.    NM Renal With Flow & Function Without Pharmacological Intervention [[id number]] Collected: 04/04/22 0835     Updated: 04/04/22 0855    Narrative:      DATE OF EXAM:  4/3/2022 12:33 PM     PROCEDURE:  NM RENAL W FLOW AND FUNCTION WO PHARMACOLOGICAL INTERVENTION-     INDICATIONS:  Renovascular HTN suspected, normal renal function; J44.1-Chronic  obstructive pulmonary disease with (acute) exacerbation;  R06.02-Shortness of breath; R06.2-Wheezing; N39.0-Urinary tract  infection, site not specified; R07.9-Chest pain, unspecified;  I16.0-Hypertensive urgency     COMPARISON:  CT abdomen pelvis from March 28, 2022     TECHNIQUE:   The patient received 7.2 mCi of technetium 99m Mag-3 intravenously and  images were obtained over both kidneys and the aorta in the posterior  projection through 1 minute to evaluate renal perfusion. The study was  then carried out in similar fashion through 30 minutes to evaluate renal  function.     FINDINGS:  There is fairly symmetric arterial perfusion to both kidneys.     The split renal perfusion to both kidneys is 49% to the left kidney,  and  51% to the right kidney.     The time to peak is normal measuring 4 minutes on the left, and 4  minutes on the right.     The T 1/2 is normal measuring 7 minutes on the left, and 8 minutes on  the right.        Impression:      Examination appears within normal limits.     Electronically Signed By-Santiago Traylor MD On:4/4/2022 8:53 AM  This report was finalized on [id number] by  Santiago Traylor MD.          Labs:  Results from last 7 days   Lab Units 04/05/22  0419   WBC 10*3/mm3 9.40   HEMOGLOBIN g/dL 14.1   HEMATOCRIT % 41.7   PLATELETS 10*3/mm3 214     Results from last 7 days   Lab Units 04/04/22 2130 03/31/22  2333   SODIUM mmol/L  --  135*   POTASSIUM mmol/L  --  4.0   CHLORIDE mmol/L  --  99   CO2 mmol/L  --  26.0   BUN mg/dL  --  20   CREATININE mg/dL  --  0.70   CALCIUM mg/dL  --  8.5*   BILIRUBIN mg/dL 0.3  --    ALK PHOS U/L 55  --    ALT (SGPT) U/L 76*  --    AST (SGOT) U/L 35*  --    GLUCOSE mg/dL  --  178*     Results from last 7 days   Lab Units 03/31/22  1524   PH, ARTERIAL pH units 7.360   PO2 ART mm Hg 62.7*   PCO2, ARTERIAL mm Hg 56.1*   HCO3 ART mmol/L 31.7*     Results from last 7 days   Lab Units 04/04/22  2130   ALBUMIN g/dL 3.90     Results from last 7 days   Lab Units 03/31/22  2333   TROPONIN T ng/mL <0.010         Results from last 7 days   Lab Units 04/05/22  0535   MAGNESIUM mg/dL 2.1                   Meds:   SCHEDULE  amLODIPine, 10 mg, Oral, Q24H  budesonide, 0.5 mg, Nebulization, BID - RT  Check Fentanyl Patch Placement, 1 each, Does not apply, Q12H  fentaNYL, 1 patch, Transdermal, Q72H   And  Check Fentanyl Patch Placement, 1 each, Does not apply, Q12H  cloNIDine, 0.3 mg, Oral, Q8H  enoxaparin, 40 mg, Subcutaneous, Q12H  guaiFENesin, 1,200 mg, Oral, Q12H  hydrALAZINE, 75 mg, Oral, Q12H  hydroCHLOROthiazide, 25 mg, Oral, Daily  insulin lispro, 0-24 Units, Subcutaneous, TID AC  insulin regular, 20 Units, Subcutaneous, Q12H  lactulose, 20 g, Oral, BID  lidocaine, 2 patch,  Transdermal, Q24H  lisinopril, 40 mg, Oral, Q24H  methylPREDNISolone sodium succinate, 40 mg, Intravenous, Q12H  metoprolol tartrate, 100 mg, Oral, Q12H  promethazine, 50 mg, Oral, Q8H  risperiDONE, 0.5 mg, Oral, Q12H  rOPINIRole, 5 mg, Oral, Nightly  Scopolamine, 1 patch, Transdermal, Q72H  sodium chloride, 10 mL, Intravenous, Q12H  sodium chloride, 10 mL, Intravenous, Q12H  topiramate, 50 mg, Oral, Daily  zolpidem, 10 mg, Oral, Nightly      Infusions  Pharmacy Consult - Steroid Insulin Protocol,   Pharmacy to Dose enoxaparin (LOVENOX),       PRNs  •  acetaminophen **OR** acetaminophen **OR** acetaminophen  •  aluminum-magnesium hydroxide-simethicone  •  benzonatate  •  dextrose  •  dextrose  •  dextrose  •  glucagon (human recombinant)  •  glucagon (human recombinant)  •  haloperidol lactate  •  hydrALAZINE  •  HYDROcodone-acetaminophen  •  insulin lispro **AND** insulin lispro  •  ipratropium-albuterol  •  magnesium sulfate **OR** magnesium sulfate **OR** magnesium sulfate  •  melatonin  •  metoprolol tartrate  •  Morphine  •  nitroglycerin  •  ondansetron **OR** ondansetron  •  Pharmacy Consult - Steroid Insulin Protocol  •  Pharmacy to Dose enoxaparin (LOVENOX)  •  potassium chloride  •  potassium chloride  •  promethazine  •  sodium chloride  •  sodium chloride  •  sodium chloride    Physical Exam:  Physical Exam  Vitals reviewed.   Pulmonary:      Breath sounds: Decreased breath sounds and rhonchi present.   Musculoskeletal:      Right lower leg: Edema present.      Left lower leg: Edema present.   Skin:     General: Skin is warm and dry.   Neurological:      Mental Status: She is alert and oriented to person, place, and time.         ROS  Review of Systems   Respiratory: Positive for cough and shortness of breath.    Cardiovascular: Positive for leg swelling.     I reviewed the patient's new clinical results    Electronically signed by CORI Christie

## 2022-04-05 NOTE — PROGRESS NOTES
Referring Provider: Hospitalist    Reason for follow-up: Shortness of breath     Patient Care Team:  Jose Enrique Walters MD as PCP - General (Internal Medicine)  Heraclio Rizzo MD as Consulting Physician (Cardiology)    Subjective .  Patient having some back pain but no shortness of breath today    Objective  Pain in bed comfortably     Review of Systems   Constitutional: Negative for fever and malaise/fatigue.   Cardiovascular: Negative for chest pain, dyspnea on exertion and palpitations.   Respiratory: Negative for cough and shortness of breath.    Skin: Negative for rash.   Gastrointestinal: Negative for abdominal pain, nausea and vomiting.   Neurological: Negative for focal weakness and headaches.   All other systems reviewed and are negative.      Codeine    Scheduled Meds:amLODIPine, 10 mg, Oral, Q24H  budesonide, 0.5 mg, Nebulization, BID - RT  fentaNYL, 1 patch, Transdermal, Q72H   And  Check Fentanyl Patch Placement, 1 each, Does not apply, Q12H  cloNIDine, 0.3 mg, Oral, Q8H  enoxaparin, 40 mg, Subcutaneous, Q12H  guaiFENesin, 1,200 mg, Oral, Q12H  hydrALAZINE, 75 mg, Oral, Q12H  hydroCHLOROthiazide, 25 mg, Oral, Daily  insulin lispro, 0-24 Units, Subcutaneous, TID AC  insulin regular, 20 Units, Subcutaneous, Q12H  lactulose, 20 g, Oral, BID  lidocaine, 2 patch, Transdermal, Q24H  lisinopril, 40 mg, Oral, Q24H  methylPREDNISolone sodium succinate, 40 mg, Intravenous, Q12H  metoprolol tartrate, 100 mg, Oral, Q12H  promethazine, 50 mg, Oral, Q8H  risperiDONE, 0.5 mg, Oral, Q12H  rOPINIRole, 5 mg, Oral, Nightly  Scopolamine, 1 patch, Transdermal, Q72H  sildenafil, 10 mg, Oral, Q8H  sodium chloride, 10 mL, Intravenous, Q12H  sodium chloride, 10 mL, Intravenous, Q12H  topiramate, 50 mg, Oral, Daily  zolpidem, 10 mg, Oral, Nightly      Continuous Infusions:Pharmacy Consult - Steroid Insulin Protocol,   Pharmacy to Dose enoxaparin (LOVENOX),       PRN Meds:.•  acetaminophen **OR** acetaminophen **OR** acetaminophen  •  " aluminum-magnesium hydroxide-simethicone  •  benzonatate  •  dextrose  •  dextrose  •  dextrose  •  glucagon (human recombinant)  •  glucagon (human recombinant)  •  haloperidol lactate  •  hydrALAZINE  •  HYDROcodone-acetaminophen  •  insulin lispro **AND** insulin lispro  •  ipratropium-albuterol  •  magnesium sulfate **OR** magnesium sulfate **OR** magnesium sulfate  •  melatonin  •  metoprolol tartrate  •  Morphine  •  ondansetron **OR** ondansetron  •  Pharmacy Consult - Steroid Insulin Protocol  •  Pharmacy to Dose enoxaparin (LOVENOX)  •  potassium chloride  •  potassium chloride  •  promethazine  •  sodium chloride  •  sodium chloride  •  sodium chloride        VITAL SIGNS  Vitals:    04/05/22 1055 04/05/22 1159 04/05/22 1506 04/05/22 1525   BP: 171/81 171/81 138/67 138/67   BP Location:   Right arm    Patient Position:   Lying    Pulse: 61 68 67 67   Resp: 16  17 20   Temp: 98.4 °F (36.9 °C)   98.6 °F (37 °C)   TempSrc:       SpO2: 99%   100%   Weight:       Height:           Flowsheet Rows    Flowsheet Row First Filed Value   Admission Height 160 cm (63\") Documented at 03/28/2022 1425   Admission Weight 99.8 kg (220 lb) Documented at 03/28/2022 1425           TELEMETRY: Sinus rhythm    Physical Exam:  Constitutional:       Appearance: Well-developed.   Eyes:      General: No scleral icterus.     Conjunctiva/sclera: Conjunctivae normal.   HENT:      Head: Normocephalic and atraumatic.   Neck:      Vascular: No carotid bruit or JVD.   Pulmonary:      Effort: Pulmonary effort is normal.      Breath sounds: Normal breath sounds. No wheezing. No rales.   Cardiovascular:      Normal rate. Regular rhythm.   Pulses:     Intact distal pulses.   Abdominal:      General: Bowel sounds are normal.      Palpations: Abdomen is soft.   Musculoskeletal:      Cervical back: Normal range of motion and neck supple. Skin:     General: Skin is warm and dry.      Findings: No rash.   Neurological:      Mental Status: Alert. "          Results Review:   I reviewed the patient's new clinical results.  Lab Results (last 24 hours)     Procedure Component Value Units Date/Time    POC Glucose Once [[id number]]  (Abnormal) Collected: 04/05/22 1140    Specimen: Blood Updated: 04/05/22 1141     Glucose 156 mg/dL      Comment: Serial Number: [id number]:  [id number]       C-reactive Protein [[id number]]  (Normal) Collected: 04/05/22 0659    Specimen: Blood Updated: 04/05/22 1018     C-Reactive Protein <0.30 mg/dL     Phosphorus [[id number]]  (Normal) Collected: 04/05/22 0659    Specimen: Blood Updated: 04/05/22 0828     Phosphorus 2.9 mg/dL     Extra Tubes [[id number]] Collected: 04/05/22 0659    Specimen: Blood, Venous Line Updated: 04/05/22 0803    Narrative:      The following orders were created for panel order Extra Tubes.  Procedure                               Abnormality         Status                     ---------                               -----------         ------                     Gold Top - SST[[id number]]                                   Final result                 Please view results for these tests on the individual orders.    Gold Top - SST [[id number]] Collected: 04/05/22 0659    Specimen: Blood Updated: 04/05/22 0803     Extra Tube Hold for add-ons.     Comment: Auto resulted.       POC Glucose Once [[id number]]  (Abnormal) Collected: 04/05/22 0739    Specimen: Blood Updated: 04/05/22 0740     Glucose 161 mg/dL      Comment: Serial Number: [id number]:  [id number]       Basic Metabolic Panel [[id number]]  (Abnormal) Collected: 04/05/22 0659    Specimen: Blood Updated: 04/05/22 0735     Glucose 123 mg/dL      BUN 23 mg/dL      Creatinine 0.82 mg/dL      Sodium 133 mmol/L      Potassium 4.4 mmol/L      Comment: Slight hemolysis detected by analyzer. Results may be affected.        Chloride 96 mmol/L      CO2 28.0 mmol/L      Calcium 8.7 mg/dL      BUN/Creatinine Ratio 28.0     Anion Gap 9.0 mmol/L      eGFR 81.0  mL/min/1.73      Comment: National Kidney Foundation and American Society of Nephrology (ASN) Task Force recommended calculation based on the Chronic Kidney Disease Epidemiology Collaboration (CKD-EPI) equation refit without adjustment for race.       Narrative:      GFR Normal >60  Chronic Kidney Disease <60  Kidney Failure <15      CK [[id number]]  (Normal) Collected: 04/05/22 0659    Specimen: Blood Updated: 04/05/22 0735     Creatine Kinase 28 U/L     Manual Differential [[id number]]  (Abnormal) Collected: 04/05/22 0419    Specimen: Blood Updated: 04/05/22 0647     Neutrophil % 87.0 %      Lymphocyte % 4.0 %      Monocyte % 1.0 %      Bands %  6.0 %      Metamyelocyte % 1.0 %      Myelocyte % 1.0 %      Neutrophils Absolute 8.74 10*3/mm3      Lymphocytes Absolute 0.38 10*3/mm3      Monocytes Absolute 0.09 10*3/mm3      RBC Morphology Normal     WBC Morphology Normal     Platelet Morphology Normal    CBC & Differential [[id number]] Collected: 04/05/22 0419    Specimen: Blood Updated: 04/05/22 0647    Narrative:      The following orders were created for panel order CBC & Differential.  Procedure                               Abnormality         Status                     ---------                               -----------         ------                     CBC Auto Differential[[id number]]        Normal              Final result               Scan Slide[[id number]]                                       Final result                 Please view results for these tests on the individual orders.    Scan Slide [[id number]] Collected: 04/05/22 0419    Specimen: Blood Updated: 04/05/22 0647     Scan Slide --     Comment: See Manual Differential Results       CBC Auto Differential [[id number]]  (Normal) Collected: 04/05/22 0419    Specimen: Blood Updated: 04/05/22 0647     WBC 9.40 10*3/mm3      RBC 4.94 10*6/mm3      Hemoglobin 14.1 g/dL      Hematocrit 41.7 %      MCV 84.4 fL      MCH 28.6 pg      MCHC 33.9 g/dL      RDW 15.1  %      RDW-SD 45.1 fl      MPV 7.2 fL      Platelets 214 10*3/mm3     Narrative:      The previously reported component NRBC is no longer being reported. Previous result was 0.0 /100 WBC (Reference Range: 0.0-0.2 /100 WBC) on 4/5/2022 at 0439 EDT.    Magnesium [[id number]]  (Normal) Collected: 04/05/22 0535    Specimen: Blood Updated: 04/05/22 0609     Magnesium 2.1 mg/dL     Hepatic Function Panel [[id number]]  (Abnormal) Collected: 04/04/22 2130    Specimen: Blood Updated: 04/04/22 2218     Total Protein 6.3 g/dL      Albumin 3.90 g/dL      ALT (SGPT) 76 U/L      AST (SGOT) 35 U/L      Comment: Specimen hemolyzed.  Results may be affected.        Alkaline Phosphatase 55 U/L      Total Bilirubin 0.3 mg/dL      Bilirubin, Direct <0.2 mg/dL      Comment: Specimen hemolyzed. Results may be affected.        Bilirubin, Indirect --     Comment: Unable to calculate       POC Glucose Once [[id number]]  (Abnormal) Collected: 04/04/22 2003    Specimen: Blood Updated: 04/04/22 2004     Glucose 288 mg/dL      Comment: Serial Number: [id number]:  [id number]             Imaging Results (Last 24 Hours)     ** No results found for the last 24 hours. **          EKG      I personally viewed and interpreted the patient's EKG/Telemetry data:    ECHOCARDIOGRAM:    STRESS MYOVIEW:    CARDIAC CATHETERIZATION:    OTHER:         Assessment/Plan     Principal Problem:    Acute on chronic respiratory failure with hypoxia and hypercapnia (HCC)  Active Problems:    Chronic obstructive pulmonary disease (HCC)    Primary hypertension    Tobacco abuse    Obesity, Class III, BMI 40-49.9 (morbid obesity) (HCC)    COPD exacerbation (HCC)    Acute bronchitis due to parainfluenza virus    Hypertensive urgency    Acute UTI (urinary tract infection)    Lactic acidosis    Insomnia    PTSD (post-traumatic stress disorder)    Chronic back pain    Chronic respiratory failure with hypoxia (HCC)    Prediabetes    RLS (restless legs syndrome)    KELTON  (generalized anxiety disorder)    Acute hyperglycemia       Patient presented with shortness of breath and hypoxic respiratory failure  Patient also has parainfluenza and is being treated  Patient an echocardiogram which showed normal LV systolic function but has pulmonary hypertension  Patient had ischemic work-up done couple of years ago and will not need any further cardiac work-up at this time  Patient blood pressure very high and hence her blood pressure medicines have been adjusted and her Cardene has been stopped and oral medicines have been adjusted and she is doing better now  Patient has significant sleep apnea and should have a CPAP machine after the work-up is complete  Patient sugar levels and lipid levels are followed by the primary care doctor  No further cardiac work-up  I discussed the patients findings and my recommendations with patient and nurse    Heraclio Rizzo MD  04/05/22  16:54 EDT

## 2022-04-05 NOTE — DISCHARGE SUMMARY
[facility] Medicine Services  DISCHARGE SUMMARY    Patient Name: Ann Álvarez  : 1960  MRN: [id number]      Discharge condition: Improved  Date of Admission: 3/28/2022  Discharge Diagnosis: Uncontrolled hypertension, respiratory failure  Date of Discharge:  2022  Primary Care Physician: Jose Enrique Walters MD      Presenting Problem:   Shortness of breath [R06.02]  Wheezing [R06.2]  COPD exacerbation (HCC) [J44.1]  Acute respiratory failure with hypoxia and hypercapnia (HCC) [J96.01, J96.02]    Active and Resolved Hospital Problems:  Active Hospital Problems    Diagnosis POA   • **Acute on chronic respiratory failure with hypoxia and hypercapnia (HCC) [J96.21, J96.22] Yes   • Acute bronchitis due to parainfluenza virus [J20.4] Yes   • Hypertensive urgency [I16.0] Yes   • Acute UTI (urinary tract infection) [N39.0] Yes   • Lactic acidosis [E87.2] Yes   • Obesity, Class III, BMI 40-49.9 (morbid obesity) (HCC) [E66.01] Yes   • KELTON (generalized anxiety disorder) [F41.1] Yes   • Acute hyperglycemia [R73.9] Yes   • PTSD (post-traumatic stress disorder) [F43.10] Yes   • Chronic back pain [M54.9, G89.29] Yes   • COPD exacerbation (HCC) [J44.1] Yes   • RLS (restless legs syndrome) [G25.81] Yes   • Chronic obstructive pulmonary disease (HCC) [J44.9] Yes   • Prediabetes [R73.03] Yes   • Chronic respiratory failure with hypoxia (HCC) [J96.11] Yes   • Insomnia [G47.00] Yes   • Tobacco abuse [Z72.0] Yes   • Primary hypertension [I10] Yes      Resolved Hospital Problems   No resolved problems to display.         Hospital Course     Hospital Course:  Ann Álvarez is a 61 y.o. female with a history of COPD, chronic hypoxic respiratory failure on home oxygen as needed, HTN, chronic back pain, PTSD, anxiety, and insomnia who presented to the ER at [facility] on 2022 complaining of shortness of breath. Workup in the ER revealed COPD exacerbation and acute urinary tract infection.  The patient was admitted to the ED Observation Unit for further treatment.  Dates of follow-up visits:  03/29/22:  The patient's respiratory status worsened overnight and she was found to have worsening lactic acidosis. She was given Lasix for possible fluid overload, but her lactic acid continues to elevate and she appears dry. She was admitted to the Hospitalist group for further evaluation and treatment. An ABG was repeated and she was placed on BiPAP. She will be given IV fluids and labs repeated later this afternoon. She was started on empiric antibiotics for possible sepsis.   3/30/2022  Patient was seen earlier and reported feeling improvement in her shortness of air.  She reported ongoing chronic back pain.  I was called back after a fast team was called because the patient was having tremors. The patient was able to talk during these tremor episodes.  There was no evidence of any seizure like activity.  3/31/2022  Patient complained of severe uncontrolled acute on chronic back pain.  She was having tremors and tachycardia which was felt to be due to uncontrolled pain.  She also had accelerated hypertension.  4/1/2022: Patient reports that her chronic pain in her back is more tolerable with the current pain medication regimen.  No complaints of headache, chest pain, shortness of breath, sweats, GI or  complaints.  Reports cough and shortness of breath have improved as well.  4/2/2022:   Patient reports ongoing intolerable back pain which she reports is her usual chronic pain but worse because of being in the hospital bed.  She reports occasional cough and improvement in her shortness of breath.  She reports that she has had a bowel movement recently and urinating normally.  Patient's blood pressure is fluctuating and she is still requiring a Cardene drip.  Work-up for secondary causes of hypertension has been ordered, and cardiology has been consulted.     4/3 blood pressure seems to be improving.   Hallucinations seem to be improving.  Getting closer to discharge.  Appreciate pulmonary and cardiology assistance.    4/5 her blood pressure was finally 130 systolic and she was cleared by all consultants.  She was prescribed her new medication regimen and sent home in stable condition        Reasons For Change In Medications and Indications for New Medications:      Day of Discharge     Vital Signs:  Temp:  [97.4 °F (36.3 °C)-98.6 °F (37 °C)] 98.6 °F (37 °C)  Heart Rate:  [60-78] 67  Resp:  [16-20] 20  BP: (138-193)/(67-97) 138/67  Flow (L/min):  [2-2.5] 2    Physical Exam:  Physical Exam  Vitals reviewed.   Constitutional:       Comments:  at bedside   HENT:      Head: Normocephalic.      Nose: Nose normal.      Mouth/Throat:      Mouth: Mucous membranes are moist.   Cardiovascular:      Rate and Rhythm: Normal rate and regular rhythm.      Pulses: Normal pulses.      Heart sounds: Normal heart sounds.   Pulmonary:      Breath sounds: Wheezing present.   Abdominal:      General: Abdomen is flat.   Musculoskeletal:         General: Normal range of motion.   Skin:     General: Skin is warm.      Capillary Refill: Capillary refill takes less than 2 seconds.   Neurological:      General: No focal deficit present.      Mental Status: She is alert and oriented to person, place, and time.   Psychiatric:         Mood and Affect: Mood normal.         Behavior: Behavior normal.            Pertinent  and/or Most Recent Results     LAB RESULTS:      Lab 04/05/22  0659 04/05/22  0419 04/01/22  0256 03/31/22  2333 03/31/22  0815 03/31/22  0219 03/30/22 2010 03/30/22  1213 03/30/22  1142 03/30/22  0813 03/30/22  0508 03/29/22  2337   WBC  --  9.40 10.30  --   --   --   --   --   --  14.40*  --  11.90*   HEMOGLOBIN  --  14.1 10.8*  --   --   --   --   --   --  12.1  --  11.9*   HEMOGLOBIN, POC  --   --   --   --   --   --   --   --  13.3  --   --   --    HEMATOCRIT  --  41.7 31.9*  --   --   --   --   --   --  36.8  --   35.0   HEMATOCRIT POC  --   --   --   --   --   --   --   --  39  --   --   --    PLATELETS  --  214 217  --   --   --   --   --   --  280  --  263   NEUTROS ABS  --  8.74* 8.80*  --   --   --   --   --   --   --   --  10.90*   LYMPHS ABS  --   --  0.90  --   --   --   --   --   --   --   --  0.80   MONOS ABS  --   --  0.50  --   --   --   --   --   --   --   --  0.20   EOS ABS  --   --  0.00  --   --   --   --   --   --   --   --  0.00   MCV  --  84.4 84.3  --   --   --   --   --   --  84.9  --  84.0   CRP <0.30  --   --   --   --   --   --   --   --   --   --   --    PROCALCITONIN  --   --   --  0.05  --   --   --   --   --   --   --   --    LACTATE  --   --   --   --  0.7 1.2 2.0 2.3* 2.6*  --    < > 3.2*    < > = values in this interval not displayed.         Lab 04/05/22  0659 04/05/22  0535 03/31/22  2333 03/30/22  0813 03/29/22  2337   SODIUM 133*  --  135* 141 139   POTASSIUM 4.4  --  4.0 4.2 4.2   CHLORIDE 96*  --  99 100 97*   CO2 28.0  --  26.0 27.0 29.0   ANION GAP 9.0  --  10.0 14.0 13.0   BUN 23  --  20 21 19   CREATININE 0.82  --  0.70 1.03* 0.94   EGFR 81.0  --  97.9 61.6 68.7   GLUCOSE 123*  --  178* 236* 264*   CALCIUM 8.7  --  8.5* 9.1 8.6   MAGNESIUM  --  2.1 1.9  --  1.6   PHOSPHORUS 2.9  --  2.3*  --   --          Lab 04/04/22 2130   TOTAL PROTEIN 6.3   ALBUMIN 3.90   ALT (SGPT) 76*   AST (SGOT) 35*   BILIRUBIN 0.3   BILIRUBIN DIRECT <0.2   ALK PHOS 55         Lab 03/31/22  2333   TROPONIN T <0.010             Lab 04/01/22  0256   VITAMIN B 12 489         Lab 03/31/22  1524 03/31/22  0315 03/30/22  1142   PH, ARTERIAL 7.360  --  7.357   PCO2, ARTERIAL 56.1*  --  53.5*   PO2 ART 62.7*  --  57.1*   O2 SATURATION ART 90.1*  --  87.4*   FIO2 40 30 30   HCO3 ART 31.7*  --  30.0*   BASE EXCESS ART 4.8*  --  3.3*     Brief Urine Lab Results  (Last result in the past 365 days)      Color   Clarity   Blood   Leuk Est   Nitrite   Protein   CREAT   Urine HCG        04/03/22 2350             43.3              Microbiology Results (last 10 days)     Procedure Component Value - Date/Time    Blood Culture - Blood, Arm, Left [[id number]]  (Normal) Collected: 03/29/22 0204    Lab Status: Final result Specimen: Blood from Arm, Left Updated: 04/03/22 0232     Blood Culture No growth at 5 days    Blood Culture - Blood, Arm, Right [[id number]]  (Normal) Collected: 03/29/22 0204    Lab Status: Final result Specimen: Blood from Arm, Right Updated: 04/03/22 0232     Blood Culture No growth at 5 days    Urine Culture - Urine, Urine, Clean Catch [[id number]]  (Abnormal)  (Susceptibility) Collected: 03/28/22 1730    Lab Status: Final result Specimen: Urine, Clean Catch Updated: 03/30/22 0905     Urine Culture >100,000 CFU/mL Escherichia coli    Susceptibility      Escherichia coli      FAUSTO      Ampicillin Susceptible      Ampicillin + Sulbactam Susceptible      Cefazolin Susceptible      Cefepime Susceptible      Ceftazidime Susceptible      Ceftriaxone Susceptible      Gentamicin Susceptible      Levofloxacin Susceptible      Nitrofurantoin Susceptible      Piperacillin + Tazobactam Susceptible      Trimethoprim + Sulfamethoxazole Susceptible                           Respiratory Panel PCR w/COVID-19(SARS-CoV-2) SAPNA/POPEYE/BEULAH/PAD/COR/MAD/NATHANIEL In-House, NP Swab in UTM/VTM, 3-4 HR TAT - Swab, Nasopharynx [[id number]]  (Abnormal) Collected: 03/28/22 1507    Lab Status: Final result Specimen: Swab from Nasopharynx Updated: 03/28/22 1610     ADENOVIRUS, PCR Not Detected     Coronavirus 229E Not Detected     Coronavirus HKU1 Not Detected     Coronavirus NL63 Not Detected     Coronavirus OC43 Not Detected     COVID19 Not Detected     Human Metapneumovirus Not Detected     Human Rhinovirus/Enterovirus Not Detected     Influenza A PCR Not Detected     Influenza B PCR Not Detected     Parainfluenza Virus 1 Not Detected     Parainfluenza Virus 2 Not Detected     Parainfluenza Virus 3 Detected     Parainfluenza Virus 4 Not Detected     RSV, PCR Not  Detected     Bordetella pertussis pcr Not Detected     Bordetella parapertussis PCR Not Detected     Chlamydophila pneumoniae PCR Not Detected     Mycoplasma pneumo by PCR Not Detected    Narrative:      In the setting of a positive respiratory panel with a viral infection PLUS a negative procalcitonin without other underlying concern for bacterial infection, consider observing off antibiotics or discontinuation of antibiotics and continue supportive care. If the respiratory panel is positive for atypical bacterial infection (Bordetella pertussis, Chlamydophila pneumoniae, or Mycoplasma pneumoniae), consider antibiotic de-escalation to target atypical bacterial infection.          NM Renal With Flow & Function Without Pharmacological Intervention    Result Date: 4/4/2022  Impression: Examination appears within normal limits.  Electronically Signed By-Santiago Traylor MD On:4/4/2022 8:53 AM This report was finalized on [id number] by  Santiago Traylor MD.    CT Abdomen Pelvis With Contrast    Result Date: 3/28/2022  Impression:  1. No acute pulmonary embolic disease. 2. Abnormal appearance of the lung bases probably due to atelectasis. Pulmonary edema or an atypical infection are considered less likely. 3. There is mild mediastinal and bilateral hilar adenopathy. There are prominent, but non-pathologically enlarged lymph nodes in the retroperitoneum. The patient has hepatosplenomegaly. I would recommend clinical correlation as this can be seen with leukemia or lymphoma. 4. Hepatic steatosis. 5. Fecal retention. 6. Additional findings as noted above.  Electronically Signed By-Bala Chapman MD On:3/28/2022 6:20 PM This report was finalized on [id number] by  Bala Chapman MD.    XR Chest 1 View    Result Date: 3/31/2022  Impression: No significant change. Electronically signed by:  Peter Mariscal D.O.  3/31/2022 3:34 AM    XR Chest 1 View    Result Date: 3/29/2022  Impression: Increased left basilar airspace  opacities, which may be due to atelectasis and/or pneumonia.  Electronically Signed By-Rukhsana Sinha MD On:3/29/2022 12:51 PM This report was finalized on [id number] by  Rukhsana Sinha MD.    XR Chest 1 View    Result Date: 3/28/2022  Impression: No acute chest findings.  Electronically Signed By-Jocelyn Zayas MD On:3/28/2022 3:19 PM This report was finalized on [id number] by  Jocelyn Zayas MD.    CT Chest Pulmonary Embolism    Result Date: 3/28/2022  Impression:  1. No acute pulmonary embolic disease. 2. Abnormal appearance of the lung bases probably due to atelectasis. Pulmonary edema or an atypical infection are considered less likely. 3. There is mild mediastinal and bilateral hilar adenopathy. There are prominent, but non-pathologically enlarged lymph nodes in the retroperitoneum. The patient has hepatosplenomegaly. I would recommend clinical correlation as this can be seen with leukemia or lymphoma. 4. Hepatic steatosis. 5. Fecal retention. 6. Additional findings as noted above.  Electronically Signed By-Bala Chapman MD On:3/28/2022 6:20 PM This report was finalized on [id number] by  Bala Chapman MD.    CT Angiogram Renal    Result Date: 4/4/2022  Impression: IMPRESSION :  1. No significant renal artery stenosis is identified. 2. Atherosclerotic plaque in both common iliac arteries without significant stenosis. 3. Incidental nonvascular findings as described on the patient's previous CT. There is an increase in atelectasis or airspace disease in the lung bases.  Electronically Signed By-Polo Segundo MD On:4/4/2022 10:14 AM This report was finalized on [id number] by  Polo Segundo MD.      Results for orders placed during the hospital encounter of 03/28/22    Duplex Venous Lower Extremity - Bilateral CAR    Interpretation Summary  · Normal bilateral lower extremity venous duplex scan.      Results for orders placed during the hospital encounter of 03/28/22    Duplex Venous Lower Extremity -  Bilateral CAR    Interpretation Summary  · Normal bilateral lower extremity venous duplex scan.      Results for orders placed during the hospital encounter of 03/28/22    Adult Transthoracic Echo Complete W/ Cont if Necessary Per Protocol    Interpretation Summary  · Left ventricular ejection fraction appears to be 66 - 70%.  · The right ventricular cavity is mildly dilated.  · Moderate tricuspid valve regurgitation is present.  · No pericardial effusion noted      Labs Pending at Discharge:  Pending Labs     Order Current Status    Catecholamine+VMA, 24-Hr Urine - Urine, Clean Catch In process          Procedures Performed           Consults:   Consults     Date and Time Order Name Status Description    4/2/2022  3:49 PM Inpatient Cardiology Consult Completed     3/31/2022  9:18 AM Inpatient Psychiatrist Consult Completed     3/31/2022  9:18 AM Inpatient Pulmonology Consult Completed             Discharge Details        Discharge Medications      New Medications      Instructions Start Date   amLODIPine 10 MG tablet  Commonly known as: NORVASC   10 mg, Oral, Every 24 Hours Scheduled   Start Date: April 6, 2022     hydrALAZINE 25 MG tablet  Commonly known as: APRESOLINE   75 mg, Oral, Every 12 Hours Scheduled      methylPREDNISolone 4 MG dose pack  Commonly known as: MEDROL   Take 1 tablet by mouth Daily for 5 days, THEN 1 tablet Daily for 5 days. Take as directed on package instructions.   Start Date: April 5, 2022     metoprolol tartrate 100 MG tablet  Commonly known as: LOPRESSOR   100 mg, Oral, Every 12 Hours Scheduled      sildenafil 20 MG tablet  Commonly known as: REVATIO   10 mg, Oral, Every 8 Hours Scheduled         Changes to Medications      Instructions Start Date   cloNIDine 0.3 MG tablet  Commonly known as: CATAPRES  What changed:   medication strength  how much to take  when to take this  reasons to take this   0.3 mg, Oral, Every 8 Hours Scheduled         Continue These Medications      Instructions  Start Date   Ambien CR 12.5 MG CR tablet  Generic drug: zolpidem CR   12.5 mg, Oral, Nightly PRN      doxepin 50 MG capsule  Commonly known as: SINEquan   50 mg, Oral, Nightly PRN      hydroCHLOROthiazide 25 MG tablet  Commonly known as: HYDRODIURIL   25 mg, Oral, Daily      Linzess 290 MCG capsule capsule  Generic drug: linaclotide   290 mcg, Oral, Daily PRN      lisinopril 40 MG tablet  Commonly known as: PRINIVIL,ZESTRIL   40 mg, Oral, Every 24 Hours      multivitamin tablet tablet  Generic drug: multivitamin   1 tablet, Oral, Daily      oxyCODONE-acetaminophen  MG per tablet  Commonly known as: PERCOCET   1 tablet, Oral, Every 4 Hours PRN      Probiotic Advanced capsule   1 capsule, Oral, Daily      promethazine 50 MG tablet  Commonly known as: PHENERGAN   50 mg, Oral, Every 6 Hours PRN      rOPINIRole 5 MG tablet  Commonly known as: REQUIP   5 mg, Oral, Nightly      topiramate 50 MG tablet  Commonly known as: TOPAMAX   50 mg, Oral, Daily      traZODone 100 MG tablet  Commonly known as: DESYREL   100 mg, Oral, Nightly             Allergies   Allergen Reactions   • Codeine Hives, Itching and Nausea And Vomiting         Discharge Disposition:   Home or Self Care    Diet:  Hospital:  Diet Order   Procedures   • Diet Diabetic/Consistent Carbs; Diabetic - Consistent Carb         Discharge Activity:         CODE STATUS:  Code Status and Medical Interventions:   Ordered at: 03/28/22 1910     Code Status (Patient has no pulse and is not breathing):    CPR (Attempt to Resuscitate)     Medical Interventions (Patient has pulse or is breathing):    Full Support         No future appointments.        Time spent on Discharge including face to face service:  55 minutes    This patient has been examined wearing appropriate Personal Protective Equipment and discussed with nursing. 04/05/22      Signature: Gabriel Ye MD

## 2022-04-06 NOTE — OUTREACH NOTE
Prep Survey    Flowsheet Row Responses   [facility] facility patient discharged from? [facility]   Is LACE score < 7 ? No   Emergency Room discharge w/ pulse ox? No   Eligibility Readm Mgmt   Discharge diagnosis COPD exacerbation, Acute on chronic hypercapnic and hypoxic respiratory failure, UTI, Acute bronchitis due to parainfluenza virus   Does the patient have one of the following disease processes/diagnoses(primary or secondary)? COPD/Pneumonia   Does the patient have Home health ordered? No   Is there a DME ordered? Yes   What DME was ordered? [facility] [place]   Prep survey completed? Yes          APRIL FLORIAN - Registered Nurse

## 2022-04-06 NOTE — PROGRESS NOTES
Enter Query Response Below      Query Response:   Sepsis present on admission as witnessed by tachycardia tachypnea and lactic acidosis and positive infection with parainfluenza    Electronically signed by Gabriel Ye MD, 22, 11:33 AM EDT.         If applicable, please update the problem list.      Patient: Ann Álvarez        : 1960  Account: [id number]           Admit Date:         Options to Respond to Query:    1. Access the Encounter     a. From the To-Do Side bar, click Respond With Note.     b. Click New Note     c. Answer query within the yellow box.                d. Update the Problem List if applicable.     Dr. Ye:     61 year old female admitted on 3/28/22 with acute on chronic respiratory failure, COPD exacerbation, and found to have acute bronchitis due to parainfluenza virus and a urinary tract infection.   The H&P includes the diagnosis of sepsis.  The Hospitalist progress notes plan of care and active problem list does not include the diagnosis of sepsis.  The lab results from 3/28/22 include a WBC count of 6.40, CRP 2.87, and on 3/29/22 a lactate level of 4.3.  The vital signs on 22 include a temperature of 97.7, /98, heart rate 106, and respiratory rate of 30.  She has been treated this admission with Oral Omnicef, IV cefepime, IV Rocephin, and IV vancomcyin.      Can the patient's condition be further clarified as:     Sepsis present on admission  Sepsis ruled out  other_______________________________  Unable to determine     By submitting this query, we are merely seeking further clarification of documentation to accurately reflect all conditions that you are monitoring, evaluating, treating or that extend the hospitalization or utilize additional resources of care. Please utilize your independent clinical judgment when addressing the question(s) above.     This query and your response, once completed, will be entered into the legal medical  record.    Sincerely,  Kiya Hansen RN, BSN  [email or web address]  Clinical Documentation Integrity Program

## 2022-04-06 NOTE — PAYOR COMM NOTE
"Discharge notification for case# [id number]    -------------    THANK YOU,    ENRRIQUE Prieto, RN  Utilization Review  [facility]  Phone: [phone]  Fax: [phone]      NPI: [id number]  Tax ID: [id number]    Ann Álvarez (61 y.o. Female)             Date of Birth   1960    Social Security Number       Address   [address] [place] [address] [place] [facility]    Home Phone   [phone]    MRN   [id number]       Anabaptism   Other    Marital Status                               Admission Date   3/28/22    Admission Type   Emergency    Admitting Provider   Gabriel Ye MD    Attending Provider       Department, Room/Bed   [facility] PROGRESS CARE, 2116/1       Discharge Date   4/5/2022    Discharge Disposition   Home or Self Care    Discharge Destination                               Attending Provider: (none)   Allergies: Codeine    Isolation: None   Infection: Other (03/29/22)   Code Status: Prior   Advance Care Planning Activity    Ht: 160 cm (62.99\")   Wt: 101 kg (222 lb 3.6 oz)    Admission Cmt: None   Principal Problem: Acute on chronic respiratory failure with hypoxia and hypercapnia (HCC) [J96.21,J96.22]                 Active Insurance as of 3/28/2022     Primary Coverage     Payor Plan Insurance Group Employer/Plan Group    [facility] [employer] [facility] [employer] [facility] PPO [id number]     Payor Plan Address Payor Plan Phone Number Payor Plan Fax Number Effective Dates    [address] [phone]  2/1/2022 - None Entered    [place] [address]       Subscriber Name Subscriber Birth Date Member ID       ANN ÁLVAREZ 1960 [id number]                 Emergency Contacts      (Rel.) Home Phone Work Phone Mobile Phone    ANGY ÁLVAREZ (Spouse) [phone] -- [phone]            Discharge Summary    No notes of this type exist for this encounter.         Discharge Order (From admission, onward)     Start     Ordered    04/05/22 1553  Discharge patient  " Once        Expected Discharge Date: 04/05/22    Discharge Disposition: Home or Self Care    Physician of Record for Attribution - Please select from Treatment Team: SULY ALEJANDRA [[id number]]    Review needed by CMO to determine Physician of Record: No       Question Answer Comment   Physician of Record for Attribution - Please select from Treatment Team SULY ALEJANDRA    Review needed by CMO to determine Physician of Record No        04/05/22 1606

## 2022-04-06 NOTE — CASE MANAGEMENT/SOCIAL WORK
Case Management Discharge Note             Selected Continued Care - Discharged on 4/5/2022 Admission date: 3/28/2022 - Discharge disposition: Home or Self Care     Transportation Services  Private: Car    Final Discharge Disposition Code: 01 - home or self-care

## 2022-04-08 ENCOUNTER — READMISSION MANAGEMENT (OUTPATIENT)
Dept: CALL CENTER | Facility: HOSPITAL | Age: 62
End: 2022-04-08

## 2022-04-08 NOTE — OUTREACH NOTE
COPD/PN Week 1 Survey    Flowsheet Row Responses   [facility], [place], operated by [facility] patient discharged from? [facility]   Does the patient have one of the following disease processes/diagnoses(primary or secondary)? COPD/Pneumonia   Was the primary reason for admission: COPD exacerbation   Week 1 attempt successful? Yes   Call start time 1059   Call end time 1108   Discharge diagnosis COPD exacerbation, Acute on chronic hypercapnic and hypoxic respiratory failure, UTI, Acute bronchitis due to parainfluenza virus   Meds reviewed with patient/caregiver? Yes   Is the patient having any side effects they believe may be caused by any medication additions or changes? No   Does the patient have all medications ordered at discharge? Yes   Is the patient taking all medications as directed (includes completed medication regime)? Yes   Medication comments Patient reports she is out of some of her daily meds, encouraged her to contact PCP for earlier appt.   Does the patient have a primary care provider?  Yes   Does the patient have an appointment with their PCP or specialist within 7 days of discharge? Greater than 7 days   Comments regarding PCP PCP follow up 4/20/22   What is preventing the patient from scheduling follow up appointments within 7 days of discharge? Earlier appointment not available   Nursing Interventions Verified appointment date/time/provider   Has the patient kept scheduled appointments due by today? N/A   Has home health visited the patient within 72 hours of discharge? N/A   What DME was ordered? O2- [place],  2L   Has all DME been delivered? Yes   Pulse Ox monitoring None  [patient reports pulse ox is broken, has orderd new one.]   Psychosocial issues? No   Did the patient receive a copy of their discharge instructions? Yes   Nursing interventions Reviewed instructions with patient   What is the patient's perception of their health status since discharge? Improving   Nursing Interventions Nurse provided patient education    Are the patient's immunizations up to date?  Yes   Nursing interventions Educated on importance of maintaining up to date immunizations as advised by provider   If the patient is a current smoker, are they able to teach back resources for cessation? Not a smoker   Is the patient/caregiver able to teach back the hierarchy of who to call/visit for symptoms/problems? PCP, Specialist, Home health nurse, Urgent Care, ED, 911 Yes   Is the patient able to teach back COPD zones? Yes   Nursing interventions Education provided on various zones   Patient reports what zone on this call? Yellow Zone   Yellow Zone Increased shortness of air, Unable to complete daily activities, Poor sleep and symptoms work patient up, Poor Appetite   Yellow interventions Continue to use daily medications, Use oxygen as ordered, Get plenty of rest   Week 1 call completed? Yes          KATRINA WILKINS - Registered Nurse

## 2022-04-20 LAB
DOPAMINE 24H UR-MRATE: 115 UG/24 HR (ref 0–510)
DOPAMINE UR-MCNC: 96 UG/L
EPINEPH 24H UR-MRATE: 6 UG/24 HR (ref 0–20)
EPINEPH UR-MCNC: 5 UG/L
NOREPINEPH 24H UR-MRATE: 67 UG/24 HR (ref 0–135)
NOREPINEPH UR-MCNC: 56 UG/L
VMA 24H UR-MRATE: 2.8 MG/24 HR (ref 0–7.5)
VMA UR-MCNC: 2.3 MG/L

## 2022-05-16 ENCOUNTER — OFFICE VISIT (OUTPATIENT)
Dept: CARDIOLOGY | Facility: CLINIC | Age: 62
End: 2022-05-16

## 2022-05-16 VITALS
OXYGEN SATURATION: 91 % | BODY MASS INDEX: 41.11 KG/M2 | WEIGHT: 232 LBS | HEIGHT: 63 IN | HEART RATE: 124 BPM | DIASTOLIC BLOOD PRESSURE: 92 MMHG | SYSTOLIC BLOOD PRESSURE: 190 MMHG

## 2022-05-16 DIAGNOSIS — E78.5 DYSLIPIDEMIA: ICD-10-CM

## 2022-05-16 DIAGNOSIS — I10 PRIMARY HYPERTENSION: Primary | ICD-10-CM

## 2022-05-16 DIAGNOSIS — J44.9 CHRONIC OBSTRUCTIVE PULMONARY DISEASE, UNSPECIFIED COPD TYPE: ICD-10-CM

## 2022-05-16 PROCEDURE — 99213 OFFICE O/P EST LOW 20 MIN: CPT | Performed by: INTERNAL MEDICINE

## 2022-05-16 NOTE — PROGRESS NOTES
"    Subjective:     Encounter Date:05/16/2022      Patient ID: Ann Álvarez is a 62 y.o. female.    Chief Complaint:  History of Present Illness 62-year-old white female with history of COPD hypertension dyslipidemia presents to my office for follow-up.  Patient is currently stable without any symptoms of chest pain but has some shortness of breath exertion.  No complains any PND orthopnea.  Patient has occasional palpitations with some dizziness.  No syncope she has no swelling of the feet.  She still continues to smoke.    The following portions of the patient's history were reviewed and updated as appropriate: allergies, current medications, past family history, past medical history, past social history, past surgical history and problem list.  Past Medical History:   Diagnosis Date   • Anxiety    • Arthritis    • Chronic back pain    • Chronic obstructive pulmonary disease ([facility]) 01/31/2020   • Chronic respiratory failure with hypoxia ([facility]) 03/06/2018    O2 @ 2 L per NC as needed   • Dyslipidemia 10/21/2014   • Edema, lower extremity 07/16/2014   • Hypertension    • Insomnia    • Obesity, Class III, BMI 40-49.9 (morbid obesity) ([facility]) 03/29/2022   • Prediabetes 11/06/2018   • Primary hypertension 05/30/2012   • PTSD (post-traumatic stress disorder)    • RLS (restless legs syndrome) 04/22/2020   • Stroke ([facility]) 2018   • Tobacco abuse 10/21/2014   • Vitamin D deficiency 05/30/2012     Past Surgical History:   Procedure Laterality Date   • BACK SURGERY     • CHOLECYSTECTOMY     • HYSTERECTOMY     • TUMOR REMOVAL      benign breats tumor     BP (!) 190/92 (BP Location: Left arm, Patient Position: Sitting)   Pulse (!) 124   Ht 160 cm (62.99\")   Wt 105 kg (232 lb)   SpO2 91%   BMI 41.11 kg/m²   Family History   Problem Relation Age of Onset   • Heart disease Mother    • Heart disease Father        Current Outpatient Medications:   •  amLODIPine (NORVASC) 10 MG tablet, Take 1 tablet by mouth Daily., Disp: 30 tablet, " Rfl: 1  •  cloNIDine (CATAPRES) 0.3 MG tablet, Take 1 tablet by mouth Every 8 (Eight) Hours., Disp: 90 tablet, Rfl: 1  •  doxepin (SINEquan) 50 MG capsule, Take 50 mg by mouth At Night As Needed., Disp: , Rfl:   •  hydrALAZINE (APRESOLINE) 25 MG tablet, Take 3 tablets by mouth Every 12 (Twelve) Hours., Disp: 90 tablet, Rfl: 1  •  hydroCHLOROthiazide (HYDRODIURIL) 25 MG tablet, Take 25 mg by mouth Daily., Disp: , Rfl:   •  LINZESS 290 MCG capsule capsule, Take 290 mcg by mouth Daily As Needed., Disp: , Rfl:   •  lisinopril (PRINIVIL,ZESTRIL) 40 MG tablet, Take 40 mg by mouth Daily., Disp: , Rfl:   •  metoprolol tartrate (LOPRESSOR) 100 MG tablet, Take 1 tablet by mouth Every 12 (Twelve) Hours., Disp: 60 tablet, Rfl: 1  •  Multiple Vitamin (MULTIVITAMIN) tablet, Take 1 tablet by mouth Daily., Disp: , Rfl:   •  oxyCODONE-acetaminophen (PERCOCET)  MG per tablet, Take 1 tablet by mouth Every 4 (Four) Hours As Needed for Moderate Pain ., Disp: , Rfl:   •  Probiotic Product (PROBIOTIC ADVANCED) capsule, Take 1 capsule by mouth Daily., Disp: , Rfl:   •  promethazine (PHENERGAN) 50 MG tablet, Take 50 mg by mouth Every 6 (Six) Hours As Needed., Disp: , Rfl:   •  rOPINIRole (REQUIP) 5 MG tablet, Take 5 mg by mouth Every Night., Disp: , Rfl:   •  sildenafil (REVATIO) 20 MG tablet, Take 0.5 tablets by mouth Every 8 (Eight) Hours., Disp: 90 tablet, Rfl: 1  •  topiramate (TOPAMAX) 50 MG tablet, Take 50 mg by mouth Daily., Disp: , Rfl:   •  traZODone (DESYREL) 100 MG tablet, Take 100 mg by mouth Every Night., Disp: , Rfl:   •  zolpidem CR (AMBIEN CR) 12.5 MG CR tablet, Take 12.5 mg by mouth At Night As Needed for Sleep., Disp: , Rfl:   Allergies   Allergen Reactions   • Codeine Hives, Itching and Nausea And Vomiting     Social History     Socioeconomic History   • Marital status:    Tobacco Use   • Smoking status: Current Every Day Smoker     Types: Cigarettes   • Smokeless tobacco: Never Used   Vaping Use   • Vaping  Use: Never used   Substance and Sexual Activity   • Alcohol use: Not Currently   • Drug use: Never   • Sexual activity: Defer     Review of Systems   Constitutional: Negative for fever and malaise/fatigue.   Cardiovascular: Positive for leg swelling. Negative for chest pain, dyspnea on exertion, irregular heartbeat and palpitations.   Respiratory: Positive for shortness of breath. Negative for cough.    Skin: Negative for rash.   Gastrointestinal: Positive for nausea and vomiting. Negative for abdominal pain.   Neurological: Positive for dizziness and light-headedness. Negative for focal weakness and headaches.   All other systems reviewed and are negative.             Objective:     Constitutional:       Appearance: Well-developed.   Eyes:      General: No scleral icterus.     Conjunctiva/sclera: Conjunctivae normal.   HENT:      Head: Normocephalic and atraumatic.   Neck:      Vascular: No carotid bruit or JVD.   Pulmonary:      Effort: Pulmonary effort is normal.      Breath sounds: Normal breath sounds. No wheezing. No rales.   Cardiovascular:      Normal rate. Regular rhythm.   Pulses:     Intact distal pulses.   Abdominal:      General: Bowel sounds are normal.      Palpations: Abdomen is soft.   Musculoskeletal:      Cervical back: Normal range of motion and neck supple. Skin:     General: Skin is warm and dry.      Findings: No rash.   Neurological:      Mental Status: Alert.       Procedures    Lab Review:         MDM  1.  Hypertension  Patient still has high blood pressure but she is not taking her medicines regularly and hence I will increase the hydralazine to 75 mg 3 times daily  2.  Dyslipidemia  Patient is currently on over-the-counter medicines and followed by the primary care doctor  3.  COPD  Patient is significant COPD and also sleep apnea.  Patient does not use a CPAP machine and she continues to smoke and have advised her to stop smoking      Patient's previous medical records, labs, and EKG  were reviewed and discussed with the patient at today's visit.

## 2022-06-27 ENCOUNTER — HOSPITAL ENCOUNTER (OUTPATIENT)
Facility: HOSPITAL | Age: 62
Setting detail: OBSERVATION
Discharge: HOME | End: 2022-06-29
Attending: EMERGENCY MEDICINE | Admitting: EMERGENCY MEDICINE

## 2022-06-27 ENCOUNTER — APPOINTMENT (OUTPATIENT)
Dept: GENERAL RADIOLOGY | Facility: HOSPITAL | Age: 62
End: 2022-06-27

## 2022-06-27 ENCOUNTER — APPOINTMENT (OUTPATIENT)
Dept: CT IMAGING | Facility: HOSPITAL | Age: 62
End: 2022-06-27

## 2022-06-27 DIAGNOSIS — R04.2 HEMOPTYSIS: ICD-10-CM

## 2022-06-27 DIAGNOSIS — J44.1 COPD EXACERBATION: ICD-10-CM

## 2022-06-27 DIAGNOSIS — R07.9 CHEST PAIN, UNSPECIFIED TYPE: Primary | ICD-10-CM

## 2022-06-27 LAB
ALBUMIN SERPL-MCNC: 3.5 G/DL (ref 3.5–5.2)
ALBUMIN/GLOB SERPL: 1.3 G/DL
ALP SERPL-CCNC: 63 U/L (ref 39–117)
ALT SERPL W P-5'-P-CCNC: 10 U/L (ref 1–33)
ANION GAP SERPL CALCULATED.3IONS-SCNC: 10 MMOL/L (ref 5–15)
APTT PPP: 27.5 SECONDS (ref 61–76.5)
AST SERPL-CCNC: 13 U/L (ref 1–32)
BASOPHILS # BLD AUTO: 0.1 10*3/MM3 (ref 0–0.2)
BASOPHILS NFR BLD AUTO: 0.6 % (ref 0–1.5)
BILIRUB SERPL-MCNC: 0.3 MG/DL (ref 0–1.2)
BUN SERPL-MCNC: 11 MG/DL (ref 8–23)
BUN/CREAT SERPL: 16.9 (ref 7–25)
CALCIUM SPEC-SCNC: 9.1 MG/DL (ref 8.6–10.5)
CHLORIDE SERPL-SCNC: 102 MMOL/L (ref 98–107)
CO2 SERPL-SCNC: 27 MMOL/L (ref 22–29)
CREAT SERPL-MCNC: 0.65 MG/DL (ref 0.57–1)
D DIMER PPP FEU-MCNC: 0.55 MG/L (FEU) (ref 0–0.59)
DEPRECATED RDW RBC AUTO: 42.4 FL (ref 37–54)
EGFRCR SERPLBLD CKD-EPI 2021: 99.1 ML/MIN/1.73
EOSINOPHIL # BLD AUTO: 0.1 10*3/MM3 (ref 0–0.4)
EOSINOPHIL NFR BLD AUTO: 1.4 % (ref 0.3–6.2)
ERYTHROCYTE [DISTWIDTH] IN BLOOD BY AUTOMATED COUNT: 14.5 % (ref 12.3–15.4)
GLOBULIN UR ELPH-MCNC: 2.6 GM/DL
GLUCOSE SERPL-MCNC: 149 MG/DL (ref 65–99)
HCT VFR BLD AUTO: 36 % (ref 34–46.6)
HGB BLD-MCNC: 12.2 G/DL (ref 12–15.9)
INR PPP: 0.99 (ref 0.93–1.1)
LYMPHOCYTES # BLD AUTO: 2.2 10*3/MM3 (ref 0.7–3.1)
LYMPHOCYTES NFR BLD AUTO: 26.4 % (ref 19.6–45.3)
MCH RBC QN AUTO: 28.3 PG (ref 26.6–33)
MCHC RBC AUTO-ENTMCNC: 33.9 G/DL (ref 31.5–35.7)
MCV RBC AUTO: 83.5 FL (ref 79–97)
MONOCYTES # BLD AUTO: 0.4 10*3/MM3 (ref 0.1–0.9)
MONOCYTES NFR BLD AUTO: 4.6 % (ref 5–12)
NEUTROPHILS NFR BLD AUTO: 5.5 10*3/MM3 (ref 1.7–7)
NEUTROPHILS NFR BLD AUTO: 67 % (ref 42.7–76)
NRBC BLD AUTO-RTO: 0 /100 WBC (ref 0–0.2)
NT-PROBNP SERPL-MCNC: 391.4 PG/ML (ref 0–900)
PLATELET # BLD AUTO: 210 10*3/MM3 (ref 140–450)
PMV BLD AUTO: 7.5 FL (ref 6–12)
POTASSIUM SERPL-SCNC: 4.8 MMOL/L (ref 3.5–5.2)
PROT SERPL-MCNC: 6.1 G/DL (ref 6–8.5)
PROTHROMBIN TIME: 10.2 SECONDS (ref 9.6–11.7)
RBC # BLD AUTO: 4.31 10*6/MM3 (ref 3.77–5.28)
SARS-COV-2 RNA RESP QL NAA+PROBE: NOT DETECTED
SODIUM SERPL-SCNC: 139 MMOL/L (ref 136–145)
TROPONIN T SERPL-MCNC: <0.01 NG/ML (ref 0–0.03)
WBC NRBC COR # BLD: 8.3 10*3/MM3 (ref 3.4–10.8)

## 2022-06-27 PROCEDURE — G0378 HOSPITAL OBSERVATION PER HR: HCPCS

## 2022-06-27 PROCEDURE — 96361 HYDRATE IV INFUSION ADD-ON: CPT

## 2022-06-27 PROCEDURE — 84484 ASSAY OF TROPONIN QUANT: CPT | Performed by: EMERGENCY MEDICINE

## 2022-06-27 PROCEDURE — 71045 X-RAY EXAM CHEST 1 VIEW: CPT

## 2022-06-27 PROCEDURE — 25010000002 MORPHINE PER 10 MG: Performed by: EMERGENCY MEDICINE

## 2022-06-27 PROCEDURE — 85025 COMPLETE CBC W/AUTO DIFF WBC: CPT | Performed by: EMERGENCY MEDICINE

## 2022-06-27 PROCEDURE — 99284 EMERGENCY DEPT VISIT MOD MDM: CPT

## 2022-06-27 PROCEDURE — 85730 THROMBOPLASTIN TIME PARTIAL: CPT | Performed by: EMERGENCY MEDICINE

## 2022-06-27 PROCEDURE — 93005 ELECTROCARDIOGRAM TRACING: CPT | Performed by: EMERGENCY MEDICINE

## 2022-06-27 PROCEDURE — 96375 TX/PRO/DX INJ NEW DRUG ADDON: CPT

## 2022-06-27 PROCEDURE — 25010000002 ONDANSETRON PER 1 MG: Performed by: EMERGENCY MEDICINE

## 2022-06-27 PROCEDURE — 25010000002 KETOROLAC TROMETHAMINE PER 15 MG: Performed by: EMERGENCY MEDICINE

## 2022-06-27 PROCEDURE — 25010000002 HYDROMORPHONE 1 MG/ML SOLUTION: Performed by: EMERGENCY MEDICINE

## 2022-06-27 PROCEDURE — 85610 PROTHROMBIN TIME: CPT | Performed by: EMERGENCY MEDICINE

## 2022-06-27 PROCEDURE — 71250 CT THORAX DX C-: CPT

## 2022-06-27 PROCEDURE — 83880 ASSAY OF NATRIURETIC PEPTIDE: CPT | Performed by: EMERGENCY MEDICINE

## 2022-06-27 PROCEDURE — 36415 COLL VENOUS BLD VENIPUNCTURE: CPT | Performed by: EMERGENCY MEDICINE

## 2022-06-27 PROCEDURE — 85027 COMPLETE CBC AUTOMATED: CPT | Performed by: EMERGENCY MEDICINE

## 2022-06-27 PROCEDURE — 0202U NFCT DS 22 TRGT SARS-COV-2: CPT | Performed by: NURSE PRACTITIONER

## 2022-06-27 PROCEDURE — 87040 BLOOD CULTURE FOR BACTERIA: CPT | Performed by: EMERGENCY MEDICINE

## 2022-06-27 PROCEDURE — 85379 FIBRIN DEGRADATION QUANT: CPT | Performed by: EMERGENCY MEDICINE

## 2022-06-27 PROCEDURE — 80053 COMPREHEN METABOLIC PANEL: CPT | Performed by: EMERGENCY MEDICINE

## 2022-06-27 RX ORDER — SODIUM CHLORIDE 0.9 % (FLUSH) 0.9 %
10 SYRINGE (ML) INJECTION AS NEEDED
Status: DISCONTINUED | OUTPATIENT
Start: 2022-06-27 | End: 2022-06-28 | Stop reason: SDUPTHER

## 2022-06-27 RX ORDER — ONDANSETRON 2 MG/ML
4 INJECTION INTRAMUSCULAR; INTRAVENOUS ONCE
Status: COMPLETED | OUTPATIENT
Start: 2022-06-27 | End: 2022-06-27

## 2022-06-27 RX ORDER — SODIUM CHLORIDE 9 MG/ML
100 INJECTION, SOLUTION INTRAVENOUS CONTINUOUS
Status: DISCONTINUED | OUTPATIENT
Start: 2022-06-27 | End: 2022-06-29 | Stop reason: HOSPADM

## 2022-06-27 RX ORDER — NITROGLYCERIN 0.4 MG/1
0.4 TABLET SUBLINGUAL
Status: DISCONTINUED | OUTPATIENT
Start: 2022-06-27 | End: 2022-06-29 | Stop reason: HOSPADM

## 2022-06-27 RX ORDER — SODIUM CHLORIDE 0.9 % (FLUSH) 0.9 %
10 SYRINGE (ML) INJECTION EVERY 12 HOURS SCHEDULED
Status: DISCONTINUED | OUTPATIENT
Start: 2022-06-27 | End: 2022-06-28 | Stop reason: SDUPTHER

## 2022-06-27 RX ORDER — KETOROLAC TROMETHAMINE 15 MG/ML
7.5 INJECTION, SOLUTION INTRAMUSCULAR; INTRAVENOUS ONCE
Status: COMPLETED | OUTPATIENT
Start: 2022-06-27 | End: 2022-06-27

## 2022-06-27 RX ORDER — MORPHINE SULFATE 2 MG/ML
1 INJECTION, SOLUTION INTRAMUSCULAR; INTRAVENOUS EVERY 4 HOURS PRN
Status: DISCONTINUED | OUTPATIENT
Start: 2022-06-27 | End: 2022-06-29

## 2022-06-27 RX ORDER — NALOXONE HCL 0.4 MG/ML
0.4 VIAL (ML) INJECTION
Status: DISCONTINUED | OUTPATIENT
Start: 2022-06-27 | End: 2022-06-29

## 2022-06-27 RX ORDER — INSULIN GLARGINE 100 [IU]/ML
20 INJECTION, SOLUTION SUBCUTANEOUS 2 TIMES DAILY
Status: ON HOLD | COMMUNITY
Start: 2022-06-23 | End: 2022-06-29 | Stop reason: SDUPTHER

## 2022-06-27 RX ADMIN — KETOROLAC TROMETHAMINE 7.5 MG: 15 INJECTION, SOLUTION INTRAMUSCULAR; INTRAVENOUS at 19:53

## 2022-06-27 RX ADMIN — SODIUM CHLORIDE 100 ML/HR: 9 INJECTION, SOLUTION INTRAVENOUS at 22:47

## 2022-06-27 RX ADMIN — Medication 10 ML: at 22:47

## 2022-06-27 RX ADMIN — HYDROMORPHONE HYDROCHLORIDE 0.5 MG: 1 INJECTION, SOLUTION INTRAMUSCULAR; INTRAVENOUS; SUBCUTANEOUS at 21:02

## 2022-06-27 RX ADMIN — MORPHINE SULFATE 4 MG: 4 INJECTION, SOLUTION INTRAMUSCULAR; INTRAVENOUS at 17:51

## 2022-06-27 RX ADMIN — ONDANSETRON 4 MG: 2 INJECTION INTRAMUSCULAR; INTRAVENOUS at 17:51

## 2022-06-28 LAB
ANION GAP SERPL CALCULATED.3IONS-SCNC: 11 MMOL/L (ref 5–15)
ANION GAP SERPL CALCULATED.3IONS-SCNC: 13 MMOL/L (ref 5–15)
B PARAPERT DNA SPEC QL NAA+PROBE: NOT DETECTED
B PERT DNA SPEC QL NAA+PROBE: NOT DETECTED
BASOPHILS # BLD AUTO: 0.1 10*3/MM3 (ref 0–0.2)
BASOPHILS NFR BLD AUTO: 0.8 % (ref 0–1.5)
BUN SERPL-MCNC: 13 MG/DL (ref 8–23)
BUN SERPL-MCNC: 15 MG/DL (ref 8–23)
BUN/CREAT SERPL: 18.6 (ref 7–25)
BUN/CREAT SERPL: 25.4 (ref 7–25)
C PNEUM DNA NPH QL NAA+NON-PROBE: NOT DETECTED
CALCIUM SPEC-SCNC: 8.7 MG/DL (ref 8.6–10.5)
CALCIUM SPEC-SCNC: 9.2 MG/DL (ref 8.6–10.5)
CHLORIDE SERPL-SCNC: 100 MMOL/L (ref 98–107)
CHLORIDE SERPL-SCNC: 104 MMOL/L (ref 98–107)
CO2 SERPL-SCNC: 27 MMOL/L (ref 22–29)
CO2 SERPL-SCNC: 27 MMOL/L (ref 22–29)
CREAT SERPL-MCNC: 0.59 MG/DL (ref 0.57–1)
CREAT SERPL-MCNC: 0.7 MG/DL (ref 0.57–1)
D DIMER PPP FEU-MCNC: 0.8 MG/L (FEU) (ref 0–0.59)
DEPRECATED RDW RBC AUTO: 43.8 FL (ref 37–54)
DEPRECATED RDW RBC AUTO: 44.2 FL (ref 37–54)
EGFRCR SERPLBLD CKD-EPI 2021: 101.4 ML/MIN/1.73
EGFRCR SERPLBLD CKD-EPI 2021: 97.3 ML/MIN/1.73
EOSINOPHIL # BLD AUTO: 0.1 10*3/MM3 (ref 0–0.4)
EOSINOPHIL NFR BLD AUTO: 1.7 % (ref 0.3–6.2)
ERYTHROCYTE [DISTWIDTH] IN BLOOD BY AUTOMATED COUNT: 14.6 % (ref 12.3–15.4)
ERYTHROCYTE [DISTWIDTH] IN BLOOD BY AUTOMATED COUNT: 14.9 % (ref 12.3–15.4)
FLUAV SUBTYP SPEC NAA+PROBE: NOT DETECTED
FLUBV RNA ISLT QL NAA+PROBE: NOT DETECTED
GLUCOSE BLDC GLUCOMTR-MCNC: 143 MG/DL (ref 70–105)
GLUCOSE BLDC GLUCOMTR-MCNC: 150 MG/DL (ref 70–105)
GLUCOSE BLDC GLUCOMTR-MCNC: 261 MG/DL (ref 70–105)
GLUCOSE BLDC GLUCOMTR-MCNC: 325 MG/DL (ref 70–105)
GLUCOSE SERPL-MCNC: 151 MG/DL (ref 65–99)
GLUCOSE SERPL-MCNC: 158 MG/DL (ref 65–99)
HADV DNA SPEC NAA+PROBE: NOT DETECTED
HCOV 229E RNA SPEC QL NAA+PROBE: NOT DETECTED
HCOV HKU1 RNA SPEC QL NAA+PROBE: NOT DETECTED
HCOV NL63 RNA SPEC QL NAA+PROBE: NOT DETECTED
HCOV OC43 RNA SPEC QL NAA+PROBE: NOT DETECTED
HCT VFR BLD AUTO: 37.4 % (ref 34–46.6)
HCT VFR BLD AUTO: 39 % (ref 34–46.6)
HGB BLD-MCNC: 12.5 G/DL (ref 12–15.9)
HGB BLD-MCNC: 12.8 G/DL (ref 12–15.9)
HMPV RNA NPH QL NAA+NON-PROBE: NOT DETECTED
HPIV1 RNA ISLT QL NAA+PROBE: NOT DETECTED
HPIV2 RNA SPEC QL NAA+PROBE: NOT DETECTED
HPIV3 RNA NPH QL NAA+PROBE: NOT DETECTED
HPIV4 P GENE NPH QL NAA+PROBE: NOT DETECTED
LYMPHOCYTES # BLD AUTO: 2.2 10*3/MM3 (ref 0.7–3.1)
LYMPHOCYTES NFR BLD AUTO: 32.2 % (ref 19.6–45.3)
M PNEUMO IGG SER IA-ACNC: NOT DETECTED
MCH RBC QN AUTO: 27.3 PG (ref 26.6–33)
MCH RBC QN AUTO: 27.9 PG (ref 26.6–33)
MCHC RBC AUTO-ENTMCNC: 32.8 G/DL (ref 31.5–35.7)
MCHC RBC AUTO-ENTMCNC: 33.4 G/DL (ref 31.5–35.7)
MCV RBC AUTO: 83.4 FL (ref 79–97)
MCV RBC AUTO: 83.7 FL (ref 79–97)
MONOCYTES # BLD AUTO: 0.4 10*3/MM3 (ref 0.1–0.9)
MONOCYTES NFR BLD AUTO: 5.8 % (ref 5–12)
NEUTROPHILS NFR BLD AUTO: 4 10*3/MM3 (ref 1.7–7)
NEUTROPHILS NFR BLD AUTO: 59.5 % (ref 42.7–76)
NRBC BLD AUTO-RTO: 0 /100 WBC (ref 0–0.2)
PLATELET # BLD AUTO: 178 10*3/MM3 (ref 140–450)
PLATELET # BLD AUTO: 216 10*3/MM3 (ref 140–450)
PMV BLD AUTO: 7.4 FL (ref 6–12)
PMV BLD AUTO: 7.9 FL (ref 6–12)
POTASSIUM SERPL-SCNC: 4.4 MMOL/L (ref 3.5–5.2)
POTASSIUM SERPL-SCNC: 4.4 MMOL/L (ref 3.5–5.2)
RBC # BLD AUTO: 4.47 10*6/MM3 (ref 3.77–5.28)
RBC # BLD AUTO: 4.67 10*6/MM3 (ref 3.77–5.28)
RHINOVIRUS RNA SPEC NAA+PROBE: NOT DETECTED
RSV RNA NPH QL NAA+NON-PROBE: NOT DETECTED
SARS-COV-2 RNA NPH QL NAA+NON-PROBE: NOT DETECTED
SODIUM SERPL-SCNC: 140 MMOL/L (ref 136–145)
SODIUM SERPL-SCNC: 142 MMOL/L (ref 136–145)
TROPONIN T SERPL-MCNC: <0.01 NG/ML (ref 0–0.03)
WBC NRBC COR # BLD: 6.7 10*3/MM3 (ref 3.4–10.8)
WBC NRBC COR # BLD: 8.8 10*3/MM3 (ref 3.4–10.8)

## 2022-06-28 PROCEDURE — 93010 ELECTROCARDIOGRAM REPORT: CPT | Performed by: INTERNAL MEDICINE

## 2022-06-28 PROCEDURE — 93005 ELECTROCARDIOGRAM TRACING: CPT | Performed by: EMERGENCY MEDICINE

## 2022-06-28 PROCEDURE — 94664 DEMO&/EVAL PT USE INHALER: CPT

## 2022-06-28 PROCEDURE — 63710000001 INSULIN GLARGINE PER 5 UNITS: Performed by: NURSE PRACTITIONER

## 2022-06-28 PROCEDURE — 25010000002 ONDANSETRON PER 1 MG: Performed by: NURSE PRACTITIONER

## 2022-06-28 PROCEDURE — 96375 TX/PRO/DX INJ NEW DRUG ADDON: CPT

## 2022-06-28 PROCEDURE — 94799 UNLISTED PULMONARY SVC/PX: CPT

## 2022-06-28 PROCEDURE — 94640 AIRWAY INHALATION TREATMENT: CPT

## 2022-06-28 PROCEDURE — 63710000001 INSULIN LISPRO (HUMAN) PER 5 UNITS: Performed by: NURSE PRACTITIONER

## 2022-06-28 PROCEDURE — 96376 TX/PRO/DX INJ SAME DRUG ADON: CPT

## 2022-06-28 PROCEDURE — 85379 FIBRIN DEGRADATION QUANT: CPT | Performed by: NURSE PRACTITIONER

## 2022-06-28 PROCEDURE — 25010000002 MORPHINE PER 10 MG: Performed by: NURSE PRACTITIONER

## 2022-06-28 PROCEDURE — 96365 THER/PROPH/DIAG IV INF INIT: CPT

## 2022-06-28 PROCEDURE — 85025 COMPLETE CBC W/AUTO DIFF WBC: CPT | Performed by: NURSE PRACTITIONER

## 2022-06-28 PROCEDURE — 25010000002 ONDANSETRON PER 1 MG: Performed by: EMERGENCY MEDICINE

## 2022-06-28 PROCEDURE — 96361 HYDRATE IV INFUSION ADD-ON: CPT

## 2022-06-28 PROCEDURE — G0378 HOSPITAL OBSERVATION PER HR: HCPCS

## 2022-06-28 PROCEDURE — 25010000002 METHYLPREDNISOLONE PER 125 MG: Performed by: NURSE PRACTITIONER

## 2022-06-28 PROCEDURE — 82962 GLUCOSE BLOOD TEST: CPT

## 2022-06-28 PROCEDURE — 25010000002 CEFTRIAXONE PER 250 MG: Performed by: NURSE PRACTITIONER

## 2022-06-28 PROCEDURE — 25010000002 MORPHINE PER 10 MG: Performed by: EMERGENCY MEDICINE

## 2022-06-28 PROCEDURE — 80048 BASIC METABOLIC PNL TOTAL CA: CPT | Performed by: NURSE PRACTITIONER

## 2022-06-28 RX ORDER — INSULIN LISPRO 100 [IU]/ML
0-9 INJECTION, SOLUTION INTRAVENOUS; SUBCUTANEOUS
Status: DISCONTINUED | OUTPATIENT
Start: 2022-06-28 | End: 2022-06-29

## 2022-06-28 RX ORDER — L.ACID,PARA/B.BIFIDUM/S.THERM 8B CELL
1 CAPSULE ORAL DAILY
Status: DISCONTINUED | OUTPATIENT
Start: 2022-06-28 | End: 2022-06-29 | Stop reason: HOSPADM

## 2022-06-28 RX ORDER — NICOTINE POLACRILEX 4 MG
15 LOZENGE BUCCAL
Status: DISCONTINUED | OUTPATIENT
Start: 2022-06-28 | End: 2022-06-29

## 2022-06-28 RX ORDER — HYDRALAZINE HYDROCHLORIDE 25 MG/1
75 TABLET, FILM COATED ORAL EVERY 12 HOURS SCHEDULED
Status: DISCONTINUED | OUTPATIENT
Start: 2022-06-28 | End: 2022-06-29 | Stop reason: HOSPADM

## 2022-06-28 RX ORDER — BISACODYL 5 MG/1
5 TABLET, DELAYED RELEASE ORAL DAILY PRN
Status: DISCONTINUED | OUTPATIENT
Start: 2022-06-28 | End: 2022-06-29 | Stop reason: HOSPADM

## 2022-06-28 RX ORDER — CLONIDINE HYDROCHLORIDE 0.1 MG/1
0.3 TABLET ORAL EVERY 8 HOURS SCHEDULED
Status: DISCONTINUED | OUTPATIENT
Start: 2022-06-28 | End: 2022-06-29 | Stop reason: HOSPADM

## 2022-06-28 RX ORDER — ZOLPIDEM TARTRATE 5 MG/1
5 TABLET ORAL NIGHTLY PRN
Status: DISCONTINUED | OUTPATIENT
Start: 2022-06-28 | End: 2022-06-29 | Stop reason: HOSPADM

## 2022-06-28 RX ORDER — OXYCODONE HYDROCHLORIDE 5 MG/1
5 TABLET ORAL EVERY 4 HOURS PRN
Status: DISCONTINUED | OUTPATIENT
Start: 2022-06-28 | End: 2022-06-29 | Stop reason: HOSPADM

## 2022-06-28 RX ORDER — TRAZODONE HYDROCHLORIDE 100 MG/1
100 TABLET ORAL NIGHTLY
Status: DISCONTINUED | OUTPATIENT
Start: 2022-06-28 | End: 2022-06-29 | Stop reason: HOSPADM

## 2022-06-28 RX ORDER — PANTOPRAZOLE SODIUM 40 MG/1
40 TABLET, DELAYED RELEASE ORAL
Status: DISCONTINUED | OUTPATIENT
Start: 2022-06-29 | End: 2022-06-29 | Stop reason: HOSPADM

## 2022-06-28 RX ORDER — ONDANSETRON 2 MG/ML
4 INJECTION INTRAMUSCULAR; INTRAVENOUS EVERY 6 HOURS PRN
Status: DISCONTINUED | OUTPATIENT
Start: 2022-06-28 | End: 2022-06-29 | Stop reason: HOSPADM

## 2022-06-28 RX ORDER — METHYLPREDNISOLONE SODIUM SUCCINATE 125 MG/2ML
80 INJECTION, POWDER, LYOPHILIZED, FOR SOLUTION INTRAMUSCULAR; INTRAVENOUS EVERY 12 HOURS
Status: DISCONTINUED | OUTPATIENT
Start: 2022-06-28 | End: 2022-06-29

## 2022-06-28 RX ORDER — INSULIN LISPRO 100 [IU]/ML
0-9 INJECTION, SOLUTION INTRAVENOUS; SUBCUTANEOUS AS NEEDED
Status: DISCONTINUED | OUTPATIENT
Start: 2022-06-28 | End: 2022-06-29

## 2022-06-28 RX ORDER — METOPROLOL TARTRATE 50 MG/1
100 TABLET, FILM COATED ORAL EVERY 12 HOURS SCHEDULED
Status: DISCONTINUED | OUTPATIENT
Start: 2022-06-28 | End: 2022-06-29 | Stop reason: HOSPADM

## 2022-06-28 RX ORDER — OLANZAPINE 10 MG/2ML
1 INJECTION, POWDER, LYOPHILIZED, FOR SOLUTION INTRAMUSCULAR
Status: DISCONTINUED | OUTPATIENT
Start: 2022-06-28 | End: 2022-06-29

## 2022-06-28 RX ORDER — PANTOPRAZOLE SODIUM 40 MG/1
40 TABLET, DELAYED RELEASE ORAL
Status: DISCONTINUED | OUTPATIENT
Start: 2022-06-28 | End: 2022-06-28 | Stop reason: SDUPTHER

## 2022-06-28 RX ORDER — BUDESONIDE 0.5 MG/2ML
0.5 INHALANT ORAL
Status: DISCONTINUED | OUTPATIENT
Start: 2022-06-28 | End: 2022-06-29 | Stop reason: HOSPADM

## 2022-06-28 RX ORDER — SILDENAFIL CITRATE 20 MG/1
10 TABLET ORAL EVERY 8 HOURS SCHEDULED
Status: DISCONTINUED | OUTPATIENT
Start: 2022-06-28 | End: 2022-06-29 | Stop reason: HOSPADM

## 2022-06-28 RX ORDER — TOPIRAMATE 25 MG/1
50 TABLET ORAL DAILY
Status: DISCONTINUED | OUTPATIENT
Start: 2022-06-28 | End: 2022-06-29 | Stop reason: HOSPADM

## 2022-06-28 RX ORDER — IPRATROPIUM BROMIDE AND ALBUTEROL SULFATE 2.5; .5 MG/3ML; MG/3ML
3 SOLUTION RESPIRATORY (INHALATION)
Status: DISCONTINUED | OUTPATIENT
Start: 2022-06-28 | End: 2022-06-29

## 2022-06-28 RX ORDER — POLYETHYLENE GLYCOL 3350 17 G/17G
17 POWDER, FOR SOLUTION ORAL DAILY PRN
Status: DISCONTINUED | OUTPATIENT
Start: 2022-06-28 | End: 2022-06-29 | Stop reason: HOSPADM

## 2022-06-28 RX ORDER — HYDRALAZINE HYDROCHLORIDE 20 MG/ML
10 INJECTION INTRAMUSCULAR; INTRAVENOUS EVERY 6 HOURS PRN
Status: DISCONTINUED | OUTPATIENT
Start: 2022-06-28 | End: 2022-06-29 | Stop reason: HOSPADM

## 2022-06-28 RX ORDER — INSULIN GLARGINE 100 [IU]/ML
20 INJECTION, SOLUTION SUBCUTANEOUS 2 TIMES DAILY
Status: DISCONTINUED | OUTPATIENT
Start: 2022-06-28 | End: 2022-06-29 | Stop reason: HOSPADM

## 2022-06-28 RX ORDER — DOXYCYCLINE 100 MG/1
100 TABLET ORAL EVERY 12 HOURS SCHEDULED
Status: DISCONTINUED | OUTPATIENT
Start: 2022-06-28 | End: 2022-06-29 | Stop reason: HOSPADM

## 2022-06-28 RX ORDER — AMLODIPINE BESYLATE 5 MG/1
10 TABLET ORAL
Status: DISCONTINUED | OUTPATIENT
Start: 2022-06-28 | End: 2022-06-29 | Stop reason: HOSPADM

## 2022-06-28 RX ORDER — DEXTROSE MONOHYDRATE 25 G/50ML
25 INJECTION, SOLUTION INTRAVENOUS
Status: DISCONTINUED | OUTPATIENT
Start: 2022-06-28 | End: 2022-06-29

## 2022-06-28 RX ORDER — ONDANSETRON 2 MG/ML
4 INJECTION INTRAMUSCULAR; INTRAVENOUS EVERY 6 HOURS PRN
Status: DISCONTINUED | OUTPATIENT
Start: 2022-06-28 | End: 2022-06-28 | Stop reason: SDUPTHER

## 2022-06-28 RX ORDER — ONDANSETRON 4 MG/1
4 TABLET, FILM COATED ORAL EVERY 6 HOURS PRN
Status: DISCONTINUED | OUTPATIENT
Start: 2022-06-28 | End: 2022-06-29 | Stop reason: HOSPADM

## 2022-06-28 RX ORDER — SODIUM CHLORIDE 0.9 % (FLUSH) 0.9 %
10 SYRINGE (ML) INJECTION AS NEEDED
Status: DISCONTINUED | OUTPATIENT
Start: 2022-06-28 | End: 2022-06-29 | Stop reason: HOSPADM

## 2022-06-28 RX ORDER — BISACODYL 10 MG
10 SUPPOSITORY, RECTAL RECTAL DAILY PRN
Status: DISCONTINUED | OUTPATIENT
Start: 2022-06-28 | End: 2022-06-29 | Stop reason: HOSPADM

## 2022-06-28 RX ORDER — AMOXICILLIN 250 MG
2 CAPSULE ORAL 2 TIMES DAILY
Status: DISCONTINUED | OUTPATIENT
Start: 2022-06-28 | End: 2022-06-29 | Stop reason: HOSPADM

## 2022-06-28 RX ORDER — SODIUM CHLORIDE 0.9 % (FLUSH) 0.9 %
10 SYRINGE (ML) INJECTION EVERY 12 HOURS SCHEDULED
Status: DISCONTINUED | OUTPATIENT
Start: 2022-06-28 | End: 2022-06-29 | Stop reason: HOSPADM

## 2022-06-28 RX ORDER — LISINOPRIL 20 MG/1
40 TABLET ORAL EVERY 24 HOURS
Status: DISCONTINUED | OUTPATIENT
Start: 2022-06-28 | End: 2022-06-29 | Stop reason: HOSPADM

## 2022-06-28 RX ADMIN — METHYLPREDNISOLONE SODIUM SUCCINATE 80 MG: 125 INJECTION, POWDER, FOR SOLUTION INTRAMUSCULAR; INTRAVENOUS at 23:43

## 2022-06-28 RX ADMIN — OXYCODONE 5 MG: 5 TABLET ORAL at 08:42

## 2022-06-28 RX ADMIN — SENNOSIDES AND DOCUSATE SODIUM 2 TABLET: 50; 8.6 TABLET ORAL at 22:59

## 2022-06-28 RX ADMIN — TOPIRAMATE 50 MG: 25 TABLET, FILM COATED ORAL at 08:42

## 2022-06-28 RX ADMIN — SODIUM CHLORIDE 100 ML/HR: 9 INJECTION, SOLUTION INTRAVENOUS at 08:31

## 2022-06-28 RX ADMIN — DOXYCYCLINE 100 MG: 100 TABLET ORAL at 08:42

## 2022-06-28 RX ADMIN — IPRATROPIUM BROMIDE AND ALBUTEROL SULFATE 3 ML: .5; 3 SOLUTION RESPIRATORY (INHALATION) at 18:58

## 2022-06-28 RX ADMIN — MORPHINE SULFATE 1 MG: 2 INJECTION, SOLUTION INTRAMUSCULAR; INTRAVENOUS at 04:53

## 2022-06-28 RX ADMIN — MORPHINE SULFATE 1 MG: 2 INJECTION, SOLUTION INTRAMUSCULAR; INTRAVENOUS at 15:49

## 2022-06-28 RX ADMIN — ONDANSETRON 4 MG: 2 INJECTION INTRAMUSCULAR; INTRAVENOUS at 00:21

## 2022-06-28 RX ADMIN — SILDENAFIL 10 MG: 20 TABLET, FILM COATED ORAL at 08:42

## 2022-06-28 RX ADMIN — IPRATROPIUM BROMIDE AND ALBUTEROL SULFATE 3 ML: .5; 3 SOLUTION RESPIRATORY (INHALATION) at 11:42

## 2022-06-28 RX ADMIN — CLONIDINE HYDROCHLORIDE 0.3 MG: 0.1 TABLET ORAL at 08:42

## 2022-06-28 RX ADMIN — MORPHINE SULFATE 1 MG: 2 INJECTION, SOLUTION INTRAMUSCULAR; INTRAVENOUS at 11:25

## 2022-06-28 RX ADMIN — METHYLPREDNISOLONE SODIUM SUCCINATE 80 MG: 125 INJECTION, POWDER, FOR SOLUTION INTRAMUSCULAR; INTRAVENOUS at 14:03

## 2022-06-28 RX ADMIN — LISINOPRIL 40 MG: 20 TABLET ORAL at 08:42

## 2022-06-28 RX ADMIN — ONDANSETRON 4 MG: 2 INJECTION INTRAMUSCULAR; INTRAVENOUS at 11:25

## 2022-06-28 RX ADMIN — CLONIDINE HYDROCHLORIDE 0.3 MG: 0.1 TABLET ORAL at 23:00

## 2022-06-28 RX ADMIN — IPRATROPIUM BROMIDE AND ALBUTEROL SULFATE 3 ML: .5; 3 SOLUTION RESPIRATORY (INHALATION) at 14:14

## 2022-06-28 RX ADMIN — TRAZODONE HYDROCHLORIDE 100 MG: 100 TABLET ORAL at 22:58

## 2022-06-28 RX ADMIN — CEFTRIAXONE 1 G: 1 INJECTION, POWDER, FOR SOLUTION INTRAMUSCULAR; INTRAVENOUS at 08:40

## 2022-06-28 RX ADMIN — SILDENAFIL 10 MG: 20 TABLET, FILM COATED ORAL at 14:03

## 2022-06-28 RX ADMIN — METOPROLOL TARTRATE 100 MG: 50 TABLET, FILM COATED ORAL at 08:42

## 2022-06-28 RX ADMIN — ONDANSETRON 4 MG: 2 INJECTION INTRAMUSCULAR; INTRAVENOUS at 19:37

## 2022-06-28 RX ADMIN — INSULIN GLARGINE 20 UNITS: 100 INJECTION, SOLUTION SUBCUTANEOUS at 08:40

## 2022-06-28 RX ADMIN — Medication 10 ML: at 23:02

## 2022-06-28 RX ADMIN — Medication 1 CAPSULE: at 08:40

## 2022-06-28 RX ADMIN — Medication 10 ML: at 19:37

## 2022-06-28 RX ADMIN — INSULIN LISPRO 6 UNITS: 100 INJECTION, SOLUTION INTRAVENOUS; SUBCUTANEOUS at 18:08

## 2022-06-28 RX ADMIN — BUDESONIDE 0.5 MG: 0.5 INHALANT RESPIRATORY (INHALATION) at 18:58

## 2022-06-28 RX ADMIN — SENNOSIDES AND DOCUSATE SODIUM 2 TABLET: 50; 8.6 TABLET ORAL at 08:41

## 2022-06-28 RX ADMIN — MORPHINE SULFATE 1 MG: 2 INJECTION, SOLUTION INTRAMUSCULAR; INTRAVENOUS at 23:44

## 2022-06-28 RX ADMIN — DOXYCYCLINE 100 MG: 100 TABLET ORAL at 23:00

## 2022-06-28 RX ADMIN — MORPHINE SULFATE 1 MG: 2 INJECTION, SOLUTION INTRAMUSCULAR; INTRAVENOUS at 00:20

## 2022-06-28 RX ADMIN — INSULIN GLARGINE 20 UNITS: 100 INJECTION, SOLUTION SUBCUTANEOUS at 23:02

## 2022-06-28 RX ADMIN — MORPHINE SULFATE 1 MG: 2 INJECTION, SOLUTION INTRAMUSCULAR; INTRAVENOUS at 19:37

## 2022-06-28 RX ADMIN — SILDENAFIL 10 MG: 20 TABLET, FILM COATED ORAL at 23:01

## 2022-06-28 RX ADMIN — HYDRALAZINE HYDROCHLORIDE 75 MG: 25 TABLET, FILM COATED ORAL at 22:58

## 2022-06-28 RX ADMIN — HYDRALAZINE HYDROCHLORIDE 75 MG: 25 TABLET, FILM COATED ORAL at 08:41

## 2022-06-28 RX ADMIN — METOPROLOL TARTRATE 100 MG: 50 TABLET, FILM COATED ORAL at 23:00

## 2022-06-28 RX ADMIN — SODIUM CHLORIDE 100 ML/HR: 9 INJECTION, SOLUTION INTRAVENOUS at 19:37

## 2022-06-28 RX ADMIN — ZOLPIDEM TARTRATE 5 MG: 5 TABLET ORAL at 23:43

## 2022-06-28 RX ADMIN — CLONIDINE HYDROCHLORIDE 0.3 MG: 0.1 TABLET ORAL at 14:03

## 2022-06-28 RX ADMIN — AMLODIPINE BESYLATE 10 MG: 5 TABLET ORAL at 08:43

## 2022-06-29 VITALS
DIASTOLIC BLOOD PRESSURE: 62 MMHG | BODY MASS INDEX: 39.69 KG/M2 | SYSTOLIC BLOOD PRESSURE: 130 MMHG | WEIGHT: 223.99 LBS | TEMPERATURE: 97.9 F | OXYGEN SATURATION: 94 % | HEART RATE: 76 BPM | RESPIRATION RATE: 18 BRPM | HEIGHT: 63 IN

## 2022-06-29 LAB
ANION GAP SERPL CALCULATED.3IONS-SCNC: 12 MMOL/L (ref 5–15)
BASOPHILS # BLD AUTO: 0 10*3/MM3 (ref 0–0.2)
BASOPHILS NFR BLD AUTO: 0.5 % (ref 0–1.5)
BUN SERPL-MCNC: 13 MG/DL (ref 8–23)
BUN/CREAT SERPL: 18.8 (ref 7–25)
CALCIUM SPEC-SCNC: 9.1 MG/DL (ref 8.6–10.5)
CHLORIDE SERPL-SCNC: 101 MMOL/L (ref 98–107)
CO2 SERPL-SCNC: 24 MMOL/L (ref 22–29)
CREAT SERPL-MCNC: 0.69 MG/DL (ref 0.57–1)
DEPRECATED RDW RBC AUTO: 43.8 FL (ref 37–54)
EGFRCR SERPLBLD CKD-EPI 2021: 97.7 ML/MIN/1.73
EOSINOPHIL # BLD AUTO: 0.2 10*3/MM3 (ref 0–0.4)
EOSINOPHIL NFR BLD AUTO: 2.8 % (ref 0.3–6.2)
ERYTHROCYTE [DISTWIDTH] IN BLOOD BY AUTOMATED COUNT: 14.7 % (ref 12.3–15.4)
GLUCOSE BLDC GLUCOMTR-MCNC: 254 MG/DL (ref 70–105)
GLUCOSE BLDC GLUCOMTR-MCNC: 263 MG/DL (ref 70–105)
GLUCOSE BLDC GLUCOMTR-MCNC: 328 MG/DL (ref 70–105)
GLUCOSE SERPL-MCNC: 273 MG/DL (ref 65–99)
HCT VFR BLD AUTO: 37 % (ref 34–46.6)
HGB BLD-MCNC: 12.7 G/DL (ref 12–15.9)
LYMPHOCYTES # BLD AUTO: 0.6 10*3/MM3 (ref 0.7–3.1)
LYMPHOCYTES NFR BLD AUTO: 6.9 % (ref 19.6–45.3)
MCH RBC QN AUTO: 28.6 PG (ref 26.6–33)
MCHC RBC AUTO-ENTMCNC: 34.2 G/DL (ref 31.5–35.7)
MCV RBC AUTO: 83.6 FL (ref 79–97)
MONOCYTES # BLD AUTO: 0.1 10*3/MM3 (ref 0.1–0.9)
MONOCYTES NFR BLD AUTO: 0.8 % (ref 5–12)
NEUTROPHILS NFR BLD AUTO: 7.6 10*3/MM3 (ref 1.7–7)
NEUTROPHILS NFR BLD AUTO: 89 % (ref 42.7–76)
NRBC BLD AUTO-RTO: 0 /100 WBC (ref 0–0.2)
PLATELET # BLD AUTO: 194 10*3/MM3 (ref 140–450)
PMV BLD AUTO: 8.2 FL (ref 6–12)
POTASSIUM SERPL-SCNC: 4.7 MMOL/L (ref 3.5–5.2)
QT INTERVAL: 416 MS
RBC # BLD AUTO: 4.43 10*6/MM3 (ref 3.77–5.28)
SODIUM SERPL-SCNC: 137 MMOL/L (ref 136–145)
WBC NRBC COR # BLD: 8.5 10*3/MM3 (ref 3.4–10.8)

## 2022-06-29 PROCEDURE — 63710000001 INSULIN GLARGINE PER 5 UNITS: Performed by: NURSE PRACTITIONER

## 2022-06-29 PROCEDURE — 80048 BASIC METABOLIC PNL TOTAL CA: CPT | Performed by: NURSE PRACTITIONER

## 2022-06-29 PROCEDURE — 63710000001 INSULIN LISPRO (HUMAN) PER 5 UNITS: Performed by: PHYSICIAN ASSISTANT

## 2022-06-29 PROCEDURE — 25010000002 MORPHINE PER 10 MG: Performed by: PHYSICIAN ASSISTANT

## 2022-06-29 PROCEDURE — 85025 COMPLETE CBC W/AUTO DIFF WBC: CPT | Performed by: NURSE PRACTITIONER

## 2022-06-29 PROCEDURE — 82962 GLUCOSE BLOOD TEST: CPT

## 2022-06-29 PROCEDURE — 96376 TX/PRO/DX INJ SAME DRUG ADON: CPT

## 2022-06-29 PROCEDURE — 63710000001 PREDNISONE PER 5 MG: Performed by: PHYSICIAN ASSISTANT

## 2022-06-29 PROCEDURE — 94799 UNLISTED PULMONARY SVC/PX: CPT

## 2022-06-29 PROCEDURE — 63710000001 INSULIN LISPRO (HUMAN) PER 5 UNITS: Performed by: NURSE PRACTITIONER

## 2022-06-29 PROCEDURE — 96366 THER/PROPH/DIAG IV INF ADDON: CPT

## 2022-06-29 PROCEDURE — 94761 N-INVAS EAR/PLS OXIMETRY MLT: CPT

## 2022-06-29 PROCEDURE — 25010000002 MORPHINE PER 10 MG: Performed by: NURSE PRACTITIONER

## 2022-06-29 PROCEDURE — 94664 DEMO&/EVAL PT USE INHALER: CPT

## 2022-06-29 PROCEDURE — G0378 HOSPITAL OBSERVATION PER HR: HCPCS

## 2022-06-29 PROCEDURE — 25010000002 CEFTRIAXONE PER 250 MG: Performed by: NURSE PRACTITIONER

## 2022-06-29 PROCEDURE — 25010000002 ONDANSETRON PER 1 MG: Performed by: NURSE PRACTITIONER

## 2022-06-29 PROCEDURE — 63710000001 PREDNISONE PER 1 MG: Performed by: PHYSICIAN ASSISTANT

## 2022-06-29 RX ORDER — PREDNISONE 10 MG/1
30 TABLET ORAL DAILY
Qty: 6 TABLET | Refills: 0 | Status: SHIPPED | OUTPATIENT
Start: 2022-06-29 | End: 2022-07-01

## 2022-06-29 RX ORDER — MORPHINE SULFATE 2 MG/ML
2 INJECTION, SOLUTION INTRAMUSCULAR; INTRAVENOUS
Status: DISCONTINUED | OUTPATIENT
Start: 2022-06-29 | End: 2022-06-29

## 2022-06-29 RX ORDER — INSULIN GLARGINE-YFGN 100 [IU]/ML
20 INJECTION, SOLUTION SUBCUTANEOUS 2 TIMES DAILY
Qty: 15 ML | Refills: 0 | Status: SHIPPED | OUTPATIENT
Start: 2022-06-29 | End: 2022-11-14

## 2022-06-29 RX ORDER — OLANZAPINE 10 MG/2ML
1 INJECTION, POWDER, LYOPHILIZED, FOR SOLUTION INTRAMUSCULAR
Status: DISCONTINUED | OUTPATIENT
Start: 2022-06-29 | End: 2022-06-29 | Stop reason: HOSPADM

## 2022-06-29 RX ORDER — INSULIN LISPRO 100 [IU]/ML
0-24 INJECTION, SOLUTION INTRAVENOUS; SUBCUTANEOUS
Status: DISCONTINUED | OUTPATIENT
Start: 2022-06-29 | End: 2022-06-29 | Stop reason: HOSPADM

## 2022-06-29 RX ORDER — PREDNISONE 10 MG/1
10 TABLET ORAL DAILY
Qty: 12 TABLET | Refills: 0 | Status: SHIPPED | OUTPATIENT
Start: 2022-07-03 | End: 2022-11-14

## 2022-06-29 RX ORDER — INSULIN LISPRO 100 [IU]/ML
0-24 INJECTION, SOLUTION INTRAVENOUS; SUBCUTANEOUS AS NEEDED
Status: DISCONTINUED | OUTPATIENT
Start: 2022-06-29 | End: 2022-06-29 | Stop reason: HOSPADM

## 2022-06-29 RX ORDER — DOXYCYCLINE 100 MG/1
100 TABLET ORAL EVERY 12 HOURS SCHEDULED
Qty: 17 TABLET | Refills: 0 | Status: SHIPPED | OUTPATIENT
Start: 2022-06-29 | End: 2022-07-08

## 2022-06-29 RX ORDER — MORPHINE SULFATE 2 MG/ML
2 INJECTION, SOLUTION INTRAMUSCULAR; INTRAVENOUS ONCE
Status: COMPLETED | OUTPATIENT
Start: 2022-06-29 | End: 2022-06-29

## 2022-06-29 RX ORDER — NALOXONE HCL 0.4 MG/ML
0.4 VIAL (ML) INJECTION
Status: DISCONTINUED | OUTPATIENT
Start: 2022-06-29 | End: 2022-06-29 | Stop reason: HOSPADM

## 2022-06-29 RX ORDER — IPRATROPIUM BROMIDE AND ALBUTEROL SULFATE 2.5; .5 MG/3ML; MG/3ML
3 SOLUTION RESPIRATORY (INHALATION)
Status: DISCONTINUED | OUTPATIENT
Start: 2022-06-29 | End: 2022-06-29 | Stop reason: HOSPADM

## 2022-06-29 RX ORDER — PREDNISONE 10 MG/1
10 TABLET ORAL DAILY
Status: DISCONTINUED | OUTPATIENT
Start: 2022-07-03 | End: 2022-06-29 | Stop reason: HOSPADM

## 2022-06-29 RX ORDER — NICOTINE POLACRILEX 4 MG
15 LOZENGE BUCCAL
Status: DISCONTINUED | OUTPATIENT
Start: 2022-06-29 | End: 2022-06-29 | Stop reason: HOSPADM

## 2022-06-29 RX ORDER — DEXTROSE MONOHYDRATE 25 G/50ML
25 INJECTION, SOLUTION INTRAVENOUS
Status: DISCONTINUED | OUTPATIENT
Start: 2022-06-29 | End: 2022-06-29 | Stop reason: HOSPADM

## 2022-06-29 RX ORDER — PREDNISONE 20 MG/1
20 TABLET ORAL DAILY
Qty: 2 TABLET | Refills: 0 | Status: SHIPPED | OUTPATIENT
Start: 2022-07-01 | End: 2022-07-03

## 2022-06-29 RX ORDER — BUDESONIDE 0.5 MG/2ML
0.5 INHALANT ORAL
Qty: 60 ML | Refills: 0 | Status: SHIPPED | OUTPATIENT
Start: 2022-06-29 | End: 2022-11-14

## 2022-06-29 RX ORDER — IPRATROPIUM BROMIDE AND ALBUTEROL SULFATE 2.5; .5 MG/3ML; MG/3ML
3 SOLUTION RESPIRATORY (INHALATION)
Qty: 360 ML | Refills: 0 | Status: SHIPPED | OUTPATIENT
Start: 2022-06-29 | End: 2022-11-14

## 2022-06-29 RX ORDER — CEFDINIR 300 MG/1
300 CAPSULE ORAL 2 TIMES DAILY
Qty: 14 CAPSULE | Refills: 0 | Status: SHIPPED | OUTPATIENT
Start: 2022-06-29 | End: 2022-07-06

## 2022-06-29 RX ORDER — PREDNISONE 20 MG/1
20 TABLET ORAL DAILY
Status: DISCONTINUED | OUTPATIENT
Start: 2022-07-01 | End: 2022-06-29 | Stop reason: HOSPADM

## 2022-06-29 RX ORDER — LIDOCAINE 50 MG/G
1 PATCH TOPICAL
Status: DISCONTINUED | OUTPATIENT
Start: 2022-06-29 | End: 2022-06-29 | Stop reason: HOSPADM

## 2022-06-29 RX ADMIN — CLONIDINE HYDROCHLORIDE 0.3 MG: 0.1 TABLET ORAL at 13:40

## 2022-06-29 RX ADMIN — INSULIN LISPRO 16 UNITS: 100 INJECTION, SOLUTION INTRAVENOUS; SUBCUTANEOUS at 12:30

## 2022-06-29 RX ADMIN — IPRATROPIUM BROMIDE AND ALBUTEROL SULFATE 3 ML: .5; 3 SOLUTION RESPIRATORY (INHALATION) at 07:25

## 2022-06-29 RX ADMIN — METOPROLOL TARTRATE 100 MG: 50 TABLET, FILM COATED ORAL at 08:07

## 2022-06-29 RX ADMIN — INSULIN LISPRO 6 UNITS: 100 INJECTION, SOLUTION INTRAVENOUS; SUBCUTANEOUS at 08:08

## 2022-06-29 RX ADMIN — MORPHINE SULFATE 2 MG: 2 INJECTION, SOLUTION INTRAMUSCULAR; INTRAVENOUS at 15:10

## 2022-06-29 RX ADMIN — LISINOPRIL 40 MG: 20 TABLET ORAL at 08:08

## 2022-06-29 RX ADMIN — INSULIN GLARGINE 20 UNITS: 100 INJECTION, SOLUTION SUBCUTANEOUS at 08:08

## 2022-06-29 RX ADMIN — PANTOPRAZOLE SODIUM 40 MG: 40 TABLET, DELAYED RELEASE ORAL at 06:05

## 2022-06-29 RX ADMIN — IPRATROPIUM BROMIDE AND ALBUTEROL SULFATE 3 ML: .5; 3 SOLUTION RESPIRATORY (INHALATION) at 11:30

## 2022-06-29 RX ADMIN — AMLODIPINE BESYLATE 10 MG: 5 TABLET ORAL at 08:07

## 2022-06-29 RX ADMIN — CLONIDINE HYDROCHLORIDE 0.3 MG: 0.1 TABLET ORAL at 06:05

## 2022-06-29 RX ADMIN — ONDANSETRON 4 MG: 2 INJECTION INTRAMUSCULAR; INTRAVENOUS at 04:08

## 2022-06-29 RX ADMIN — SILDENAFIL 10 MG: 20 TABLET, FILM COATED ORAL at 13:41

## 2022-06-29 RX ADMIN — SENNOSIDES AND DOCUSATE SODIUM 2 TABLET: 50; 8.6 TABLET ORAL at 08:07

## 2022-06-29 RX ADMIN — DOXYCYCLINE 100 MG: 100 TABLET ORAL at 08:07

## 2022-06-29 RX ADMIN — TOPIRAMATE 50 MG: 25 TABLET, FILM COATED ORAL at 08:07

## 2022-06-29 RX ADMIN — SILDENAFIL 10 MG: 20 TABLET, FILM COATED ORAL at 06:05

## 2022-06-29 RX ADMIN — CEFTRIAXONE 1 G: 1 INJECTION, POWDER, FOR SOLUTION INTRAMUSCULAR; INTRAVENOUS at 08:08

## 2022-06-29 RX ADMIN — MORPHINE SULFATE 1 MG: 2 INJECTION, SOLUTION INTRAMUSCULAR; INTRAVENOUS at 04:08

## 2022-06-29 RX ADMIN — PREDNISONE 30 MG: 20 TABLET ORAL at 13:41

## 2022-06-29 RX ADMIN — MORPHINE SULFATE 2 MG: 2 INJECTION, SOLUTION INTRAMUSCULAR; INTRAVENOUS at 11:43

## 2022-06-29 RX ADMIN — MORPHINE SULFATE 1 MG: 2 INJECTION, SOLUTION INTRAMUSCULAR; INTRAVENOUS at 08:08

## 2022-06-29 RX ADMIN — Medication 1 CAPSULE: at 08:07

## 2022-06-29 RX ADMIN — HYDRALAZINE HYDROCHLORIDE 75 MG: 25 TABLET, FILM COATED ORAL at 08:07

## 2022-06-29 RX ADMIN — BUDESONIDE 0.5 MG: 0.5 INHALANT RESPIRATORY (INHALATION) at 07:25

## 2022-06-30 LAB — QT INTERVAL: 437 MS

## 2022-07-02 LAB
BACTERIA SPEC AEROBE CULT: NORMAL
BACTERIA SPEC AEROBE CULT: NORMAL

## 2022-07-06 ENCOUNTER — OFFICE VISIT (OUTPATIENT)
Dept: PULMONOLOGY | Facility: HOSPITAL | Age: 62
End: 2022-07-06

## 2022-07-06 VITALS
TEMPERATURE: 98.1 F | WEIGHT: 223 LBS | RESPIRATION RATE: 13 BRPM | OXYGEN SATURATION: 93 % | SYSTOLIC BLOOD PRESSURE: 106 MMHG | HEIGHT: 63 IN | DIASTOLIC BLOOD PRESSURE: 65 MMHG | HEART RATE: 79 BPM | BODY MASS INDEX: 39.51 KG/M2

## 2022-07-06 DIAGNOSIS — G25.81 RLS (RESTLESS LEGS SYNDROME): Chronic | ICD-10-CM

## 2022-07-06 DIAGNOSIS — G47.33 OBSTRUCTIVE SLEEP APNEA: ICD-10-CM

## 2022-07-06 DIAGNOSIS — J44.9 CHRONIC OBSTRUCTIVE PULMONARY DISEASE, UNSPECIFIED COPD TYPE: Chronic | ICD-10-CM

## 2022-07-06 DIAGNOSIS — E66.01 OBESITY, CLASS III, BMI 40-49.9 (MORBID OBESITY): Chronic | ICD-10-CM

## 2022-07-06 DIAGNOSIS — J96.11 CHRONIC RESPIRATORY FAILURE WITH HYPOXIA: Primary | Chronic | ICD-10-CM

## 2022-07-06 PROCEDURE — G0463 HOSPITAL OUTPT CLINIC VISIT: HCPCS

## 2022-07-06 NOTE — PROGRESS NOTES
SLEEP/PULMONARY  CLINIC NOTE      PATIENT IDENTIFICATION:  Name: Ann Álvarez  Age: 62 y.o.  Sex: female  :  1960  MRN: [id number]    DATE OF CONSULTATION:  2022                     CHIEF COMPLAINT: Follow-up hospitalization    History of Present Illness:   Ann Álvarez is a 62 y.o. female patient was recently discharged from the hospital after treatment for pneumonia and hemoptysis, she feels significantly better finished her medication no more coughing she still on her 2 L oxygen no severe dyspnea exertion  Patient still having symptoms of tiredness and fatigues even with using oxygen while sleeping      Review of Systems:   Constitutional:  As above   Eyes: negative   ENT/oropharynx:  As above   Cardiovascular: negative   Respiratory:  As above   Gastrointestinal: negative   Genitourinary: negative   Neurological: negative   Musculoskeletal: negative   Integument/breast: negative   Endocrine: negative   Allergic/Immunologic: negative     Past Medical History:  Past Medical History:   Diagnosis Date   • Anxiety    • Arthritis    • Chronic back pain    • Chronic obstructive pulmonary disease (HCC) 2020   • Chronic respiratory failure with hypoxia ([facility]) 2018    O2 @ 2 L per NC as needed   • Diabetes mellitus ([facility])    • Dyslipidemia 10/21/2014   • Edema, lower extremity 2014   • Hypertension    • Insomnia    • Obesity, Class III, BMI 40-49.9 (morbid obesity) ([facility]) 2022   • Prediabetes 2018   • Primary hypertension 2012   • PTSD (post-traumatic stress disorder)    • RLS (restless legs syndrome) 2020   • Tobacco abuse 10/21/2014   • Vitamin D deficiency 2012       Past Surgical History:  Past Surgical History:   Procedure Laterality Date   • BACK SURGERY     • CHOLECYSTECTOMY     • HYSTERECTOMY     • TUMOR REMOVAL      benign breats tumor        Family History:  Family History   Problem Relation Age of Onset   • Heart disease Mother    • Heart disease Father     • Cancer Sister    • Cancer Brother         Social History:   Social History     Tobacco Use   • Smoking status: Former Smoker     Packs/day: 0.25     Types: Cigarettes     Quit date: 2022     Years since quittin.0   • Smokeless tobacco: Never Used   Substance Use Topics   • Alcohol use: Not Currently        Allergies:  Allergies   Allergen Reactions   • Codeine Hives, Itching and Nausea And Vomiting       Home Meds:  (Not in a hospital admission)      Objective:    Vitals Ranges:   Temp:  [98.1 °F (36.7 °C)] 98.1 °F (36.7 °C)  Heart Rate:  [79] 79  Resp:  [13] 13  BP: (106)/(65) 106/65  Body mass index is 39.5 kg/m².     Exam:  General Appearance:  WDWN    HEENT:   without obvious abnormality,  Conjunctiva/corneas clear,  Normal external ear canals, no drainage    Clear orsalmucosa,  Mallampati score 3    Neck:  Supple, symmetrical, trachea midline. No JVD.  Lungs:   Bilateral basal rhonchi bilaterally, respirations unlabored symmetrical wall movement.    Chest wall:  No tenderness or deformity.    Heart:  Regular rate and rhythm, S1 and S2 normal.  Extremities: Trace edema no clubbing or Cyanosis        Data Review:  All labs (24hrs): No results found for this or any previous visit (from the past 24 hour(s)).     Imaging:  CT Chest Without Contrast Diagnostic  Narrative: CT CHEST WO CONTRAST DIAGNOSTIC-     Date of Exam: 2022 9:59 PM     Indication: Coughing up blood; R07.9-Chest pain, unspecified;  R04.2-Hemoptysis.     Comparison: Portable chest 2022, CT chest 2022     Technique: Serial and axial CT images of the chest were obtained.  Reconstructions in the coronal and sagittal planes were performed.   Automated exposure control and iterative reconstruction methods were  used.     FINDINGS:  There is a left prevascular node with transverse diameter is slightly  smaller than on the prior. Lower paratracheal node has transverse  millimeters, decreased from the prior. Hilar evaluation is  limited  without contrast. No axillary adenopathy. There are calcified right  hilar nodes. There is mild aortic calcification. Heart size is within  normal.     There is mild emphysema in the upper lungs. There are some bandlike  opacities in the lower lobes, with some increase from the prior. There  are no areas of airspace consolidation. No significant groundglass  opacity. No developing pulmonary nodules are seen. No bronchial  abnormality.     Limited abdominal imaging shows prior cholecystectomy. There is hepatic  steatosis. The spleen is incompletely included and is probably about  14.5 cm in length, probably decreased from prior. Bone window show no  significant abnormality.     Impression: 1. There is mild mediastinal adenopathy, which is decreased from  03/28/2022.  2. The spleen is incompletely included but appears slightly enlarged,  probably decreased from the prior.  3. Hepatic steatosis.  4. There are mild areas of bandlike atelectasis in the lungs. There is  mild emphysema.     Electronically Signed By-Ella Recinos MD On:6/27/2022 11:21 PM  This report was finalized on [id number] by  Ella Recinos MD.  XR Chest 1 View  Narrative: Examination: XR CHEST 1 VW-     Date of Exam: 6/27/2022 6:02 PM     Indication: Chest pain coughing up blood.       Comparison: 3 03/01/2022     Technique: 1 view of the chest      FINDINGS:  The heart size is within normal. Mediastinal and hilar contours are  unremarkable. There are some mild bandlike opacities in the left lower  lung, decreased from prior exam. There is no pleural effusion or  pneumothorax. No bone abnormality is seen.     Impression: 1. Mild left basal opacity is favored to beatelectasis. Pneumonia is  considered less likely.     Electronically Signed By-Ella Recinos MD On:6/27/2022 6:18 PM  This report was finalized on [id number] by  Ella Recinos MD.       ASSESSMENT:  Diagnoses and all orders for this visit:    Chronic respiratory failure with  hypoxia (HCC)    Chronic obstructive pulmonary disease, unspecified COPD type (HCC)    RLS (restless legs syndrome)    Obstructive sleep apnea    Obesity, Class III, BMI 40-49.9 (morbid obesity) (HCC)        PLAN:   This is patient with symptoms of obstructive sleep apnea, NPSG study ASAP / split night study, Avoid supine avoid sedative meds in pm, weight loss, Avoid driving. Long discussion with patient about the physiology of RC, and long term and short term   benefit of treating RC   Continue home oxygen  Bronchodilator inhaled corticosteroid and nebulizer    Education how to use inhalers    Encouraged to use incentive spirometer    Continue to exercise slowly as tolerated    Monitor for any change in the color of the sputum    Avoid any exposure to fumes, gas or any irritant    Continue current medication for RLS     Follow-up after sleep study    Maxime Hernandez MD. D, ABSM.  7/6/2022  14:28 EDT

## 2022-07-19 ENCOUNTER — TELEPHONE (OUTPATIENT)
Dept: PULMONOLOGY | Facility: HOSPITAL | Age: 62
End: 2022-07-19

## 2022-07-28 DIAGNOSIS — G47.33 OSA (OBSTRUCTIVE SLEEP APNEA): Primary | ICD-10-CM

## 2022-08-03 NOTE — TELEPHONE ENCOUNTER
Per Dr. Hernandez, find out if she is agreeable to doing an in-lab sleep study. He did go ahead place an order for it.     I spoke with patient and she does not want to have it done here at the hospital and said to forget about the whole thing. I asked if I could place her on hold so I could find out of any other options and she stated to forget the whole thing and cancel it all together. I let her know I would get a message to Dr. Hernandez's office.

## 2022-10-07 ENCOUNTER — LAB (OUTPATIENT)
Dept: LAB | Facility: HOSPITAL | Age: 62
End: 2022-10-07

## 2022-10-07 ENCOUNTER — TRANSCRIBE ORDERS (OUTPATIENT)
Dept: ADMINISTRATIVE | Facility: HOSPITAL | Age: 62
End: 2022-10-07

## 2022-10-07 DIAGNOSIS — M47.816 LUMBAR SPONDYLOSIS: ICD-10-CM

## 2022-10-07 DIAGNOSIS — J44.9 OBSTRUCTIVE CHRONIC BRONCHITIS WITHOUT EXACERBATION: ICD-10-CM

## 2022-10-07 DIAGNOSIS — M51.9 INTERVERTEBRAL DISC DISORDER: ICD-10-CM

## 2022-10-07 DIAGNOSIS — M54.16 LUMBAR RADICULOPATHY: ICD-10-CM

## 2022-10-07 DIAGNOSIS — M47.816 LUMBAR SPONDYLOSIS: Primary | ICD-10-CM

## 2022-10-07 PROCEDURE — G0480 DRUG TEST DEF 1-7 CLASSES: HCPCS

## 2022-10-07 PROCEDURE — 80179 DRUG ASSAY SALICYLATE: CPT

## 2022-10-07 PROCEDURE — 36415 COLL VENOUS BLD VENIPUNCTURE: CPT

## 2022-10-07 PROCEDURE — 80184 ASSAY OF PHENOBARBITAL: CPT

## 2022-10-07 PROCEDURE — 82077 ASSAY SPEC XCP UR&BREATH IA: CPT

## 2022-10-12 LAB
ACETONE SERPL-MCNC: <.01 G/DL (ref 0–0.01)
BUTALBITAL SERPL-MCNC: <1 UG/ML (ref 1–10)
CHLORDIAZEP SERPL-MCNC: <0.1 UG/ML (ref 0.1–0.9)
DIAZEPAM SERPL-MCNC: 0.3 UG/ML (ref 0.1–0.9)
ETHANOL BLD GC-MCNC: <.01 G/DL (ref 0–0.01)
ISOPROPANOL SERPL-MCNC: <.01 G/DL (ref 0–0.01)
Lab: ABNORMAL
METHANOL SERPL-MCNC: <.01 G/DL (ref 0–0.01)
NORCHLORDIAZEP SERPL-MCNC: <0.1 UG/ML (ref 0.1–0.6)
NORDIAZEPAM SERPL-MCNC: 0.7 UG/ML (ref 0.1–1.4)
PENTOBARB SERPL-MCNC: <1 UG/ML (ref 1–5)
PHENOBARB SERPL-MCNC: <1 UG/ML (ref 15–40)
SALICYLATES SERPL-MCNC: ABNORMAL UG/ML (ref 30–250)

## 2022-11-14 ENCOUNTER — APPOINTMENT (OUTPATIENT)
Dept: CARDIOLOGY | Facility: HOSPITAL | Age: 62
End: 2022-11-14

## 2022-11-14 ENCOUNTER — APPOINTMENT (OUTPATIENT)
Dept: CT IMAGING | Facility: HOSPITAL | Age: 62
End: 2022-11-14

## 2022-11-14 ENCOUNTER — APPOINTMENT (OUTPATIENT)
Dept: GENERAL RADIOLOGY | Facility: HOSPITAL | Age: 62
End: 2022-11-14

## 2022-11-14 ENCOUNTER — HOSPITAL ENCOUNTER (INPATIENT)
Facility: HOSPITAL | Age: 62
LOS: 2 days | Discharge: HOME | End: 2022-11-19
Attending: EMERGENCY MEDICINE | Admitting: INTERNAL MEDICINE

## 2022-11-14 DIAGNOSIS — R06.02 SHORTNESS OF BREATH: Primary | ICD-10-CM

## 2022-11-14 DIAGNOSIS — R07.9 CHEST PAIN, UNSPECIFIED TYPE: ICD-10-CM

## 2022-11-14 DIAGNOSIS — N39.0 URINARY TRACT INFECTION WITHOUT HEMATURIA, SITE UNSPECIFIED: ICD-10-CM

## 2022-11-14 DIAGNOSIS — I26.99 OTHER ACUTE PULMONARY EMBOLISM, UNSPECIFIED WHETHER ACUTE COR PULMONALE PRESENT: ICD-10-CM

## 2022-11-14 LAB
ALBUMIN SERPL-MCNC: 3.6 G/DL (ref 3.5–5.2)
ALBUMIN/GLOB SERPL: 1.3 G/DL
ALP SERPL-CCNC: 66 U/L (ref 39–117)
ALT SERPL W P-5'-P-CCNC: 9 U/L (ref 1–33)
ANION GAP SERPL CALCULATED.3IONS-SCNC: 6 MMOL/L (ref 5–15)
APTT PPP: 28.8 SECONDS (ref 61–76.5)
APTT PPP: 94.5 SECONDS (ref 61–76.5)
AST SERPL-CCNC: 9 U/L (ref 1–32)
BACTERIA UR QL AUTO: ABNORMAL /HPF
BASOPHILS # BLD AUTO: 0 10*3/MM3 (ref 0–0.2)
BASOPHILS NFR BLD AUTO: 0.9 % (ref 0–1.5)
BH CV LOWER VASCULAR LEFT COMMON FEMORAL AUGMENT: NORMAL
BH CV LOWER VASCULAR LEFT COMMON FEMORAL COMPETENT: NORMAL
BH CV LOWER VASCULAR LEFT COMMON FEMORAL COMPRESS: NORMAL
BH CV LOWER VASCULAR LEFT COMMON FEMORAL PHASIC: NORMAL
BH CV LOWER VASCULAR LEFT COMMON FEMORAL SPONT: NORMAL
BH CV LOWER VASCULAR LEFT DISTAL FEMORAL COMPRESS: NORMAL
BH CV LOWER VASCULAR LEFT GASTRONEMIUS COMPRESS: NORMAL
BH CV LOWER VASCULAR LEFT GREATER SAPH AK COMPRESS: NORMAL
BH CV LOWER VASCULAR LEFT GREATER SAPH BK COMPRESS: NORMAL
BH CV LOWER VASCULAR LEFT MID FEMORAL AUGMENT: NORMAL
BH CV LOWER VASCULAR LEFT MID FEMORAL COMPETENT: NORMAL
BH CV LOWER VASCULAR LEFT MID FEMORAL COMPRESS: NORMAL
BH CV LOWER VASCULAR LEFT MID FEMORAL PHASIC: NORMAL
BH CV LOWER VASCULAR LEFT MID FEMORAL SPONT: NORMAL
BH CV LOWER VASCULAR LEFT PERONEAL COMPRESS: NORMAL
BH CV LOWER VASCULAR LEFT POPLITEAL AUGMENT: NORMAL
BH CV LOWER VASCULAR LEFT POPLITEAL COMPETENT: NORMAL
BH CV LOWER VASCULAR LEFT POPLITEAL COMPRESS: NORMAL
BH CV LOWER VASCULAR LEFT POPLITEAL PHASIC: NORMAL
BH CV LOWER VASCULAR LEFT POPLITEAL SPONT: NORMAL
BH CV LOWER VASCULAR LEFT POSTERIOR TIBIAL COMPRESS: NORMAL
BH CV LOWER VASCULAR LEFT PROXIMAL FEMORAL COMPRESS: NORMAL
BH CV LOWER VASCULAR LEFT SAPHENOFEMORAL JUNCTION COMPRESS: NORMAL
BH CV LOWER VASCULAR RIGHT COMMON FEMORAL AUGMENT: NORMAL
BH CV LOWER VASCULAR RIGHT COMMON FEMORAL COMPETENT: NORMAL
BH CV LOWER VASCULAR RIGHT COMMON FEMORAL COMPRESS: NORMAL
BH CV LOWER VASCULAR RIGHT COMMON FEMORAL PHASIC: NORMAL
BH CV LOWER VASCULAR RIGHT COMMON FEMORAL SPONT: NORMAL
BH CV LOWER VASCULAR RIGHT DISTAL FEMORAL COMPRESS: NORMAL
BH CV LOWER VASCULAR RIGHT GASTRONEMIUS COMPRESS: NORMAL
BH CV LOWER VASCULAR RIGHT GREATER SAPH AK COMPRESS: NORMAL
BH CV LOWER VASCULAR RIGHT GREATER SAPH BK COMPRESS: NORMAL
BH CV LOWER VASCULAR RIGHT MID FEMORAL AUGMENT: NORMAL
BH CV LOWER VASCULAR RIGHT MID FEMORAL COMPETENT: NORMAL
BH CV LOWER VASCULAR RIGHT MID FEMORAL COMPRESS: NORMAL
BH CV LOWER VASCULAR RIGHT MID FEMORAL PHASIC: NORMAL
BH CV LOWER VASCULAR RIGHT MID FEMORAL SPONT: NORMAL
BH CV LOWER VASCULAR RIGHT PERONEAL COMPRESS: NORMAL
BH CV LOWER VASCULAR RIGHT POPLITEAL AUGMENT: NORMAL
BH CV LOWER VASCULAR RIGHT POPLITEAL COMPETENT: NORMAL
BH CV LOWER VASCULAR RIGHT POPLITEAL COMPRESS: NORMAL
BH CV LOWER VASCULAR RIGHT POPLITEAL PHASIC: NORMAL
BH CV LOWER VASCULAR RIGHT POPLITEAL SPONT: NORMAL
BH CV LOWER VASCULAR RIGHT POSTERIOR TIBIAL COMPRESS: NORMAL
BH CV LOWER VASCULAR RIGHT PROXIMAL FEMORAL COMPRESS: NORMAL
BH CV LOWER VASCULAR RIGHT SAPHENOFEMORAL JUNCTION COMPRESS: NORMAL
BILIRUB SERPL-MCNC: 0.3 MG/DL (ref 0–1.2)
BILIRUB UR QL STRIP: NEGATIVE
BUN SERPL-MCNC: 11 MG/DL (ref 8–23)
BUN/CREAT SERPL: 13.3 (ref 7–25)
CALCIUM SPEC-SCNC: 9 MG/DL (ref 8.6–10.5)
CHLORIDE SERPL-SCNC: 97 MMOL/L (ref 98–107)
CLARITY UR: CLEAR
CO2 SERPL-SCNC: 36 MMOL/L (ref 22–29)
COLOR UR: YELLOW
CREAT SERPL-MCNC: 0.83 MG/DL (ref 0.57–1)
D DIMER PPP FEU-MCNC: 1.47 MG/L (FEU) (ref 0–0.59)
D-LACTATE SERPL-SCNC: 0.9 MMOL/L (ref 0.5–2)
DEPRECATED RDW RBC AUTO: 48.1 FL (ref 37–54)
EGFRCR SERPLBLD CKD-EPI 2021: 79.3 ML/MIN/1.73
EOSINOPHIL # BLD AUTO: 0.1 10*3/MM3 (ref 0–0.4)
EOSINOPHIL NFR BLD AUTO: 1.6 % (ref 0.3–6.2)
ERYTHROCYTE [DISTWIDTH] IN BLOOD BY AUTOMATED COUNT: 16 % (ref 12.3–15.4)
GLOBULIN UR ELPH-MCNC: 2.8 GM/DL
GLUCOSE BLDC GLUCOMTR-MCNC: 126 MG/DL (ref 70–105)
GLUCOSE SERPL-MCNC: 142 MG/DL (ref 65–99)
GLUCOSE UR STRIP-MCNC: NEGATIVE MG/DL
HBA1C MFR BLD: 6.8 % (ref 3.5–5.6)
HCT VFR BLD AUTO: 33.4 % (ref 34–46.6)
HGB BLD-MCNC: 10.5 G/DL (ref 12–15.9)
HGB UR QL STRIP.AUTO: NEGATIVE
HOLD SPECIMEN: NORMAL
HYALINE CASTS UR QL AUTO: ABNORMAL /LPF
INR PPP: 0.99 (ref 0.93–1.1)
KETONES UR QL STRIP: NEGATIVE
LEUKOCYTE ESTERASE UR QL STRIP.AUTO: ABNORMAL
LYMPHOCYTES # BLD AUTO: 1.6 10*3/MM3 (ref 0.7–3.1)
LYMPHOCYTES NFR BLD AUTO: 28.7 % (ref 19.6–45.3)
MAXIMAL PREDICTED HEART RATE: 157 BPM
MCH RBC QN AUTO: 27 PG (ref 26.6–33)
MCHC RBC AUTO-ENTMCNC: 31.3 G/DL (ref 31.5–35.7)
MCV RBC AUTO: 86.1 FL (ref 79–97)
MONOCYTES # BLD AUTO: 0.3 10*3/MM3 (ref 0.1–0.9)
MONOCYTES NFR BLD AUTO: 4.7 % (ref 5–12)
NEUTROPHILS NFR BLD AUTO: 3.6 10*3/MM3 (ref 1.7–7)
NEUTROPHILS NFR BLD AUTO: 64.1 % (ref 42.7–76)
NITRITE UR QL STRIP: POSITIVE
NRBC BLD AUTO-RTO: 0 /100 WBC (ref 0–0.2)
NT-PROBNP SERPL-MCNC: 404.5 PG/ML (ref 0–900)
PH UR STRIP.AUTO: 6.5 [PH] (ref 5–8)
PLATELET # BLD AUTO: 222 10*3/MM3 (ref 140–450)
PMV BLD AUTO: 7.4 FL (ref 6–12)
POTASSIUM SERPL-SCNC: 3.9 MMOL/L (ref 3.5–5.2)
PROT SERPL-MCNC: 6.4 G/DL (ref 6–8.5)
PROT UR QL STRIP: ABNORMAL
PROTHROMBIN TIME: 10.2 SECONDS (ref 9.6–11.7)
RBC # BLD AUTO: 3.88 10*6/MM3 (ref 3.77–5.28)
RBC # UR STRIP: ABNORMAL /HPF
REF LAB TEST METHOD: ABNORMAL
SODIUM SERPL-SCNC: 139 MMOL/L (ref 136–145)
SP GR UR STRIP: 1.01 (ref 1–1.03)
SQUAMOUS #/AREA URNS HPF: ABNORMAL /HPF
STRESS TARGET HR: 133 BPM
TROPONIN T SERPL-MCNC: <0.01 NG/ML (ref 0–0.03)
TROPONIN T SERPL-MCNC: <0.01 NG/ML (ref 0–0.03)
UROBILINOGEN UR QL STRIP: ABNORMAL
WBC # UR STRIP: ABNORMAL /HPF
WBC NRBC COR # BLD: 5.5 10*3/MM3 (ref 3.4–10.8)

## 2022-11-14 PROCEDURE — 85379 FIBRIN DEGRADATION QUANT: CPT | Performed by: EMERGENCY MEDICINE

## 2022-11-14 PROCEDURE — 82962 GLUCOSE BLOOD TEST: CPT

## 2022-11-14 PROCEDURE — 93005 ELECTROCARDIOGRAM TRACING: CPT

## 2022-11-14 PROCEDURE — 87077 CULTURE AEROBIC IDENTIFY: CPT | Performed by: EMERGENCY MEDICINE

## 2022-11-14 PROCEDURE — 25010000002 ONDANSETRON PER 1 MG: Performed by: EMERGENCY MEDICINE

## 2022-11-14 PROCEDURE — 0 IOPAMIDOL PER 1 ML: Performed by: EMERGENCY MEDICINE

## 2022-11-14 PROCEDURE — 85730 THROMBOPLASTIN TIME PARTIAL: CPT | Performed by: EMERGENCY MEDICINE

## 2022-11-14 PROCEDURE — 80053 COMPREHEN METABOLIC PANEL: CPT | Performed by: EMERGENCY MEDICINE

## 2022-11-14 PROCEDURE — 87186 SC STD MICRODIL/AGAR DIL: CPT | Performed by: EMERGENCY MEDICINE

## 2022-11-14 PROCEDURE — 84484 ASSAY OF TROPONIN QUANT: CPT | Performed by: PHYSICIAN ASSISTANT

## 2022-11-14 PROCEDURE — 83880 ASSAY OF NATRIURETIC PEPTIDE: CPT | Performed by: PHYSICIAN ASSISTANT

## 2022-11-14 PROCEDURE — 83605 ASSAY OF LACTIC ACID: CPT

## 2022-11-14 PROCEDURE — 87086 URINE CULTURE/COLONY COUNT: CPT | Performed by: EMERGENCY MEDICINE

## 2022-11-14 PROCEDURE — 25010000002 ONDANSETRON PER 1 MG: Performed by: PHYSICIAN ASSISTANT

## 2022-11-14 PROCEDURE — 87040 BLOOD CULTURE FOR BACTERIA: CPT | Performed by: EMERGENCY MEDICINE

## 2022-11-14 PROCEDURE — G0378 HOSPITAL OBSERVATION PER HR: HCPCS

## 2022-11-14 PROCEDURE — 71275 CT ANGIOGRAPHY CHEST: CPT

## 2022-11-14 PROCEDURE — 85025 COMPLETE CBC W/AUTO DIFF WBC: CPT | Performed by: EMERGENCY MEDICINE

## 2022-11-14 PROCEDURE — 94640 AIRWAY INHALATION TREATMENT: CPT

## 2022-11-14 PROCEDURE — 93005 ELECTROCARDIOGRAM TRACING: CPT | Performed by: EMERGENCY MEDICINE

## 2022-11-14 PROCEDURE — 83036 HEMOGLOBIN GLYCOSYLATED A1C: CPT | Performed by: PHYSICIAN ASSISTANT

## 2022-11-14 PROCEDURE — 25010000002 CEFTRIAXONE PER 250 MG: Performed by: EMERGENCY MEDICINE

## 2022-11-14 PROCEDURE — 25010000002 MORPHINE PER 10 MG: Performed by: INTERNAL MEDICINE

## 2022-11-14 PROCEDURE — 99285 EMERGENCY DEPT VISIT HI MDM: CPT

## 2022-11-14 PROCEDURE — 81001 URINALYSIS AUTO W/SCOPE: CPT | Performed by: EMERGENCY MEDICINE

## 2022-11-14 PROCEDURE — 84484 ASSAY OF TROPONIN QUANT: CPT | Performed by: EMERGENCY MEDICINE

## 2022-11-14 PROCEDURE — P9612 CATHETERIZE FOR URINE SPEC: HCPCS

## 2022-11-14 PROCEDURE — 85610 PROTHROMBIN TIME: CPT | Performed by: EMERGENCY MEDICINE

## 2022-11-14 PROCEDURE — 71045 X-RAY EXAM CHEST 1 VIEW: CPT

## 2022-11-14 PROCEDURE — 93970 EXTREMITY STUDY: CPT

## 2022-11-14 PROCEDURE — 25010000002 MORPHINE PER 10 MG: Performed by: EMERGENCY MEDICINE

## 2022-11-14 PROCEDURE — 25010000002 HEPARIN (PORCINE) 25000-0.45 UT/250ML-% SOLUTION: Performed by: EMERGENCY MEDICINE

## 2022-11-14 PROCEDURE — 94799 UNLISTED PULMONARY SVC/PX: CPT

## 2022-11-14 RX ORDER — POLYETHYLENE GLYCOL 3350 17 G/17G
17 POWDER, FOR SOLUTION ORAL DAILY PRN
Status: DISCONTINUED | OUTPATIENT
Start: 2022-11-14 | End: 2022-11-19 | Stop reason: HOSPADM

## 2022-11-14 RX ORDER — BUDESONIDE 0.5 MG/2ML
0.5 INHALANT ORAL 2 TIMES DAILY
COMMUNITY

## 2022-11-14 RX ORDER — HYDRALAZINE HYDROCHLORIDE 25 MG/1
25 TABLET, FILM COATED ORAL 3 TIMES DAILY PRN
COMMUNITY

## 2022-11-14 RX ORDER — OXYCODONE HYDROCHLORIDE 5 MG/1
5 TABLET ORAL EVERY 4 HOURS PRN
Status: DISCONTINUED | OUTPATIENT
Start: 2022-11-14 | End: 2022-11-19 | Stop reason: HOSPADM

## 2022-11-14 RX ORDER — DEXTROSE MONOHYDRATE 25 G/50ML
25 INJECTION, SOLUTION INTRAVENOUS
Status: DISCONTINUED | OUTPATIENT
Start: 2022-11-14 | End: 2022-11-19 | Stop reason: HOSPADM

## 2022-11-14 RX ORDER — NICOTINE POLACRILEX 4 MG
15 LOZENGE BUCCAL
Status: DISCONTINUED | OUTPATIENT
Start: 2022-11-14 | End: 2022-11-19 | Stop reason: HOSPADM

## 2022-11-14 RX ORDER — SODIUM CHLORIDE 0.9 % (FLUSH) 0.9 %
10 SYRINGE (ML) INJECTION AS NEEDED
Status: DISCONTINUED | OUTPATIENT
Start: 2022-11-14 | End: 2022-11-19 | Stop reason: HOSPADM

## 2022-11-14 RX ORDER — SODIUM CHLORIDE 0.9 % (FLUSH) 0.9 %
10 SYRINGE (ML) INJECTION EVERY 12 HOURS SCHEDULED
Status: DISCONTINUED | OUTPATIENT
Start: 2022-11-14 | End: 2022-11-19 | Stop reason: HOSPADM

## 2022-11-14 RX ORDER — MAGNESIUM SULFATE 1 G/100ML
1 INJECTION INTRAVENOUS AS NEEDED
Status: DISCONTINUED | OUTPATIENT
Start: 2022-11-14 | End: 2022-11-19 | Stop reason: HOSPADM

## 2022-11-14 RX ORDER — IPRATROPIUM BROMIDE AND ALBUTEROL SULFATE 2.5; .5 MG/3ML; MG/3ML
3 SOLUTION RESPIRATORY (INHALATION) 3 TIMES DAILY PRN
COMMUNITY

## 2022-11-14 RX ORDER — MAGNESIUM SULFATE HEPTAHYDRATE 40 MG/ML
2 INJECTION, SOLUTION INTRAVENOUS AS NEEDED
Status: DISCONTINUED | OUTPATIENT
Start: 2022-11-14 | End: 2022-11-19 | Stop reason: HOSPADM

## 2022-11-14 RX ORDER — BISACODYL 10 MG
10 SUPPOSITORY, RECTAL RECTAL DAILY PRN
Status: DISCONTINUED | OUTPATIENT
Start: 2022-11-14 | End: 2022-11-19 | Stop reason: HOSPADM

## 2022-11-14 RX ORDER — ACETAMINOPHEN 650 MG/1
650 SUPPOSITORY RECTAL EVERY 4 HOURS PRN
Status: DISCONTINUED | OUTPATIENT
Start: 2022-11-14 | End: 2022-11-19 | Stop reason: HOSPADM

## 2022-11-14 RX ORDER — ONDANSETRON 2 MG/ML
4 INJECTION INTRAMUSCULAR; INTRAVENOUS EVERY 6 HOURS PRN
Status: DISCONTINUED | OUTPATIENT
Start: 2022-11-14 | End: 2022-11-19 | Stop reason: HOSPADM

## 2022-11-14 RX ORDER — NITROGLYCERIN 0.4 MG/1
0.4 TABLET SUBLINGUAL
Status: DISCONTINUED | OUTPATIENT
Start: 2022-11-14 | End: 2022-11-19 | Stop reason: HOSPADM

## 2022-11-14 RX ORDER — DIAZEPAM 5 MG/1
5 TABLET ORAL 4 TIMES DAILY PRN
COMMUNITY

## 2022-11-14 RX ORDER — ONDANSETRON 2 MG/ML
4 INJECTION INTRAMUSCULAR; INTRAVENOUS ONCE
Status: COMPLETED | OUTPATIENT
Start: 2022-11-14 | End: 2022-11-14

## 2022-11-14 RX ORDER — CHOLECALCIFEROL (VITAMIN D3) 125 MCG
5 CAPSULE ORAL NIGHTLY PRN
Status: DISCONTINUED | OUTPATIENT
Start: 2022-11-14 | End: 2022-11-19 | Stop reason: HOSPADM

## 2022-11-14 RX ORDER — OLANZAPINE 10 MG/2ML
1 INJECTION, POWDER, LYOPHILIZED, FOR SOLUTION INTRAMUSCULAR
Status: DISCONTINUED | OUTPATIENT
Start: 2022-11-14 | End: 2022-11-19 | Stop reason: HOSPADM

## 2022-11-14 RX ORDER — TRAZODONE HYDROCHLORIDE 100 MG/1
100 TABLET ORAL NIGHTLY
Status: DISCONTINUED | OUTPATIENT
Start: 2022-11-14 | End: 2022-11-19 | Stop reason: HOSPADM

## 2022-11-14 RX ORDER — ACETAMINOPHEN 325 MG/1
650 TABLET ORAL EVERY 4 HOURS PRN
Status: DISCONTINUED | OUTPATIENT
Start: 2022-11-14 | End: 2022-11-19 | Stop reason: HOSPADM

## 2022-11-14 RX ORDER — HEPARIN SODIUM 10000 [USP'U]/100ML
14 INJECTION, SOLUTION INTRAVENOUS
Status: DISCONTINUED | OUTPATIENT
Start: 2022-11-14 | End: 2022-11-16

## 2022-11-14 RX ORDER — INSULIN LISPRO 100 [IU]/ML
2-7 INJECTION, SOLUTION INTRAVENOUS; SUBCUTANEOUS
Status: DISCONTINUED | OUTPATIENT
Start: 2022-11-14 | End: 2022-11-19 | Stop reason: HOSPADM

## 2022-11-14 RX ORDER — BISACODYL 5 MG/1
5 TABLET, DELAYED RELEASE ORAL DAILY PRN
Status: DISCONTINUED | OUTPATIENT
Start: 2022-11-14 | End: 2022-11-19 | Stop reason: HOSPADM

## 2022-11-14 RX ORDER — DIAZEPAM 5 MG/1
5 TABLET ORAL EVERY 6 HOURS PRN
Status: DISCONTINUED | OUTPATIENT
Start: 2022-11-14 | End: 2022-11-19 | Stop reason: HOSPADM

## 2022-11-14 RX ORDER — ALUMINA, MAGNESIA, AND SIMETHICONE 2400; 2400; 240 MG/30ML; MG/30ML; MG/30ML
15 SUSPENSION ORAL EVERY 6 HOURS PRN
Status: DISCONTINUED | OUTPATIENT
Start: 2022-11-14 | End: 2022-11-19 | Stop reason: HOSPADM

## 2022-11-14 RX ORDER — AMOXICILLIN 250 MG
2 CAPSULE ORAL 2 TIMES DAILY
Status: DISCONTINUED | OUTPATIENT
Start: 2022-11-14 | End: 2022-11-19 | Stop reason: HOSPADM

## 2022-11-14 RX ORDER — POTASSIUM CHLORIDE 1.5 G/1.77G
40 POWDER, FOR SOLUTION ORAL AS NEEDED
Status: DISCONTINUED | OUTPATIENT
Start: 2022-11-14 | End: 2022-11-19 | Stop reason: HOSPADM

## 2022-11-14 RX ORDER — ALBUTEROL SULFATE 2.5 MG/3ML
2.5 SOLUTION RESPIRATORY (INHALATION) ONCE
Status: COMPLETED | OUTPATIENT
Start: 2022-11-14 | End: 2022-11-14

## 2022-11-14 RX ORDER — ONDANSETRON 4 MG/1
4 TABLET, FILM COATED ORAL EVERY 6 HOURS PRN
Status: DISCONTINUED | OUTPATIENT
Start: 2022-11-14 | End: 2022-11-19 | Stop reason: HOSPADM

## 2022-11-14 RX ORDER — POTASSIUM CHLORIDE 20 MEQ/1
40 TABLET, EXTENDED RELEASE ORAL AS NEEDED
Status: DISCONTINUED | OUTPATIENT
Start: 2022-11-14 | End: 2022-11-19 | Stop reason: HOSPADM

## 2022-11-14 RX ORDER — ACETAMINOPHEN 160 MG/5ML
650 SOLUTION ORAL EVERY 4 HOURS PRN
Status: DISCONTINUED | OUTPATIENT
Start: 2022-11-14 | End: 2022-11-19 | Stop reason: HOSPADM

## 2022-11-14 RX ORDER — IPRATROPIUM BROMIDE AND ALBUTEROL SULFATE 2.5; .5 MG/3ML; MG/3ML
3 SOLUTION RESPIRATORY (INHALATION) ONCE
Status: COMPLETED | OUTPATIENT
Start: 2022-11-14 | End: 2022-11-14

## 2022-11-14 RX ADMIN — MORPHINE SULFATE 4 MG: 4 INJECTION, SOLUTION INTRAMUSCULAR; INTRAVENOUS at 15:57

## 2022-11-14 RX ADMIN — IPRATROPIUM BROMIDE AND ALBUTEROL SULFATE 3 ML: .5; 3 SOLUTION RESPIRATORY (INHALATION) at 14:27

## 2022-11-14 RX ADMIN — TRAZODONE HYDROCHLORIDE 100 MG: 100 TABLET ORAL at 21:42

## 2022-11-14 RX ADMIN — Medication 10 ML: at 21:42

## 2022-11-14 RX ADMIN — ONDANSETRON 4 MG: 2 INJECTION INTRAMUSCULAR; INTRAVENOUS at 13:00

## 2022-11-14 RX ADMIN — CEFTRIAXONE 2 G: 2 INJECTION, POWDER, FOR SOLUTION INTRAMUSCULAR; INTRAVENOUS at 14:34

## 2022-11-14 RX ADMIN — MORPHINE SULFATE 4 MG: 4 INJECTION, SOLUTION INTRAMUSCULAR; INTRAVENOUS at 21:42

## 2022-11-14 RX ADMIN — IOPAMIDOL 100 ML: 755 INJECTION, SOLUTION INTRAVENOUS at 15:07

## 2022-11-14 RX ADMIN — HEPARIN SODIUM 14 UNITS/KG/HR: 10000 INJECTION, SOLUTION INTRAVENOUS at 15:43

## 2022-11-14 RX ADMIN — ONDANSETRON 4 MG: 2 INJECTION INTRAMUSCULAR; INTRAVENOUS at 21:42

## 2022-11-14 RX ADMIN — ALBUTEROL SULFATE 2.5 MG: 2.5 SOLUTION RESPIRATORY (INHALATION) at 14:22

## 2022-11-14 RX ADMIN — MORPHINE SULFATE 4 MG: 4 INJECTION, SOLUTION INTRAMUSCULAR; INTRAVENOUS at 13:00

## 2022-11-14 NOTE — ED NOTES
Pt reports she was recently discharged from the ICU at [facility] for a UTI. Pt reports she was intubated for two days at [facility]. Pt complains of worsening chest pain and shortness of breath after discharge last Wednesday. Pt wears 4 L O2 via [place] at home.

## 2022-11-14 NOTE — ED PROVIDER NOTES
Subjective   History of Present Illness  Chief complaint weakness chest pain shortness of breath    History of present illness 62-year-old female with multiple health problems Home O2 dependent COPD pulmonary hypertension who was recently at [facility] for sepsis and intubation was discharged end of October who states since Wednesday she has been having some increasing shortness of breath and tightness in her chest.  There is no neck arm or jaw pain.  Describes as somewhat sharp in nature moderate to severe worse with activity and better with rest.  No fever chills sweats cough congestion.  She has some generalized weakness with that she has been at home and no one at home with similar illness.  No foreign travels and has been recently hospitalized.  Ongoing continuous since Wednesday.  Severe in nature treating at home.        Review of Systems   Constitutional: Negative for chills and fever.   HENT: Negative for congestion and sinus pressure.    Eyes: Negative for photophobia and visual disturbance.   Respiratory: Positive for chest tightness and shortness of breath. Negative for cough.    Cardiovascular: Positive for chest pain. Negative for palpitations.   Gastrointestinal: Negative for abdominal pain and vomiting.   Endocrine: Negative for cold intolerance and heat intolerance.   Genitourinary: Negative for difficulty urinating and dysuria.   Musculoskeletal: Negative for back pain and neck pain.   Skin: Negative for rash and wound.   Neurological: Negative for dizziness and light-headedness.   Psychiatric/Behavioral: Negative for agitation and confusion.       Past Medical History:   Diagnosis Date   • Anxiety    • Arthritis    • Chronic back pain    • Chronic obstructive pulmonary disease (HCC) 01/31/2020   • Chronic respiratory failure with hypoxia (HCC) 03/06/2018    O2 @ 2 L per NC as needed   • Diabetes mellitus (HCC)    • Dyslipidemia 10/21/2014   • Edema, lower extremity 07/16/2014   •  Hypertension    • Insomnia    • Obesity, Class III, BMI 40-49.9 (morbid obesity) ([facility]) 2022   • Prediabetes 2018   • Primary hypertension 2012   • PTSD (post-traumatic stress disorder)    • RLS (restless legs syndrome) 2020   • Tobacco abuse 10/21/2014   • Vitamin D deficiency 2012       Allergies   Allergen Reactions   • Codeine Hives, Itching and Nausea And Vomiting       Past Surgical History:   Procedure Laterality Date   • BACK SURGERY     • CHOLECYSTECTOMY     • HYSTERECTOMY     • TUMOR REMOVAL      benign breats tumor       Family History   Problem Relation Age of Onset   • Heart disease Mother    • Heart disease Father    • Cancer Sister    • Cancer Brother        Social History     Socioeconomic History   • Marital status:    Tobacco Use   • Smoking status: Former     Packs/day: 0.25     Types: Cigarettes     Quit date: 2022     Years since quittin.3   • Smokeless tobacco: Never   Vaping Use   • Vaping Use: Never used   Substance and Sexual Activity   • Alcohol use: Not Currently   • Drug use: Never   • Sexual activity: Defer       Prior to Admission medications    Medication Sig Start Date End Date Taking? Authorizing Provider   amLODIPine (NORVASC) 10 MG tablet Take 1 tablet by mouth Daily. 22   Gabriel Ye MD   budesonide (PULMICORT) 0.5 MG/2ML nebulizer solution use 1 vial by nebulization 2 (Two) Times a Day for 14 days. 22  Zev Sam PA-C   cloNIDine (CATAPRES) 0.3 MG tablet Take 1 tablet by mouth Every 8 (Eight) Hours. 22   Gabriel Ye MD   doxepin (SINEquan) 50 MG capsule Take 50 mg by mouth At Night As Needed. 19   ProviderAylin MD   hydrALAZINE (APRESOLINE) 25 MG tablet Take 3 tablets by mouth Every 12 (Twelve) Hours. 22   Gabriel Ye MD   Insulin Glargine-yfgn (Semglee, yfgn,) 100 UNIT/ML solution pen-injector Inject 20 Units under the skin into the appropriate area as directed 2 (Two) Times a  Day. 6/29/22   Zev Sam PA-C   ipratropium-albuterol (DUO-NEB) 0.5-2.5 mg/3 ml nebulizer use 1 vial via nebulizer 3 (Three) Times a Day 6/29/22 7/29/22  Zev Sam PA-C   LINZESS 290 MCG capsule capsule Take 290 mcg by mouth Daily As Needed. 12/21/19   Aylin Bledsoe MD   lisinopril (PRINIVIL,ZESTRIL) 40 MG tablet Take 40 mg by mouth Daily. 7/24/17   Aylin Bledsoe MD   metoprolol tartrate (LOPRESSOR) 100 MG tablet Take 1 tablet by mouth Every 12 (Twelve) Hours. 4/5/22   Gabriel Ye MD   Multiple Vitamin (MULTIVITAMIN) tablet Take 1 tablet by mouth Daily.    Aylin Bledsoe MD   oxyCODONE-acetaminophen (PERCOCET)  MG per tablet Take 1 tablet by mouth Every 4 (Four) Hours As Needed for Moderate Pain . 12/3/19   Aylin Bledsoe MD   predniSONE (DELTASONE) 10 MG tablet take 3 tablets once daily x 2 days, 2 daily x 2 days, 1 daily x 2 days 7/3/22   Zev Sam PA-C   Probiotic Product (PROBIOTIC ADVANCED) capsule Take 1 capsule by mouth Daily. 9/18/17   Aylin Bledsoe MD   promethazine (PHENERGAN) 50 MG tablet Take 50 mg by mouth Every 6 (Six) Hours As Needed. 12/23/19   Aylin Bledsoe MD   sildenafil (REVATIO) 20 MG tablet Take 0.5 tablets by mouth Every 8 (Eight) Hours. 4/5/22   Gabriel Ye MD   topiramate (TOPAMAX) 50 MG tablet Take 50 mg by mouth Daily. 11/17/19   Aylin Bledsoe MD   traZODone (DESYREL) 100 MG tablet Take 100 mg by mouth Every Night. 12/21/19   Aylin Bledsoe MD   zolpidem CR (AMBIEN CR) 12.5 MG CR tablet Take 12.5 mg by mouth At Night As Needed for Sleep.    Aylin Bledsoe MD   insulin glargine (LANTUS, SEMGLEE) 100 UNIT/ML injection Inject 20 Units under the skin into the appropriate area as directed 2 (Two) Times a Day. 6/23/22 6/29/22  Provider, Aylin, MD         Objective   Physical Exam  Constitutional this is a 62-year-old awake alert chronically ill-appearing but nontoxic-appearing  temperature is 98.2 blood pressure 161/68 respirations 18 heart rate 82.  HEENT extraocular muscles are intact pupils equal round reactive sclerae clear mouth clear.  Neck supple no adenopathy no JVD no bruits.  Lungs scattered wheezes no retractions.  Heart regular without murmur.  Abdomen soft nontender good bowel sounds no peritoneal findings or pulsatile masses.  Extremities pulses equal throughout upper and lower extremities no edema cords or Homans' sign or evidence of DVT.  Skin is warm and dry without rashes or cellulitic changes.  Neurologic awake alert orientated x3 no facial asymmetry normal speech without focal weakness  Procedures           ED Course      Results for orders placed or performed during the hospital encounter of 11/14/22   Comprehensive Metabolic Panel    Specimen: Blood   Result Value Ref Range    Glucose 142 (H) 65 - 99 mg/dL    BUN 11 8 - 23 mg/dL    Creatinine 0.83 0.57 - 1.00 mg/dL    Sodium 139 136 - 145 mmol/L    Potassium 3.9 3.5 - 5.2 mmol/L    Chloride 97 (L) 98 - 107 mmol/L    CO2 36.0 (H) 22.0 - 29.0 mmol/L    Calcium 9.0 8.6 - 10.5 mg/dL    Total Protein 6.4 6.0 - 8.5 g/dL    Albumin 3.60 3.50 - 5.20 g/dL    ALT (SGPT) 9 1 - 33 U/L    AST (SGOT) 9 1 - 32 U/L    Alkaline Phosphatase 66 39 - 117 U/L    Total Bilirubin 0.3 0.0 - 1.2 mg/dL    Globulin 2.8 gm/dL    A/G Ratio 1.3 g/dL    BUN/Creatinine Ratio 13.3 7.0 - 25.0    Anion Gap 6.0 5.0 - 15.0 mmol/L    eGFR 79.3 >60.0 mL/min/1.73   Urinalysis With Microscopic If Indicated (No Culture) - Urine, Catheter    Specimen: Urine, Catheter   Result Value Ref Range    Color, UA Yellow Yellow, Straw    Appearance, UA Clear Clear    pH, UA 6.5 5.0 - 8.0    Specific Gravity, UA 1.014 1.005 - 1.030    Glucose, UA Negative Negative    Ketones, UA Negative Negative    Bilirubin, UA Negative Negative    Blood, UA Negative Negative    Protein, UA Trace (A) Negative    Leuk Esterase, UA Small (1+) (A) Negative    Nitrite, UA Positive (A)  Negative    Urobilinogen, UA 1.0 E.U./dL 0.2 - 1.0 E.U./dL   Troponin    Specimen: Blood   Result Value Ref Range    Troponin T <0.010 0.000 - 0.030 ng/mL   D-dimer, Quantitative    Specimen: Blood   Result Value Ref Range    D-Dimer, Quantitative 1.47 (H) 0.00 - 0.59 mg/L (FEU)   CBC Auto Differential    Specimen: Blood   Result Value Ref Range    WBC 5.50 3.40 - 10.80 10*3/mm3    RBC 3.88 3.77 - 5.28 10*6/mm3    Hemoglobin 10.5 (L) 12.0 - 15.9 g/dL    Hematocrit 33.4 (L) 34.0 - 46.6 %    MCV 86.1 79.0 - 97.0 fL    MCH 27.0 26.6 - 33.0 pg    MCHC 31.3 (L) 31.5 - 35.7 g/dL    RDW 16.0 (H) 12.3 - 15.4 %    RDW-SD 48.1 37.0 - 54.0 fl    MPV 7.4 6.0 - 12.0 fL    Platelets 222 140 - 450 10*3/mm3    Neutrophil % 64.1 42.7 - 76.0 %    Lymphocyte % 28.7 19.6 - 45.3 %    Monocyte % 4.7 (L) 5.0 - 12.0 %    Eosinophil % 1.6 0.3 - 6.2 %    Basophil % 0.9 0.0 - 1.5 %    Neutrophils, Absolute 3.60 1.70 - 7.00 10*3/mm3    Lymphocytes, Absolute 1.60 0.70 - 3.10 10*3/mm3    Monocytes, Absolute 0.30 0.10 - 0.90 10*3/mm3    Eosinophils, Absolute 0.10 0.00 - 0.40 10*3/mm3    Basophils, Absolute 0.00 0.00 - 0.20 10*3/mm3    nRBC 0.0 0.0 - 0.2 /100 WBC   Urinalysis, Microscopic Only - Urine, Catheter    Specimen: Urine, Catheter   Result Value Ref Range    RBC, UA None Seen None Seen /HPF    WBC, UA 6-12 (A) None Seen /HPF    Bacteria, UA 3+ (A) None Seen /HPF    Squamous Epithelial Cells, UA 0-2 None Seen, 0-2 /HPF    Hyaline Casts, UA None Seen None Seen /LPF    Methodology Manual Light Microscopy    POC Lactate    Specimen: Blood   Result Value Ref Range    Lactate 0.9 0.5 - 2.0 mmol/L   ECG 12 Lead Chest Pain   Result Value Ref Range    QT Interval 426 ms   ECG 12 Lead Chest Pain   Result Value Ref Range    QT Interval 435 ms   Gold Top - SST   Result Value Ref Range    Extra Tube Hold for add-ons.      XR Chest 1 View    Result Date: 11/14/2022  No acute chest findings.  Electronically Signed By-Jocelyn Zayas MD On:11/14/2022 1:00  PM This report was finalized on [id number] by  Jocelyn Zayas MD.    Medications   sodium chloride 0.9 % flush 10 mL (has no administration in time range)   cefTRIAXone (ROCEPHIN) 2 g in sodium chloride 0.9 % 100 mL IVPB (2 g Intravenous New Bag 11/14/22 1434)   morphine injection 4 mg (4 mg Intravenous Given 11/14/22 1300)   ondansetron (ZOFRAN) injection 4 mg (4 mg Intravenous Given 11/14/22 1300)   ipratropium-albuterol (DUO-NEB) nebulizer solution 3 mL (3 mL Nebulization Given 11/14/22 1427)   albuterol (PROVENTIL) nebulizer solution 0.083% 2.5 mg/3mL (2.5 mg Nebulization Given 11/14/22 1422)          EKG my interpretation normal sinus rhythm rate of 70 normal axis no hypertrophy no acute ST elevation nonspecific T wave abnormalities lateral really no significant change from previous EKG abnormal                                  MDM  Number of Diagnoses or Management Options  Chest pain, unspecified type: new and requires workup  Shortness of breath: new and requires workup  Urinary tract infection without hematuria, site unspecified: new and requires workup  Diagnosis management comments: Medical decision making.  Patient had IV established placed on a monitor placed on her nasal cannula and had the above exam evaluation sats in the mid 90s.  EKG obtained showed a sinus rhythm on my review without acute findings.  As well as the monitor.  Labs obtained read by me chemistries unremarkable.  CBC unremarkable urine positive nitrate with 3+ bacteria and few white cells I did culture that.  Troponin was negative.  The patient had a hemoglobin 10.5 troponin was negative D-dimer 1.47.  Blood cultures pending.  Chest x-ray obtained reviewed by me as well as radiology without acute findings.  Patient was given a DuoNeb and an albuterol.  Her urine was cultured.  I reviewed her records from [facility] that were sent here.  She was treated for sepsis but had negative blood cultures ultimately.  She did have  a urine culture which showed E. coli sensitive to multiple antibiotics including Rocephin.  She was started on 2 g Rocephin IV.  At that time she had a CTA of her chest which showed no PE at the end of October.  She has had CT of her abdomen and stress test which were all reported as being unremarkable these reports were reviewed by me.  The patient had been given morphine 4 IV and Zofran 4 IV for pain and nausea.  Cultures are pending.  I do not see evidence clinically that would suggest sepsis today and lactate was normal.  White count looks okay she has been hemodynamically stable.  I see no evidence of acute myocardial infarction or ischemia or evidence of dissection.  This not a complete list of all possibilities.  The patient will be admitted to the hospital hospitalist nurse practitioner notified stable unremarkable ER course.       Amount and/or Complexity of Data Reviewed  Clinical lab tests: reviewed  Tests in the radiology section of CPT®: reviewed  Discuss the patient with other providers: yes    Risk of Complications, Morbidity, and/or Mortality  Presenting problems: high  Diagnostic procedures: high  Management options: high    Patient Progress  Patient progress: stable      Final diagnoses:   Shortness of breath   Chest pain, unspecified type   Urinary tract infection without hematuria, site unspecified       ED Disposition  ED Disposition     ED Disposition   Decision to Admit    Condition   --    Comment   Level of Care: Telemetry [5]   Admitting Physician: DAVE SALAZAR [[id number]]   Attending Physician: DAVE SALAZAR [[id number]]               No follow-up provider specified.       Medication List      No changes were made to your prescriptions during this visit.          Polo Ceja MD  11/14/22 1766

## 2022-11-14 NOTE — H&P
[facility]      Patient Name: Ann Álvarez  : 1960  MRN: [id number]  Primary Care Physician:  Jose Enrique Walters MD  Date of admission: 2022      Subjective      Chief Complaint: Shortness of breath, chest pain and back pain    History of Present Illness: Ann Álvarez is a 62 y.o. female who presented to [facility] on 2022 complaining of shortness of air, chest pain and back pain.  Patient reports that she was in [facility] for 6 days around [facility] for UTI, sepsis and was on a ventilator for 2 days.  Since then according to primary care provider records the UTI had cleared.  She reports that 2 days ago she began having shortness of air, left-sided chest pain, that she describes as a tightness, that radiates into her shoulder blade and low back pain.  She is on her baseline home 4 L O2 at 95%.  She states nothing makes her chest tightness or back pain any better.  She denies having any lightheadedness, dizziness or syncopal episode.  She denies having any vomiting but has felt nauseous.  She denies she denies any abdominal pain, constipation or diarrhea.  She reports bilateral lower extremity swelling but feels this is nothing out of the ordinary.  We have been asked to admit this patient for further evaluation and treatment.    Emergency department work-up vital signs stable 4 L nasal cannula at 95%.  UA positive nitrate with 3+ bacteria.  Glucose 142.  Creatinine 0.83.  Chloride 97.  CO2 36.0.  Troponin negative.  D-dimer 1.47.  White blood count 5.50.  Hemoglobin 10.5.  Platelets 222.  Lactate 0.9.  Blood culture pending, urine culture pending.    CTA chest Small distal segmental and subsegmental emboli are present in the right lower lobe. No evidence of right heart strain. Small eccentric nonocclusive thrombus is demonstrated in the left lower lobe pulmonary artery proximally. This has a chronic appearance.Features of mild  interstitial and alveolar edema.Subsegmental atelectasis in the right middle lobe and left lower lobe.    Chest x-ray no acute chest findings.    Review of Systems   Constitutional: Negative for chills, fever and malaise/fatigue.   HENT: Negative.    Eyes: Negative.    Cardiovascular: Positive for chest pain, dyspnea on exertion and leg swelling. Negative for syncope.   Respiratory: Positive for shortness of breath and sputum production.    Skin: Negative.    Musculoskeletal: Positive for back pain.   Gastrointestinal: Positive for nausea. Negative for abdominal pain, constipation, diarrhea, hematemesis, hematochezia and vomiting.   Genitourinary: Negative for dysuria, flank pain and hematuria.   Neurological: Negative.    Psychiatric/Behavioral: Negative.           Personal History     Past Medical History:   Diagnosis Date   • Anxiety    • Arthritis    • Chronic back pain    • Chronic obstructive pulmonary disease ([facility]) 01/31/2020   • Chronic respiratory failure with hypoxia ([facility]) 03/06/2018    O2 @ 2 L per NC as needed   • Diabetes mellitus ([facility])    • Dyslipidemia 10/21/2014   • Edema, lower extremity 07/16/2014   • Hypertension    • Insomnia    • Obesity, Class III, BMI 40-49.9 (morbid obesity) ([facility]) 03/29/2022   • Prediabetes 11/06/2018   • Primary hypertension 05/30/2012   • PTSD (post-traumatic stress disorder)    • RLS (restless legs syndrome) 04/22/2020   • Tobacco abuse 10/21/2014   • Vitamin D deficiency 05/30/2012       Past Surgical History:   Procedure Laterality Date   • BACK SURGERY     • CHOLECYSTECTOMY     • HYSTERECTOMY     • TUMOR REMOVAL      benign breats tumor       Family History: family history includes Cancer in her brother and sister; Heart disease in her father and mother. Otherwise pertinent FHx was reviewed and not pertinent to current issue.    Social History:  reports that she quit smoking about 4 months ago. Her smoking use included cigarettes. She smoked an average of .25 packs per  day. She has never used smokeless tobacco. She reports that she does not currently use alcohol. She reports that she does not use drugs.    Home Medications:  Prior to Admission Medications     Prescriptions Last Dose Informant Patient Reported? Taking?    amLODIPine (NORVASC) 10 MG tablet   No No    Take 1 tablet by mouth Daily.    budesonide (PULMICORT) 0.5 MG/2ML nebulizer solution   No No    use 1 vial by nebulization 2 (Two) Times a Day for 14 days.    cloNIDine (CATAPRES) 0.3 MG tablet   No No    Take 1 tablet by mouth Every 8 (Eight) Hours.    doxepin (SINEquan) 50 MG capsule   Yes No    Take 50 mg by mouth At Night As Needed.    hydrALAZINE (APRESOLINE) 25 MG tablet   No No    Take 3 tablets by mouth Every 12 (Twelve) Hours.    Insulin Glargine-yfgn (Semglee, yfgn,) 100 UNIT/ML solution pen-injector   No No    Inject 20 Units under the skin into the appropriate area as directed 2 (Two) Times a Day.    ipratropium-albuterol (DUO-NEB) 0.5-2.5 mg/3 ml nebulizer   No No    use 1 vial via nebulizer 3 (Three) Times a Day    LINZESS 290 MCG capsule capsule   Yes No    Take 290 mcg by mouth Daily As Needed.    lisinopril (PRINIVIL,ZESTRIL) 40 MG tablet   Yes No    Take 40 mg by mouth Daily.    metoprolol tartrate (LOPRESSOR) 100 MG tablet   No No    Take 1 tablet by mouth Every 12 (Twelve) Hours.    Multiple Vitamin (MULTIVITAMIN) tablet   Yes No    Take 1 tablet by mouth Daily.    oxyCODONE-acetaminophen (PERCOCET)  MG per tablet   Yes No    Take 1 tablet by mouth Every 4 (Four) Hours As Needed for Moderate Pain .    predniSONE (DELTASONE) 10 MG tablet   No No    take 3 tablets once daily x 2 days, 2 daily x 2 days, 1 daily x 2 days    Probiotic Product (PROBIOTIC ADVANCED) capsule   Yes No    Take 1 capsule by mouth Daily.    promethazine (PHENERGAN) 50 MG tablet   Yes No    Take 50 mg by mouth Every 6 (Six) Hours As Needed.    sildenafil (REVATIO) 20 MG tablet   No No    Take 0.5 tablets by mouth Every 8  (Eight) Hours.    topiramate (TOPAMAX) 50 MG tablet   Yes No    Take 50 mg by mouth Daily.    traZODone (DESYREL) 100 MG tablet   Yes No    Take 100 mg by mouth Every Night.    zolpidem CR (AMBIEN CR) 12.5 MG CR tablet   Yes No    Take 12.5 mg by mouth At Night As Needed for Sleep.            Allergies:  Allergies   Allergen Reactions   • Codeine Hives, Itching and Nausea And Vomiting       Objective      Vitals:   Temp:  [98.2 °F (36.8 °C)] 98.2 °F (36.8 °C)  Heart Rate:  [63-84] 70  Resp:  [17-18] 18  BP: (103-161)/(49-68) 103/61  Flow (L/min):  [4] 4    Physical Exam  Constitutional:       General: She is not in acute distress.     Appearance: She is obese.   HENT:      Head: Normocephalic and atraumatic.   Cardiovascular:      Rate and Rhythm: Normal rate and regular rhythm.      Pulses: Normal pulses.      Heart sounds: Normal heart sounds.   Pulmonary:      Effort: Pulmonary effort is normal.      Breath sounds: Wheezing and rhonchi present.   Abdominal:      General: Bowel sounds are normal.      Palpations: Abdomen is soft.      Tenderness: There is no abdominal tenderness.   Musculoskeletal:      Cervical back: Normal range of motion and neck supple.      Right lower leg: Edema present.      Left lower leg: Edema present.   Skin:     General: Skin is warm and dry.   Neurological:      General: No focal deficit present.      Mental Status: She is alert and oriented to person, place, and time.   Psychiatric:         Mood and Affect: Mood normal.            Result Review    Result Review:  I have personally reviewed the results from the time of this admission to 11/14/2022 16:18 EST and agree with these findings:  [x]  Laboratory  [x]  Microbiology  [x]  Radiology  [x]  EKG/Telemetry   []  Cardiology/Vascular   []  Pathology  []  Old records  []  Other:        Assessment & Plan        Active Hospital Problems:  Active Hospital Problems    Diagnosis    • **Shortness of breath      Plan:     Home meds not  verified at the time of assessment and plan    Shortness of air/chest pain  -4 L nasal cannula, 95% on baseline O2  -Troponin negative and trend  -EKG Sinus rhythm Abnrm T, consider ischemia, anterolateral lds When compared with ECG of 14-Nov-2022 11:47:11, Nonspecific significant change  -Chest x-ray no acute chest findings.  -D-dimer 1.47  -CTA chest Small distal segmental and subsegmental emboli are present in the right lower lobe. No evidence of right heart strain. Small eccentric nonocclusive thrombus is demonstrated in the left lower lobe pulmonary artery proximally. This has a chronic appearance.Features of mild interstitial and alveolar edema.Subsegmental atelectasis in the right middle lobe and left lower lobe.  -Patient has been initiated on heparin gtt.  -Echo, pending  -Echo 3/29/2022 EF 66 to 70%.  Moderate tricuspid valve regurgitation.  -proBNP pending  -lactate 0.9  -Bilateral lower extremity venous duplex  -Heme-onc consult for anticoagulation management    UTI, acute  -UA positive for nitrites and 3+ bacteria  -Rocephin started in emergency department, continue  -Creatinine 0.83  -BUN 11  -Culture pending    COPD  -Continue Pulmicort and DuoNebs once verified    DM2  -Glucose 142  -A1c, pending  -Hold home medicines  -Initiate SSI  -Check blood glucose before meals and at bedtime    Hypertension  -Current /61  -Continue home meds once verified  -Check blood pressure before administering home meds      Restless leg syndrome  -Continue doxepin once verified    Anxiety/depression/PTSD  -Continue home meds once verified    Obesity (41.79 kg/m²)/tobacco abuse  -Lifestyle modifications  -Smoking cessation    DVT prophylaxis:  Medical DVT prophylaxis orders are present.    CODE STATUS:    Level Of Support Discussed With: Patient  Code Status (Patient has no pulse and is not breathing): CPR (Attempt to Resuscitate)  Medical Interventions (Patient has pulse or is breathing): Full Support    Admission  Status:  I believe this patient meets observation status.    I discussed the patient's findings and my recommendations with patient.        Signature: `Electronically signed by Nanci Walter PA-C, 11/14/22, 4:18 PM EST.

## 2022-11-15 ENCOUNTER — APPOINTMENT (OUTPATIENT)
Dept: CARDIOLOGY | Facility: HOSPITAL | Age: 62
End: 2022-11-15

## 2022-11-15 PROBLEM — N30.00 ACUTE CYSTITIS WITHOUT HEMATURIA: Status: ACTIVE | Noted: 2022-11-15

## 2022-11-15 PROBLEM — I26.99 ACUTE PULMONARY EMBOLISM WITHOUT ACUTE COR PULMONALE: Status: ACTIVE | Noted: 2022-11-15

## 2022-11-15 PROBLEM — E78.2 MIXED HYPERLIPIDEMIA: Status: ACTIVE | Noted: 2022-11-15

## 2022-11-15 PROBLEM — E66.01 MORBID OBESITY: Status: ACTIVE | Noted: 2022-03-29

## 2022-11-15 LAB
ANION GAP SERPL CALCULATED.3IONS-SCNC: 6 MMOL/L (ref 5–15)
APTT PPP: 30.6 SECONDS (ref 61–76.5)
APTT PPP: 31.8 SECONDS (ref 61–76.5)
APTT PPP: 38.4 SECONDS (ref 61–76.5)
APTT PPP: 63.4 SECONDS (ref 61–76.5)
BASOPHILS # BLD AUTO: 0 10*3/MM3 (ref 0–0.2)
BASOPHILS NFR BLD AUTO: 0.7 % (ref 0–1.5)
BUN SERPL-MCNC: 10 MG/DL (ref 8–23)
BUN/CREAT SERPL: 13.3 (ref 7–25)
CALCIUM SPEC-SCNC: 8.8 MG/DL (ref 8.6–10.5)
CHLORIDE SERPL-SCNC: 98 MMOL/L (ref 98–107)
CO2 SERPL-SCNC: 36 MMOL/L (ref 22–29)
CREAT SERPL-MCNC: 0.75 MG/DL (ref 0.57–1)
D-LACTATE SERPL-SCNC: 0.7 MMOL/L (ref 0.5–2)
DEPRECATED RDW RBC AUTO: 48.6 FL (ref 37–54)
EGFRCR SERPLBLD CKD-EPI 2021: 89.6 ML/MIN/1.73
EOSINOPHIL # BLD AUTO: 0.1 10*3/MM3 (ref 0–0.4)
EOSINOPHIL NFR BLD AUTO: 1.5 % (ref 0.3–6.2)
ERYTHROCYTE [DISTWIDTH] IN BLOOD BY AUTOMATED COUNT: 16 % (ref 12.3–15.4)
GLUCOSE BLDC GLUCOMTR-MCNC: 106 MG/DL (ref 70–105)
GLUCOSE BLDC GLUCOMTR-MCNC: 137 MG/DL (ref 70–105)
GLUCOSE BLDC GLUCOMTR-MCNC: 165 MG/DL (ref 70–105)
GLUCOSE BLDC GLUCOMTR-MCNC: 168 MG/DL (ref 70–105)
GLUCOSE SERPL-MCNC: 131 MG/DL (ref 65–99)
HCT VFR BLD AUTO: 32.2 % (ref 34–46.6)
HGB BLD-MCNC: 9.9 G/DL (ref 12–15.9)
HOLD SPECIMEN: NORMAL
HOLD SPECIMEN: NORMAL
LYMPHOCYTES # BLD AUTO: 1.6 10*3/MM3 (ref 0.7–3.1)
LYMPHOCYTES NFR BLD AUTO: 26.5 % (ref 19.6–45.3)
MAGNESIUM SERPL-MCNC: 1.8 MG/DL (ref 1.6–2.4)
MCH RBC QN AUTO: 26.3 PG (ref 26.6–33)
MCHC RBC AUTO-ENTMCNC: 30.7 G/DL (ref 31.5–35.7)
MCV RBC AUTO: 85.9 FL (ref 79–97)
MONOCYTES # BLD AUTO: 0.3 10*3/MM3 (ref 0.1–0.9)
MONOCYTES NFR BLD AUTO: 4.8 % (ref 5–12)
NEUTROPHILS NFR BLD AUTO: 4.1 10*3/MM3 (ref 1.7–7)
NEUTROPHILS NFR BLD AUTO: 66.5 % (ref 42.7–76)
NRBC BLD AUTO-RTO: 0 /100 WBC (ref 0–0.2)
PLATELET # BLD AUTO: 188 10*3/MM3 (ref 140–450)
PMV BLD AUTO: 7.2 FL (ref 6–12)
POTASSIUM SERPL-SCNC: 4.4 MMOL/L (ref 3.5–5.2)
RBC # BLD AUTO: 3.75 10*6/MM3 (ref 3.77–5.28)
SODIUM SERPL-SCNC: 140 MMOL/L (ref 136–145)
TROPONIN T SERPL-MCNC: <0.01 NG/ML (ref 0–0.03)
WBC NRBC COR # BLD: 6.1 10*3/MM3 (ref 3.4–10.8)
WHOLE BLOOD HOLD SPECIMEN: NORMAL

## 2022-11-15 PROCEDURE — 93306 TTE W/DOPPLER COMPLETE: CPT

## 2022-11-15 PROCEDURE — 25010000002 HEPARIN (PORCINE) 25000-0.45 UT/250ML-% SOLUTION: Performed by: EMERGENCY MEDICINE

## 2022-11-15 PROCEDURE — 25010000002 SULFUR HEXAFLUORIDE MICROSPH 60.7-25 MG RECONSTITUTED SUSPENSION: Performed by: INTERNAL MEDICINE

## 2022-11-15 PROCEDURE — 83605 ASSAY OF LACTIC ACID: CPT | Performed by: INTERNAL MEDICINE

## 2022-11-15 PROCEDURE — 93306 TTE W/DOPPLER COMPLETE: CPT | Performed by: INTERNAL MEDICINE

## 2022-11-15 PROCEDURE — 99204 OFFICE O/P NEW MOD 45 MIN: CPT | Performed by: INTERNAL MEDICINE

## 2022-11-15 PROCEDURE — G0378 HOSPITAL OBSERVATION PER HR: HCPCS

## 2022-11-15 PROCEDURE — 85730 THROMBOPLASTIN TIME PARTIAL: CPT | Performed by: INTERNAL MEDICINE

## 2022-11-15 PROCEDURE — 83735 ASSAY OF MAGNESIUM: CPT | Performed by: PHYSICIAN ASSISTANT

## 2022-11-15 PROCEDURE — 25010000002 MORPHINE PER 10 MG: Performed by: INTERNAL MEDICINE

## 2022-11-15 PROCEDURE — 25010000002 CEFTRIAXONE PER 250 MG: Performed by: INTERNAL MEDICINE

## 2022-11-15 PROCEDURE — 94799 UNLISTED PULMONARY SVC/PX: CPT

## 2022-11-15 PROCEDURE — 80048 BASIC METABOLIC PNL TOTAL CA: CPT | Performed by: PHYSICIAN ASSISTANT

## 2022-11-15 PROCEDURE — 82962 GLUCOSE BLOOD TEST: CPT

## 2022-11-15 PROCEDURE — 84484 ASSAY OF TROPONIN QUANT: CPT | Performed by: PHYSICIAN ASSISTANT

## 2022-11-15 PROCEDURE — 94761 N-INVAS EAR/PLS OXIMETRY MLT: CPT

## 2022-11-15 PROCEDURE — 94664 DEMO&/EVAL PT USE INHALER: CPT

## 2022-11-15 PROCEDURE — 36415 COLL VENOUS BLD VENIPUNCTURE: CPT | Performed by: EMERGENCY MEDICINE

## 2022-11-15 PROCEDURE — 85730 THROMBOPLASTIN TIME PARTIAL: CPT | Performed by: EMERGENCY MEDICINE

## 2022-11-15 PROCEDURE — 85025 COMPLETE CBC W/AUTO DIFF WBC: CPT | Performed by: PHYSICIAN ASSISTANT

## 2022-11-15 PROCEDURE — 25010000002 ONDANSETRON PER 1 MG: Performed by: PHYSICIAN ASSISTANT

## 2022-11-15 RX ORDER — AMLODIPINE BESYLATE 5 MG/1
10 TABLET ORAL
Status: DISCONTINUED | OUTPATIENT
Start: 2022-11-15 | End: 2022-11-19 | Stop reason: HOSPADM

## 2022-11-15 RX ORDER — CLONIDINE HYDROCHLORIDE 0.1 MG/1
0.3 TABLET ORAL EVERY 8 HOURS SCHEDULED
Status: DISCONTINUED | OUTPATIENT
Start: 2022-11-15 | End: 2022-11-19 | Stop reason: HOSPADM

## 2022-11-15 RX ORDER — BUDESONIDE AND FORMOTEROL FUMARATE DIHYDRATE 80; 4.5 UG/1; UG/1
2 AEROSOL RESPIRATORY (INHALATION)
Status: DISCONTINUED | OUTPATIENT
Start: 2022-11-15 | End: 2022-11-16

## 2022-11-15 RX ORDER — BUDESONIDE 0.5 MG/2ML
0.5 INHALANT ORAL 2 TIMES DAILY
Status: DISCONTINUED | OUTPATIENT
Start: 2022-11-15 | End: 2022-11-15

## 2022-11-15 RX ORDER — TOPIRAMATE 25 MG/1
50 TABLET ORAL DAILY
Status: DISCONTINUED | OUTPATIENT
Start: 2022-11-15 | End: 2022-11-19 | Stop reason: HOSPADM

## 2022-11-15 RX ORDER — HYDRALAZINE HYDROCHLORIDE 25 MG/1
25 TABLET, FILM COATED ORAL 3 TIMES DAILY PRN
Status: DISCONTINUED | OUTPATIENT
Start: 2022-11-15 | End: 2022-11-17

## 2022-11-15 RX ORDER — DOXEPIN HYDROCHLORIDE 25 MG/1
50 CAPSULE ORAL NIGHTLY PRN
Status: DISCONTINUED | OUTPATIENT
Start: 2022-11-15 | End: 2022-11-19 | Stop reason: HOSPADM

## 2022-11-15 RX ORDER — LISINOPRIL 20 MG/1
40 TABLET ORAL EVERY 24 HOURS
Status: DISCONTINUED | OUTPATIENT
Start: 2022-11-15 | End: 2022-11-19 | Stop reason: HOSPADM

## 2022-11-15 RX ORDER — IPRATROPIUM BROMIDE AND ALBUTEROL SULFATE 2.5; .5 MG/3ML; MG/3ML
3 SOLUTION RESPIRATORY (INHALATION) 3 TIMES DAILY PRN
Status: DISCONTINUED | OUTPATIENT
Start: 2022-11-15 | End: 2022-11-19 | Stop reason: HOSPADM

## 2022-11-15 RX ADMIN — BUDESONIDE AND FORMOTEROL FUMARATE DIHYDRATE 2 PUFF: 80; 4.5 AEROSOL RESPIRATORY (INHALATION) at 11:05

## 2022-11-15 RX ADMIN — OXYCODONE 5 MG: 5 TABLET ORAL at 21:12

## 2022-11-15 RX ADMIN — MORPHINE SULFATE 4 MG: 4 INJECTION, SOLUTION INTRAMUSCULAR; INTRAVENOUS at 17:52

## 2022-11-15 RX ADMIN — TRAZODONE HYDROCHLORIDE 100 MG: 100 TABLET ORAL at 21:12

## 2022-11-15 RX ADMIN — Medication 10 ML: at 08:22

## 2022-11-15 RX ADMIN — CEFTRIAXONE 2 G: 2 INJECTION, POWDER, FOR SOLUTION INTRAMUSCULAR; INTRAVENOUS at 13:59

## 2022-11-15 RX ADMIN — CLONIDINE HYDROCHLORIDE 0.3 MG: 0.1 TABLET ORAL at 13:59

## 2022-11-15 RX ADMIN — ONDANSETRON 4 MG: 2 INJECTION INTRAMUSCULAR; INTRAVENOUS at 13:59

## 2022-11-15 RX ADMIN — OXYCODONE 5 MG: 5 TABLET ORAL at 08:22

## 2022-11-15 RX ADMIN — BUDESONIDE AND FORMOTEROL FUMARATE DIHYDRATE 2 PUFF: 80; 4.5 AEROSOL RESPIRATORY (INHALATION) at 17:42

## 2022-11-15 RX ADMIN — LISINOPRIL 40 MG: 20 TABLET ORAL at 03:03

## 2022-11-15 RX ADMIN — OXYCODONE 5 MG: 5 TABLET ORAL at 03:02

## 2022-11-15 RX ADMIN — MORPHINE SULFATE 4 MG: 4 INJECTION, SOLUTION INTRAMUSCULAR; INTRAVENOUS at 13:59

## 2022-11-15 RX ADMIN — CLONIDINE HYDROCHLORIDE 0.3 MG: 0.1 TABLET ORAL at 08:21

## 2022-11-15 RX ADMIN — Medication 10 ML: at 21:12

## 2022-11-15 RX ADMIN — OXYCODONE 5 MG: 5 TABLET ORAL at 15:15

## 2022-11-15 RX ADMIN — TOPIRAMATE 50 MG: 25 TABLET, FILM COATED ORAL at 08:22

## 2022-11-15 RX ADMIN — HEPARIN SODIUM 16 UNITS/KG/HR: 10000 INJECTION, SOLUTION INTRAVENOUS at 10:14

## 2022-11-15 RX ADMIN — HEPARIN SODIUM 18 UNITS/KG/HR: 10000 INJECTION, SOLUTION INTRAVENOUS at 16:55

## 2022-11-15 RX ADMIN — SULFUR HEXAFLUORIDE 3 ML: KIT at 10:08

## 2022-11-15 RX ADMIN — HEPARIN SODIUM 12 UNITS/KG/HR: 10000 INJECTION, SOLUTION INTRAVENOUS at 07:53

## 2022-11-15 RX ADMIN — HEPARIN SODIUM 18 UNITS/KG/HR: 10000 INJECTION, SOLUTION INTRAVENOUS at 17:51

## 2022-11-15 RX ADMIN — AMLODIPINE BESYLATE 10 MG: 5 TABLET ORAL at 08:22

## 2022-11-15 NOTE — CASE MANAGEMENT/SOCIAL WORK
Discharge Planning Assessment  [facility]     Patient Name: Ann Álvarez  MRN: [id number]  Today's Date: 11/15/2022    Admit Date: 11/14/2022    Plan: Home with spouse.  Eligible for Xarelto or Eliquis $10 monthly copay cards. Need card.   Discharge Needs Assessment     Row Name 11/15/22 1812       Living Environment    People in Home spouse    Current Living Arrangements home    Primary Care Provided by self    Provides Primary Care For no one    Family Caregiver if Needed spouse    Quality of Family Relationships helpful;involved;supportive    Able to Return to Prior Arrangements yes       Resource/Environmental Concerns    Resource/Environmental Concerns none    Transportation Concerns none       Transition Planning    Patient/Family Anticipates Transition to home with family    Patient/Family Anticipated Services at Transition none    Transportation Anticipated family or friend will provide       Discharge Needs Assessment    Readmission Within the Last 30 Days no previous admission in last 30 days    Equipment Currently Used at Home wheelchair;walker, standard;oxygen    Anticipated Changes Related to Illness none    Equipment Needed After Discharge none               Discharge Plan     Row Name 11/15/22 5168       Plan    Plan Home with spouse.  Eligible for Xarelto or Eliquis $10 monthly copay cards. Need card.    Patient/Family in Agreement with Plan yes    Plan Comments Met with patietn and psouse at bedside.  Patient lives at home with spouse IADL.  Denies discharge needs.  Discussed that she is eligible for Xarelto or Eliquis montly copay cards for $10.  Added to Meds to beds. Secure chat sent to Hematology NP to discuss pricing.  Barriers to discharge: heparin drip for bilateral pulmonary emboli.  Hematology consult.              Continued Care and Services - Admitted Since 11/14/2022    Coordination has not been started for this encounter.          Demographic Summary     Row Name 11/15/22 1812        General Information    Admission Type observation    Arrived From emergency department    Referral Source admission list    Reason for Consult discharge planning    Preferred Language English               Functional Status     Row Name 11/15/22 1812       Functional Status    Usual Activity Tolerance good    Current Activity Tolerance good       Functional Status, IADL    Medications independent    Meal Preparation independent    Housekeeping independent    Laundry independent    Shopping independent       Mental Status    General Appearance WDL WDL       Mental Status Summary    Recent Changes in Mental Status/Cognitive Functioning no changes              Met with patient in room wearing PPE: mask.    Maintained distance greater than six feet and spent less than 15 minutes in the room.    Sangita Harden RN     Office Phone [phone]  Office Cell [phone]

## 2022-11-15 NOTE — PROGRESS NOTES
[facility] Medicine Services Daily Progress Note    Patient Name: Ann Álvarez  : 1960  MRN: [id number]  Primary Care Physician:  Jose Enrique Walters MD  Date of admission: 2022      Subjective      Chief Complaint: Shortness of breath.      Patient Reports:      11/15/2022.  Patient was seen and examined.  Patient reported no overnight events.      Review of Systems   Constitutional: Negative.   HENT: Negative.    Eyes: Negative.    Cardiovascular: Negative.    Respiratory: Negative.    Endocrine: Negative.    Hematologic/Lymphatic: Negative.    Skin: Negative.    Musculoskeletal: Negative.    Gastrointestinal: Negative.    Genitourinary: Negative.    Neurological: Negative.    Psychiatric/Behavioral: Negative.    Allergic/Immunologic: Negative.             Objective      Vitals:   Temp:  [98 °F (36.7 °C)-98.9 °F (37.2 °C)] 98.9 °F (37.2 °C)  Heart Rate:  [] 88  Resp:  [16-23] 16  BP: ()/() 121/61  Flow (L/min):  [4] 4    Physical Exam  Vitals reviewed.   Constitutional:       General: She is not in acute distress.     Appearance: She is obese.   HENT:      Head: Normocephalic and atraumatic.      Nose: Nose normal. No congestion or rhinorrhea.      Mouth/Throat:      Mouth: Mucous membranes are moist.      Pharynx: Oropharynx is clear. No oropharyngeal exudate or posterior oropharyngeal erythema.   Eyes:      Pupils: Pupils are equal, round, and reactive to light.   Cardiovascular:      Pulses: Normal pulses.      Heart sounds: Normal heart sounds. No murmur heard.    No friction rub. No gallop.      Comments: S1 and S2 present.  No tachycardia.  Pulmonary:      Effort: No respiratory distress.      Breath sounds: No wheezing, rhonchi or rales.      Comments: Decreased air entry bilaterally.  Chest:      Chest wall: No tenderness.   Abdominal:      General: Abdomen is flat. Bowel sounds are normal. There is no distension.      Palpations: Abdomen is soft.       Tenderness: There is no right CVA tenderness.   Musculoskeletal:         General: No swelling, tenderness, deformity or signs of injury.      Cervical back: Neck supple. No tenderness.      Right lower leg: No edema.      Left lower leg: No edema.   Skin:     Capillary Refill: Capillary refill takes less than 2 seconds.      Coloration: Skin is not jaundiced.      Findings: No bruising, lesion or rash.   Neurological:      Comments: No facial asymmetry noted.  Gait and station not tested.   Psychiatric:      Comments: No agitation.                 Result Review    Result Review:  I have personally reviewed the results from the time of this admission to 11/15/2022 11:02 EST and agree with these findings:  [x]  Laboratory  [x]  Microbiology  [x]  Radiology  []  EKG/Telemetry   []  Cardiology/Vascular   []  Pathology  []  Old records  []  Other:  Most notable findings include:      CT pulmonary angiogram showed:      Comparison: CT chest 6/27/2022.       Technique: Serial and axial CT images of the chest were obtained   following the uneventful intravenous administration of 100 cc Isovue-370   contrast. Reconstructions in the coronal and sagittal planes were also   performed. In addition, a 3 D volume rendered image was obtained after   post processing. Automated exposure control and iterative reconstruction   methods were used.       FINDINGS:       Acute-appearing small distal segmental and subsegmental emboli are   present within the right lower lobe. Small eccentric nonocclusive   embolism is seen in the proximal left lower lobe pulmonary artery, which   appears chronic.       There is no indication of right heart strain. RV LV ratio is   approximately 0.8. Heart size is within normal limits. No pericardial   effusion or pleural effusion is seen.       There are slightly enlarged and mediastinal lymph nodes, including a   right lower paratracheal lymph node measuring 13 mm short axis   (previously 10 mm),  prevascular node measuring 13 mm (previously 9 mm),   subcarinal node measuring 14 mm short axis (previously 9 mm). There is   an enlarged right hilar node measuring 17 mm short axis.       Present in both lungs with faint groundglass densities, in a pattern   suggestive of mild pulmonary edema. There is bandlike subsegmental   atelectasis in the left lower lobe and right middle lobe. No dense   consolidations are identified.       No pericardial effusion or pleural effusion is seen.       Cholecystectomy changes are present. The included portions of the upper   abdominal organs are within normal limits. There is mild scarring of the   spleen.       No acute or suspicious osseous abnormalities are identified.                   Impression:     1. Small distal segmental and subsegmental emboli are present in the   right lower lobe. No evidence of right heart strain. I notified the   emergency room physician at the time of this dictation.   2. Small eccentric nonocclusive thrombus is demonstrated in the left   lower lobe pulmonary artery proximally. This has a chronic appearance.   3. Features of mild interstitial and alveolar edema.   4. Subsegmental atelectasis in the right middle lobe and left lower   lobe.       Electronically Signed By-Jocelyn Zayas MD On:11/14/2022 3:17 PM   This report was finalized on [id number] by  Jocelyn Zayas MD.         Assessment & Plan    From previous notes and with minor updates.      Brief Patient Summary:      Patient is a 62 y.o. female  with past medical history of chronic respiratory failure with hypoxia on 2 L nasal cannula continuous, diabetes mellitus, hypertension, hyperlipidemia, osteoarthritis, anxiety disorder, COPD, chronic low back pain, morbid obesity, insomnia, PTSD, restless leg syndrome tobacco use disorder who presented to [facility] on 11/14/2022 complaining of shortness of air, chest pain and back pain.  Patient reports that she was in [facility] for 6 days around [facility] for UTI, sepsis and was on a ventilator for 2 days.  Since then according to primary care provider records the UTI had cleared.  She reports that 2 days ago she began having shortness of air, left-sided chest pain, that she describes as a tightness, that radiates into her shoulder blade and low back pain.  She is on her baseline home 4 L O2 at 95%.  She states nothing makes her chest tightness or back pain any better.  She denies having any lightheadedness, dizziness or syncopal episode.  She denies having any vomiting but has felt nauseous.  She denies she denies any abdominal pain, constipation or diarrhea.  She reports bilateral lower extremity swelling but feels this is nothing out of the ordinary.  We have been asked to admit this patient for further evaluation and treatment.  Patient was seen in the emergency room and emergency department work-up vital signs stable 4 L nasal cannula at 95%.  UA positive nitrate with 3+ bacteria.  Glucose 142.  Creatinine 0.83.  Chloride 97.  CO2 36.0.  Troponin negative.  D-dimer 1.47.  White blood count 5.50.  Hemoglobin 10.5.  Platelets 222.  Lactate 0.9.  Blood culture pending, urine culture pending.  CTA chest Small distal segmental and subsegmental emboli are present in the right lower lobe. No evidence of right heart strain. Small eccentric nonocclusive thrombus is demonstrated in the left lower lobe pulmonary artery proximally. This has a chronic appearance.Features of mild interstitial and alveolar edema.Subsegmental atelectasis in the right middle lobe and left lower lobe.  Pulmonary consult was completed.    amLODIPine, 10 mg, Oral, Q24H  budesonide-formoterol, 2 puff, Inhalation, BID - RT  cefTRIAXone, 2 g, Intravenous, Q24H  cloNIDine, 0.3 mg, Oral, Q8H  insulin lispro, 2-7 Units, Subcutaneous, TID With Meals  lisinopril, 40 mg, Oral, Q24H  senna-docusate sodium, 2 tablet, Oral, BID  sodium chloride, 10 mL, Intravenous,  Q12H  topiramate, 50 mg, Oral, Daily  traZODone, 100 mg, Oral, Nightly       heparin, 14 Units/kg/hr, Last Rate: 16 Units/kg/hr (11/15/22 1014)         Active Hospital Problems:  Active Hospital Problems    Diagnosis    • **Acute pulmonary embolism without acute cor pulmonale (HCC)    • Acute cystitis without hematuria    • Shortness of breath    • Mixed hyperlipidemia    • KELTON (generalized anxiety disorder)    • Morbid obesity (HCC)    • Chronic back pain    • PTSD (post-traumatic stress disorder)    • RLS (restless legs syndrome)    • Chronic obstructive pulmonary disease (HCC)    • Chronic respiratory failure with hypoxia (HCC)    • Insomnia    • Tobacco use disorder    • Primary hypertension          Plan:      -Continue appropriate patient's home medications for other chronic medical conditions.  -Continue the present level of care.  -Patient and family agreed with the plan of care.  -Treat acute pulmonary embolism with anticoagulation.  -Follow pulmonary recommendations.  -Treat acute cystitis with antibiotics.  -Follow cultures.        DVT prophylaxis:  Medical DVT prophylaxis orders are present.    CODE STATUS:    Level Of Support Discussed With: Patient  Code Status (Patient has no pulse and is not breathing): CPR (Attempt to Resuscitate)  Medical Interventions (Patient has pulse or is breathing): Full Support      Disposition: Patient can discharge in the next 24 to 72 hours.    This patient has been examined wearing appropriate Personal Protective Equipment and discussed with hospital infection control department, [facility] department, infectious disease specialist and pulmonologist. 11/15/22      Electronically signed by Scottie Colin MD, FACP, 11/15/22, 11:02 EST.      [facility] Hospitalist Team

## 2022-11-15 NOTE — CONSULTS
Hematology/Oncology Inpatient Consultation    Patient name: Ann Álvarez  : 1960  MRN: [id number]  Referring Provider:Nanci Walter PA-C    Reason for Consultation: Anticoagulation Management    Chief complaint: Shortness of Breath, Chest and Back Pain    History of present illness:    Ann Álvarez is a 62 y.o. female who presented to [facility] on 2022 with complaints of shortness of breath, left left chest pain that radiates to her back and found to have PE right lower lobe on CT chest.  She has a recent history of UTI with sepsis at [facility] for which she spent a few days on the ventilator. The patient was placed on heparin drip and admitted for further management.      CT chest 2022- small distal segmental and subsegmental emboli RLL, no evidence of heart strain. Small nonocclusive thrombus LLL.      BLE Doppler 22 - normal    11/15/22  Hematology/Oncology was consulted for bilateral PE with recent illness of UTI with sepsis for which she spent 48 hours on the ventilator.  With her recent illness and immobility, she is likely a provoked thrombus. The patient smokes about 1 pack of cigarettes per day, and does not use drugs or alcohol.  She does not have personal hx of clotting but does have family hx of blood clots with grandmother dying suddenly attributed to thrombus and her mother was treated for thrombus. She has been started on heparin drip and should remain on heparin IV until stable, then transition to an oral anticoagulation either DOAC or coumadin that her insurance will cover for a period of 3 months at which time she will be reassessed.      She  has a past medical history of Anxiety, Arthritis, Chronic back pain, Chronic obstructive pulmonary disease (HCC) (2020), Chronic respiratory failure with hypoxia (HCC) (2018), Diabetes mellitus (HCC), Dyslipidemia (10/21/2014), Edema, lower extremity (2014), Hypertension,  Insomnia, Obesity, Class III, BMI 40-49.9 (morbid obesity) ([facility]) (2022), Prediabetes (2018), Primary hypertension (2012), PTSD (post-traumatic stress disorder), RLS (restless legs syndrome) (2020), Tobacco abuse (10/21/2014), and Vitamin D deficiency (2012).    PCP: Jose Enrique Walters MD    History:  Past Medical History:   Diagnosis Date   • Anxiety    • Arthritis    • Chronic back pain    • Chronic obstructive pulmonary disease ([facility]) 2020   • Chronic respiratory failure with hypoxia ([facility]) 2018    O2 @ 2 L per NC as needed   • Diabetes mellitus ([facility])    • Dyslipidemia 10/21/2014   • Edema, lower extremity 2014   • Hypertension    • Insomnia    • Obesity, Class III, BMI 40-49.9 (morbid obesity) ([facility]) 2022   • Prediabetes 2018   • Primary hypertension 2012   • PTSD (post-traumatic stress disorder)    • RLS (restless legs syndrome) 2020   • Tobacco abuse 10/21/2014   • Vitamin D deficiency 2012   ,   Past Surgical History:   Procedure Laterality Date   • BACK SURGERY     • CHOLECYSTECTOMY     • HYSTERECTOMY     • TUMOR REMOVAL      benign breats tumor   ,   Family History   Problem Relation Age of Onset   • Heart disease Mother    • Heart disease Father    • Cancer Sister    • Cancer Brother    ,   Social History     Tobacco Use   • Smoking status: Former     Packs/day: 0.25     Types: Cigarettes     Quit date: 2022     Years since quittin.3   • Smokeless tobacco: Never   Vaping Use   • Vaping Use: Never used   Substance Use Topics   • Alcohol use: Not Currently   • Drug use: Never   ,   Medications Prior to Admission   Medication Sig Dispense Refill Last Dose   • amLODIPine (NORVASC) 10 MG tablet Take 1 tablet by mouth Daily. 30 tablet 1 2022   • budesonide (PULMICORT) 0.5 MG/2ML nebulizer solution Take 2 mL by nebulization 2 (Two) Times a Day.   2022   • cloNIDine (CATAPRES) 0.3 MG tablet Take 1 tablet by mouth Every 8  (Eight) Hours. 90 tablet 1 11/13/2022   • diazePAM (VALIUM) 5 MG tablet Take 1 tablet by mouth 4 (Four) Times a Day As Needed for Anxiety.   11/13/2022   • insulin aspart (novoLOG FLEXPEN) 100 UNIT/ML solution pen-injector sc pen Inject 10 Units under the skin into the appropriate area as directed 2 (Two) Times a Day.   11/13/2022   • lisinopril (PRINIVIL,ZESTRIL) 40 MG tablet Take 40 mg by mouth Daily.   11/13/2022   • Probiotic Product (PROBIOTIC ADVANCED) capsule Take 1 capsule by mouth Daily.   11/13/2022   • sildenafil (REVATIO) 20 MG tablet Take 0.5 tablets by mouth Every 8 (Eight) Hours. 90 tablet 1 11/13/2022   • topiramate (TOPAMAX) 50 MG tablet Take 50 mg by mouth Daily.   11/13/2022   • traZODone (DESYREL) 100 MG tablet Take 100 mg by mouth Every Night.   11/13/2022   • doxepin (SINEquan) 50 MG capsule Take 50 mg by mouth At Night As Needed.      • hydrALAZINE (APRESOLINE) 25 MG tablet Take 1 tablet by mouth 3 (Three) Times a Day As Needed.      • ipratropium-albuterol (DUO-NEB) 0.5-2.5 mg/3 ml nebulizer Take 3 mL by nebulization 3 (Three) Times a Day As Needed for Wheezing.      • LINZESS 290 MCG capsule capsule Take 290 mcg by mouth Daily As Needed.      • oxyCODONE-acetaminophen (PERCOCET)  MG per tablet Take 1 tablet by mouth Every 4 (Four) Hours As Needed for Moderate Pain .      • promethazine (PHENERGAN) 50 MG tablet Take 50 mg by mouth Every 6 (Six) Hours As Needed.      , Scheduled Meds:  amLODIPine, 10 mg, Oral, Q24H  budesonide-formoterol, 2 puff, Inhalation, BID - RT  cefTRIAXone, 2 g, Intravenous, Q24H  cloNIDine, 0.3 mg, Oral, Q8H  insulin lispro, 2-7 Units, Subcutaneous, TID With Meals  lisinopril, 40 mg, Oral, Q24H  senna-docusate sodium, 2 tablet, Oral, BID  sodium chloride, 10 mL, Intravenous, Q12H  topiramate, 50 mg, Oral, Daily  traZODone, 100 mg, Oral, Nightly    , Continuous Infusions:  heparin, 14 Units/kg/hr, Last Rate: 16 Units/kg/hr (11/15/22 1240)    , PRN Meds:  •   "acetaminophen **OR** acetaminophen **OR** acetaminophen  •  aluminum-magnesium hydroxide-simethicone  •  senna-docusate sodium **AND** polyethylene glycol **AND** bisacodyl **AND** bisacodyl  •  dextrose  •  dextrose  •  diazePAM  •  doxepin  •  glucagon (human recombinant)  •  heparin  •  heparin  •  hydrALAZINE  •  ipratropium-albuterol  •  magnesium sulfate **OR** magnesium sulfate in D5W 1g/100mL (PREMIX)  •  melatonin  •  Morphine  •  nitroglycerin  •  ondansetron **OR** ondansetron  •  oxyCODONE  •  oxyCODONE  •  potassium chloride  •  potassium chloride  •  [COMPLETED] Insert peripheral IV **AND** sodium chloride  •  sodium chloride   Allergies:  Codeine    Subjective     ROS:  Review of Systems   Constitutional: Negative for chills, fatigue and fever.   HENT: Negative.    Eyes: Negative.    Respiratory: Positive for shortness of breath. Negative for cough.    Cardiovascular: Positive for chest pain and leg swelling. Negative for palpitations.   Gastrointestinal: Negative for abdominal pain.   Endocrine: Negative.    Musculoskeletal: Positive for back pain.   Skin: Negative for rash and wound.   Neurological: Negative for dizziness.   Psychiatric/Behavioral: Negative for behavioral problems.        Objective   Vital Signs:   /53 (BP Location: Right arm, Patient Position: Lying)   Pulse 74   Temp 98.6 °F (37 °C) (Oral)   Resp 19   Ht 160 cm (63\")   Wt 107 kg (235 lb)   SpO2 90%   BMI 41.63 kg/m²     Physical Exam: (performed by MD)  Physical Exam  Vitals and nursing note reviewed.   Pulmonary:      Comments: Oxygen at 4 LPM NC  Musculoskeletal:      Right lower leg: Edema present.      Left lower leg: Edema present.   Neurological:      Mental Status: She is alert.         Results Review:  Lab Results (last 48 hours)     Procedure Component Value Units Date/Time    aPTT [[id number]] Collected: 11/15/22 0713    Specimen: Blood Updated: 11/15/22 0715    aPTT [[id number]]  (Abnormal) Collected: " 11/15/22 0035    Specimen: Blood Updated: 11/15/22 0122     PTT 31.8 seconds     Basic Metabolic Panel [[id number]]  (Abnormal) Collected: 11/15/22 0035    Specimen: Blood Updated: 11/15/22 0105     Glucose 131 mg/dL      BUN 10 mg/dL      Creatinine 0.75 mg/dL      Sodium 140 mmol/L      Potassium 4.4 mmol/L      Chloride 98 mmol/L      CO2 36.0 mmol/L      Calcium 8.8 mg/dL      BUN/Creatinine Ratio 13.3     Anion Gap 6.0 mmol/L      eGFR 89.6 mL/min/1.73      Comment: National Kidney Foundation and American Society of Nephrology (ASN) Task Force recommended calculation based on the Chronic Kidney Disease Epidemiology Collaboration (CKD-EPI) equation refit without adjustment for race.       Narrative:      GFR Normal >60  Chronic Kidney Disease <60  Kidney Failure <15      Troponin [[id number]]  (Normal) Collected: 11/15/22 0035    Specimen: Blood Updated: 11/15/22 0105     Troponin T <0.010 ng/mL     Narrative:      Troponin T Reference Range:  <= 0.03 ng/mL-   Negative for AMI  >0.03 ng/mL-     Abnormal for myocardial necrosis.  Clinicians would have to utilize clinical acumen, EKG, Troponin and serial changes to determine if it is an Acute Myocardial Infarction or myocardial injury due to an underlying chronic condition.       Results may be falsely decreased if patient taking Biotin.      Magnesium [[id number]]  (Normal) Collected: 11/15/22 0035    Specimen: Blood Updated: 11/15/22 0105     Magnesium 1.8 mg/dL     CBC Auto Differential [[id number]]  (Abnormal) Collected: 11/15/22 0035    Specimen: Blood Updated: 11/15/22 0044     WBC 6.10 10*3/mm3      RBC 3.75 10*6/mm3      Hemoglobin 9.9 g/dL      Hematocrit 32.2 %      MCV 85.9 fL      MCH 26.3 pg      MCHC 30.7 g/dL      RDW 16.0 %      RDW-SD 48.6 fl      MPV 7.2 fL      Platelets 188 10*3/mm3      Neutrophil % 66.5 %      Lymphocyte % 26.5 %      Monocyte % 4.8 %      Eosinophil % 1.5 %      Basophil % 0.7 %      Neutrophils, Absolute 4.10 10*3/mm3       Lymphocytes, Absolute 1.60 10*3/mm3      Monocytes, Absolute 0.30 10*3/mm3      Eosinophils, Absolute 0.10 10*3/mm3      Basophils, Absolute 0.00 10*3/mm3      nRBC 0.0 /100 WBC     aPTT [[id number]]  (Abnormal) Collected: 11/14/22 2149    Specimen: Blood Updated: 11/14/22 2207     PTT 94.5 seconds     Troponin [[id number]]  (Normal) Collected: 11/14/22 1912    Specimen: Blood Updated: 11/14/22 1939     Troponin T <0.010 ng/mL     Narrative:      Troponin T Reference Range:  <= 0.03 ng/mL-   Negative for AMI  >0.03 ng/mL-     Abnormal for myocardial necrosis.  Clinicians would have to utilize clinical acumen, EKG, Troponin and serial changes to determine if it is an Acute Myocardial Infarction or myocardial injury due to an underlying chronic condition.       Results may be falsely decreased if patient taking Biotin.      Hemoglobin A1c [[id number]]  (Abnormal) Collected: 11/14/22 1912    Specimen: Blood Updated: 11/14/22 1931     Hemoglobin A1C 6.8 %     Narrative:      Hemoglobin A1C Reference Range:    <5.7 %        Normal  5.7-6.4 %     Increased risk for diabetes  > 6.4 %        Diabetes       These guidelines have been recommended by the American Diabetic Association for Hgb A1c.      The following 2010 guidelines have been recommended by the American Diabetes Association for Hemoglobin A1c.    HBA1c 5.7-6.4% Increased risk for future diabetes (pre-diabetes)  HBA1c     >6.4% Diabetes      POC Glucose Once [[id number]]  (Abnormal) Collected: 11/14/22 1754    Specimen: Blood Updated: 11/14/22 1755     Glucose 126 mg/dL      Comment: Serial Number: [id number]:  [id number]       BNP [[id number]]  (Normal) Collected: 11/14/22 1237    Specimen: Blood Updated: 11/14/22 1652     proBNP 404.5 pg/mL     Narrative:      Among patients with dyspnea, NT-proBNP is highly sensitive for the detection of acute congestive heart failure. In addition NT-proBNP of <300 pg/ml effectively rules out acute congestive heart failure  with 99% negative predictive value.    Results may be falsely decreased if patient taking Biotin.      aPTT [[id number]]  (Abnormal) Collected: 11/14/22 1237    Specimen: Blood Updated: 11/14/22 1531     PTT 28.8 seconds     Protime-INR [[id number]]  (Normal) Collected: 11/14/22 1237    Specimen: Blood Updated: 11/14/22 1531     Protime 10.2 Seconds      INR 0.99    Urine Culture - Urine, Urine, Catheter [[id number]] Collected: 11/14/22 1255    Specimen: Urine, Catheter Updated: 11/14/22 1439    Extra Tubes [[id number]] Collected: 11/14/22 1237    Specimen: Blood, Venous Line Updated: 11/14/22 1346    Narrative:      The following orders were created for panel order Extra Tubes.  Procedure                               Abnormality         Status                     ---------                               -----------         ------                     Gold Top - SST[[id number]]                                   Final result                 Please view results for these tests on the individual orders.    Gold Top - SST [[id number]] Collected: 11/14/22 1237    Specimen: Blood Updated: 11/14/22 1346     Extra Tube Hold for add-ons.     Comment: Auto resulted.       Urinalysis, Microscopic Only - Urine, Catheter [[id number]]  (Abnormal) Collected: 11/14/22 1255    Specimen: Urine, Catheter Updated: 11/14/22 1325     RBC, UA None Seen /HPF      WBC, UA 6-12 /HPF      Bacteria, UA 3+ /HPF      Squamous Epithelial Cells, UA 0-2 /HPF      Hyaline Casts, UA None Seen /LPF      Methodology Manual Light Microscopy    Comprehensive Metabolic Panel [[id number]]  (Abnormal) Collected: 11/14/22 1237    Specimen: Blood Updated: 11/14/22 1317     Glucose 142 mg/dL      BUN 11 mg/dL      Creatinine 0.83 mg/dL      Sodium 139 mmol/L      Potassium 3.9 mmol/L      Chloride 97 mmol/L      CO2 36.0 mmol/L      Calcium 9.0 mg/dL      Total Protein 6.4 g/dL      Albumin 3.60 g/dL      ALT (SGPT) 9 U/L      AST (SGOT) 9 U/L      Alkaline Phosphatase  66 U/L      Total Bilirubin 0.3 mg/dL      Globulin 2.8 gm/dL      A/G Ratio 1.3 g/dL      BUN/Creatinine Ratio 13.3     Anion Gap 6.0 mmol/L      eGFR 79.3 mL/min/1.73      Comment: National Kidney Foundation and American Society of Nephrology (ASN) Task Force recommended calculation based on the Chronic Kidney Disease Epidemiology Collaboration (CKD-EPI) equation refit without adjustment for race.       Narrative:      GFR Normal >60  Chronic Kidney Disease <60  Kidney Failure <15      Troponin [[id number]]  (Normal) Collected: 11/14/22 1237    Specimen: Blood Updated: 11/14/22 1317     Troponin T <0.010 ng/mL     Narrative:      Troponin T Reference Range:  <= 0.03 ng/mL-   Negative for AMI  >0.03 ng/mL-     Abnormal for myocardial necrosis.  Clinicians would have to utilize clinical acumen, EKG, Troponin and serial changes to determine if it is an Acute Myocardial Infarction or myocardial injury due to an underlying chronic condition.       Results may be falsely decreased if patient taking Biotin.      Blood Culture - Blood, Arm, Left [[id number]] Collected: 11/14/22 1306    Specimen: Blood from Arm, Left Updated: 11/14/22 1310    Urinalysis With Microscopic If Indicated (No Culture) - Urine, Catheter [[id number]]  (Abnormal) Collected: 11/14/22 1255    Specimen: Urine, Catheter Updated: 11/14/22 1303     Color, UA Yellow     Appearance, UA Clear     pH, UA 6.5     Specific Gravity, UA 1.014     Glucose, UA Negative     Ketones, UA Negative     Bilirubin, UA Negative     Blood, UA Negative     Protein, UA Trace     Leuk Esterase, UA Small (1+)     Nitrite, UA Positive     Urobilinogen, UA 1.0 E.U./dL    D-dimer, Quantitative [[id number]]  (Abnormal) Collected: 11/14/22 1237    Specimen: Blood Updated: 11/14/22 1257     D-Dimer, Quantitative 1.47 mg/L (FEU)     Narrative:      Reference Range  --------------------------------------------------------------------     < 0.50   Negative Predictive Value  0.50-0.59    Indeterminate    >= 0.60   Probable VTE             A very low percentage of patients with DVT may yield D-Dimer results   below the cut-off of 0.50 mg/L FEU.  This is known to be more   prevalent in patients with distal DVT.             Results of this test should always be interpreted in conjunction with   the patient's medical history, clinical presentation and other   findings.  Clinical diagnosis should not be based on the result of   [place] D-Dimer alone.    CBC & Differential [[id number]]  (Abnormal) Collected: 11/14/22 1237    Specimen: Blood Updated: 11/14/22 1247    Narrative:      The following orders were created for panel order CBC & Differential.  Procedure                               Abnormality         Status                     ---------                               -----------         ------                     CBC Auto Differential[[id number]]        Abnormal            Final result                 Please view results for these tests on the individual orders.    CBC Auto Differential [[id number]]  (Abnormal) Collected: 11/14/22 1237    Specimen: Blood Updated: 11/14/22 1247     WBC 5.50 10*3/mm3      RBC 3.88 10*6/mm3      Hemoglobin 10.5 g/dL      Hematocrit 33.4 %      MCV 86.1 fL      MCH 27.0 pg      MCHC 31.3 g/dL      RDW 16.0 %      RDW-SD 48.1 fl      MPV 7.4 fL      Platelets 222 10*3/mm3      Neutrophil % 64.1 %      Lymphocyte % 28.7 %      Monocyte % 4.7 %      Eosinophil % 1.6 %      Basophil % 0.9 %      Neutrophils, Absolute 3.60 10*3/mm3      Lymphocytes, Absolute 1.60 10*3/mm3      Monocytes, Absolute 0.30 10*3/mm3      Eosinophils, Absolute 0.10 10*3/mm3      Basophils, Absolute 0.00 10*3/mm3      nRBC 0.0 /100 WBC     POC Lactate [[id number]]  (Normal) Collected: 11/14/22 1241    Specimen: Blood Updated: 11/14/22 1242     Lactate 0.9 mmol/L      Comment: Serial Number: [id number]:  [id number]       Blood Culture - Blood, Arm, Left [[id number]] Collected: 11/14/22 1237     Specimen: Blood from Arm, Left Updated: 11/14/22 1241           Pending Results:     Imaging Reviewed:   XR Chest 1 View    Result Date: 11/14/2022  No acute chest findings.  Electronically Signed By-Jocelyn Zayas MD On:11/14/2022 1:00 PM This report was finalized on [id number] by  Jocelyn Zayas MD.    CT Angiogram Chest Pulmonary Embolism    Result Date: 11/14/2022  1. Small distal segmental and subsegmental emboli are present in the right lower lobe. No evidence of right heart strain. I notified the emergency room physician at the time of this dictation. 2. Small eccentric nonocclusive thrombus is demonstrated in the left lower lobe pulmonary artery proximally. This has a chronic appearance. 3. Features of mild interstitial and alveolar edema. 4. Subsegmental atelectasis in the right middle lobe and left lower lobe.  Electronically Signed By-Jocelyn Zayas MD On:11/14/2022 3:17 PM This report was finalized on [id number] by  Jocelyn Zayas MD.    Assessment & Plan   ASSESSMENT  This is a 62 year old female with recent sepsis infection and ventilator placement for 2 days presenting with shortness of breath, chest pain, and back pain found to have bilateral pulmonary emboli on CT chest and negative BLE doppler.  She was placed on heparin drip and admitted.  Oncology was consulted for anticoagulation management and recommends heparin drip until stabilized then oral anticoagulation for a period of 3 months.    Bilateral pulmonary emboli  Presented with shortness of breath, chest and back pain, and D-dimer of 1.47  CT chest 11/14/2022- small distal segmental and subsegmental emboli RLL-no evidence of right heart strain.  Small nonocclusive thrombus in LLL pulmonary artery-chronic appearance, mild interstitial and alveolar edema, subsegmental atelectasis RML and LLL  BLE Doppler 11/14/2022-negative  Echo pending  Heparin drip  Transition to oral anticoagulation with DOAC or Coumadin when stable  The thrombus is  thought to be provoked due to recent illness with extended immobility even after leaving the hosptial    Acute UTI  Urine positive for nitrites and 3+ bacteria-culture pending  Rocephin IV    Other chronic conditions: COPD, DM type II, HTN, restless leg syndrome, anxiety/depression/PTSD, obesity, recent tobacco abuse      Electronically signed by CORI Conrad, 11/15/22, 7:31 AM ESTNola    I was asked to see Ms. Álvarez who was admitted to the hospital with a short history of chest pain and dyspnea and hypertension associated to low oxygen saturation. In the emergency room she was found to have pulmonary embolism and evidence of chronic embolism in the left lower lobe pulmonary artery. She had been admitted a few days before, apparently with sepsis of urinary origin and had been treated with antibiotics. However, she apparently was also intubated. After approximately 6 days of hospital stay she left the hospital because she was offered rehabilitation that she did not want to take. At home she spent approximately 40% of the time in bed and the majority of the reminder on a recliner. She carried a long history of cigarette smoking and her spouse reported that she consumed about one pack per day. She had not had any fevers since leaving the hospital. She had been eating well and did not seem to be losing weight. She suffered from chronic dyspnea and was generally limited by this. She had not experienced any chest pain. She denied abdominal pain and had not had any diarrhea or dysuria. She admitted to chronic edema. On exam she appears to be a chronically ill woman. She is alert and oriented. No distress and no jaundice. No oral lesions and respirations not labored. The lungs are markedly diminished bilaterally and the heart is tachycardic but regular. The abdomen is protuberant and obese but soft and not tender. There is no edema at this time. Reviewed the records including imaging studies, all laboratory exams and  medical notes. Interviewed her and her spouse. With the recent history of hospitalization and immobility, as well as her continued cigarette smoking and her high BMI, her embolic process is clearly provoked. In spite of her apparent family history of thrombosis, I don't believe that testing for inherited predisposition to thrombosis is justified.  She needs anticoagulation. Will keep her on unfractionated heparin and will try to find out which oral anticoagulant is preferred by her insurance so as to reduce the monetary burden on her. Given the appearance of chronic thrombus in the left pulmonary artery and given her very high risk of recurrence she might benefit from long term anticoagulation. Discussed with her and her spouse. Explained the importance of smoking cessation. Will continue to follow.     Javy King MD on 11/15/2022 at 7:30 AM.

## 2022-11-15 NOTE — PHARMACY RECOMMENDATION
Transitions-of-Care ([facility] Future COST Assessment:     /Nurse requesting test claim: [facility]    Pharmacy ran test claim for the following:     Drug Sig Covered/PA required Patient Copay per month   Eliquis (apixiban) 5 mg BID Covered without PA $ 100     Xarelto (rivaroxaban) 20 mg QD Covered without PA $ 100     Patient Insurance Type: Commercial Insurance - they are eligible to use a monthly  discount card in the future    Deductible/Medicare Gap Issue? Yes - this means the price may change in the future of this calendar year.  Our test claim did not provide information about the specific price at this time.    Is patient signed up for M2B service? No    Is above drug(s) eligible for 1 month free through M2B service? Yes - free coupon is available   If eligible,  or [facility] pharmacy can provide coupon upon request.           For billing questions, reach out to [facility] at [phone]  For M2B questions, reach out to [facility] Pharmacy at [phone]    Mackenzie Norris PharmD   11/15/2022 10:20 EST

## 2022-11-15 NOTE — PLAN OF CARE
Goal Outcome Evaluation:            Pt admitted from ER with bilateral PE and started on heparin gtt. Pulmonary and hematology following. Pt on home dose of 4L n/c.       Problem: Fall Injury Risk  Goal: Absence of Fall and Fall-Related Injury  Outcome: Ongoing, Progressing  Intervention: Promote Injury-Free Environment  Recent Flowsheet Documentation  Taken 11/15/2022 1600 by Robina Hopson RN  Safety Promotion/Fall Prevention:   activity supervised   clutter free environment maintained   nonskid shoes/slippers when out of bed   safety round/check completed  Taken 11/15/2022 1200 by Robina Hopson RN  Safety Promotion/Fall Prevention:   activity supervised   clutter free environment maintained   nonskid shoes/slippers when out of bed   safety round/check completed  Taken 11/15/2022 0822 by Robina Hopson, RN  Safety Promotion/Fall Prevention:   activity supervised   clutter free environment maintained   nonskid shoes/slippers when out of bed   safety round/check completed

## 2022-11-15 NOTE — CONSULTS
[facility]: [facility]         CONSULT NOTE    Patient Identification:  Ann Álvarez  62 y.o.  female  1960  [id number]            Requesting physician: Attending physician    Reason for Consultation: Shortness of breath and pulm embolism      History of Present Illness:  62-year-old female with past medical history of COPD, tobacco smoking, obesity, diabetes mellitus type 2, dyslipidemia, hepatic steatosis, RLS, essential hypertension, insomnia and PTSD who presented 11/14/2022 complaining of shortness of breath with chest pain and back pain.  Her oxygen saturation is 95% on 4 L per nasal cannula with heart rate 115 and respiratory rate 23.    UA positive nitrate with 3+ bacteria.  Glucose 142.  Creatinine 0.83.  Chloride 97.  CO2 36.0.  Troponin negative.  D-dimer 1.47.  White blood count 5.50.  Hemoglobin 10.5.  Platelets 222.  Lactate 0.9.  Blood culture pending, urine culture pending.     CTA chest Small distal segmental and subsegmental emboli are present in the right lower lobe. No evidence of right heart strain. Small eccentric nonocclusive thrombus is demonstrated in the left lower lobe pulmonary artery proximally. This has a chronic appearance.Features of mild interstitial and alveolar edema.Subsegmental atelectasis in the right middle lobe and left lower lobe.    Assessment:  Shortness of breath  Pulmonary embolism  Chronic hypoxemia  Bilateral subsegmental atelectasis  COPD  History of tobacco smoking: Quit 6/27/2022  Obesity  Diabetes mellitus type 2  Dyslipidemia  Hepatic steatosis  Essential hypertension  RLS  History of insomnia and PTSD  Normocytic anemia    Recommendations:  Oxygen supplement and titration maintain saturation 90 to 95%: Currently on 4 L per nasal cannula  Bronchodilators  Symbicort  Mucinex  Anticoagulation: IV heparin  Blood pressure control  Glucose control            Review of Sytems:  Review of Systems  Constitutional: Negative for chills, fever and malaise/fatigue.    HENT: Negative.    Eyes: Negative.    Cardiovascular: Positive for chest pain  Respiratory: Positive for cough and shortness of breath.    Skin: Negative.    Musculoskeletal: Positive for chronic back pain  Gastrointestinal: Negative.    Genitourinary: Negative.    Neurological: Negative.    Psychiatric/Behavioral: Positive for insomnia PTSD  Past Medical History:  Past Medical History:   Diagnosis Date   • Anxiety    • Arthritis    • Chronic back pain    • Chronic obstructive pulmonary disease (HCC) 01/31/2020   • Chronic respiratory failure with hypoxia ([facility]) 03/06/2018    O2 @ 2 L per NC as needed   • Diabetes mellitus ([facility])    • Dyslipidemia 10/21/2014   • Edema, lower extremity 07/16/2014   • Hypertension    • Insomnia    • Obesity, Class III, BMI 40-49.9 (morbid obesity) ([facility]) 03/29/2022   • Prediabetes 11/06/2018   • Primary hypertension 05/30/2012   • PTSD (post-traumatic stress disorder)    • RLS (restless legs syndrome) 04/22/2020   • Tobacco abuse 10/21/2014   • Vitamin D deficiency 05/30/2012       Past Surgical History:  Past Surgical History:   Procedure Laterality Date   • BACK SURGERY     • CHOLECYSTECTOMY     • HYSTERECTOMY     • TUMOR REMOVAL      benign breats tumor        Home Meds:  Medications Prior to Admission   Medication Sig Dispense Refill Last Dose   • amLODIPine (NORVASC) 10 MG tablet Take 1 tablet by mouth Daily. 30 tablet 1 11/13/2022   • budesonide (PULMICORT) 0.5 MG/2ML nebulizer solution Take 2 mL by nebulization 2 (Two) Times a Day.   11/13/2022   • cloNIDine (CATAPRES) 0.3 MG tablet Take 1 tablet by mouth Every 8 (Eight) Hours. 90 tablet 1 11/13/2022   • diazePAM (VALIUM) 5 MG tablet Take 1 tablet by mouth 4 (Four) Times a Day As Needed for Anxiety.   11/13/2022   • insulin aspart (novoLOG FLEXPEN) 100 UNIT/ML solution pen-injector sc pen Inject 10 Units under the skin into the appropriate area as directed 2 (Two) Times a Day.   11/13/2022   • lisinopril (PRINIVIL,ZESTRIL) 40 MG  "tablet Take 40 mg by mouth Daily.   2022   • Probiotic Product (PROBIOTIC ADVANCED) capsule Take 1 capsule by mouth Daily.   2022   • sildenafil (REVATIO) 20 MG tablet Take 0.5 tablets by mouth Every 8 (Eight) Hours. 90 tablet 1 2022   • topiramate (TOPAMAX) 50 MG tablet Take 50 mg by mouth Daily.   2022   • traZODone (DESYREL) 100 MG tablet Take 100 mg by mouth Every Night.   2022   • doxepin (SINEquan) 50 MG capsule Take 50 mg by mouth At Night As Needed.      • hydrALAZINE (APRESOLINE) 25 MG tablet Take 1 tablet by mouth 3 (Three) Times a Day As Needed.      • ipratropium-albuterol (DUO-NEB) 0.5-2.5 mg/3 ml nebulizer Take 3 mL by nebulization 3 (Three) Times a Day As Needed for Wheezing.      • LINZESS 290 MCG capsule capsule Take 290 mcg by mouth Daily As Needed.      • oxyCODONE-acetaminophen (PERCOCET)  MG per tablet Take 1 tablet by mouth Every 4 (Four) Hours As Needed for Moderate Pain .      • promethazine (PHENERGAN) 50 MG tablet Take 50 mg by mouth Every 6 (Six) Hours As Needed.          Allergies:  Allergies   Allergen Reactions   • Codeine Hives, Itching and Nausea And Vomiting       Social History:   Social History     Socioeconomic History   • Marital status:    Tobacco Use   • Smoking status: Former     Packs/day: 0.25     Types: Cigarettes     Quit date: 2022     Years since quittin.3   • Smokeless tobacco: Never   Vaping Use   • Vaping Use: Never used   Substance and Sexual Activity   • Alcohol use: Not Currently   • Drug use: Never   • Sexual activity: Defer       Family History:  Family History   Problem Relation Age of Onset   • Heart disease Mother    • Heart disease Father    • Cancer Sister    • Cancer Brother        Physical Exam:  BP (!) 140/103 (BP Location: Right arm, Patient Position: Lying)   Pulse 115   Temp 98 °F (36.7 °C) (Oral)   Resp 23   Ht 160 cm (63\")   Wt 107 kg (235 lb 14.3 oz)   SpO2 95%   BMI 41.79 kg/m²  Body mass " index is 41.79 kg/m². 95% 107 kg (235 lb 14.3 oz)  Physical Exam  General Appearance:  Alert, appears no acute distress on rest  HEENT:  Normocephalic, without obvious abnormality, Conjunctiva/corneas clear,.   Nares normal, no drainage     Neck:  Supple, symmetrical, trachea midline. No JVD.  Lungs /Chest wall: Mild bilateral basal rhonchi, respirations unlabored, symmetrical wall movement.     Heart:  Regular rate and rhythm, S1 S2 normal  Abdomen: Soft, non-tender, no masses, no organomegaly.    Extremities: No edema, no clubbing or cyanosis  LABS:  Lab Results   Component Value Date    CALCIUM 8.8 11/15/2022    PHOS 2.9 04/05/2022     Results from last 7 days   Lab Units 11/15/22  0035 11/14/22  1237   MAGNESIUM mg/dL 1.8  --    SODIUM mmol/L 140 139   POTASSIUM mmol/L 4.4 3.9   CHLORIDE mmol/L 98 97*   CO2 mmol/L 36.0* 36.0*   BUN mg/dL 10 11   CREATININE mg/dL 0.75 0.83   GLUCOSE mg/dL 131* 142*   CALCIUM mg/dL 8.8 9.0   WBC 10*3/mm3 6.10 5.50   HEMOGLOBIN g/dL 9.9* 10.5*   PLATELETS 10*3/mm3 188 222   ALT (SGPT) U/L  --  9   AST (SGOT) U/L  --  9   PROBNP pg/mL  --  404.5     Lab Results   Component Value Date    CKTOTAL 28 04/05/2022    TROPONINT <0.010 11/15/2022     Results from last 7 days   Lab Units 11/15/22  0035 11/14/22  1912 11/14/22  1237   TROPONIN T ng/mL <0.010 <0.010 <0.010     Results from last 7 days   Lab Units 11/14/22  1255   URINECX  >100,000 CFU/mL Gram Negative Bacilli*     Results from last 7 days   Lab Units 11/15/22  0819 11/14/22  1241   LACTATE mmol/L 0.7 0.9             Results from last 7 days   Lab Units 11/14/22  1237   INR  0.99     Results from last 7 days   Lab Units 11/14/22  1255   URINECX  >100,000 CFU/mL Gram Negative Bacilli*     No results found for: TSH  Estimated Creatinine Clearance: 89.9 mL/min (by C-G formula based on SCr of 0.75 mg/dL).  Results from last 7 days   Lab Units 11/14/22  1255   NITRITE UA  Positive*   WBC UA /HPF 6-12*   BACTERIA UA /HPF 3+*   SQUAM  EPITHEL UA /HPF 0-2   URINECX  >100,000 CFU/mL Gram Negative Bacilli*        Imaging:  Imaging Results (Last 24 Hours)     Procedure Component Value Units Date/Time    CT Angiogram Chest Pulmonary Embolism [[id number]] Collected: 11/14/22 1511     Updated: 11/14/22 1519    Narrative:      CT ANGIOGRAM CHEST PULMONARY EMBOLISM-     Date of Exam: 11/14/2022 2:47 PM     Indication: Hypoxia Short of breath elevated D-dimer recent  hospitalization; R06.02-Shortness of breath; R07.9-Chest pain,  unspecified; N39.0-Urinary tract infection, site not specified.     Comparison: CT chest 6/27/2022.     Technique: Serial and axial CT images of the chest were obtained  following the uneventful intravenous administration of 100 cc Isovue-370  contrast. Reconstructions in the coronal and sagittal planes were also  performed. In addition, a 3 D volume rendered image was obtained after  post processing. Automated exposure control and iterative reconstruction  methods were used.     FINDINGS:     Acute-appearing small distal segmental and subsegmental emboli are  present within the right lower lobe. Small eccentric nonocclusive  embolism is seen in the proximal left lower lobe pulmonary artery, which  appears chronic.     There is no indication of right heart strain. RV LV ratio is  approximately 0.8. Heart size is within normal limits. No pericardial  effusion or pleural effusion is seen.     There are slightly enlarged and mediastinal lymph nodes, including a  right lower paratracheal lymph node measuring 13 mm short axis  (previously 10 mm), prevascular node measuring 13 mm (previously 9 mm),  subcarinal node measuring 14 mm short axis (previously 9 mm). There is  an enlarged right hilar node measuring 17 mm short axis.     Present in both lungs with faint groundglass densities, in a pattern  suggestive of mild pulmonary edema. There is bandlike subsegmental  atelectasis in the left lower lobe and right middle lobe. No  dense  consolidations are identified.     No pericardial effusion or pleural effusion is seen.     Cholecystectomy changes are present. The included portions of the upper  abdominal organs are within normal limits. There is mild scarring of the  spleen.     No acute or suspicious osseous abnormalities are identified.                Impression:      1. Small distal segmental and subsegmental emboli are present in the  right lower lobe. No evidence of right heart strain. I notified the  emergency room physician at the time of this dictation.  2. Small eccentric nonocclusive thrombus is demonstrated in the left  lower lobe pulmonary artery proximally. This has a chronic appearance.  3. Features of mild interstitial and alveolar edema.  4. Subsegmental atelectasis in the right middle lobe and left lower  lobe.     Electronically Signed By-Jocelyn Zayas MD On:11/14/2022 3:17 PM  This report was finalized on [id number] by  Jocelyn Zayas MD.    XR Chest 1 View [[id number]] Collected: 11/14/22 1300     Updated: 11/14/22 1303    Narrative:      DATE OF EXAM:  11/14/2022 12:40 PM     PROCEDURE:  XR CHEST 1 VW-     INDICATIONS:  cp       COMPARISON:  AP portable chest 6/27/2022 CT chest 6/27/2022.     TECHNIQUE:   Single radiographic view of the chest was obtained.     FINDINGS:  No acute airspace disease. Heart size is borderline enlarged but within  normal limits. No pleural effusion, pneumothorax, or acute osseous  abnormalities are identified.       Impression:      No acute chest findings.     Electronically Signed By-Jocelyn Zayas MD On:11/14/2022 1:00 PM  This report was finalized on [id number] by  Jocelyn Zayas MD.            Current Meds:   SCHEDULE  amLODIPine, 10 mg, Oral, Q24H  budesonide-formoterol, 2 puff, Inhalation, BID - RT  cefTRIAXone, 2 g, Intravenous, Q24H  cloNIDine, 0.3 mg, Oral, Q8H  insulin lispro, 2-7 Units, Subcutaneous, TID With Meals  lisinopril, 40 mg, Oral, Q24H  senna-docusate sodium,  2 tablet, Oral, BID  sodium chloride, 10 mL, Intravenous, Q12H  topiramate, 50 mg, Oral, Daily  traZODone, 100 mg, Oral, Nightly      Infusions  heparin, 14 Units/kg/hr, Last Rate: 12 Units/kg/hr (11/15/22 0753)      PRNs  •  acetaminophen **OR** acetaminophen **OR** acetaminophen  •  aluminum-magnesium hydroxide-simethicone  •  senna-docusate sodium **AND** polyethylene glycol **AND** bisacodyl **AND** bisacodyl  •  dextrose  •  dextrose  •  diazePAM  •  doxepin  •  glucagon (human recombinant)  •  heparin  •  heparin  •  hydrALAZINE  •  ipratropium-albuterol  •  magnesium sulfate **OR** magnesium sulfate in D5W 1g/100mL (PREMIX)  •  melatonin  •  Morphine  •  nitroglycerin  •  ondansetron **OR** ondansetron  •  oxyCODONE  •  oxyCODONE  •  potassium chloride  •  potassium chloride  •  [COMPLETED] Insert peripheral IV **AND** sodium chloride  •  sodium chloride        Sury Rivas MD  11/15/2022  09:42 EST      Much of this encounter note is an electronic transcription/translation of spoken language to printed text using Dragon Software.

## 2022-11-15 NOTE — ED NOTES
Assumed care for this pt at this time pt laying in bed watching tv A&OX 4, Heparin gtt going at 12.8 units at this time. No new complaints at this time

## 2022-11-16 LAB
ANION GAP SERPL CALCULATED.3IONS-SCNC: 7 MMOL/L (ref 5–15)
APTT PPP: 32.8 SECONDS (ref 61–76.5)
ARTERIAL PATENCY WRIST A: POSITIVE
ATMOSPHERIC PRESS: ABNORMAL MM[HG]
B PARAPERT DNA SPEC QL NAA+PROBE: NOT DETECTED
B PERT DNA SPEC QL NAA+PROBE: NOT DETECTED
BACTERIA SPEC AEROBE CULT: ABNORMAL
BASE EXCESS BLDA CALC-SCNC: 10.2 MMOL/L (ref 0–3)
BASE EXCESS BLDA CALC-SCNC: 6.7 MMOL/L (ref 0–3)
BASE EXCESS BLDA CALC-SCNC: 7.7 MMOL/L (ref 0–3)
BASOPHILS # BLD AUTO: 0 10*3/MM3 (ref 0–0.2)
BASOPHILS NFR BLD AUTO: 0.6 % (ref 0–1.5)
BDY SITE: ABNORMAL
BUN SERPL-MCNC: 8 MG/DL (ref 8–23)
BUN/CREAT SERPL: 7.9 (ref 7–25)
C PNEUM DNA NPH QL NAA+NON-PROBE: NOT DETECTED
CALCIUM SPEC-SCNC: 8.6 MG/DL (ref 8.6–10.5)
CHLORIDE SERPL-SCNC: 99 MMOL/L (ref 98–107)
CO2 BLDA-SCNC: 37 MMOL/L (ref 22–29)
CO2 BLDA-SCNC: 38.9 MMOL/L (ref 22–29)
CO2 BLDA-SCNC: 41.6 MMOL/L (ref 22–29)
CO2 SERPL-SCNC: 35 MMOL/L (ref 22–29)
CREAT SERPL-MCNC: 1.01 MG/DL (ref 0.57–1)
DEPRECATED RDW RBC AUTO: 50.8 FL (ref 37–54)
EGFRCR SERPLBLD CKD-EPI 2021: 62.7 ML/MIN/1.73
EOSINOPHIL # BLD AUTO: 0.1 10*3/MM3 (ref 0–0.4)
EOSINOPHIL NFR BLD AUTO: 1.3 % (ref 0.3–6.2)
ERYTHROCYTE [DISTWIDTH] IN BLOOD BY AUTOMATED COUNT: 16.5 % (ref 12.3–15.4)
FLUAV SUBTYP SPEC NAA+PROBE: NOT DETECTED
FLUBV RNA ISLT QL NAA+PROBE: NOT DETECTED
GLUCOSE BLDC GLUCOMTR-MCNC: 126 MG/DL (ref 70–105)
GLUCOSE BLDC GLUCOMTR-MCNC: 132 MG/DL (ref 70–105)
GLUCOSE BLDC GLUCOMTR-MCNC: 162 MG/DL (ref 70–105)
GLUCOSE SERPL-MCNC: 155 MG/DL (ref 65–99)
HADV DNA SPEC NAA+PROBE: NOT DETECTED
HCO3 BLDA-SCNC: 34.8 MMOL/L (ref 21–28)
HCO3 BLDA-SCNC: 36.4 MMOL/L (ref 21–28)
HCO3 BLDA-SCNC: 39.1 MMOL/L (ref 21–28)
HCOV 229E RNA SPEC QL NAA+PROBE: NOT DETECTED
HCOV HKU1 RNA SPEC QL NAA+PROBE: NOT DETECTED
HCOV NL63 RNA SPEC QL NAA+PROBE: NOT DETECTED
HCOV OC43 RNA SPEC QL NAA+PROBE: NOT DETECTED
HCT VFR BLD AUTO: 32.2 % (ref 34–46.6)
HEMODILUTION: NO
HGB BLD-MCNC: 10 G/DL (ref 12–15.9)
HMPV RNA NPH QL NAA+NON-PROBE: NOT DETECTED
HPIV1 RNA ISLT QL NAA+PROBE: NOT DETECTED
HPIV2 RNA SPEC QL NAA+PROBE: NOT DETECTED
HPIV3 RNA NPH QL NAA+PROBE: NOT DETECTED
HPIV4 P GENE NPH QL NAA+PROBE: NOT DETECTED
INHALED O2 CONCENTRATION: 36 %
INHALED O2 CONCENTRATION: 40 %
INHALED O2 CONCENTRATION: 40 %
LYMPHOCYTES # BLD AUTO: 1.7 10*3/MM3 (ref 0.7–3.1)
LYMPHOCYTES NFR BLD AUTO: 28.2 % (ref 19.6–45.3)
M PNEUMO IGG SER IA-ACNC: NOT DETECTED
MAGNESIUM SERPL-MCNC: 2 MG/DL (ref 1.6–2.4)
MCH RBC QN AUTO: 27 PG (ref 26.6–33)
MCHC RBC AUTO-ENTMCNC: 31.2 G/DL (ref 31.5–35.7)
MCV RBC AUTO: 86.7 FL (ref 79–97)
MODALITY: ABNORMAL
MONOCYTES # BLD AUTO: 0.3 10*3/MM3 (ref 0.1–0.9)
MONOCYTES NFR BLD AUTO: 5.2 % (ref 5–12)
NEUTROPHILS NFR BLD AUTO: 3.9 10*3/MM3 (ref 1.7–7)
NEUTROPHILS NFR BLD AUTO: 64.7 % (ref 42.7–76)
NRBC BLD AUTO-RTO: 0.1 /100 WBC (ref 0–0.2)
PCO2 BLDA: 71.8 MM HG (ref 35–48)
PCO2 BLDA: 80 MM HG (ref 35–48)
PCO2 BLDA: 82.2 MM HG (ref 35–48)
PEEP RESPIRATORY: 5 CM[H2O]
PH BLDA: 7.27 PH UNITS (ref 7.35–7.45)
PH BLDA: 7.29 PH UNITS (ref 7.35–7.45)
PH BLDA: 7.29 PH UNITS (ref 7.35–7.45)
PLATELET # BLD AUTO: 170 10*3/MM3 (ref 140–450)
PMV BLD AUTO: 7.5 FL (ref 6–12)
PO2 BLDA: 109.7 MM HG (ref 83–108)
PO2 BLDA: 64.1 MM HG (ref 83–108)
PO2 BLDA: 76.2 MM HG (ref 83–108)
POTASSIUM SERPL-SCNC: 4.3 MMOL/L (ref 3.5–5.2)
POTASSIUM SERPL-SCNC: 4.7 MMOL/L (ref 3.5–5.2)
PSV: 15 CMH2O
QT INTERVAL: 426 MS
QT INTERVAL: 435 MS
RBC # BLD AUTO: 3.72 10*6/MM3 (ref 3.77–5.28)
RESPIRATORY RATE: 22
RESPIRATORY RATE: 22
RHINOVIRUS RNA SPEC NAA+PROBE: NOT DETECTED
RSV RNA NPH QL NAA+NON-PROBE: NOT DETECTED
SAO2 % BLDCOA: 87.7 % (ref 94–98)
SAO2 % BLDCOA: 91.9 % (ref 94–98)
SAO2 % BLDCOA: 97.4 % (ref 94–98)
SARS-COV-2 RNA NPH QL NAA+NON-PROBE: NOT DETECTED
SODIUM SERPL-SCNC: 141 MMOL/L (ref 136–145)
VT ON VENT VENT: 500 ML
VT ON VENT VENT: 500 ML
WBC NRBC COR # BLD: 5.9 10*3/MM3 (ref 3.4–10.8)

## 2022-11-16 PROCEDURE — G0378 HOSPITAL OBSERVATION PER HR: HCPCS

## 2022-11-16 PROCEDURE — 25010000002 CEFTRIAXONE PER 250 MG: Performed by: INTERNAL MEDICINE

## 2022-11-16 PROCEDURE — 94799 UNLISTED PULMONARY SVC/PX: CPT

## 2022-11-16 PROCEDURE — 83735 ASSAY OF MAGNESIUM: CPT | Performed by: PHYSICIAN ASSISTANT

## 2022-11-16 PROCEDURE — 25010000002 ONDANSETRON PER 1 MG: Performed by: PHYSICIAN ASSISTANT

## 2022-11-16 PROCEDURE — 94664 DEMO&/EVAL PT USE INHALER: CPT

## 2022-11-16 PROCEDURE — 82803 BLOOD GASES ANY COMBINATION: CPT

## 2022-11-16 PROCEDURE — 99214 OFFICE O/P EST MOD 30 MIN: CPT | Performed by: INTERNAL MEDICINE

## 2022-11-16 PROCEDURE — 36415 COLL VENOUS BLD VENIPUNCTURE: CPT | Performed by: PHYSICIAN ASSISTANT

## 2022-11-16 PROCEDURE — 94660 CPAP INITIATION&MGMT: CPT

## 2022-11-16 PROCEDURE — 84132 ASSAY OF SERUM POTASSIUM: CPT | Performed by: PHYSICIAN ASSISTANT

## 2022-11-16 PROCEDURE — 85025 COMPLETE CBC W/AUTO DIFF WBC: CPT | Performed by: EMERGENCY MEDICINE

## 2022-11-16 PROCEDURE — 0202U NFCT DS 22 TRGT SARS-COV-2: CPT | Performed by: NURSE PRACTITIONER

## 2022-11-16 PROCEDURE — 25010000002 HEPARIN (PORCINE) 25000-0.45 UT/250ML-% SOLUTION: Performed by: EMERGENCY MEDICINE

## 2022-11-16 PROCEDURE — 82962 GLUCOSE BLOOD TEST: CPT

## 2022-11-16 PROCEDURE — 85730 THROMBOPLASTIN TIME PARTIAL: CPT | Performed by: INTERNAL MEDICINE

## 2022-11-16 PROCEDURE — 25010000002 MORPHINE PER 10 MG: Performed by: INTERNAL MEDICINE

## 2022-11-16 PROCEDURE — 87040 BLOOD CULTURE FOR BACTERIA: CPT | Performed by: INTERNAL MEDICINE

## 2022-11-16 PROCEDURE — 80048 BASIC METABOLIC PNL TOTAL CA: CPT | Performed by: INTERNAL MEDICINE

## 2022-11-16 PROCEDURE — 36600 WITHDRAWAL OF ARTERIAL BLOOD: CPT

## 2022-11-16 RX ORDER — IBUPROFEN 400 MG/1
600 TABLET ORAL EVERY 6 HOURS PRN
Status: DISCONTINUED | OUTPATIENT
Start: 2022-11-16 | End: 2022-11-16

## 2022-11-16 RX ORDER — FENTANYL CITRATE 50 UG/ML
INJECTION, SOLUTION INTRAMUSCULAR; INTRAVENOUS
Status: DISPENSED
Start: 2022-11-16 | End: 2022-11-17

## 2022-11-16 RX ORDER — ZOLPIDEM TARTRATE 5 MG/1
5 TABLET ORAL NIGHTLY PRN
Status: DISCONTINUED | OUTPATIENT
Start: 2022-11-16 | End: 2022-11-19 | Stop reason: HOSPADM

## 2022-11-16 RX ORDER — ETOMIDATE 2 MG/ML
INJECTION INTRAVENOUS
Status: DISPENSED
Start: 2022-11-16 | End: 2022-11-17

## 2022-11-16 RX ORDER — PROPOFOL 10 MG/ML
VIAL (ML) INTRAVENOUS
Status: DISPENSED
Start: 2022-11-16 | End: 2022-11-17

## 2022-11-16 RX ORDER — MORPHINE SULFATE 2 MG/ML
2 INJECTION, SOLUTION INTRAMUSCULAR; INTRAVENOUS EVERY 4 HOURS PRN
Status: DISPENSED | OUTPATIENT
Start: 2022-11-16 | End: 2022-11-18

## 2022-11-16 RX ORDER — BUDESONIDE AND FORMOTEROL FUMARATE DIHYDRATE 80; 4.5 UG/1; UG/1
2 AEROSOL RESPIRATORY (INHALATION)
Status: DISCONTINUED | OUTPATIENT
Start: 2022-11-16 | End: 2022-11-19 | Stop reason: HOSPADM

## 2022-11-16 RX ORDER — MIDAZOLAM HYDROCHLORIDE 1 MG/ML
INJECTION INTRAMUSCULAR; INTRAVENOUS
Status: DISPENSED
Start: 2022-11-16 | End: 2022-11-17

## 2022-11-16 RX ADMIN — MORPHINE SULFATE 4 MG: 4 INJECTION, SOLUTION INTRAMUSCULAR; INTRAVENOUS at 06:05

## 2022-11-16 RX ADMIN — AMLODIPINE BESYLATE 10 MG: 5 TABLET ORAL at 08:07

## 2022-11-16 RX ADMIN — CLONIDINE HYDROCHLORIDE 0.3 MG: 0.1 TABLET ORAL at 21:14

## 2022-11-16 RX ADMIN — SODIUM CHLORIDE 1000 ML: 0.9 INJECTION, SOLUTION INTRAVENOUS at 14:11

## 2022-11-16 RX ADMIN — TOPIRAMATE 50 MG: 25 TABLET, FILM COATED ORAL at 08:07

## 2022-11-16 RX ADMIN — IBUPROFEN 600 MG: 400 TABLET, FILM COATED ORAL at 08:07

## 2022-11-16 RX ADMIN — BUDESONIDE AND FORMOTEROL FUMARATE DIHYDRATE 2 PUFF: 80; 4.5 AEROSOL RESPIRATORY (INHALATION) at 06:50

## 2022-11-16 RX ADMIN — Medication 5 MG: at 21:18

## 2022-11-16 RX ADMIN — APIXABAN 10 MG: 5 TABLET, FILM COATED ORAL at 10:32

## 2022-11-16 RX ADMIN — APIXABAN 10 MG: 5 TABLET, FILM COATED ORAL at 21:14

## 2022-11-16 RX ADMIN — SENNOSIDES AND DOCUSATE SODIUM 2 TABLET: 50; 8.6 TABLET ORAL at 21:15

## 2022-11-16 RX ADMIN — ONDANSETRON 4 MG: 2 INJECTION INTRAMUSCULAR; INTRAVENOUS at 01:40

## 2022-11-16 RX ADMIN — HEPARIN SODIUM 22 UNITS/KG/HR: 10000 INJECTION, SOLUTION INTRAVENOUS at 06:51

## 2022-11-16 RX ADMIN — OXYCODONE 5 MG: 5 TABLET ORAL at 19:48

## 2022-11-16 RX ADMIN — HEPARIN SODIUM 22 UNITS/KG/HR: 10000 INJECTION, SOLUTION INTRAVENOUS at 06:44

## 2022-11-16 RX ADMIN — CEFTRIAXONE 2 G: 2 INJECTION, POWDER, FOR SOLUTION INTRAMUSCULAR; INTRAVENOUS at 12:45

## 2022-11-16 RX ADMIN — TRAZODONE HYDROCHLORIDE 100 MG: 100 TABLET ORAL at 21:14

## 2022-11-16 RX ADMIN — MORPHINE SULFATE 4 MG: 4 INJECTION, SOLUTION INTRAMUSCULAR; INTRAVENOUS at 01:31

## 2022-11-16 RX ADMIN — ACETAMINOPHEN 650 MG: 325 TABLET, FILM COATED ORAL at 01:40

## 2022-11-16 RX ADMIN — LISINOPRIL 40 MG: 20 TABLET ORAL at 00:01

## 2022-11-16 RX ADMIN — Medication 10 ML: at 08:13

## 2022-11-16 RX ADMIN — ACETAMINOPHEN 650 MG: 325 TABLET, FILM COATED ORAL at 06:04

## 2022-11-16 RX ADMIN — Medication 10 ML: at 21:15

## 2022-11-16 RX ADMIN — OXYCODONE 5 MG: 5 TABLET ORAL at 08:11

## 2022-11-16 NOTE — NURSING NOTE
Pt arrived to ICU responsive and oriented. Pt requested no intubation. Decision made to not intubate by Naun PERSAUD, and continue BiPap. Pt placed in isolation d/t pending covid test. Family notified.

## 2022-11-16 NOTE — CONSULTS
Infectious Diseases Consult Note    Referring Provider: Scottie Colin MD    Reason for Consultation: Fever    Patient Care Team:  Jose Enrique Walters MD as PCP - General (Internal Medicine)  Heraclio Rizzo MD as Consulting Physician (Cardiology)    Chief complaint shortness of breath and fever    Subjective     History of present illness:      This is 62-year-old female who was hospitalized [facility] on November 14, 2022.  Patient presented hospital with shortness of breath and fever.  Patient was found to have pulmonary embolisms.  The patient was recently at [facility] and she spent 6 days there.  She was on a ventilator for 3 days.  The family was told that the patient had sepsis.  Patient did not go home on antibiotics.  Patient presented hospital here with shortness of breath and was found to have pulmonary embolism.  She started spiking fever today.  She was moved to ICU since she became more hypoxic.  Her urine culture grew lower Klebsiella pneumoniae.  The patient is currently on IV ceftriaxone.    Review of Systems   Review of Systems   Constitutional: Positive for fever.   HENT: Negative.    Eyes: Negative.    Respiratory: Positive for shortness of breath.    Cardiovascular: Negative.    Gastrointestinal: Negative.    Genitourinary: Negative.    Musculoskeletal: Negative.    Skin: Negative.    Neurological: Negative.    Hematological: Negative.    Psychiatric/Behavioral: Negative.        Medications  Medications Prior to Admission   Medication Sig Dispense Refill Last Dose   • amLODIPine (NORVASC) 10 MG tablet Take 1 tablet by mouth Daily. 30 tablet 1 11/13/2022   • budesonide (PULMICORT) 0.5 MG/2ML nebulizer solution Take 2 mL by nebulization 2 (Two) Times a Day.   11/13/2022   • cloNIDine (CATAPRES) 0.3 MG tablet Take 1 tablet by mouth Every 8 (Eight) Hours. 90 tablet 1 11/13/2022   • diazePAM (VALIUM) 5 MG tablet Take 1 tablet by mouth 4 (Four) Times a Day As Needed for Anxiety.    11/13/2022   • insulin aspart (novoLOG FLEXPEN) 100 UNIT/ML solution pen-injector sc pen Inject 10 Units under the skin into the appropriate area as directed 2 (Two) Times a Day.   11/13/2022   • lisinopril (PRINIVIL,ZESTRIL) 40 MG tablet Take 40 mg by mouth Daily.   11/13/2022   • Probiotic Product (PROBIOTIC ADVANCED) capsule Take 1 capsule by mouth Daily.   11/13/2022   • sildenafil (REVATIO) 20 MG tablet Take 0.5 tablets by mouth Every 8 (Eight) Hours. 90 tablet 1 11/13/2022   • topiramate (TOPAMAX) 50 MG tablet Take 50 mg by mouth Daily.   11/13/2022   • traZODone (DESYREL) 100 MG tablet Take 100 mg by mouth Every Night.   11/13/2022   • doxepin (SINEquan) 50 MG capsule Take 50 mg by mouth At Night As Needed.      • hydrALAZINE (APRESOLINE) 25 MG tablet Take 1 tablet by mouth 3 (Three) Times a Day As Needed.      • ipratropium-albuterol (DUO-NEB) 0.5-2.5 mg/3 ml nebulizer Take 3 mL by nebulization 3 (Three) Times a Day As Needed for Wheezing.      • LINZESS 290 MCG capsule capsule Take 290 mcg by mouth Daily As Needed.      • oxyCODONE-acetaminophen (PERCOCET)  MG per tablet Take 1 tablet by mouth Every 4 (Four) Hours As Needed for Moderate Pain .      • promethazine (PHENERGAN) 50 MG tablet Take 50 mg by mouth Every 6 (Six) Hours As Needed.          History  Past Medical History:   Diagnosis Date   • Anxiety    • Arthritis    • Chronic back pain    • Chronic obstructive pulmonary disease (HCC) 01/31/2020   • Chronic respiratory failure with hypoxia ([facility]) 03/06/2018    O2 @ 2 L per NC as needed   • Diabetes mellitus ([facility])    • Dyslipidemia 10/21/2014   • Edema, lower extremity 07/16/2014   • Hypertension    • Insomnia    • Obesity, Class III, BMI 40-49.9 (morbid obesity) ([facility]) 03/29/2022   • Prediabetes 11/06/2018   • Primary hypertension 05/30/2012   • PTSD (post-traumatic stress disorder)    • RLS (restless legs syndrome) 04/22/2020   • Tobacco abuse 10/21/2014   • Vitamin D deficiency 05/30/2012      Past Surgical History:   Procedure Laterality Date   • BACK SURGERY     • CHOLECYSTECTOMY     • HYSTERECTOMY     • TUMOR REMOVAL      benign breats tumor       Family History  Family History   Problem Relation Age of Onset   • Heart disease Mother    • Heart disease Father    • Pancreatic cancer Sister 65   • Lymphoma Brother 65       Social History   reports that she has been smoking cigarettes. She started smoking about 49 years ago. She has been smoking an average of 1 pack per day. She has never used smokeless tobacco. She reports that she does not currently use alcohol. She reports that she does not use drugs.    Allergies  Codeine    Objective     Vital Signs   Vital Signs (last 24 hours)       11/15 0700  11/16 0659 11/16 0700  11/16 1705   Most Recent      Temp (°F) 98 -  101.6    96.9 -  101.1     96.9 (36.1) 11/16 1615    Heart Rate 66 -  115    54 -  89     61 11/16 1700    Resp 14 -  23    18 -  21     21 11/16 1500    /48 -  146/56    88/38 -  140/57     108/56 11/16 1700    SpO2 (%) 90 -  98    91 -  99     97 11/16 1700          Physical Exam:  Physical Exam  Vitals and nursing note reviewed.   Constitutional:       Appearance: She is well-developed. She is ill-appearing.   HENT:      Head: Normocephalic and atraumatic.   Eyes:      Pupils: Pupils are equal, round, and reactive to light.   Cardiovascular:      Rate and Rhythm: Normal rate and regular rhythm.      Heart sounds: Normal heart sounds.   Pulmonary:      Effort: Pulmonary effort is normal. No respiratory distress.      Breath sounds: No wheezing or rales.   Abdominal:      General: Bowel sounds are normal. There is no distension.      Palpations: Abdomen is soft. There is no mass.      Tenderness: There is no abdominal tenderness. There is no guarding or rebound.   Musculoskeletal:         General: No deformity. Normal range of motion.      Cervical back: Normal range of motion and neck supple.   Skin:     General: Skin is warm.       Findings: No erythema or rash.   Neurological:      Mental Status: She is alert. She is disoriented.      Cranial Nerves: No cranial nerve deficit.         Microbiology  Microbiology Results (last 10 days)     Procedure Component Value - Date/Time    Blood Culture - Blood, Arm, Left [[id number]]  (Normal) Collected: 11/14/22 1306    Lab Status: Preliminary result Specimen: Blood from Arm, Left Updated: 11/16/22 1317     Blood Culture No growth at 2 days    Urine Culture - Urine, Urine, Catheter [[id number]]  (Abnormal)  (Susceptibility) Collected: 11/14/22 1255    Lab Status: Final result Specimen: Urine, Catheter Updated: 11/16/22 1212     Urine Culture >100,000 CFU/mL Klebsiella pneumoniae ssp pneumoniae    Narrative:      Colonization of the urinary tract without infection is common. Treatment is discouraged unless the patient is symptomatic, pregnant, or undergoing an invasive urologic procedure.    Susceptibility      Klebsiella pneumoniae ssp pneumoniae      FAUSTO      Ampicillin Resistant     Ampicillin + Sulbactam Susceptible      Cefazolin Susceptible      Cefepime Susceptible      Ceftazidime Susceptible      Ceftriaxone Susceptible      Gentamicin Susceptible      Levofloxacin Intermediate      Nitrofurantoin Resistant     Piperacillin + Tazobactam Susceptible      Trimethoprim + Sulfamethoxazole Susceptible                           Blood Culture - Blood, Arm, Left [[id number]]  (Normal) Collected: 11/14/22 1237    Lab Status: Preliminary result Specimen: Blood from Arm, Left Updated: 11/16/22 1245     Blood Culture No growth at 2 days          Laboratory  Results from last 7 days   Lab Units 11/16/22  0557   WBC 10*3/mm3 5.90   HEMOGLOBIN g/dL 10.0*   HEMATOCRIT % 32.2*   PLATELETS 10*3/mm3 170     Results from last 7 days   Lab Units 11/16/22  1324   SODIUM mmol/L 141   POTASSIUM mmol/L 4.3   CHLORIDE mmol/L 99   CO2 mmol/L 35.0*   BUN mg/dL 8   CREATININE mg/dL 1.01*   GLUCOSE mg/dL 155*   CALCIUM  mg/dL 8.6     Results from last 7 days   Lab Units 11/16/22  1324   SODIUM mmol/L 141   POTASSIUM mmol/L 4.3   CHLORIDE mmol/L 99   CO2 mmol/L 35.0*   BUN mg/dL 8   CREATININE mg/dL 1.01*   GLUCOSE mg/dL 155*   CALCIUM mg/dL 8.6                   Radiology  Imaging Results (Last 72 Hours)     Procedure Component Value Units Date/Time    CT Angiogram Chest Pulmonary Embolism [[id number]] Collected: 11/14/22 1511     Updated: 11/14/22 1519    Narrative:      CT ANGIOGRAM CHEST PULMONARY EMBOLISM-     Date of Exam: 11/14/2022 2:47 PM     Indication: Hypoxia Short of breath elevated D-dimer recent  hospitalization; R06.02-Shortness of breath; R07.9-Chest pain,  unspecified; N39.0-Urinary tract infection, site not specified.     Comparison: CT chest 6/27/2022.     Technique: Serial and axial CT images of the chest were obtained  following the uneventful intravenous administration of 100 cc Isovue-370  contrast. Reconstructions in the coronal and sagittal planes were also  performed. In addition, a 3 D volume rendered image was obtained after  post processing. Automated exposure control and iterative reconstruction  methods were used.     FINDINGS:     Acute-appearing small distal segmental and subsegmental emboli are  present within the right lower lobe. Small eccentric nonocclusive  embolism is seen in the proximal left lower lobe pulmonary artery, which  appears chronic.     There is no indication of right heart strain. RV LV ratio is  approximately 0.8. Heart size is within normal limits. No pericardial  effusion or pleural effusion is seen.     There are slightly enlarged and mediastinal lymph nodes, including a  right lower paratracheal lymph node measuring 13 mm short axis  (previously 10 mm), prevascular node measuring 13 mm (previously 9 mm),  subcarinal node measuring 14 mm short axis (previously 9 mm). There is  an enlarged right hilar node measuring 17 mm short axis.     Present in both lungs with faint  groundglass densities, in a pattern  suggestive of mild pulmonary edema. There is bandlike subsegmental  atelectasis in the left lower lobe and right middle lobe. No dense  consolidations are identified.     No pericardial effusion or pleural effusion is seen.     Cholecystectomy changes are present. The included portions of the upper  abdominal organs are within normal limits. There is mild scarring of the  spleen.     No acute or suspicious osseous abnormalities are identified.                Impression:      1. Small distal segmental and subsegmental emboli are present in the  right lower lobe. No evidence of right heart strain. I notified the  emergency room physician at the time of this dictation.  2. Small eccentric nonocclusive thrombus is demonstrated in the left  lower lobe pulmonary artery proximally. This has a chronic appearance.  3. Features of mild interstitial and alveolar edema.  4. Subsegmental atelectasis in the right middle lobe and left lower  lobe.     Electronically Signed By-Jocelyn Zayas MD On:11/14/2022 3:17 PM  This report was finalized on [id number] by  Jocelyn Zayas MD.    XR Chest 1 View [[id number]] Collected: 11/14/22 1300     Updated: 11/14/22 1303    Narrative:      DATE OF EXAM:  11/14/2022 12:40 PM     PROCEDURE:  XR CHEST 1 VW-     INDICATIONS:  cp       COMPARISON:  AP portable chest 6/27/2022 CT chest 6/27/2022.     TECHNIQUE:   Single radiographic view of the chest was obtained.     FINDINGS:  No acute airspace disease. Heart size is borderline enlarged but within  normal limits. No pleural effusion, pneumothorax, or acute osseous  abnormalities are identified.       Impression:      No acute chest findings.     Electronically Signed By-Jocelyn Zayas MD On:11/14/2022 1:00 PM  This report was finalized on [id number] by  Jocelyn Zayas MD.          Cardiology      Results Review:  I have reviewed all clinical data, test, lab, and imaging results.       Schedule  Meds  amLODIPine, 10 mg, Oral, Q24H  apixaban, 10 mg, Oral, BID   Followed by  [START ON 11/23/2022] apixaban, 5 mg, Oral, BID  budesonide-formoterol, 2 puff, Inhalation, BID - RT  cefTRIAXone, 2 g, Intravenous, Q24H  cloNIDine, 0.3 mg, Oral, Q8H  etomidate, , ,   fentaNYL citrate (PF), , ,   insulin lispro, 2-7 Units, Subcutaneous, TID With Meals  lisinopril, 40 mg, Oral, Q24H  midazolam, , ,   Propofol, , ,   senna-docusate sodium, 2 tablet, Oral, BID  sodium chloride, 10 mL, Intravenous, Q12H  topiramate, 50 mg, Oral, Daily  traZODone, 100 mg, Oral, Nightly        Infusion Meds       PRN Meds  •  acetaminophen **OR** acetaminophen **OR** acetaminophen  •  aluminum-magnesium hydroxide-simethicone  •  senna-docusate sodium **AND** polyethylene glycol **AND** bisacodyl **AND** bisacodyl  •  dextrose  •  dextrose  •  diazePAM  •  doxepin  •  glucagon (human recombinant)  •  hydrALAZINE  •  ipratropium-albuterol  •  magnesium sulfate **OR** magnesium sulfate in D5W 1g/100mL (PREMIX)  •  melatonin  •  nitroglycerin  •  ondansetron **OR** ondansetron  •  oxyCODONE  •  oxyCODONE  •  potassium chloride  •  potassium chloride  •  [COMPLETED] Insert peripheral IV **AND** sodium chloride  •  sodium chloride  •  zolpidem      Assessment & Plan       Assessment    High-grade fever.  Probably secondary to UTI and pulmonary embolism.    Pulmonary embolism.  Patient is currently on high flow oxygen    UTI with a Klebsiella pneumoniae.  We need to rule out obstructive uropathy.        Plan    Continue IV ceftriaxone 2 g daily for now  Request renal ultrasound  When patient is more stable and might consider CT scan of abdomen pelvis with stone protocol  Supportive care  A.m. labs  Case was discussed with the patient's family at bedside    Arsenio Guy MD  11/16/22  17:05 EST    Note is dictated utilizing voice recognition software/Dragon

## 2022-11-16 NOTE — PROGRESS NOTES
[facility] Medicine Services Daily Progress Note    Patient Name: Ann Álvarez  : 1960  MRN: [id number]  Primary Care Physician:  Jose Enrique Walters MD  Date of admission: 2022      Subjective      Chief Complaint: Shortness of breath.      Patient Reports:      11/15/2022.  Patient was seen and examined.  Patient reported no overnight events.      2022.  Patient was seen and examined.  Patient reported having fever last night.  Blood cultures were completed.  Infectious disease consult order was placed.      Review of Systems   Constitutional: Positive for chills and fever.   HENT: Negative.    Eyes: Negative.    Cardiovascular: Negative.    Respiratory: Negative.    Endocrine: Negative.    Hematologic/Lymphatic: Negative.    Skin: Negative.    Musculoskeletal: Negative.    Gastrointestinal: Negative.    Genitourinary: Negative.    Neurological: Negative.    Psychiatric/Behavioral: Negative.    Allergic/Immunologic: Negative.             Objective      Vitals:   Temp:  [98.6 °F (37 °C)-101.6 °F (38.7 °C)] 100.2 °F (37.9 °C)  Heart Rate:  [57-89] 61  Resp:  [14-20] 18  BP: ()/(38-78) 110/56  Flow (L/min):  [4-6] 6    Physical Exam  Vitals reviewed.   Constitutional:       General: She is not in acute distress.     Appearance: She is obese.      Comments: Patient is lying comfortably in bed.  Family is at the bedside.   HENT:      Head: Normocephalic and atraumatic.      Nose: Nose normal. No congestion or rhinorrhea.      Mouth/Throat:      Mouth: Mucous membranes are moist.      Pharynx: Oropharynx is clear. No oropharyngeal exudate or posterior oropharyngeal erythema.   Eyes:      Pupils: Pupils are equal, round, and reactive to light.   Cardiovascular:      Pulses: Normal pulses.      Heart sounds: Normal heart sounds. No murmur heard.    No friction rub. No gallop.      Comments: S1 and S2 present.  No tachycardia.  Pulmonary:      Effort: No respiratory distress.       Breath sounds: No wheezing, rhonchi or rales.      Comments: Decreased air entry bilaterally.  Chest:      Chest wall: No tenderness.   Abdominal:      General: Abdomen is flat. Bowel sounds are normal. There is no distension.      Palpations: Abdomen is soft.      Tenderness: There is no right CVA tenderness.   Musculoskeletal:         General: No swelling, tenderness, deformity or signs of injury.      Cervical back: Neck supple. No tenderness.      Right lower leg: No edema.      Left lower leg: No edema.   Skin:     Capillary Refill: Capillary refill takes less than 2 seconds.      Coloration: Skin is not jaundiced.      Findings: No bruising, lesion or rash.   Neurological:      Comments: No facial asymmetry noted.  Gait and station not tested.   Psychiatric:      Comments: No agitation.                 Result Review    Result Review:  I have personally reviewed the results from the time of this admission to 11/16/2022 15:23 EST and agree with these findings:  [x]  Laboratory  [x]  Microbiology  [x]  Radiology  []  EKG/Telemetry   []  Cardiology/Vascular   []  Pathology  []  Old records  []  Other:  Most notable findings include:      CT pulmonary angiogram showed:      Comparison: CT chest 6/27/2022.       Technique: Serial and axial CT images of the chest were obtained   following the uneventful intravenous administration of 100 cc Isovue-370   contrast. Reconstructions in the coronal and sagittal planes were also   performed. In addition, a 3 D volume rendered image was obtained after   post processing. Automated exposure control and iterative reconstruction   methods were used.       FINDINGS:       Acute-appearing small distal segmental and subsegmental emboli are   present within the right lower lobe. Small eccentric nonocclusive   embolism is seen in the proximal left lower lobe pulmonary artery, which   appears chronic.       There is no indication of right heart strain. RV LV ratio is   approximately  0.8. Heart size is within normal limits. No pericardial   effusion or pleural effusion is seen.       There are slightly enlarged and mediastinal lymph nodes, including a   right lower paratracheal lymph node measuring 13 mm short axis   (previously 10 mm), prevascular node measuring 13 mm (previously 9 mm),   subcarinal node measuring 14 mm short axis (previously 9 mm). There is   an enlarged right hilar node measuring 17 mm short axis.       Present in both lungs with faint groundglass densities, in a pattern   suggestive of mild pulmonary edema. There is bandlike subsegmental   atelectasis in the left lower lobe and right middle lobe. No dense   consolidations are identified.       No pericardial effusion or pleural effusion is seen.       Cholecystectomy changes are present. The included portions of the upper   abdominal organs are within normal limits. There is mild scarring of the   spleen.       No acute or suspicious osseous abnormalities are identified.                   Impression:     1. Small distal segmental and subsegmental emboli are present in the   right lower lobe. No evidence of right heart strain. I notified the   emergency room physician at the time of this dictation.   2. Small eccentric nonocclusive thrombus is demonstrated in the left   lower lobe pulmonary artery proximally. This has a chronic appearance.   3. Features of mild interstitial and alveolar edema.   4. Subsegmental atelectasis in the right middle lobe and left lower   lobe.       Electronically Signed By-Jocelyn Zayas MD On:11/14/2022 3:17 PM   This report was finalized on [id number] by  Jocelyn Zayas MD.         Assessment & Plan    From previous notes and with minor updates.      Brief Patient Summary:      Patient is a 62 y.o. female  with past medical history of COPD, chronic respiratory failure with hypoxia on 2 L nasal cannula continuous, diabetes mellitus, hypertension, hyperlipidemia, osteoarthritis, anxiety  disorder, chronic low back pain, morbid obesity, insomnia, PTSD, restless leg syndrome tobacco use disorder who presented to [facility] on 11/14/2022 complaining of shortness of air, chest pain and back pain.  Patient reports that she was in [facility] for 6 days around [facility] for UTI, sepsis and was on a ventilator for 2 days.  Since then according to primary care provider records the UTI had cleared.  She reports that 2 days ago she began having shortness of air, left-sided chest pain, that she describes as a tightness, that radiates into her shoulder blade and low back pain.  She is on her baseline home 4 L O2 at 95%.  She states nothing makes her chest tightness or back pain any better.  She denies having any lightheadedness, dizziness or syncopal episode.  She denies having any vomiting but has felt nauseous.  She denies she denies any abdominal pain, constipation or diarrhea.  She reports bilateral lower extremity swelling but feels this is nothing out of the ordinary.  We have been asked to admit this patient for further evaluation and treatment.  Patient was seen in the emergency room and emergency department work-up vital signs stable 4 L nasal cannula at 95%.  UA positive nitrate with 3+ bacteria.  Glucose 142.  Creatinine 0.83.  Chloride 97.  CO2 36.0.  Troponin negative.  D-dimer 1.47.  White blood count 5.50.  Hemoglobin 10.5.  Platelets 222.  Lactate 0.9.  Blood culture pending, urine culture pending.  CTA chest Small distal segmental and subsegmental emboli are present in the right lower lobe. No evidence of right heart strain. Small eccentric nonocclusive thrombus is demonstrated in the left lower lobe pulmonary artery proximally. This has a chronic appearance.Features of mild interstitial and alveolar edema.Subsegmental atelectasis in the right middle lobe and left lower lobe.  Pulmonary consult was completed.    amLODIPine, 10 mg, Oral, Q24H  apixaban, 10 mg, Oral, BID    Followed by  [START ON 11/23/2022] apixaban, 5 mg, Oral, BID  budesonide-formoterol, 2 puff, Inhalation, BID - RT  cefTRIAXone, 2 g, Intravenous, Q24H  cloNIDine, 0.3 mg, Oral, Q8H  insulin lispro, 2-7 Units, Subcutaneous, TID With Meals  lisinopril, 40 mg, Oral, Q24H  senna-docusate sodium, 2 tablet, Oral, BID  sodium chloride, 10 mL, Intravenous, Q12H  topiramate, 50 mg, Oral, Daily  traZODone, 100 mg, Oral, Nightly             Active Hospital Problems:  Active Hospital Problems    Diagnosis    • **Acute pulmonary embolism without acute cor pulmonale (HCC)    • Acute cystitis without hematuria    • Shortness of breath    • Mixed hyperlipidemia    • KELTON (generalized anxiety disorder)    • Morbid obesity ([facility])    • Chronic back pain    • PTSD (post-traumatic stress disorder)    • RLS (restless legs syndrome)    • Chronic obstructive pulmonary disease ([facility])    • Chronic respiratory failure with hypoxia ([facility])    • Insomnia    • Tobacco use disorder    • Primary hypertension          Plan:      -Continue appropriate patient's home medications for other chronic medical conditions.  -Continue the present level of care.  -Patient and family agreed with the plan of care.  -Treat acute pulmonary embolism with anticoagulation.  -Follow pulmonary recommendations.  -Treat acute cystitis with antibiotics.  -Follow cultures.  -Complete infectious disease consult.  -Continue to monitor blood cultures.          DVT prophylaxis:  Medical DVT prophylaxis orders are present.    CODE STATUS:    Level Of Support Discussed With: Patient  Code Status (Patient has no pulse and is not breathing): CPR (Attempt to Resuscitate)  Medical Interventions (Patient has pulse or is breathing): Full Support      Disposition: Patient can discharge in the next 24 to 72 hours.    This patient has been examined wearing appropriate Personal Protective Equipment and discussed with hospital infection control department, [facility] department, infectious  disease specialist and pulmonologist. 11/16/22      Electronically signed by Scottie Colin MD, FACP, 11/16/22, 15:23 EST.      [facility] Hospitalist Team

## 2022-11-16 NOTE — PLAN OF CARE
Goal Outcome Evaluation:      Pt family has been at bedside most of the night and attentive to patient. Pt did have a temperature around 0130 of 101.1, prn tylenol given and repeat temp was 99.2. pt continues to remain on heparin gtt at this time. Pt has been on 4L NC through the night as well, has not complained of shortness of air but once, pt was able to be repositioned and stated that it helped. Prn pain medication to help with pain. VSS at this time.

## 2022-11-16 NOTE — CASE MANAGEMENT/SOCIAL WORK
Continued Stay Note  [facility]     Patient Name: Ann Álvarez  MRN: [id number]  Today's Date: 11/16/2022    Admit Date: 11/14/2022    Plan: D/C Plan: Home with spouse. Eliquis $10 copay card provided.   Discharge Plan     Row Name 11/16/22 1051       Plan    Plan D/C Plan: Home with spouse. Eliquis $10 copay card provided.    Plan Comments CM notified by heme/onc that patient will be started on Eliquis today-patient signed up for M2Bs and copay card provided to patient. Barrier to D/C: ID consult, cultures pending, IV abx.                    Expected Discharge Date and Time     Expected Discharge Date Expected Discharge Time    Nov 18, 2022         Phone communication or documentation only - no physical contact with patient or family.    HARDIK CmN, RN    [address] [address]    Office: [phone]  Fax: [phone]

## 2022-11-16 NOTE — PROGRESS NOTES
Daily Progress Note          Assessment      Shortness of breath  Pulmonary embolism: No DVT on lower extremity venous Doppler studies  Chronic hypoxemia  Bilateral subsegmental atelectasis  COPD  History of tobacco smoking: Quit 6/27/2022  Obesity  Diabetes mellitus type 2  Dyslipidemia  Hepatic steatosis  Essential hypertension  RLS  History of insomnia and PTSD  Normocytic anemia     Recommendations:  Oxygen supplement and titration maintain saturation 90 to 95%: Currently on 4 L per nasal cannula  Bronchodilators  Symbicort  Mucinex  Anticoagulation: IV heparin with plans to switch to oral anticoagulation soon  Blood pressure control  Glucose control  ID consulted for the fever               LOS: 1 day     Subjective     Patient had fever today as high as 101.6, she denies coughing but still have mild shortness of breath    Objective     Vital signs for last 24 hours:  Vitals:    11/16/22 0558 11/16/22 0650 11/16/22 0653 11/16/22 0749   BP: 105/78      BP Location:       Patient Position:       Pulse: 83 84 87    Resp:  20 20    Temp:    (!) 101.1 °F (38.4 °C)   TempSrc:       SpO2:  94% 94%    Weight:       Height:           Intake/Output last 3 shifts:  I/O last 3 completed shifts:  In: 840 [P.O.:840]  Out: -   Intake/Output this shift:  No intake/output data recorded.      Radiology  Imaging Results (Last 24 Hours)     ** No results found for the last 24 hours. **          Labs:  Results from last 7 days   Lab Units 11/16/22  0557   WBC 10*3/mm3 5.90   HEMOGLOBIN g/dL 10.0*   HEMATOCRIT % 32.2*   PLATELETS 10*3/mm3 170     Results from last 7 days   Lab Units 11/16/22  0557 11/15/22  0035 11/14/22  1237   SODIUM mmol/L  --  140 139   POTASSIUM mmol/L 4.7 4.4 3.9   CHLORIDE mmol/L  --  98 97*   CO2 mmol/L  --  36.0* 36.0*   BUN mg/dL  --  10 11   CREATININE mg/dL  --  0.75 0.83   CALCIUM mg/dL  --  8.8 9.0   BILIRUBIN mg/dL  --   --  0.3   ALK PHOS U/L  --   --  66   ALT (SGPT) U/L  --   --  9   AST (SGOT) U/L   --   --  9   GLUCOSE mg/dL  --  131* 142*         Results from last 7 days   Lab Units 11/14/22  1237   ALBUMIN g/dL 3.60     Results from last 7 days   Lab Units 11/15/22  0035 11/14/22  1912 11/14/22  1237   TROPONIN T ng/mL <0.010 <0.010 <0.010         Results from last 7 days   Lab Units 11/16/22  0557   MAGNESIUM mg/dL 2.0     Results from last 7 days   Lab Units 11/16/22  0557 11/15/22  2306 11/15/22  1608 11/14/22  2149 11/14/22  1237   INR   --   --   --   --  0.99   APTT seconds 32.8* 63.4 38.4*   < > 28.8*    < > = values in this interval not displayed.               Meds:   SCHEDULE  amLODIPine, 10 mg, Oral, Q24H  budesonide-formoterol, 2 puff, Inhalation, BID - RT  cefTRIAXone, 2 g, Intravenous, Q24H  cloNIDine, 0.3 mg, Oral, Q8H  insulin lispro, 2-7 Units, Subcutaneous, TID With Meals  lisinopril, 40 mg, Oral, Q24H  senna-docusate sodium, 2 tablet, Oral, BID  sodium chloride, 10 mL, Intravenous, Q12H  topiramate, 50 mg, Oral, Daily  traZODone, 100 mg, Oral, Nightly      Infusions  heparin, 14 Units/kg/hr, Last Rate: 22 Units/kg/hr (11/16/22 0651)      PRNs  •  acetaminophen **OR** acetaminophen **OR** acetaminophen  •  aluminum-magnesium hydroxide-simethicone  •  senna-docusate sodium **AND** polyethylene glycol **AND** bisacodyl **AND** bisacodyl  •  dextrose  •  dextrose  •  diazePAM  •  doxepin  •  glucagon (human recombinant)  •  heparin  •  heparin  •  hydrALAZINE  •  ibuprofen  •  ipratropium-albuterol  •  magnesium sulfate **OR** magnesium sulfate in D5W 1g/100mL (PREMIX)  •  melatonin  •  Morphine  •  nitroglycerin  •  ondansetron **OR** ondansetron  •  oxyCODONE  •  oxyCODONE  •  potassium chloride  •  potassium chloride  •  [COMPLETED] Insert peripheral IV **AND** sodium chloride  •  sodium chloride  •  zolpidem    Physical Exam:  General Appearance:  Alert   HEENT:  Normocephalic, without obvious abnormality, Conjunctiva/corneas clear,.   Nares normal, no drainage     Neck:  Supple,  symmetrical, trachea midline.   Lungs /Chest wall: Minimal bilateral basal rhonchi, respirations unlabored, symmetrical wall movement.     Heart:  Regular rate and rhythm, S1 S2 normal  Abdomen: Soft, non-tender, no masses, no organomegaly.    Extremities: No edema, no clubbing or cyanosis constitutional: Negative for     ROS  Constitutional: Negative for chills, positive for fever and malaise/fatigue.   HENT: Negative.    Eyes: Negative.    Cardiovascular: Negative.    Respiratory: Positive for shortness of breath.    Skin: Negative.    Musculoskeletal: Negative.    Gastrointestinal: Negative.    Genitourinary: Negative.    Neurological: Negative.    Psychiatric/Behavioral: Negative.      I reviewed the recent clinical results    Much of this encounter note is an electronic transcription/translation of spoken language to printed text using Dragon Software which might include inadvertent errors in transcription.

## 2022-11-16 NOTE — PROGRESS NOTES
Hematology/Oncology Inpatient Progress Note    PATIENT NAME: Ann Álvarez  : 1960  MRN: [id number]    CHIEF COMPLAINT: Shortness of breath, chest and back pain    HISTORY OF PRESENT ILLNESS:    Ann Álvarez is a 62 y.o. female who presented to [facility] on 2022 with complaints of shortness of breath, left left chest pain that radiates to her back and found to have PE right lower lobe on CT chest.  She has a recent history of UTI with sepsis at [facility] for which she spent a few days on the ventilator. The patient was placed on heparin drip and admitted for further management.       CT chest 2022- small distal segmental and subsegmental emboli RLL, no evidence of heart strain. Small nonocclusive thrombus LLL.       BLE Doppler 22 - normal     11/15/22  Hematology/Oncology was consulted for bilateral PE with recent illness of UTI with sepsis for which she spent 48 hours on the ventilator.  She does not have personal hx of clotting but does have family hx of blood clots with grandmother dying suddenly attributed to thrombus and her mother was treated for thrombus. After her discharge in 6 days she spent approximately 40% of her time in bed and the majority of the time in her recliner.  She has a long history of cigarette smoking and her spouse reported that she consumed about 1 pack/day.  With history of hospitalization and immobility, continued cigarette smoking, elevated BMI, heart thrombosis thought to be provoked.  At this point, no hereditary testing for inherited predisposition to thrombosis is justified.  She has been started on heparin drip and should remain on heparin IV until insurance evaluation of which anticoagulation is covered and then transitioned to oral anticoagulation. Given the appearance of the chronic thrombus in the left pulmonary artery given her high risk of recurrence she might be benefit from long-term anticoagulation.  This will  be discussed with her in follow up appt.  She was also advised to stop smoking.    She  has a past medical history of Anxiety, Arthritis, Chronic back pain, Chronic obstructive pulmonary disease ([facility]) (01/31/2020), Chronic respiratory failure with hypoxia ([facility]) (03/06/2018), Diabetes mellitus ([facility]), Dyslipidemia (10/21/2014), Edema, lower extremity (07/16/2014), Hypertension, Insomnia, Obesity, Class III, BMI 40-49.9 (morbid obesity) ([facility]) (03/29/2022), Prediabetes (11/06/2018), Primary hypertension (05/30/2012), PTSD (post-traumatic stress disorder), RLS (restless legs syndrome) (04/22/2020), Tobacco abuse (10/21/2014), and Vitamin D deficiency (05/30/2012).     PCP: Jose Enrique Walters MD    History of present illness was reviewed and is unchanged from the previous visit. 11/16/22    Subjective     ROS:  Review of Systems   Constitutional: Negative for chills, fatigue and fever.   HENT: Negative.    Eyes: Negative.    Respiratory: Positive for shortness of breath.    Cardiovascular: Negative for chest pain and palpitations.   Gastrointestinal: Negative for abdominal pain.   Endocrine: Negative.    Genitourinary: Negative.    Musculoskeletal: Positive for back pain.   Skin: Negative.    Neurological: Negative for dizziness.   Psychiatric/Behavioral: Negative for behavioral problems.        MEDICATIONS:    Scheduled Meds:  amLODIPine, 10 mg, Oral, Q24H  budesonide-formoterol, 2 puff, Inhalation, BID - RT  cefTRIAXone, 2 g, Intravenous, Q24H  cloNIDine, 0.3 mg, Oral, Q8H  insulin lispro, 2-7 Units, Subcutaneous, TID With Meals  lisinopril, 40 mg, Oral, Q24H  senna-docusate sodium, 2 tablet, Oral, BID  sodium chloride, 10 mL, Intravenous, Q12H  topiramate, 50 mg, Oral, Daily  traZODone, 100 mg, Oral, Nightly       Continuous Infusions:  heparin, 14 Units/kg/hr, Last Rate: 22 Units/kg/hr (11/16/22 0651)       PRN Meds:  •  acetaminophen **OR** acetaminophen **OR** acetaminophen  •  aluminum-magnesium  "hydroxide-simethicone  •  senna-docusate sodium **AND** polyethylene glycol **AND** bisacodyl **AND** bisacodyl  •  dextrose  •  dextrose  •  diazePAM  •  doxepin  •  glucagon (human recombinant)  •  heparin  •  heparin  •  hydrALAZINE  •  ibuprofen  •  ipratropium-albuterol  •  magnesium sulfate **OR** magnesium sulfate in D5W 1g/100mL (PREMIX)  •  melatonin  •  Morphine  •  nitroglycerin  •  ondansetron **OR** ondansetron  •  oxyCODONE  •  oxyCODONE  •  potassium chloride  •  potassium chloride  •  [COMPLETED] Insert peripheral IV **AND** sodium chloride  •  sodium chloride     ALLERGIES:    Allergies   Allergen Reactions   • Codeine Hives, Itching and Nausea And Vomiting       Objective    VITALS:   /78   Pulse 87   Temp (!) 101.1 °F (38.4 °C)   Resp 20   Ht 160 cm (63\")   Wt 107 kg (236 lb 12.4 oz)   SpO2 94%   BMI 41.94 kg/m²     PHYSICAL EXAM: (performed by MD)  Physical Exam  Vitals and nursing note reviewed.   Constitutional:       General: She is not in acute distress.     Appearance: She is ill-appearing.   HENT:      Mouth/Throat:      Pharynx: Oropharynx is clear.   Eyes:      Pupils: Pupils are equal, round, and reactive to light.   Cardiovascular:      Rate and Rhythm: Normal rate and regular rhythm.      Pulses: Normal pulses.   Pulmonary:      Effort: Pulmonary effort is normal.   Abdominal:      General: Bowel sounds are normal.   Musculoskeletal:         General: Normal range of motion.      Cervical back: Normal range of motion.      Right lower leg: Edema present.      Left lower leg: Edema present.      Comments: Chronic leg swelling   Skin:     General: Skin is warm and dry.      Coloration: Skin is not jaundiced.   Neurological:      Mental Status: She is alert and oriented to person, place, and time.   Psychiatric:         Behavior: Behavior normal.           RECENT LABS:  Lab Results (last 24 hours)     Procedure Component Value Units Date/Time    POC Glucose Once [[id number]]  " (Abnormal) Collected: 11/16/22 0719    Specimen: Blood Updated: 11/16/22 0720     Glucose 132 mg/dL      Comment: Serial Number: [id number]:  [id number]       aPTT [[id number]]  (Abnormal) Collected: 11/16/22 0557    Specimen: Blood Updated: 11/16/22 0629     PTT 32.8 seconds     Magnesium [[id number]]  (Normal) Collected: 11/16/22 0557    Specimen: Blood Updated: 11/16/22 0624     Magnesium 2.0 mg/dL     Potassium [[id number]]  (Normal) Collected: 11/16/22 0557    Specimen: Blood Updated: 11/16/22 0624     Potassium 4.7 mmol/L     CBC & Differential [[id number]]  (Abnormal) Collected: 11/16/22 0557    Specimen: Blood Updated: 11/16/22 0613    Narrative:      The following orders were created for panel order CBC & Differential.  Procedure                               Abnormality         Status                     ---------                               -----------         ------                     CBC Auto Differential[[id number]]        Abnormal            Final result                 Please view results for these tests on the individual orders.    CBC Auto Differential [[id number]]  (Abnormal) Collected: 11/16/22 0557    Specimen: Blood Updated: 11/16/22 0613     WBC 5.90 10*3/mm3      RBC 3.72 10*6/mm3      Hemoglobin 10.0 g/dL      Hematocrit 32.2 %      MCV 86.7 fL      MCH 27.0 pg      MCHC 31.2 g/dL      RDW 16.5 %      RDW-SD 50.8 fl      MPV 7.5 fL      Platelets 170 10*3/mm3      Neutrophil % 64.7 %      Lymphocyte % 28.2 %      Monocyte % 5.2 %      Eosinophil % 1.3 %      Basophil % 0.6 %      Neutrophils, Absolute 3.90 10*3/mm3      Lymphocytes, Absolute 1.70 10*3/mm3      Monocytes, Absolute 0.30 10*3/mm3      Eosinophils, Absolute 0.10 10*3/mm3      Basophils, Absolute 0.00 10*3/mm3      nRBC 0.1 /100 WBC     aPTT [[id number]]  (Normal) Collected: 11/15/22 2306    Specimen: Blood Updated: 11/15/22 2341     PTT 63.4 seconds     POC Glucose Once [[id number]]  (Abnormal) Collected: 11/15/22 2020     Specimen: Blood Updated: 11/15/22 2020     Glucose 168 mg/dL      Comment: Serial Number: [id number]:  [id number]       POC Glucose Once [[id number]]  (Abnormal) Collected: 11/15/22 1731    Specimen: Blood Updated: 11/15/22 1731     Glucose 165 mg/dL      Comment: Serial Number: [id number]:  [id number]       aPTT [[id number]]  (Abnormal) Collected: 11/15/22 1608    Specimen: Blood from Arm, Left Updated: 11/15/22 1646     PTT 38.4 seconds     Blood Culture - Blood, Arm, Left [[id number]]  (Normal) Collected: 11/14/22 1306    Specimen: Blood from Arm, Left Updated: 11/15/22 1317     Blood Culture No growth at 24 hours    Blood Culture - Blood, Arm, Left [[id number]]  (Normal) Collected: 11/14/22 1237    Specimen: Blood from Arm, Left Updated: 11/15/22 1245     Blood Culture No growth at 24 hours    POC Glucose Once [[id number]]  (Abnormal) Collected: 11/15/22 1154    Specimen: Blood Updated: 11/15/22 1156     Glucose 137 mg/dL      Comment: Serial Number: [id number]:  [id number]       Urine Culture - Urine, Urine, Catheter [[id number]]  (Abnormal) Collected: 11/14/22 1255    Specimen: Urine, Catheter Updated: 11/15/22 0931     Urine Culture >100,000 CFU/mL Gram Negative Bacilli    Narrative:      Colonization of the urinary tract without infection is common. Treatment is discouraged unless the patient is symptomatic, pregnant, or undergoing an invasive urologic procedure.    Extra Tubes [[id number]] Collected: 11/15/22 0819    Specimen: Blood, Venous Line Updated: 11/15/22 0931    Narrative:      The following orders were created for panel order Extra Tubes.  Procedure                               Abnormality         Status                     ---------                               -----------         ------                     Lavender Top[[id number]]                                     Final result               Gold Top - [facility][[id number]]                                   Final result               Green  Top (Gel)[[id number]]                                  Final result                 Please view results for these tests on the individual orders.    Lavender Top [[id number]] Collected: 11/15/22 0819    Specimen: Blood Updated: 11/15/22 0931     Extra Tube hold for add-on     Comment: Auto resulted       Green Top (Gel) [[id number]] Collected: 11/15/22 0819    Specimen: Blood Updated: 11/15/22 0931     Extra Tube Hold for add-ons.     Comment: Auto resulted.       Gold Top - [facility]ST [[id number]] Collected: 11/15/22 0819    Specimen: Blood Updated: 11/15/22 0931     Extra Tube Hold for add-ons.     Comment: Auto resulted.       Lactic Acid, Plasma [[id number]]  (Normal) Collected: 11/15/22 0819    Specimen: Blood Updated: 11/15/22 0853     Lactate 0.7 mmol/L           PENDING RESULTS:     IMAGING REVIEWED:  XR Chest 1 View    Result Date: 11/14/2022  No acute chest findings.  Electronically Signed By-Jocelyn Zayas MD On:11/14/2022 1:00 PM This report was finalized on [id number] by  Jocelyn Zayas MD.    CT Angiogram Chest Pulmonary Embolism    Result Date: 11/14/2022  1. Small distal segmental and subsegmental emboli are present in the right lower lobe. No evidence of right heart strain. I notified the emergency room physician at the time of this dictation. 2. Small eccentric nonocclusive thrombus is demonstrated in the left lower lobe pulmonary artery proximally. This has a chronic appearance. 3. Features of mild interstitial and alveolar edema. 4. Subsegmental atelectasis in the right middle lobe and left lower lobe.  Electronically Signed By-Jocelyn Zayas MD On:11/14/2022 3:17 PM This report was finalized on [id number] by  Jocelyn Zayas MD.      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   With history of hospitalization and immobility, continued cigarette smoking, and elevated BMI, her thrombosis thought to be provoked.  At this point, no hereditary testing for inherited predisposition to thrombosis is justified.  She will continue on unfractionated heparin and tail oral anticoagulation preferred by her insurance is discovered and she should be transition to oral anticoagulation for discharge.  Given the finding of chronic thrombus in left lower pulmonary artery, and high risk of recurrence, she may benefit from long-term anticoagulation.      Bilateral pulmonary emboli  CT chest 11/14/2022- small emboli RLL-no evidence of heart strain.  Small thrombus LLL pulmonary artery that is chronic in appearance.  BLE Doppler 11/14/2022 was negative for thrombus  Continues on heparin drip  Will transition to oral anticoagulation with insurance approval  This thrombus is thought to be provoked due to recent illness, extended immobility, smoking  Echo pending    Acute UTI  Patient's urine was positive for nitrites and 3+ bacteria  Receiving Rocephin IV  Blood cultures negative      Electronically signed by CORI Conrad, 11/16/22, 8:56 AM EST.    Definitely feeling better. Not as much dyspnea and no chest pain. Was febrile and had chills last night and even in the morning today. Has been able to eat and drink fluids without difficulties. No diarrhea. On exam chronically ill. Oriented and conversant. No distress and no jaundice. No oral lesions and respirations not labored. Tachycardic. Lungs markedly diminished bilaterally. Abdomen soft. No edema. Reviewed the laboratory exams. As expected she has some anemia but not worse than before. No leukocyte or platelet abnormalities. Will continue to monitor. She will start treatment with apixaban, which is one of the drugs her insurance prefers. The heparin will be discontinued. Discussed with her and her spouse at length. Will follow.     Javy King MD on  11/16/2022 at 18:05

## 2022-11-17 ENCOUNTER — APPOINTMENT (OUTPATIENT)
Dept: GENERAL RADIOLOGY | Facility: HOSPITAL | Age: 62
End: 2022-11-17

## 2022-11-17 ENCOUNTER — APPOINTMENT (OUTPATIENT)
Dept: ULTRASOUND IMAGING | Facility: HOSPITAL | Age: 62
End: 2022-11-17

## 2022-11-17 LAB
ALBUMIN SERPL-MCNC: 3.4 G/DL (ref 3.5–5.2)
ALBUMIN/GLOB SERPL: 1.4 G/DL
ALP SERPL-CCNC: 51 U/L (ref 39–117)
ALT SERPL W P-5'-P-CCNC: 6 U/L (ref 1–33)
ANION GAP SERPL CALCULATED.3IONS-SCNC: 4 MMOL/L (ref 5–15)
APTT PPP: 29.9 SECONDS (ref 61–76.5)
AST SERPL-CCNC: 6 U/L (ref 1–32)
ATMOSPHERIC PRESS: ABNORMAL MM[HG]
BASE EXCESS BLDV CALC-SCNC: 7.6 MMOL/L (ref -2–2)
BASOPHILS # BLD AUTO: 0 10*3/MM3 (ref 0–0.2)
BASOPHILS NFR BLD AUTO: 0.9 % (ref 0–1.5)
BDY SITE: ABNORMAL
BH CV ECHO MEAS - ACS: 1.81 CM
BH CV ECHO MEAS - AO MAX PG: 14.4 MMHG
BH CV ECHO MEAS - AO MEAN PG: 8 MMHG
BH CV ECHO MEAS - AO ROOT DIAM: 2.8 CM
BH CV ECHO MEAS - AO V2 MAX: 189.7 CM/SEC
BH CV ECHO MEAS - AO V2 VTI: 31.7 CM
BH CV ECHO MEAS - AVA(I,D): 3.7 CM2
BH CV ECHO MEAS - EDV(CUBED): 164.8 ML
BH CV ECHO MEAS - EDV(MOD-SP4): 98 ML
BH CV ECHO MEAS - EF(MOD-SP4): 81.3 %
BH CV ECHO MEAS - ESV(CUBED): 38.9 ML
BH CV ECHO MEAS - ESV(MOD-SP4): 18.3 ML
BH CV ECHO MEAS - FS: 38.2 %
BH CV ECHO MEAS - IVS/LVPW: 1.05 CM
BH CV ECHO MEAS - IVSD: 1.26 CM
BH CV ECHO MEAS - LA DIMENSION: 4.3 CM
BH CV ECHO MEAS - LV DIASTOLIC VOL/BSA (35-75): 47.3 CM2
BH CV ECHO MEAS - LV MASS(C)D: 282.1 GRAMS
BH CV ECHO MEAS - LV MAX PG: 9.8 MMHG
BH CV ECHO MEAS - LV MEAN PG: 4.7 MMHG
BH CV ECHO MEAS - LV SYSTOLIC VOL/BSA (12-30): 8.8 CM2
BH CV ECHO MEAS - LV V1 MAX: 156.7 CM/SEC
BH CV ECHO MEAS - LV V1 VTI: 31.1 CM
BH CV ECHO MEAS - LVIDD: 5.5 CM
BH CV ECHO MEAS - LVIDS: 3.4 CM
BH CV ECHO MEAS - LVOT AREA: 3.8 CM2
BH CV ECHO MEAS - LVOT DIAM: 2.19 CM
BH CV ECHO MEAS - LVPWD: 1.21 CM
BH CV ECHO MEAS - MV A MAX VEL: 141.5 CM/SEC
BH CV ECHO MEAS - MV DEC SLOPE: 710.4 CM/SEC2
BH CV ECHO MEAS - MV DEC TIME: 0.17 MSEC
BH CV ECHO MEAS - MV E MAX VEL: 122.5 CM/SEC
BH CV ECHO MEAS - MV E/A: 0.87
BH CV ECHO MEAS - MV MAX PG: 11.3 MMHG
BH CV ECHO MEAS - MV MEAN PG: 4.4 MMHG
BH CV ECHO MEAS - MV V2 VTI: 35.7 CM
BH CV ECHO MEAS - MVA(VTI): 3.3 CM2
BH CV ECHO MEAS - PA ACC TIME: 0.07 SEC
BH CV ECHO MEAS - PA PR(ACCEL): 46.3 MMHG
BH CV ECHO MEAS - PA V2 MAX: 170.5 CM/SEC
BH CV ECHO MEAS - PULM A REVS DUR: 0.08 SEC
BH CV ECHO MEAS - PULM A REVS VEL: 29 CM/SEC
BH CV ECHO MEAS - PULM DIAS VEL: 48.1 CM/SEC
BH CV ECHO MEAS - PULM S/D: 1.03
BH CV ECHO MEAS - PULM SYS VEL: 49.7 CM/SEC
BH CV ECHO MEAS - RAP SYSTOLE: 3 MMHG
BH CV ECHO MEAS - RV MAX PG: 5.1 MMHG
BH CV ECHO MEAS - RV V1 MAX: 113.1 CM/SEC
BH CV ECHO MEAS - RV V1 VTI: 18.7 CM
BH CV ECHO MEAS - RVDD: 3.2 CM
BH CV ECHO MEAS - RVSP: 58.5 MMHG
BH CV ECHO MEAS - SI(MOD-SP4): 38.5 ML/M2
BH CV ECHO MEAS - SV(LVOT): 117.5 ML
BH CV ECHO MEAS - SV(MOD-SP4): 79.7 ML
BH CV ECHO MEAS - TR MAX PG: 55.5 MMHG
BH CV ECHO MEAS - TR MAX VEL: 372.4 CM/SEC
BILIRUB SERPL-MCNC: 0.2 MG/DL (ref 0–1.2)
BUN SERPL-MCNC: 7 MG/DL (ref 8–23)
BUN/CREAT SERPL: 7.6 (ref 7–25)
CALCIUM SPEC-SCNC: 8.9 MG/DL (ref 8.6–10.5)
CHLORIDE SERPL-SCNC: 102 MMOL/L (ref 98–107)
CO2 BLDA-SCNC: 38 MMOL/L (ref 22–29)
CO2 SERPL-SCNC: 34 MMOL/L (ref 22–29)
CREAT SERPL-MCNC: 0.92 MG/DL (ref 0.57–1)
DEPRECATED RDW RBC AUTO: 50.8 FL (ref 37–54)
EGFRCR SERPLBLD CKD-EPI 2021: 70.1 ML/MIN/1.73
EOSINOPHIL # BLD AUTO: 0.1 10*3/MM3 (ref 0–0.4)
EOSINOPHIL NFR BLD AUTO: 1.3 % (ref 0.3–6.2)
ERYTHROCYTE [DISTWIDTH] IN BLOOD BY AUTOMATED COUNT: 16.5 % (ref 12.3–15.4)
GLOBULIN UR ELPH-MCNC: 2.5 GM/DL
GLUCOSE BLDC GLUCOMTR-MCNC: 116 MG/DL (ref 70–105)
GLUCOSE BLDC GLUCOMTR-MCNC: 138 MG/DL (ref 70–105)
GLUCOSE BLDC GLUCOMTR-MCNC: 146 MG/DL (ref 70–105)
GLUCOSE BLDC GLUCOMTR-MCNC: 188 MG/DL (ref 70–105)
GLUCOSE SERPL-MCNC: 132 MG/DL (ref 65–99)
HCO3 BLDV-SCNC: 35.8 MMOL/L (ref 22–26)
HCT VFR BLD AUTO: 28.4 % (ref 34–46.6)
HGB BLD-MCNC: 8.6 G/DL (ref 12–15.9)
INHALED O2 CONCENTRATION: 40 %
LYMPHOCYTES # BLD AUTO: 1.2 10*3/MM3 (ref 0.7–3.1)
LYMPHOCYTES NFR BLD AUTO: 24.3 % (ref 19.6–45.3)
MAGNESIUM SERPL-MCNC: 1.9 MG/DL (ref 1.6–2.4)
MAXIMAL PREDICTED HEART RATE: 157 BPM
MCH RBC QN AUTO: 26.4 PG (ref 26.6–33)
MCHC RBC AUTO-ENTMCNC: 30.4 G/DL (ref 31.5–35.7)
MCV RBC AUTO: 86.8 FL (ref 79–97)
MODALITY: ABNORMAL
MONOCYTES # BLD AUTO: 0.3 10*3/MM3 (ref 0.1–0.9)
MONOCYTES NFR BLD AUTO: 6.4 % (ref 5–12)
NEUTROPHILS NFR BLD AUTO: 3.2 10*3/MM3 (ref 1.7–7)
NEUTROPHILS NFR BLD AUTO: 67.1 % (ref 42.7–76)
NRBC BLD AUTO-RTO: 0.1 /100 WBC (ref 0–0.2)
PCO2 BLDV: 73.3 MM HG (ref 42–51)
PEEP RESPIRATORY: 5 CM[H2O]
PH BLDV: 7.3 PH UNITS (ref 7.32–7.43)
PLATELET # BLD AUTO: 149 10*3/MM3 (ref 140–450)
PMV BLD AUTO: 7.4 FL (ref 6–12)
PO2 BLDV: 33.7 MM HG (ref 40–42)
POTASSIUM SERPL-SCNC: 4.8 MMOL/L (ref 3.5–5.2)
PROT SERPL-MCNC: 5.9 G/DL (ref 6–8.5)
PSV: 15 CMH2O
RBC # BLD AUTO: 3.27 10*6/MM3 (ref 3.77–5.28)
SAO2 % BLDCOV: 55.4 % (ref 45–75)
SODIUM SERPL-SCNC: 140 MMOL/L (ref 136–145)
STRESS TARGET HR: 133 BPM
VT ON VENT VENT: 500 ML
WBC NRBC COR # BLD: 4.7 10*3/MM3 (ref 3.4–10.8)

## 2022-11-17 PROCEDURE — 82803 BLOOD GASES ANY COMBINATION: CPT

## 2022-11-17 PROCEDURE — 94799 UNLISTED PULMONARY SVC/PX: CPT

## 2022-11-17 PROCEDURE — 82962 GLUCOSE BLOOD TEST: CPT

## 2022-11-17 PROCEDURE — 25010000002 MAGNESIUM SULFATE IN D5W 1G/100ML (PREMIX) 1-5 GM/100ML-% SOLUTION: Performed by: PHYSICIAN ASSISTANT

## 2022-11-17 PROCEDURE — 25010000002 MORPHINE PER 10 MG: Performed by: NURSE PRACTITIONER

## 2022-11-17 PROCEDURE — 93010 ELECTROCARDIOGRAM REPORT: CPT | Performed by: INTERNAL MEDICINE

## 2022-11-17 PROCEDURE — 85025 COMPLETE CBC W/AUTO DIFF WBC: CPT | Performed by: INTERNAL MEDICINE

## 2022-11-17 PROCEDURE — 76775 US EXAM ABDO BACK WALL LIM: CPT

## 2022-11-17 PROCEDURE — 99231 SBSQ HOSP IP/OBS SF/LOW 25: CPT | Performed by: INTERNAL MEDICINE

## 2022-11-17 PROCEDURE — 83735 ASSAY OF MAGNESIUM: CPT | Performed by: PHYSICIAN ASSISTANT

## 2022-11-17 PROCEDURE — 85730 THROMBOPLASTIN TIME PARTIAL: CPT | Performed by: EMERGENCY MEDICINE

## 2022-11-17 PROCEDURE — 94660 CPAP INITIATION&MGMT: CPT

## 2022-11-17 PROCEDURE — 71045 X-RAY EXAM CHEST 1 VIEW: CPT

## 2022-11-17 PROCEDURE — 93005 ELECTROCARDIOGRAM TRACING: CPT | Performed by: INTERNAL MEDICINE

## 2022-11-17 PROCEDURE — 80053 COMPREHEN METABOLIC PANEL: CPT | Performed by: INTERNAL MEDICINE

## 2022-11-17 PROCEDURE — 25010000002 CEFTRIAXONE PER 250 MG: Performed by: INTERNAL MEDICINE

## 2022-11-17 PROCEDURE — 94761 N-INVAS EAR/PLS OXIMETRY MLT: CPT

## 2022-11-17 PROCEDURE — 25010000002 ONDANSETRON PER 1 MG: Performed by: PHYSICIAN ASSISTANT

## 2022-11-17 RX ORDER — HYDRALAZINE HYDROCHLORIDE 25 MG/1
25 TABLET, FILM COATED ORAL EVERY 8 HOURS PRN
Status: DISCONTINUED | OUTPATIENT
Start: 2022-11-17 | End: 2022-11-19 | Stop reason: HOSPADM

## 2022-11-17 RX ADMIN — OXYCODONE 5 MG: 5 TABLET ORAL at 00:50

## 2022-11-17 RX ADMIN — ONDANSETRON 4 MG: 2 INJECTION INTRAMUSCULAR; INTRAVENOUS at 03:21

## 2022-11-17 RX ADMIN — MORPHINE SULFATE 2 MG: 2 INJECTION, SOLUTION INTRAMUSCULAR; INTRAVENOUS at 13:11

## 2022-11-17 RX ADMIN — Medication 10 ML: at 08:03

## 2022-11-17 RX ADMIN — MAGNESIUM SULFATE IN DEXTROSE 1 G: 10 INJECTION, SOLUTION INTRAVENOUS at 09:14

## 2022-11-17 RX ADMIN — TOPIRAMATE 50 MG: 25 TABLET, FILM COATED ORAL at 08:03

## 2022-11-17 RX ADMIN — OXYCODONE 5 MG: 5 TABLET ORAL at 21:11

## 2022-11-17 RX ADMIN — CLONIDINE HYDROCHLORIDE 0.3 MG: 0.1 TABLET ORAL at 13:03

## 2022-11-17 RX ADMIN — BUDESONIDE AND FORMOTEROL FUMARATE DIHYDRATE 2 PUFF: 80; 4.5 AEROSOL RESPIRATORY (INHALATION) at 07:47

## 2022-11-17 RX ADMIN — OXYCODONE 5 MG: 5 TABLET ORAL at 11:09

## 2022-11-17 RX ADMIN — Medication 10 ML: at 21:12

## 2022-11-17 RX ADMIN — MORPHINE SULFATE 2 MG: 2 INJECTION, SOLUTION INTRAMUSCULAR; INTRAVENOUS at 09:14

## 2022-11-17 RX ADMIN — OXYCODONE 5 MG: 5 TABLET ORAL at 06:03

## 2022-11-17 RX ADMIN — APIXABAN 10 MG: 5 TABLET, FILM COATED ORAL at 21:11

## 2022-11-17 RX ADMIN — CEFTRIAXONE 2 G: 2 INJECTION, POWDER, FOR SOLUTION INTRAMUSCULAR; INTRAVENOUS at 13:03

## 2022-11-17 RX ADMIN — BUDESONIDE AND FORMOTEROL FUMARATE DIHYDRATE 2 PUFF: 80; 4.5 AEROSOL RESPIRATORY (INHALATION) at 20:09

## 2022-11-17 RX ADMIN — MORPHINE SULFATE 2 MG: 2 INJECTION, SOLUTION INTRAMUSCULAR; INTRAVENOUS at 03:17

## 2022-11-17 RX ADMIN — APIXABAN 10 MG: 5 TABLET, FILM COATED ORAL at 08:03

## 2022-11-17 RX ADMIN — ONDANSETRON 4 MG: 2 INJECTION INTRAMUSCULAR; INTRAVENOUS at 09:19

## 2022-11-17 RX ADMIN — TRAZODONE HYDROCHLORIDE 100 MG: 100 TABLET ORAL at 21:11

## 2022-11-17 RX ADMIN — LISINOPRIL 40 MG: 20 TABLET ORAL at 00:41

## 2022-11-17 NOTE — PAYOR COMM NOTE
"CLINICALS FOR INPATIENT PRECERT:            Ann Álvarez (62 y.o. Female) 1960  PENDING AUTH # [id number]          AUTHORIZATION PENDING:   PLEASE CALL OR FAX DETERMINATION TO CONTACT BELOW. THANK YOU.        Tea Bridges, RN MSN  /UR  [facility]  [phone] office  [phone] fax  [email or web address]    [facility]  NPI: [id number]  Tax: [id number]              Ann Álvarez (62 y.o. Female)     Date of Birth   1960    Social Security Number       Address   [address] [place] [address] [place] [facility]    Home Phone   [phone]    MRN   [id number]       Protestant   Other    Marital Status                               Admission Date   11/14/22    Admission Type   Emergency    Admitting Provider   Scottie Colin MD    Attending Provider   Uma Cui MD    Department, Room/Bed   [facility] INTENSIVE CARE UNIT, 2311/1       Discharge Date       Discharge Disposition       Discharge Destination                               Attending Provider: Uma Cui MD    Allergies: Codeine    Isolation: None   Infection: COVID (rule out) (11/16/22)   Code Status: CPR    Ht: 160 cm (63\")   Wt: 111 kg (244 lb 0.8 oz)    Admission Cmt: None   Principal Problem: Acute pulmonary embolism without acute cor pulmonale (HCC) [I26.99]                 Active Insurance as of 11/14/2022     Primary Coverage     Payor Plan Insurance Group Employer/Plan Group    [facility] PPO [id number]     Payor Plan Address Payor Plan Phone Number Payor Plan Fax Number Effective Dates    [address] [phone]  2/1/2022 - None Entered    [facility] [address]       Subscriber Name Subscriber Birth Date Member ID       ANN ÁLVAREZ 1960 [id number]                 Emergency Contacts      (Rel.) Home Phone Work Phone Mobile Phone    ANGY ÁLVAREZ (Spouse) [phone] -- [phone]        11/14/22 1519  " Initiate Observation Status Once     Completed     Level of Care: Telemetry    Diagnosis: Shortness of breath [786.05.ICD-9-CM]    Admitting Physician: SCOTTIE COLIN [[id number]]    Attending Physician: SCOTTIE COLIN [[id number]]    Certification: I Certify That Inpatient Hospital Services Are Medically Necessary For Greater Than 2 Midnights            22 0900  Inpatient Admission  Once     Completed     Level of Care: Critical Care    Diagnosis: Shortness of breath [786.05.ICD-9-CM]    Admitting Physician: SCOTTIE COLIN [[id number]]    Attending Physician: COURTNEY MCELROY [[id number]]    Certification: I Certify That Inpatient Hospital Services Are Medically Necessary For Greater Than 2 Midnights                     History & Physical      ONanci Allen PA-C at 22 1434     Attestation signed by Scottie Colin MD at 22 1716    Electronically signed by Scottie Colin MD, 22, 5:16 PM EST.         I have reviewed this documentation and agree.                      [facility]      Patient Name: Ann Álvarez  : 1960  MRN: [id number]  Primary Care Physician:  Jose Enrique Walters MD  Date of admission: 2022      Subjective       Chief Complaint: Shortness of breath, chest pain and back pain    History of Present Illness: Ann Álvarez is a 62 y.o. female who presented to [facility] on 2022 complaining of shortness of air, chest pain and back pain.  Patient reports that she was in [facility] for 6 days around [facility] for UTI, sepsis and was on a ventilator for 2 days.  Since then according to primary care provider records the UTI had cleared.  She reports that 2 days ago she began having shortness of air, left-sided chest pain, that she describes as a tightness, that radiates into her shoulder blade and low back pain.  She is on her baseline home 4 L O2 at 95%.  She states nothing makes her chest tightness or back pain any  better.  She denies having any lightheadedness, dizziness or syncopal episode.  She denies having any vomiting but has felt nauseous.  She denies she denies any abdominal pain, constipation or diarrhea.  She reports bilateral lower extremity swelling but feels this is nothing out of the ordinary.  We have been asked to admit this patient for further evaluation and treatment.    Emergency department work-up vital signs stable 4 L nasal cannula at 95%.  UA positive nitrate with 3+ bacteria.  Glucose 142.  Creatinine 0.83.  Chloride 97.  CO2 36.0.  Troponin negative.  D-dimer 1.47.  White blood count 5.50.  Hemoglobin 10.5.  Platelets 222.  Lactate 0.9.  Blood culture pending, urine culture pending.    CTA chest Small distal segmental and subsegmental emboli are present in the right lower lobe. No evidence of right heart strain. Small eccentric nonocclusive thrombus is demonstrated in the left lower lobe pulmonary artery proximally. This has a chronic appearance.Features of mild interstitial and alveolar edema.Subsegmental atelectasis in the right middle lobe and left lower lobe.    Chest x-ray no acute chest findings.    Review of Systems   Constitutional: Negative for chills, fever and malaise/fatigue.   HENT: Negative.    Eyes: Negative.    Cardiovascular: Positive for chest pain, dyspnea on exertion and leg swelling. Negative for syncope.   Respiratory: Positive for shortness of breath and sputum production.    Skin: Negative.    Musculoskeletal: Positive for back pain.   Gastrointestinal: Positive for nausea. Negative for abdominal pain, constipation, diarrhea, hematemesis, hematochezia and vomiting.   Genitourinary: Negative for dysuria, flank pain and hematuria.   Neurological: Negative.    Psychiatric/Behavioral: Negative.           Personal History     Past Medical History:   Diagnosis Date   • Anxiety    • Arthritis    • Chronic back pain    • Chronic obstructive pulmonary disease (HCC) 01/31/2020   • Chronic  respiratory failure with hypoxia ([facility]) 03/06/2018    O2 @ 2 L per NC as needed   • Diabetes mellitus ([facility])    • Dyslipidemia 10/21/2014   • Edema, lower extremity 07/16/2014   • Hypertension    • Insomnia    • Obesity, Class III, BMI 40-49.9 (morbid obesity) ([facility]) 03/29/2022   • Prediabetes 11/06/2018   • Primary hypertension 05/30/2012   • PTSD (post-traumatic stress disorder)    • RLS (restless legs syndrome) 04/22/2020   • Tobacco abuse 10/21/2014   • Vitamin D deficiency 05/30/2012       Past Surgical History:   Procedure Laterality Date   • BACK SURGERY     • CHOLECYSTECTOMY     • HYSTERECTOMY     • TUMOR REMOVAL      benign breats tumor       Family History: family history includes Cancer in her brother and sister; Heart disease in her father and mother. Otherwise pertinent FHx was reviewed and not pertinent to current issue.    Social History:  reports that she quit smoking about 4 months ago. Her smoking use included cigarettes. She smoked an average of .25 packs per day. She has never used smokeless tobacco. She reports that she does not currently use alcohol. She reports that she does not use drugs.    Home Medications:  Prior to Admission Medications     Prescriptions Last Dose Informant Patient Reported? Taking?    amLODIPine (NORVASC) 10 MG tablet   No No    Take 1 tablet by mouth Daily.    budesonide (PULMICORT) 0.5 MG/2ML nebulizer solution   No No    use 1 vial by nebulization 2 (Two) Times a Day for 14 days.    cloNIDine (CATAPRES) 0.3 MG tablet   No No    Take 1 tablet by mouth Every 8 (Eight) Hours.    doxepin (SINEquan) 50 MG capsule   Yes No    Take 50 mg by mouth At Night As Needed.    hydrALAZINE (APRESOLINE) 25 MG tablet   No No    Take 3 tablets by mouth Every 12 (Twelve) Hours.    Insulin Glargine-yfgn (Semglee, yfgn,) 100 UNIT/ML solution pen-injector   No No    Inject 20 Units under the skin into the appropriate area as directed 2 (Two) Times a Day.    ipratropium-albuterol (DUO-NEB)  0.5-2.5 mg/3 ml nebulizer   No No    use 1 vial via nebulizer 3 (Three) Times a Day    LINZESS 290 MCG capsule capsule   Yes No    Take 290 mcg by mouth Daily As Needed.    lisinopril (PRINIVIL,ZESTRIL) 40 MG tablet   Yes No    Take 40 mg by mouth Daily.    metoprolol tartrate (LOPRESSOR) 100 MG tablet   No No    Take 1 tablet by mouth Every 12 (Twelve) Hours.    Multiple Vitamin (MULTIVITAMIN) tablet   Yes No    Take 1 tablet by mouth Daily.    oxyCODONE-acetaminophen (PERCOCET)  MG per tablet   Yes No    Take 1 tablet by mouth Every 4 (Four) Hours As Needed for Moderate Pain .    predniSONE (DELTASONE) 10 MG tablet   No No    take 3 tablets once daily x 2 days, 2 daily x 2 days, 1 daily x 2 days    Probiotic Product (PROBIOTIC ADVANCED) capsule   Yes No    Take 1 capsule by mouth Daily.    promethazine (PHENERGAN) 50 MG tablet   Yes No    Take 50 mg by mouth Every 6 (Six) Hours As Needed.    sildenafil (REVATIO) 20 MG tablet   No No    Take 0.5 tablets by mouth Every 8 (Eight) Hours.    topiramate (TOPAMAX) 50 MG tablet   Yes No    Take 50 mg by mouth Daily.    traZODone (DESYREL) 100 MG tablet   Yes No    Take 100 mg by mouth Every Night.    zolpidem CR (AMBIEN CR) 12.5 MG CR tablet   Yes No    Take 12.5 mg by mouth At Night As Needed for Sleep.            Allergies:  Allergies   Allergen Reactions   • Codeine Hives, Itching and Nausea And Vomiting       Objective       Vitals:   Temp:  [98.2 °F (36.8 °C)] 98.2 °F (36.8 °C)  Heart Rate:  [63-84] 70  Resp:  [17-18] 18  BP: (103-161)/(49-68) 103/61  Flow (L/min):  [4] 4    Physical Exam  Constitutional:       General: She is not in acute distress.     Appearance: She is obese.   HENT:      Head: Normocephalic and atraumatic.   Cardiovascular:      Rate and Rhythm: Normal rate and regular rhythm.      Pulses: Normal pulses.      Heart sounds: Normal heart sounds.   Pulmonary:      Effort: Pulmonary effort is normal.      Breath sounds: Wheezing and rhonchi  present.   Abdominal:      General: Bowel sounds are normal.      Palpations: Abdomen is soft.      Tenderness: There is no abdominal tenderness.   Musculoskeletal:      Cervical back: Normal range of motion and neck supple.      Right lower leg: Edema present.      Left lower leg: Edema present.   Skin:     General: Skin is warm and dry.   Neurological:      General: No focal deficit present.      Mental Status: She is alert and oriented to person, place, and time.   Psychiatric:         Mood and Affect: Mood normal.            Result Review    Result Review:  I have personally reviewed the results from the time of this admission to 11/14/2022 16:18 EST and agree with these findings:  [x]  Laboratory  [x]  Microbiology  [x]  Radiology  [x]  EKG/Telemetry   []  Cardiology/Vascular   []  Pathology  []  Old records  []  Other:        Assessment & Plan        Active Hospital Problems:  Active Hospital Problems    Diagnosis    • **Shortness of breath      Plan:     Home meds not verified at the time of assessment and plan    Shortness of air/chest pain  -4 L nasal cannula, 95% on baseline O2  -Troponin negative and trend  -EKG Sinus rhythm Abnrm T, consider ischemia, anterolateral lds When compared with ECG of 14-Nov-2022 11:47:11, Nonspecific significant change  -Chest x-ray no acute chest findings.  -D-dimer 1.47  -CTA chest Small distal segmental and subsegmental emboli are present in the right lower lobe. No evidence of right heart strain. Small eccentric nonocclusive thrombus is demonstrated in the left lower lobe pulmonary artery proximally. This has a chronic appearance.Features of mild interstitial and alveolar edema.Subsegmental atelectasis in the right middle lobe and left lower lobe.  -Patient has been initiated on heparin gtt.  -Echo, pending  -Echo 3/29/2022 EF 66 to 70%.  Moderate tricuspid valve regurgitation.  -proBNP pending  -lactate 0.9  -Bilateral lower extremity venous duplex  -Heme-onc consult for  anticoagulation management    UTI, acute  -UA positive for nitrites and 3+ bacteria  -Rocephin started in emergency department, continue  -Creatinine 0.83  -BUN 11  -Culture pending    COPD  -Continue Pulmicort and DuoNebs once verified    DM2  -Glucose 142  -A1c, pending  -Hold home medicines  -Initiate SSI  -Check blood glucose before meals and at bedtime    Hypertension  -Current /61  -Continue home meds once verified  -Check blood pressure before administering home meds      Restless leg syndrome  -Continue doxepin once verified    Anxiety/depression/PTSD  -Continue home meds once verified    Obesity (41.79 kg/m²)/tobacco abuse  -Lifestyle modifications  -Smoking cessation    DVT prophylaxis:  Medical DVT prophylaxis orders are present.    CODE STATUS:    Level Of Support Discussed With: Patient  Code Status (Patient has no pulse and is not breathing): CPR (Attempt to Resuscitate)  Medical Interventions (Patient has pulse or is breathing): Full Support    Admission Status:  I believe this patient meets observation status.    I discussed the patient's findings and my recommendations with patient.        Signature: `Electronically signed by Nanci Walter PA-C, 11/14/22, 4:18 PM EST.    Electronically signed by Scottie Colin MD at 11/14/22 1716          Emergency Department Notes      Nela Ferro RN at 11/14/22 1206        Pt reports she was recently discharged from the ICU at [facility] for a UTI. Pt reports she was intubated for two days at [facility]. Pt complains of worsening chest pain and shortness of breath after discharge last Wednesday. Pt wears 4 L O2 via NC at home.     Electronically signed by Nela Ferro RN at 11/14/22 1208     Polo Ceja MD at 11/14/22 1207          Subjective   History of Present Illness  Chief complaint weakness chest pain shortness of breath    History of present illness 62-year-old female with multiple health problems Home O2 dependent COPD pulmonary  hypertension who was recently at [facility] for sepsis and intubation was discharged end of October who states since Wednesday she has been having some increasing shortness of breath and tightness in her chest.  There is no neck arm or jaw pain.  Describes as somewhat sharp in nature moderate to severe worse with activity and better with rest.  No fever chills sweats cough congestion.  She has some generalized weakness with that she has been at home and no one at home with similar illness.  No foreign travels and has been recently hospitalized.  Ongoing continuous since Wednesday.  Severe in nature treating at home.        Review of Systems   Constitutional: Negative for chills and fever.   HENT: Negative for congestion and sinus pressure.    Eyes: Negative for photophobia and visual disturbance.   Respiratory: Positive for chest tightness and shortness of breath. Negative for cough.    Cardiovascular: Positive for chest pain. Negative for palpitations.   Gastrointestinal: Negative for abdominal pain and vomiting.   Endocrine: Negative for cold intolerance and heat intolerance.   Genitourinary: Negative for difficulty urinating and dysuria.   Musculoskeletal: Negative for back pain and neck pain.   Skin: Negative for rash and wound.   Neurological: Negative for dizziness and light-headedness.   Psychiatric/Behavioral: Negative for agitation and confusion.       Past Medical History:   Diagnosis Date   • Anxiety    • Arthritis    • Chronic back pain    • Chronic obstructive pulmonary disease ([facility]) 01/31/2020   • Chronic respiratory failure with hypoxia ([facility]) 03/06/2018    O2 @ 2 L per NC as needed   • Diabetes mellitus ([facility])    • Dyslipidemia 10/21/2014   • Edema, lower extremity 07/16/2014   • Hypertension    • Insomnia    • Obesity, Class III, BMI 40-49.9 (morbid obesity) ([facility]) 03/29/2022   • Prediabetes 11/06/2018   • Primary hypertension 05/30/2012   • PTSD (post-traumatic stress disorder)    • RLS  (restless legs syndrome) 2020   • Tobacco abuse 10/21/2014   • Vitamin D deficiency 2012       Allergies   Allergen Reactions   • Codeine Hives, Itching and Nausea And Vomiting       Past Surgical History:   Procedure Laterality Date   • BACK SURGERY     • CHOLECYSTECTOMY     • HYSTERECTOMY     • TUMOR REMOVAL      benign breats tumor       Family History   Problem Relation Age of Onset   • Heart disease Mother    • Heart disease Father    • Cancer Sister    • Cancer Brother        Social History     Socioeconomic History   • Marital status:    Tobacco Use   • Smoking status: Former     Packs/day: 0.25     Types: Cigarettes     Quit date: 2022     Years since quittin.3   • Smokeless tobacco: Never   Vaping Use   • Vaping Use: Never used   Substance and Sexual Activity   • Alcohol use: Not Currently   • Drug use: Never   • Sexual activity: Defer       Prior to Admission medications    Medication Sig Start Date End Date Taking? Authorizing Provider   amLODIPine (NORVASC) 10 MG tablet Take 1 tablet by mouth Daily. 22   Gabriel Ye MD   budesonide (PULMICORT) 0.5 MG/2ML nebulizer solution use 1 vial by nebulization 2 (Two) Times a Day for 14 days. 22  Zev Sam PA-C   cloNIDine (CATAPRES) 0.3 MG tablet Take 1 tablet by mouth Every 8 (Eight) Hours. 22   Gabriel Ye MD   doxepin (SINEquan) 50 MG capsule Take 50 mg by mouth At Night As Needed. 19   Provider, MD Aylin   hydrALAZINE (APRESOLINE) 25 MG tablet Take 3 tablets by mouth Every 12 (Twelve) Hours. 22   Gabriel Ye MD   Insulin Glargine-yfgn (Semglee yfgn,) 100 UNIT/ML solution pen-injector Inject 20 Units under the skin into the appropriate area as directed 2 (Two) Times a Day. 22   Zev Sam PA-C   ipratropium-albuterol (DUO-NEB) 0.5-2.5 mg/3 ml nebulizer use 1 vial via nebulizer 3 (Three) Times a Day 22  Zev Sam PA-C   LINZESS 290 MCG  capsule capsule Take 290 mcg by mouth Daily As Needed. 12/21/19   Aylin Bledsoe MD   lisinopril (PRINIVIL,ZESTRIL) 40 MG tablet Take 40 mg by mouth Daily. 7/24/17   Aylin Bledsoe MD   metoprolol tartrate (LOPRESSOR) 100 MG tablet Take 1 tablet by mouth Every 12 (Twelve) Hours. 4/5/22   Gabriel Ye MD   Multiple Vitamin (MULTIVITAMIN) tablet Take 1 tablet by mouth Daily.    Aylin Bledsoe MD   oxyCODONE-acetaminophen (PERCOCET)  MG per tablet Take 1 tablet by mouth Every 4 (Four) Hours As Needed for Moderate Pain . 12/3/19   Aylin Bledsoe MD   predniSONE (DELTASONE) 10 MG tablet take 3 tablets once daily x 2 days, 2 daily x 2 days, 1 daily x 2 days 7/3/22   Zev Sam PA-C   Probiotic Product (PROBIOTIC ADVANCED) capsule Take 1 capsule by mouth Daily. 9/18/17   Aylin Bledsoe MD   promethazine (PHENERGAN) 50 MG tablet Take 50 mg by mouth Every 6 (Six) Hours As Needed. 12/23/19   Aylin Bledsoe MD   sildenafil (REVATIO) 20 MG tablet Take 0.5 tablets by mouth Every 8 (Eight) Hours. 4/5/22   Gabriel Ye MD   topiramate (TOPAMAX) 50 MG tablet Take 50 mg by mouth Daily. 11/17/19   Aylin Bledsoe MD   traZODone (DESYREL) 100 MG tablet Take 100 mg by mouth Every Night. 12/21/19   Aylin Bledsoe MD   zolpidem CR (AMBIEN CR) 12.5 MG CR tablet Take 12.5 mg by mouth At Night As Needed for Sleep.    Aylin Bledsoe MD   insulin glargine (LANTUS, SEMGLEE) 100 UNIT/ML injection Inject 20 Units under the skin into the appropriate area as directed 2 (Two) Times a Day. 6/23/22 6/29/22  Aylin Bledsoe MD         Objective   Physical Exam  Constitutional this is a 62-year-old awake alert chronically ill-appearing but nontoxic-appearing temperature is 98.2 blood pressure 161/68 respirations 18 heart rate 82.  HEENT extraocular muscles are intact pupils equal round reactive sclerae clear mouth clear.  Neck supple no adenopathy no JVD no bruits.   Lungs scattered wheezes no retractions.  Heart regular without murmur.  Abdomen soft nontender good bowel sounds no peritoneal findings or pulsatile masses.  Extremities pulses equal throughout upper and lower extremities no edema cords or Homans' sign or evidence of DVT.  Skin is warm and dry without rashes or cellulitic changes.  Neurologic awake alert orientated x3 no facial asymmetry normal speech without focal weakness  Procedures          ED Course      Results for orders placed or performed during the hospital encounter of 11/14/22   Comprehensive Metabolic Panel    Specimen: Blood   Result Value Ref Range    Glucose 142 (H) 65 - 99 mg/dL    BUN 11 8 - 23 mg/dL    Creatinine 0.83 0.57 - 1.00 mg/dL    Sodium 139 136 - 145 mmol/L    Potassium 3.9 3.5 - 5.2 mmol/L    Chloride 97 (L) 98 - 107 mmol/L    CO2 36.0 (H) 22.0 - 29.0 mmol/L    Calcium 9.0 8.6 - 10.5 mg/dL    Total Protein 6.4 6.0 - 8.5 g/dL    Albumin 3.60 3.50 - 5.20 g/dL    ALT (SGPT) 9 1 - 33 U/L    AST (SGOT) 9 1 - 32 U/L    Alkaline Phosphatase 66 39 - 117 U/L    Total Bilirubin 0.3 0.0 - 1.2 mg/dL    Globulin 2.8 gm/dL    A/G Ratio 1.3 g/dL    BUN/Creatinine Ratio 13.3 7.0 - 25.0    Anion Gap 6.0 5.0 - 15.0 mmol/L    eGFR 79.3 >60.0 mL/min/1.73   Urinalysis With Microscopic If Indicated (No Culture) - Urine, Catheter    Specimen: Urine, Catheter   Result Value Ref Range    Color, UA Yellow Yellow, Straw    Appearance, UA Clear Clear    pH, UA 6.5 5.0 - 8.0    Specific Gravity, UA 1.014 1.005 - 1.030    Glucose, UA Negative Negative    Ketones, UA Negative Negative    Bilirubin, UA Negative Negative    Blood, UA Negative Negative    Protein, UA Trace (A) Negative    Leuk Esterase, UA Small (1+) (A) Negative    Nitrite, UA Positive (A) Negative    Urobilinogen, UA 1.0 E.U./dL 0.2 - 1.0 E.U./dL   Troponin    Specimen: Blood   Result Value Ref Range    Troponin T <0.010 0.000 - 0.030 ng/mL   D-dimer, Quantitative    Specimen: Blood   Result Value Ref  Range    D-Dimer, Quantitative 1.47 (H) 0.00 - 0.59 mg/L (FEU)   CBC Auto Differential    Specimen: Blood   Result Value Ref Range    WBC 5.50 3.40 - 10.80 10*3/mm3    RBC 3.88 3.77 - 5.28 10*6/mm3    Hemoglobin 10.5 (L) 12.0 - 15.9 g/dL    Hematocrit 33.4 (L) 34.0 - 46.6 %    MCV 86.1 79.0 - 97.0 fL    MCH 27.0 26.6 - 33.0 pg    MCHC 31.3 (L) 31.5 - 35.7 g/dL    RDW 16.0 (H) 12.3 - 15.4 %    RDW-SD 48.1 37.0 - 54.0 fl    MPV 7.4 6.0 - 12.0 fL    Platelets 222 140 - 450 10*3/mm3    Neutrophil % 64.1 42.7 - 76.0 %    Lymphocyte % 28.7 19.6 - 45.3 %    Monocyte % 4.7 (L) 5.0 - 12.0 %    Eosinophil % 1.6 0.3 - 6.2 %    Basophil % 0.9 0.0 - 1.5 %    Neutrophils, Absolute 3.60 1.70 - 7.00 10*3/mm3    Lymphocytes, Absolute 1.60 0.70 - 3.10 10*3/mm3    Monocytes, Absolute 0.30 0.10 - 0.90 10*3/mm3    Eosinophils, Absolute 0.10 0.00 - 0.40 10*3/mm3    Basophils, Absolute 0.00 0.00 - 0.20 10*3/mm3    nRBC 0.0 0.0 - 0.2 /100 WBC   Urinalysis, Microscopic Only - Urine, Catheter    Specimen: Urine, Catheter   Result Value Ref Range    RBC, UA None Seen None Seen /HPF    WBC, UA 6-12 (A) None Seen /HPF    Bacteria, UA 3+ (A) None Seen /HPF    Squamous Epithelial Cells, UA 0-2 None Seen, 0-2 /HPF    Hyaline Casts, UA None Seen None Seen /LPF    Methodology Manual Light Microscopy    POC Lactate    Specimen: Blood   Result Value Ref Range    Lactate 0.9 0.5 - 2.0 mmol/L   ECG 12 Lead Chest Pain   Result Value Ref Range    QT Interval 426 ms   ECG 12 Lead Chest Pain   Result Value Ref Range    QT Interval 435 ms   Gold Top - SST   Result Value Ref Range    Extra Tube Hold for add-ons.      XR Chest 1 View    Result Date: 11/14/2022  No acute chest findings.  Electronically Signed By-Jocelyn Zayas MD On:11/14/2022 1:00 PM This report was finalized on [id number] by  Jocelyn Zayas MD.    Medications   sodium chloride 0.9 % flush 10 mL (has no administration in time range)   cefTRIAXone (ROCEPHIN) 2 g in sodium chloride 0.9 % 100  mL IVPB (2 g Intravenous New Bag 11/14/22 1434)   morphine injection 4 mg (4 mg Intravenous Given 11/14/22 1300)   ondansetron (ZOFRAN) injection 4 mg (4 mg Intravenous Given 11/14/22 1300)   ipratropium-albuterol (DUO-NEB) nebulizer solution 3 mL (3 mL Nebulization Given 11/14/22 1427)   albuterol (PROVENTIL) nebulizer solution 0.083% 2.5 mg/3mL (2.5 mg Nebulization Given 11/14/22 1422)          EKG my interpretation normal sinus rhythm rate of 70 normal axis no hypertrophy no acute ST elevation nonspecific T wave abnormalities lateral really no significant change from previous EKG abnormal                                  MDM  Number of Diagnoses or Management Options  Chest pain, unspecified type: new and requires workup  Shortness of breath: new and requires workup  Urinary tract infection without hematuria, site unspecified: new and requires workup  Diagnosis management comments: Medical decision making.  Patient had IV established placed on a monitor placed on her nasal cannula and had the above exam evaluation sats in the mid 90s.  EKG obtained showed a sinus rhythm on my review without acute findings.  As well as the monitor.  Labs obtained read by me chemistries unremarkable.  CBC unremarkable urine positive nitrate with 3+ bacteria and few white cells I did culture that.  Troponin was negative.  The patient had a hemoglobin 10.5 troponin was negative D-dimer 1.47.  Blood cultures pending.  Chest x-ray obtained reviewed by me as well as radiology without acute findings.  Patient was given a DuoNeb and an albuterol.  Her urine was cultured.  I reviewed her records from [facility] that were sent here.  She was treated for sepsis but had negative blood cultures ultimately.  She did have a urine culture which showed E. coli sensitive to multiple antibiotics including Rocephin.  She was started on 2 g Rocephin IV.  At that time she had a CTA of her chest which showed no PE at the end of October.  She  has had CT of her abdomen and stress test which were all reported as being unremarkable these reports were reviewed by me.  The patient had been given morphine 4 IV and Zofran 4 IV for pain and nausea.  Cultures are pending.  I do not see evidence clinically that would suggest sepsis today and lactate was normal.  White count looks okay she has been hemodynamically stable.  I see no evidence of acute myocardial infarction or ischemia or evidence of dissection.  This not a complete list of all possibilities.  The patient will be admitted to the hospital hospitalist nurse practitioner notified stable unremarkable ER course.       Amount and/or Complexity of Data Reviewed  Clinical lab tests: reviewed  Tests in the radiology section of CPT®: reviewed  Discuss the patient with other providers: yes    Risk of Complications, Morbidity, and/or Mortality  Presenting problems: high  Diagnostic procedures: high  Management options: high    Patient Progress  Patient progress: stable      Final diagnoses:   Shortness of breath   Chest pain, unspecified type   Urinary tract infection without hematuria, site unspecified       ED Disposition  ED Disposition     ED Disposition   Decision to Admit    Condition   --    Comment   Level of Care: Telemetry [5]   Admitting Physician: DAVE COLIN [[id number]]   Attending Physician: DAVE COLIN [[id number]]               No follow-up provider specified.       Medication List      No changes were made to your prescriptions during this visit.          Polo Ceja MD  11/14/22 1440      Electronically signed by Polo Ceja MD at 11/14/22 1440     Raven Grider, RN at 11/15/22 0605        Assumed care for this pt at this time pt laying in bed watching tv A&OX 4, Heparin gtt going at 12.8 units at this time. No new complaints at this time    Electronically signed by Raven Grider RN at 11/15/22 0720          Physician Progress Notes (all)      Dave Colin MD at 11/16/22  1522              [facility] Medicine Services Daily Progress Note    Patient Name: Ann Álvarez  : 1960  MRN: [id number]  Primary Care Physician:  Jose Enrique Walters MD  Date of admission: 2022      Subjective       Chief Complaint: Shortness of breath.      Patient Reports:      11/15/2022.  Patient was seen and examined.  Patient reported no overnight events.      2022.  Patient was seen and examined.  Patient reported having fever last night.  Blood cultures were completed.  Infectious disease consult order was placed.      Review of Systems   Constitutional: Positive for chills and fever.   HENT: Negative.    Eyes: Negative.    Cardiovascular: Negative.    Respiratory: Negative.    Endocrine: Negative.    Hematologic/Lymphatic: Negative.    Skin: Negative.    Musculoskeletal: Negative.    Gastrointestinal: Negative.    Genitourinary: Negative.    Neurological: Negative.    Psychiatric/Behavioral: Negative.    Allergic/Immunologic: Negative.             Objective       Vitals:   Temp:  [98.6 °F (37 °C)-101.6 °F (38.7 °C)] 100.2 °F (37.9 °C)  Heart Rate:  [57-89] 61  Resp:  [14-20] 18  BP: ()/(38-78) 110/56  Flow (L/min):  [4-6] 6    Physical Exam  Vitals reviewed.   Constitutional:       General: She is not in acute distress.     Appearance: She is obese.      Comments: Patient is lying comfortably in bed.  Family is at the bedside.   HENT:      Head: Normocephalic and atraumatic.      Nose: Nose normal. No congestion or rhinorrhea.      Mouth/Throat:      Mouth: Mucous membranes are moist.      Pharynx: Oropharynx is clear. No oropharyngeal exudate or posterior oropharyngeal erythema.   Eyes:      Pupils: Pupils are equal, round, and reactive to light.   Cardiovascular:      Pulses: Normal pulses.      Heart sounds: Normal heart sounds. No murmur heard.    No friction rub. No gallop.      Comments: S1 and S2 present.  No tachycardia.  Pulmonary:      Effort: No  respiratory distress.      Breath sounds: No wheezing, rhonchi or rales.      Comments: Decreased air entry bilaterally.  Chest:      Chest wall: No tenderness.   Abdominal:      General: Abdomen is flat. Bowel sounds are normal. There is no distension.      Palpations: Abdomen is soft.      Tenderness: There is no right CVA tenderness.   Musculoskeletal:         General: No swelling, tenderness, deformity or signs of injury.      Cervical back: Neck supple. No tenderness.      Right lower leg: No edema.      Left lower leg: No edema.   Skin:     Capillary Refill: Capillary refill takes less than 2 seconds.      Coloration: Skin is not jaundiced.      Findings: No bruising, lesion or rash.   Neurological:      Comments: No facial asymmetry noted.  Gait and station not tested.   Psychiatric:      Comments: No agitation.                 Result Review    Result Review:  I have personally reviewed the results from the time of this admission to 11/16/2022 15:23 EST and agree with these findings:  [x]  Laboratory  [x]  Microbiology  [x]  Radiology  []  EKG/Telemetry   []  Cardiology/Vascular   []  Pathology  []  Old records  []  Other:  Most notable findings include:      CT pulmonary angiogram showed:      Comparison: CT chest 6/27/2022.       Technique: Serial and axial CT images of the chest were obtained   following the uneventful intravenous administration of 100 cc Isovue-370   contrast. Reconstructions in the coronal and sagittal planes were also   performed. In addition, a 3 D volume rendered image was obtained after   post processing. Automated exposure control and iterative reconstruction   methods were used.       FINDINGS:       Acute-appearing small distal segmental and subsegmental emboli are   present within the right lower lobe. Small eccentric nonocclusive   embolism is seen in the proximal left lower lobe pulmonary artery, which   appears chronic.       There is no indication of right heart strain. RV LV  ratio is   approximately 0.8. Heart size is within normal limits. No pericardial   effusion or pleural effusion is seen.       There are slightly enlarged and mediastinal lymph nodes, including a   right lower paratracheal lymph node measuring 13 mm short axis   (previously 10 mm), prevascular node measuring 13 mm (previously 9 mm),   subcarinal node measuring 14 mm short axis (previously 9 mm). There is   an enlarged right hilar node measuring 17 mm short axis.       Present in both lungs with faint groundglass densities, in a pattern   suggestive of mild pulmonary edema. There is bandlike subsegmental   atelectasis in the left lower lobe and right middle lobe. No dense   consolidations are identified.       No pericardial effusion or pleural effusion is seen.       Cholecystectomy changes are present. The included portions of the upper   abdominal organs are within normal limits. There is mild scarring of the   spleen.       No acute or suspicious osseous abnormalities are identified.                   Impression:     1. Small distal segmental and subsegmental emboli are present in the   right lower lobe. No evidence of right heart strain. I notified the   emergency room physician at the time of this dictation.   2. Small eccentric nonocclusive thrombus is demonstrated in the left   lower lobe pulmonary artery proximally. This has a chronic appearance.   3. Features of mild interstitial and alveolar edema.   4. Subsegmental atelectasis in the right middle lobe and left lower   lobe.       Electronically Signed By-Jocelyn Zayas MD On:11/14/2022 3:17 PM   This report was finalized on [id number] by  Jocelyn Zayas MD.         Assessment & Plan    From previous notes and with minor updates.      Brief Patient Summary:      Patient is a 62 y.o. female  with past medical history of COPD, chronic respiratory failure with hypoxia on 2 L nasal cannula continuous, diabetes mellitus, hypertension, hyperlipidemia,  osteoarthritis, anxiety disorder, chronic low back pain, morbid obesity, insomnia, PTSD, restless leg syndrome tobacco use disorder who presented to [facility] on 11/14/2022 complaining of shortness of air, chest pain and back pain.  Patient reports that she was in [facility] for 6 days around [facility] for UTI, sepsis and was on a ventilator for 2 days.  Since then according to primary care provider records the UTI had cleared.  She reports that 2 days ago she began having shortness of air, left-sided chest pain, that she describes as a tightness, that radiates into her shoulder blade and low back pain.  She is on her baseline home 4 L O2 at 95%.  She states nothing makes her chest tightness or back pain any better.  She denies having any lightheadedness, dizziness or syncopal episode.  She denies having any vomiting but has felt nauseous.  She denies she denies any abdominal pain, constipation or diarrhea.  She reports bilateral lower extremity swelling but feels this is nothing out of the ordinary.  We have been asked to admit this patient for further evaluation and treatment.  Patient was seen in the emergency room and emergency department work-up vital signs stable 4 L nasal cannula at 95%.  UA positive nitrate with 3+ bacteria.  Glucose 142.  Creatinine 0.83.  Chloride 97.  CO2 36.0.  Troponin negative.  D-dimer 1.47.  White blood count 5.50.  Hemoglobin 10.5.  Platelets 222.  Lactate 0.9.  Blood culture pending, urine culture pending.  CTA chest Small distal segmental and subsegmental emboli are present in the right lower lobe. No evidence of right heart strain. Small eccentric nonocclusive thrombus is demonstrated in the left lower lobe pulmonary artery proximally. This has a chronic appearance.Features of mild interstitial and alveolar edema.Subsegmental atelectasis in the right middle lobe and left lower lobe.  Pulmonary consult was completed.    amLODIPine, 10 mg, Oral,  Q24H  apixaban, 10 mg, Oral, BID   Followed by  [START ON 11/23/2022] apixaban, 5 mg, Oral, BID  budesonide-formoterol, 2 puff, Inhalation, BID - RT  cefTRIAXone, 2 g, Intravenous, Q24H  cloNIDine, 0.3 mg, Oral, Q8H  insulin lispro, 2-7 Units, Subcutaneous, TID With Meals  lisinopril, 40 mg, Oral, Q24H  senna-docusate sodium, 2 tablet, Oral, BID  sodium chloride, 10 mL, Intravenous, Q12H  topiramate, 50 mg, Oral, Daily  traZODone, 100 mg, Oral, Nightly             Active Hospital Problems:  Active Hospital Problems    Diagnosis    • **Acute pulmonary embolism without acute cor pulmonale (HCC)    • Acute cystitis without hematuria    • Shortness of breath    • Mixed hyperlipidemia    • KELTON (generalized anxiety disorder)    • Morbid obesity ([facility])    • Chronic back pain    • PTSD (post-traumatic stress disorder)    • RLS (restless legs syndrome)    • Chronic obstructive pulmonary disease (HCC)    • Chronic respiratory failure with hypoxia ([facility])    • Insomnia    • Tobacco use disorder    • Primary hypertension          Plan:      -Continue appropriate patient's home medications for other chronic medical conditions.  -Continue the present level of care.  -Patient and family agreed with the plan of care.  -Treat acute pulmonary embolism with anticoagulation.  -Follow pulmonary recommendations.  -Treat acute cystitis with antibiotics.  -Follow cultures.  -Complete infectious disease consult.  -Continue to monitor blood cultures.          DVT prophylaxis:  Medical DVT prophylaxis orders are present.    CODE STATUS:    Level Of Support Discussed With: Patient  Code Status (Patient has no pulse and is not breathing): CPR (Attempt to Resuscitate)  Medical Interventions (Patient has pulse or is breathing): Full Support      Disposition: Patient can discharge in the next 24 to 72 hours.    This patient has been examined wearing appropriate Personal Protective Equipment and discussed with hospital infection control department,  state health department, infectious disease specialist and pulmonologist. 11/16/22      Electronically signed by Scottie Colin MD, FACP, 11/16/22, 15:23 EST.      [facility] Hospitalist Team             Electronically signed by Scottie Colin MD at 11/16/22 1527     Sury Rivas MD at 11/16/22 0858          Daily Progress Note          Assessment      Shortness of breath  Pulmonary embolism: No DVT on lower extremity venous Doppler studies  Chronic hypoxemia  Bilateral subsegmental atelectasis  COPD  History of tobacco smoking: Quit 6/27/2022  Obesity  Diabetes mellitus type 2  Dyslipidemia  Hepatic steatosis  Essential hypertension  RLS  History of insomnia and PTSD  Normocytic anemia     Recommendations:  Oxygen supplement and titration maintain saturation 90 to 95%: Currently on 4 L per nasal cannula  Bronchodilators  Symbicort  Mucinex  Anticoagulation: IV heparin with plans to switch to oral anticoagulation soon  Blood pressure control  Glucose control  ID consulted for the fever               LOS: 1 day     Subjective     Patient had fever today as high as 101.6, she denies coughing but still have mild shortness of breath    Objective     Vital signs for last 24 hours:  Vitals:    11/16/22 0558 11/16/22 0650 11/16/22 0653 11/16/22 0749   BP: 105/78      BP Location:       Patient Position:       Pulse: 83 84 87    Resp:  20 20    Temp:    (!) 101.1 °F (38.4 °C)   TempSrc:       SpO2:  94% 94%    Weight:       Height:           Intake/Output last 3 shifts:  I/O last 3 completed shifts:  In: 840 [P.O.:840]  Out: -   Intake/Output this shift:  No intake/output data recorded.      Radiology  Imaging Results (Last 24 Hours)     ** No results found for the last 24 hours. **          Labs:  Results from last 7 days   Lab Units 11/16/22  0557   WBC 10*3/mm3 5.90   HEMOGLOBIN g/dL 10.0*   HEMATOCRIT % 32.2*   PLATELETS 10*3/mm3 170     Results from last 7 days   Lab Units 11/16/22  0557 11/15/22  0035  11/14/22  1237   SODIUM mmol/L  --  140 139   POTASSIUM mmol/L 4.7 4.4 3.9   CHLORIDE mmol/L  --  98 97*   CO2 mmol/L  --  36.0* 36.0*   BUN mg/dL  --  10 11   CREATININE mg/dL  --  0.75 0.83   CALCIUM mg/dL  --  8.8 9.0   BILIRUBIN mg/dL  --   --  0.3   ALK PHOS U/L  --   --  66   ALT (SGPT) U/L  --   --  9   AST (SGOT) U/L  --   --  9   GLUCOSE mg/dL  --  131* 142*         Results from last 7 days   Lab Units 11/14/22  1237   ALBUMIN g/dL 3.60     Results from last 7 days   Lab Units 11/15/22  0035 11/14/22  1912 11/14/22  1237   TROPONIN T ng/mL <0.010 <0.010 <0.010         Results from last 7 days   Lab Units 11/16/22  0557   MAGNESIUM mg/dL 2.0     Results from last 7 days   Lab Units 11/16/22  0557 11/15/22  2306 11/15/22  1608 11/14/22  2149 11/14/22  1237   INR   --   --   --   --  0.99   APTT seconds 32.8* 63.4 38.4*   < > 28.8*    < > = values in this interval not displayed.               Meds:   SCHEDULE  amLODIPine, 10 mg, Oral, Q24H  budesonide-formoterol, 2 puff, Inhalation, BID - RT  cefTRIAXone, 2 g, Intravenous, Q24H  cloNIDine, 0.3 mg, Oral, Q8H  insulin lispro, 2-7 Units, Subcutaneous, TID With Meals  lisinopril, 40 mg, Oral, Q24H  senna-docusate sodium, 2 tablet, Oral, BID  sodium chloride, 10 mL, Intravenous, Q12H  topiramate, 50 mg, Oral, Daily  traZODone, 100 mg, Oral, Nightly      Infusions  heparin, 14 Units/kg/hr, Last Rate: 22 Units/kg/hr (11/16/22 0651)      PRNs  •  acetaminophen **OR** acetaminophen **OR** acetaminophen  •  aluminum-magnesium hydroxide-simethicone  •  senna-docusate sodium **AND** polyethylene glycol **AND** bisacodyl **AND** bisacodyl  •  dextrose  •  dextrose  •  diazePAM  •  doxepin  •  glucagon (human recombinant)  •  heparin  •  heparin  •  hydrALAZINE  •  ibuprofen  •  ipratropium-albuterol  •  magnesium sulfate **OR** magnesium sulfate in D5W 1g/100mL (PREMIX)  •  melatonin  •  Morphine  •  nitroglycerin  •  ondansetron **OR** ondansetron  •  oxyCODONE  •   oxyCODONE  •  potassium chloride  •  potassium chloride  •  [COMPLETED] Insert peripheral IV **AND** sodium chloride  •  sodium chloride  •  zolpidem    Physical Exam:  General Appearance:  Alert   HEENT:  Normocephalic, without obvious abnormality, Conjunctiva/corneas clear,.   Nares normal, no drainage     Neck:  Supple, symmetrical, trachea midline.   Lungs /Chest wall: Minimal bilateral basal rhonchi, respirations unlabored, symmetrical wall movement.     Heart:  Regular rate and rhythm, S1 S2 normal  Abdomen: Soft, non-tender, no masses, no organomegaly.    Extremities: No edema, no clubbing or cyanosis constitutional: Negative for     ROS  Constitutional: Negative for chills, positive for fever and malaise/fatigue.   HENT: Negative.    Eyes: Negative.    Cardiovascular: Negative.    Respiratory: Positive for shortness of breath.    Skin: Negative.    Musculoskeletal: Negative.    Gastrointestinal: Negative.    Genitourinary: Negative.    Neurological: Negative.    Psychiatric/Behavioral: Negative.      I reviewed the recent clinical results    Much of this encounter note is an electronic transcription/translation of spoken language to printed text using Dragon Software which might include inadvertent errors in transcription.      Electronically signed by Sury Rivas MD at 22 0902     Javy King MD at 22 0834                Hematology/Oncology Inpatient Progress Note    PATIENT NAME: Ann Álvarez  : 1960  MRN: [id number]    CHIEF COMPLAINT: Shortness of breath, chest and back pain    HISTORY OF PRESENT ILLNESS:    Ann Álvarez is a 62 y.o. female who presented to [facility] on 2022 with complaints of shortness of breath, left left chest pain that radiates to her back and found to have PE right lower lobe on CT chest.  She has a recent history of UTI with sepsis at [facility] for which she spent a few days on the ventilator. The patient was placed  on heparin drip and admitted for further management.       CT chest 11/14/2022- small distal segmental and subsegmental emboli RLL, no evidence of heart strain. Small nonocclusive thrombus LLL.       BLE Doppler 11/14/22 - normal     11/15/22  Hematology/Oncology was consulted for bilateral PE with recent illness of UTI with sepsis for which she spent 48 hours on the ventilator.  She does not have personal hx of clotting but does have family hx of blood clots with grandmother dying suddenly attributed to thrombus and her mother was treated for thrombus. After her discharge in 6 days she spent approximately 40% of her time in bed and the majority of the time in her recliner.  She has a long history of cigarette smoking and her spouse reported that she consumed about 1 pack/day.  With history of hospitalization and immobility, continued cigarette smoking, elevated BMI, heart thrombosis thought to be provoked.  At this point, no hereditary testing for inherited predisposition to thrombosis is justified.  She has been started on heparin drip and should remain on heparin IV until insurance evaluation of which anticoagulation is covered and then transitioned to oral anticoagulation. Given the appearance of the chronic thrombus in the left pulmonary artery given her high risk of recurrence she might be benefit from long-term anticoagulation.  This will be discussed with her in follow up appt.  She was also advised to stop smoking.    She  has a past medical history of Anxiety, Arthritis, Chronic back pain, Chronic obstructive pulmonary disease ([facility]) (01/31/2020), Chronic respiratory failure with hypoxia ([facility]) (03/06/2018), Diabetes mellitus ([facility]), Dyslipidemia (10/21/2014), Edema, lower extremity (07/16/2014), Hypertension, Insomnia, Obesity, Class III, BMI 40-49.9 (morbid obesity) ([facility]) (03/29/2022), Prediabetes (11/06/2018), Primary hypertension (05/30/2012), PTSD (post-traumatic stress disorder), RLS (restless legs  syndrome) (04/22/2020), Tobacco abuse (10/21/2014), and Vitamin D deficiency (05/30/2012).     PCP: Jose Enrique Walters MD    History of present illness was reviewed and is unchanged from the previous visit. 11/16/22    Subjective     ROS:  Review of Systems   Constitutional: Negative for chills, fatigue and fever.   HENT: Negative.    Eyes: Negative.    Respiratory: Positive for shortness of breath.    Cardiovascular: Negative for chest pain and palpitations.   Gastrointestinal: Negative for abdominal pain.   Endocrine: Negative.    Genitourinary: Negative.    Musculoskeletal: Positive for back pain.   Skin: Negative.    Neurological: Negative for dizziness.   Psychiatric/Behavioral: Negative for behavioral problems.        MEDICATIONS:    Scheduled Meds:  amLODIPine, 10 mg, Oral, Q24H  budesonide-formoterol, 2 puff, Inhalation, BID - RT  cefTRIAXone, 2 g, Intravenous, Q24H  cloNIDine, 0.3 mg, Oral, Q8H  insulin lispro, 2-7 Units, Subcutaneous, TID With Meals  lisinopril, 40 mg, Oral, Q24H  senna-docusate sodium, 2 tablet, Oral, BID  sodium chloride, 10 mL, Intravenous, Q12H  topiramate, 50 mg, Oral, Daily  traZODone, 100 mg, Oral, Nightly       Continuous Infusions:  heparin, 14 Units/kg/hr, Last Rate: 22 Units/kg/hr (11/16/22 0651)       PRN Meds:  •  acetaminophen **OR** acetaminophen **OR** acetaminophen  •  aluminum-magnesium hydroxide-simethicone  •  senna-docusate sodium **AND** polyethylene glycol **AND** bisacodyl **AND** bisacodyl  •  dextrose  •  dextrose  •  diazePAM  •  doxepin  •  glucagon (human recombinant)  •  heparin  •  heparin  •  hydrALAZINE  •  ibuprofen  •  ipratropium-albuterol  •  magnesium sulfate **OR** magnesium sulfate in D5W 1g/100mL (PREMIX)  •  melatonin  •  Morphine  •  nitroglycerin  •  ondansetron **OR** ondansetron  •  oxyCODONE  •  oxyCODONE  •  potassium chloride  •  potassium chloride  •  [COMPLETED] Insert peripheral IV **AND** sodium chloride  •  sodium chloride     ALLERGIES:   "  Allergies   Allergen Reactions   • Codeine Hives, Itching and Nausea And Vomiting       Objective    VITALS:   /78   Pulse 87   Temp (!) 101.1 °F (38.4 °C)   Resp 20   Ht 160 cm (63\")   Wt 107 kg (236 lb 12.4 oz)   SpO2 94%   BMI 41.94 kg/m²     PHYSICAL EXAM: (performed by MD)  Physical Exam  Vitals and nursing note reviewed.   Constitutional:       General: She is not in acute distress.     Appearance: She is ill-appearing.   HENT:      Mouth/Throat:      Pharynx: Oropharynx is clear.   Eyes:      Pupils: Pupils are equal, round, and reactive to light.   Cardiovascular:      Rate and Rhythm: Normal rate and regular rhythm.      Pulses: Normal pulses.   Pulmonary:      Effort: Pulmonary effort is normal.   Abdominal:      General: Bowel sounds are normal.   Musculoskeletal:         General: Normal range of motion.      Cervical back: Normal range of motion.      Right lower leg: Edema present.      Left lower leg: Edema present.      Comments: Chronic leg swelling   Skin:     General: Skin is warm and dry.      Coloration: Skin is not jaundiced.   Neurological:      Mental Status: She is alert and oriented to person, place, and time.   Psychiatric:         Behavior: Behavior normal.           RECENT LABS:  Lab Results (last 24 hours)     Procedure Component Value Units Date/Time    POC Glucose Once [[id number]]  (Abnormal) Collected: 11/16/22 0719    Specimen: Blood Updated: 11/16/22 0720     Glucose 132 mg/dL      Comment: Serial Number: [id number]:  [id number]       aPTT [[id number]]  (Abnormal) Collected: 11/16/22 0557    Specimen: Blood Updated: 11/16/22 0629     PTT 32.8 seconds     Magnesium [[id number]]  (Normal) Collected: 11/16/22 0557    Specimen: Blood Updated: 11/16/22 0624     Magnesium 2.0 mg/dL     Potassium [[id number]]  (Normal) Collected: 11/16/22 0557    Specimen: Blood Updated: 11/16/22 0624     Potassium 4.7 mmol/L     CBC & Differential [[id number]]  (Abnormal) Collected: " 11/16/22 0557    Specimen: Blood Updated: 11/16/22 0613    Narrative:      The following orders were created for panel order CBC & Differential.  Procedure                               Abnormality         Status                     ---------                               -----------         ------                     CBC Auto Differential[[id number]]        Abnormal            Final result                 Please view results for these tests on the individual orders.    CBC Auto Differential [[id number]]  (Abnormal) Collected: 11/16/22 0557    Specimen: Blood Updated: 11/16/22 0613     WBC 5.90 10*3/mm3      RBC 3.72 10*6/mm3      Hemoglobin 10.0 g/dL      Hematocrit 32.2 %      MCV 86.7 fL      MCH 27.0 pg      MCHC 31.2 g/dL      RDW 16.5 %      RDW-SD 50.8 fl      MPV 7.5 fL      Platelets 170 10*3/mm3      Neutrophil % 64.7 %      Lymphocyte % 28.2 %      Monocyte % 5.2 %      Eosinophil % 1.3 %      Basophil % 0.6 %      Neutrophils, Absolute 3.90 10*3/mm3      Lymphocytes, Absolute 1.70 10*3/mm3      Monocytes, Absolute 0.30 10*3/mm3      Eosinophils, Absolute 0.10 10*3/mm3      Basophils, Absolute 0.00 10*3/mm3      nRBC 0.1 /100 WBC     aPTT [[id number]]  (Normal) Collected: 11/15/22 2306    Specimen: Blood Updated: 11/15/22 2341     PTT 63.4 seconds     POC Glucose Once [[id number]]  (Abnormal) Collected: 11/15/22 2020    Specimen: Blood Updated: 11/15/22 2020     Glucose 168 mg/dL      Comment: Serial Number: [id number]:  [id number]       POC Glucose Once [[id number]]  (Abnormal) Collected: 11/15/22 1731    Specimen: Blood Updated: 11/15/22 1731     Glucose 165 mg/dL      Comment: Serial Number: [id number]:  [id number]       aPTT [[id number]]  (Abnormal) Collected: 11/15/22 1608    Specimen: Blood from Arm, Left Updated: 11/15/22 1646     PTT 38.4 seconds     Blood Culture - Blood, Arm, Left [[id number]]  (Normal) Collected: 11/14/22 1306    Specimen: Blood from Arm, Left Updated: 11/15/22 9247      Blood Culture No growth at 24 hours    Blood Culture - Blood, Arm, Left [[id number]]  (Normal) Collected: 11/14/22 1237    Specimen: Blood from Arm, Left Updated: 11/15/22 1245     Blood Culture No growth at 24 hours    POC Glucose Once [[id number]]  (Abnormal) Collected: 11/15/22 1154    Specimen: Blood Updated: 11/15/22 1156     Glucose 137 mg/dL      Comment: Serial Number: [id number]:  [id number]       Urine Culture - Urine, Urine, Catheter [[id number]]  (Abnormal) Collected: 11/14/22 1255    Specimen: Urine, Catheter Updated: 11/15/22 0931     Urine Culture >100,000 CFU/mL Gram Negative Bacilli    Narrative:      Colonization of the urinary tract without infection is common. Treatment is discouraged unless the patient is symptomatic, pregnant, or undergoing an invasive urologic procedure.    Extra Tubes [[id number]] Collected: 11/15/22 0819    Specimen: Blood, Venous Line Updated: 11/15/22 0931    Narrative:      The following orders were created for panel order Extra Tubes.  Procedure                               Abnormality         Status                     ---------                               -----------         ------                     Lavender Top[[id number]]                                     Final result               Gold Top - SST[[id number]]                                   Final result               Green Top (Gel)[[id number]]                                  Final result                 Please view results for these tests on the individual orders.    Lavender Top [[id number]] Collected: 11/15/22 0819    Specimen: Blood Updated: 11/15/22 0931     Extra Tube hold for add-on     Comment: Auto resulted       Green Top (Gel) [[id number]] Collected: 11/15/22 0819    Specimen: Blood Updated: 11/15/22 0931     Extra Tube Hold for add-ons.     Comment: Auto resulted.       Gold Top - SST [[id number]] Collected: 11/15/22 0819    Specimen: Blood Updated: 11/15/22 0931     Extra Tube Hold for add-ons.      Comment: Auto resulted.       Lactic Acid, Plasma [[id number]]  (Normal) Collected: 11/15/22 0819    Specimen: Blood Updated: 11/15/22 0853     Lactate 0.7 mmol/L           PENDING RESULTS:     IMAGING REVIEWED:  XR Chest 1 View    Result Date: 11/14/2022  No acute chest findings.  Electronically Signed By-Jocelyn Zayas MD On:11/14/2022 1:00 PM This report was finalized on [id number] by  Jocelyn Zayas MD.    CT Angiogram Chest Pulmonary Embolism    Result Date: 11/14/2022  1. Small distal segmental and subsegmental emboli are present in the right lower lobe. No evidence of right heart strain. I notified the emergency room physician at the time of this dictation. 2. Small eccentric nonocclusive thrombus is demonstrated in the left lower lobe pulmonary artery proximally. This has a chronic appearance. 3. Features of mild interstitial and alveolar edema. 4. Subsegmental atelectasis in the right middle lobe and left lower lobe.  Electronically Signed By-Jocelyn Zayas MD On:11/14/2022 3:17 PM This report was finalized on [id number] by  Jocelyn Zayas MD.      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  With history of hospitalization and immobility, continued cigarette smoking, and elevated BMI, her thrombosis thought to be provoked.  At this point, no hereditary testing for inherited predisposition to thrombosis is justified.  She will continue on unfractionated heparin and tail oral anticoagulation preferred by her insurance is discovered and she should be transition to oral anticoagulation for discharge.  Given the finding of chronic thrombus in left lower pulmonary artery, and high risk of recurrence, she may benefit from long-term anticoagulation.      Bilateral pulmonary emboli  CT  chest 2022- small emboli RLL-no evidence of heart strain.  Small thrombus LLL pulmonary artery that is chronic in appearance.  BLE Doppler 2022 was negative for thrombus  Continues on heparin drip  Will transition to oral anticoagulation with insurance approval  This thrombus is thought to be provoked due to recent illness, extended immobility, smoking  Echo pending    Acute UTI  Patient's urine was positive for nitrites and 3+ bacteria  Receiving Rocephin IV  Blood cultures negative      Electronically signed by CORI Conrad, 22, 8:56 AM EST.    Definitely feeling better. Not as much dyspnea and no chest pain. Was febrile and had chills last night and even in the morning today. Has been able to eat and drink fluids without difficulties. No diarrhea. On exam chronically ill. Oriented and conversant. No distress and no jaundice. No oral lesions and respirations not labored. Tachycardic. Lungs markedly diminished bilaterally. Abdomen soft. No edema. Reviewed the laboratory exams. As expected she has some anemia but not worse than before. No leukocyte or platelet abnormalities. Will continue to monitor. She will start treatment with apixaban, which is one of the drugs her insurance prefers. The heparin will be discontinued. Discussed with her and her spouse at length. Will follow.     Javy King MD on 2022 at 18:05            Electronically signed by Javy King MD at 22 1805     Scottie Colin MD at 11/15/22 1102              [facility] Medicine Services Daily Progress Note    Patient Name: Ann Álvarez  : 1960  MRN: [id number]  Primary Care Physician:  Jose Enrique Walters MD  Date of admission: 2022      Subjective       Chief Complaint: Shortness of breath.      Patient Reports:      11/15/2022.  Patient was seen and examined.  Patient reported no overnight events.      Review of Systems   Constitutional: Negative.   HENT: Negative.     Eyes: Negative.    Cardiovascular: Negative.    Respiratory: Negative.    Endocrine: Negative.    Hematologic/Lymphatic: Negative.    Skin: Negative.    Musculoskeletal: Negative.    Gastrointestinal: Negative.    Genitourinary: Negative.    Neurological: Negative.    Psychiatric/Behavioral: Negative.    Allergic/Immunologic: Negative.             Objective       Vitals:   Temp:  [98 °F (36.7 °C)-98.9 °F (37.2 °C)] 98.9 °F (37.2 °C)  Heart Rate:  [] 88  Resp:  [16-23] 16  BP: ()/() 121/61  Flow (L/min):  [4] 4    Physical Exam  Vitals reviewed.   Constitutional:       General: She is not in acute distress.     Appearance: She is obese.   HENT:      Head: Normocephalic and atraumatic.      Nose: Nose normal. No congestion or rhinorrhea.      Mouth/Throat:      Mouth: Mucous membranes are moist.      Pharynx: Oropharynx is clear. No oropharyngeal exudate or posterior oropharyngeal erythema.   Eyes:      Pupils: Pupils are equal, round, and reactive to light.   Cardiovascular:      Pulses: Normal pulses.      Heart sounds: Normal heart sounds. No murmur heard.    No friction rub. No gallop.      Comments: S1 and S2 present.  No tachycardia.  Pulmonary:      Effort: No respiratory distress.      Breath sounds: No wheezing, rhonchi or rales.      Comments: Decreased air entry bilaterally.  Chest:      Chest wall: No tenderness.   Abdominal:      General: Abdomen is flat. Bowel sounds are normal. There is no distension.      Palpations: Abdomen is soft.      Tenderness: There is no right CVA tenderness.   Musculoskeletal:         General: No swelling, tenderness, deformity or signs of injury.      Cervical back: Neck supple. No tenderness.      Right lower leg: No edema.      Left lower leg: No edema.   Skin:     Capillary Refill: Capillary refill takes less than 2 seconds.      Coloration: Skin is not jaundiced.      Findings: No bruising, lesion or rash.   Neurological:      Comments: No facial  asymmetry noted.  Gait and station not tested.   Psychiatric:      Comments: No agitation.                 Result Review    Result Review:  I have personally reviewed the results from the time of this admission to 11/15/2022 11:02 EST and agree with these findings:  [x]  Laboratory  [x]  Microbiology  [x]  Radiology  []  EKG/Telemetry   []  Cardiology/Vascular   []  Pathology  []  Old records  []  Other:  Most notable findings include:      CT pulmonary angiogram showed:      Comparison: CT chest 6/27/2022.       Technique: Serial and axial CT images of the chest were obtained   following the uneventful intravenous administration of 100 cc Isovue-370   contrast. Reconstructions in the coronal and sagittal planes were also   performed. In addition, a 3 D volume rendered image was obtained after   post processing. Automated exposure control and iterative reconstruction   methods were used.       FINDINGS:       Acute-appearing small distal segmental and subsegmental emboli are   present within the right lower lobe. Small eccentric nonocclusive   embolism is seen in the proximal left lower lobe pulmonary artery, which   appears chronic.       There is no indication of right heart strain. RV LV ratio is   approximately 0.8. Heart size is within normal limits. No pericardial   effusion or pleural effusion is seen.       There are slightly enlarged and mediastinal lymph nodes, including a   right lower paratracheal lymph node measuring 13 mm short axis   (previously 10 mm), prevascular node measuring 13 mm (previously 9 mm),   subcarinal node measuring 14 mm short axis (previously 9 mm). There is   an enlarged right hilar node measuring 17 mm short axis.       Present in both lungs with faint groundglass densities, in a pattern   suggestive of mild pulmonary edema. There is bandlike subsegmental   atelectasis in the left lower lobe and right middle lobe. No dense   consolidations are identified.       No pericardial  effusion or pleural effusion is seen.       Cholecystectomy changes are present. The included portions of the upper   abdominal organs are within normal limits. There is mild scarring of the   spleen.       No acute or suspicious osseous abnormalities are identified.                   Impression:     1. Small distal segmental and subsegmental emboli are present in the   right lower lobe. No evidence of right heart strain. I notified the   emergency room physician at the time of this dictation.   2. Small eccentric nonocclusive thrombus is demonstrated in the left   lower lobe pulmonary artery proximally. This has a chronic appearance.   3. Features of mild interstitial and alveolar edema.   4. Subsegmental atelectasis in the right middle lobe and left lower   lobe.       Electronically Signed By-Jocelyn Zayas MD On:11/14/2022 3:17 PM   This report was finalized on [id number] by  Jocelyn Zayas MD.         Assessment & Plan    From previous notes and with minor updates.      Brief Patient Summary:      Patient is a 62 y.o. female  with past medical history of chronic respiratory failure with hypoxia on 2 L nasal cannula continuous, diabetes mellitus, hypertension, hyperlipidemia, osteoarthritis, anxiety disorder, COPD, chronic low back pain, morbid obesity, insomnia, PTSD, restless leg syndrome tobacco use disorder who presented to [facility] on 11/14/2022 complaining of shortness of air, chest pain and back pain.  Patient reports that she was in [facility] for 6 days around [facility] for UTI, sepsis and was on a ventilator for 2 days.  Since then according to primary care provider records the UTI had cleared.  She reports that 2 days ago she began having shortness of air, left-sided chest pain, that she describes as a tightness, that radiates into her shoulder blade and low back pain.  She is on her baseline home 4 L O2 at 95%.  She states nothing makes her chest tightness or back pain  any better.  She denies having any lightheadedness, dizziness or syncopal episode.  She denies having any vomiting but has felt nauseous.  She denies she denies any abdominal pain, constipation or diarrhea.  She reports bilateral lower extremity swelling but feels this is nothing out of the ordinary.  We have been asked to admit this patient for further evaluation and treatment.  Patient was seen in the emergency room and emergency department work-up vital signs stable 4 L nasal cannula at 95%.  UA positive nitrate with 3+ bacteria.  Glucose 142.  Creatinine 0.83.  Chloride 97.  CO2 36.0.  Troponin negative.  D-dimer 1.47.  White blood count 5.50.  Hemoglobin 10.5.  Platelets 222.  Lactate 0.9.  Blood culture pending, urine culture pending.  CTA chest Small distal segmental and subsegmental emboli are present in the right lower lobe. No evidence of right heart strain. Small eccentric nonocclusive thrombus is demonstrated in the left lower lobe pulmonary artery proximally. This has a chronic appearance.Features of mild interstitial and alveolar edema.Subsegmental atelectasis in the right middle lobe and left lower lobe.  Pulmonary consult was completed.    amLODIPine, 10 mg, Oral, Q24H  budesonide-formoterol, 2 puff, Inhalation, BID - RT  cefTRIAXone, 2 g, Intravenous, Q24H  cloNIDine, 0.3 mg, Oral, Q8H  insulin lispro, 2-7 Units, Subcutaneous, TID With Meals  lisinopril, 40 mg, Oral, Q24H  senna-docusate sodium, 2 tablet, Oral, BID  sodium chloride, 10 mL, Intravenous, Q12H  topiramate, 50 mg, Oral, Daily  traZODone, 100 mg, Oral, Nightly       heparin, 14 Units/kg/hr, Last Rate: 16 Units/kg/hr (11/15/22 1014)         Active Hospital Problems:  Active Hospital Problems    Diagnosis    • **Acute pulmonary embolism without acute cor pulmonale (HCC)    • Acute cystitis without hematuria    • Shortness of breath    • Mixed hyperlipidemia    • KELTON (generalized anxiety disorder)    • Morbid obesity (HCC)    • Chronic back  pain    • PTSD (post-traumatic stress disorder)    • RLS (restless legs syndrome)    • Chronic obstructive pulmonary disease (HCC)    • Chronic respiratory failure with hypoxia (HCC)    • Insomnia    • Tobacco use disorder    • Primary hypertension          Plan:      -Continue appropriate patient's home medications for other chronic medical conditions.  -Continue the present level of care.  -Patient and family agreed with the plan of care.  -Treat acute pulmonary embolism with anticoagulation.  -Follow pulmonary recommendations.  -Treat acute cystitis with antibiotics.  -Follow cultures.        DVT prophylaxis:  Medical DVT prophylaxis orders are present.    CODE STATUS:    Level Of Support Discussed With: Patient  Code Status (Patient has no pulse and is not breathing): CPR (Attempt to Resuscitate)  Medical Interventions (Patient has pulse or is breathing): Full Support      Disposition: Patient can discharge in the next 24 to 72 hours.    This patient has been examined wearing appropriate Personal Protective Equipment and discussed with hospital infection control department, [facility], infectious disease specialist and pulmonologist. 11/15/22      Electronically signed by Scottie Colin MD, [facility], 11/15/22, 11:02 EST.      [facility], operated by [facility] Hospitalist Team             Electronically signed by Scottie Colin MD at 11/15/22 1120          Consult Notes (last 24 hours)      Ana M King APRN at 22 2100          [facility] CRITICAL CARE CONSULT NOTE     PATIENT NAME:  Ann Álvarez       :  1960      MRN:  [id number]      ROOM:  [facility] 2311/1     PRIMARY CARE PROVIDER  Jose Enrique Walters MD    REFERRING PROVIDER     Scottie Colin MD     REASON FOR CONSULTATION  Critical care management    SUBJECTIVE     CHIEF COMPLAINT:   Shortness of breath    HISTORY OF PRESENT ILLNESS:  Ann Álvarez is a 62 y.o. female  has a past medical history of Anxiety, Arthritis, Chronic back pain,  Chronic obstructive pulmonary disease (HCC) (01/31/2020), Chronic respiratory failure with hypoxia (HCC) (03/06/2018), Diabetes mellitus ([facility]), Dyslipidemia (10/21/2014), Edema, lower extremity (07/16/2014), Hypertension, Insomnia, Obesity, Class III, BMI 40-49.9 (morbid obesity) (HCC) (03/29/2022), Prediabetes (11/06/2018), Primary hypertension (05/30/2012), PTSD (post-traumatic stress disorder), RLS (restless legs syndrome) (04/22/2020), Tobacco abuse (10/21/2014), and Vitamin D deficiency (05/30/2012).   Patient presented to [facility] on 11/14/2022 with complaint of shortness of breath.  Patient presented to emergency room with shortness of breath, and associated chest pain and back pain.  She reported that she was [facility] last week for around 6 days with sepsis secondary to UTI and required intubation for 2 days.  According to PCP notes the UTI had resolved prior to coming to our emergency room.  2 days before coming the emergency room she reported having increased shortness of air, left-sided chest pain, and a tightness that radiates into her shoulder blade and lower back.  At baseline she is on 4 L at home.  She was admitted to the hospitalist group.  CT's chest showed small distal segmental and subsegmental emboli in the right lower lobe.  There was no evidence of right heart strain.  She had small eccentric nonocclusive thrombus in the left lower lobe pulmonary artery proximally, which looks chronic in nature.  Repeat UA was positive for nitrites and 3+ bacteria.  She was started on Rocephin for her UTI.  Her lactic was noted at 0.9 on admission.  Hematology/oncology was consulted on 1115 for her bilateral PEs.  With her recent illness and immobility it is likely that these are provoked thrombus.  Pulmonology was also consulted on 11/15/2022 for her shortness of breath.  On 11/16/2022 she spiked a fever and started becoming more hypercapnic and hypoxic.  Urine culture showed  "Klebsiella pneumoniae.  Apparently at some point patient started become more lethargic and ABG was obtained which showed CO2 in the 90s.  Intensivist was consulted and NP went to intubate patient, however she woke up and stated to the NP, I do not want to be intubated\".  Patient was placed on AVAPS and moved to the ICU for closer monitoring.  She discussed with primary RN and intensivist NP that AVAPS was improving her gases, and that we would try to avoid intubation if possible.  His family was at bedside and agreed with plan.    Intensivist consultation was requested for further evaluation and treatment of patient condition.      Thank you for this consultation, we will follow along on this patient.        REVIEW OF SYSTEMS:  Pertinent items are noted in HPI, all other systems reviewed and negative      ASSESSMENT & PLAN     Shortness of air/chest pain  -4L O2 NC, baseline  -Currently on AVAPS  -At risk for needing intubation  -Chest x-ray no acute chest findings.  -D-dimer 1.47  -CTA chest Small distal segmental and subsegmental emboli are present in the right lower lobe. No evidence of right heart strain.   -Echo, pending  -Echo 3/29/2022 EF 66 to 70%.  Moderate tricuspid valve regurgitation.  -lactate 0.9  -Troponin negative  -Bilateral lower extremity venous duplex  -Heme-onc consult for anticoagulation management     Sepsis 2/2/ to UTI, acute  -UA positive for nitrites and 3+ bacteria  -Rocephin started in emergency department, continue  -Creatinine 0.83  -BUN 11  -Culture pending     COPD  -Continue Symbicort  -DuoNebs PRN     DM2  -A1c 6.8  -Hold home medicines  -Initiate SSI  -Glucose checks ACHS     Hypertension  -Continue home amlodipine   -Continue home lisinopril   -Continue home clonidine  -Continue home hydralazine once clinically appropriate      Restless leg syndrome  -Continue doxepin      Anxiety/depression/PTSD  -Continue home Valium     Obesity (41.79 kg/m²)/tobacco abuse  -Lifestyle " "modifications  -Smoking cessation    Code Status: FULL  VTE Prophylaxis: Eliquis  PUD Prophylaxis: Diet ordered    HOSPITAL MEDICATIONS     SCHEDULED MEDICATIONS:  amLODIPine, 10 mg, Oral, Q24H  apixaban, 10 mg, Oral, BID   Followed by  [START ON 11/23/2022] apixaban, 5 mg, Oral, BID  budesonide-formoterol, 2 puff, Inhalation, BID - RT  cefTRIAXone, 2 g, Intravenous, Q24H  cloNIDine, 0.3 mg, Oral, Q8H  etomidate, , ,   fentaNYL citrate (PF), , ,   insulin lispro, 2-7 Units, Subcutaneous, TID With Meals  lisinopril, 40 mg, Oral, Q24H  midazolam, , ,   Propofol, , ,   senna-docusate sodium, 2 tablet, Oral, BID  sodium chloride, 10 mL, Intravenous, Q12H  topiramate, 50 mg, Oral, Daily  traZODone, 100 mg, Oral, Nightly         CONTINUOUS INFUSIONS:          PRN MEDICATIONS:   •  acetaminophen **OR** acetaminophen **OR** acetaminophen  •  aluminum-magnesium hydroxide-simethicone  •  senna-docusate sodium **AND** polyethylene glycol **AND** bisacodyl **AND** bisacodyl  •  dextrose  •  dextrose  •  diazePAM  •  doxepin  •  glucagon (human recombinant)  •  hydrALAZINE  •  ipratropium-albuterol  •  magnesium sulfate **OR** magnesium sulfate in D5W 1g/100mL (PREMIX)  •  melatonin  •  nitroglycerin  •  ondansetron **OR** ondansetron  •  oxyCODONE  •  oxyCODONE  •  potassium chloride  •  potassium chloride  •  [COMPLETED] Insert peripheral IV **AND** sodium chloride  •  sodium chloride  •  zolpidem       OBJECTIVE     VITAL SIGNS:  /62   Pulse 78   Temp 97.8 °F (36.6 °C) (Axillary)   Resp 22   Ht 160 cm (63\")   Wt 111 kg (244 lb 0.8 oz)   SpO2 (!) 88%   BMI 43.23 kg/m²     Wt Readings from Last 3 Encounters:   11/16/22 111 kg (244 lb 0.8 oz)   07/06/22 101 kg (223 lb)   06/29/22 102 kg (223 lb 15.8 oz)       INTAKE/OUTPUT:  Intake/Output                             11/14/22 0701 - 11/15/22 0700 11/15/22 0701 - 11/16/22 0700 11/16/22 0701 - 11/17/22 0700     [id number] [id number] Total [id number] 0538-9072 Total " [id number] 1542-8418 Total                    Intake    P.O.  --  -- --  600  240 840  480  -- 480    IV Piggyback  100  -- 100  --  -- --  --  -- --    Total Intake 100 -- 100 600 240 840 480 -- 480       Output    Total Output -- -- -- -- -- -- -- -- --           NET INTAKE/OUTPUT SINCE ADMISSION:  Net IO Since Admission: 1,420 mL [11/16/22 2100]     PHYSICAL EXAM:   Constitutional:  Well developed, well nourished, no acute distress, toxic appearance   Eyes:  PERRL, conjunctiva normal, EOMI   HENT:  Atraumatic, external ears normal, nose normal, oropharynx moist, no pharyngeal exudates. Neck-normal range of motion, no tenderness  Respiratory: Bilateral rhonchi, diminished bases, labored respirations without accessory muscle use  Cardiovascular:  Normal rate, normal rhythm, no murmurs, no gallops, no rubs   GI:  Soft, obese, nondistended, normal bowel sounds, nontender, no rebound or guarding   :  No costovertebral angle tenderness   Musculoskeletal:  No edema, no tenderness, no deformities  Integument:  Well hydrated, no rash, pale, generalized bruising  Neurologic:  Alert & oriented x 3, normal motor function, normal sensory function, no gross motor deficits noted   Psychiatric:  Speech and behavior appropriate      HISTORY     HISTORY:  Past Medical History:   Diagnosis Date   • Anxiety    • Arthritis    • Chronic back pain    • Chronic obstructive pulmonary disease ([facility]) 01/31/2020   • Chronic respiratory failure with hypoxia ([facility]) 03/06/2018    O2 @ 2 L per NC as needed   • Diabetes mellitus ([facility])    • Dyslipidemia 10/21/2014   • Edema, lower extremity 07/16/2014   • Hypertension    • Insomnia    • Obesity, Class III, BMI 40-49.9 (morbid obesity) ([facility]) 03/29/2022   • Prediabetes 11/06/2018   • Primary hypertension 05/30/2012   • PTSD (post-traumatic stress disorder)    • RLS (restless legs syndrome) 04/22/2020   • Tobacco abuse 10/21/2014   • Vitamin D deficiency 05/30/2012     Past Surgical History:    Procedure Laterality Date   • BACK SURGERY     • CHOLECYSTECTOMY     • HYSTERECTOMY     • TUMOR REMOVAL      benign breats tumor     Family History   Problem Relation Age of Onset   • Heart disease Mother    • Heart disease Father    • Pancreatic cancer Sister 65   • Lymphoma Brother 65     Social History     Socioeconomic History   • Marital status:    Tobacco Use   • Smoking status: Every Day     Packs/day: 1.00     Types: Cigarettes     Start date:      Last attempt to quit: 2022     Years since quittin.3   • Smokeless tobacco: Never   Vaping Use   • Vaping Use: Never used   Substance and Sexual Activity   • Alcohol use: Not Currently   • Drug use: Never   • Sexual activity: Defer        HOME MEDICATIONS:   Prior to Admission medications    Medication Sig Start Date End Date Taking? Authorizing Provider   amLODIPine (NORVASC) 10 MG tablet Take 1 tablet by mouth Daily. 22  Yes Gabriel Ye MD   budesonide (PULMICORT) 0.5 MG/2ML nebulizer solution Take 2 mL by nebulization 2 (Two) Times a Day.   Yes Aylin Bledsoe MD   cloNIDine (CATAPRES) 0.3 MG tablet Take 1 tablet by mouth Every 8 (Eight) Hours. 22  Yes Gabriel Ye MD   diazePAM (VALIUM) 5 MG tablet Take 1 tablet by mouth 4 (Four) Times a Day As Needed for Anxiety.   Yes Aylin Bledsoe MD   insulin aspart (novoLOG FLEXPEN) 100 UNIT/ML solution pen-injector sc pen Inject 10 Units under the skin into the appropriate area as directed 2 (Two) Times a Day.   Yes Aylin Bledsoe MD   lisinopril (PRINIVIL,ZESTRIL) 40 MG tablet Take 40 mg by mouth Daily. 17  Yes Aylin Bledsoe MD   Probiotic Product (PROBIOTIC ADVANCED) capsule Take 1 capsule by mouth Daily. 17  Yes Aylin Bledsoe MD   sildenafil (REVATIO) 20 MG tablet Take 0.5 tablets by mouth Every 8 (Eight) Hours. 22  Yes Gabriel Ye MD   topiramate (TOPAMAX) 50 MG tablet Take 50 mg by mouth Daily. 19  Yes Aylin Bledsoe  MD   traZODone (DESYREL) 100 MG tablet Take 100 mg by mouth Every Night. 12/21/19  Yes Aylin Bledsoe MD   doxepin (SINEquan) 50 MG capsule Take 50 mg by mouth At Night As Needed. 12/16/19   Aylin Bledsoe MD   hydrALAZINE (APRESOLINE) 25 MG tablet Take 1 tablet by mouth 3 (Three) Times a Day As Needed.    Aylin Bledsoe MD   ipratropium-albuterol (DUO-NEB) 0.5-2.5 mg/3 ml nebulizer Take 3 mL by nebulization 3 (Three) Times a Day As Needed for Wheezing.    Aylin Bledsoe MD   LINZESS 290 MCG capsule capsule Take 290 mcg by mouth Daily As Needed. 12/21/19   Aylin Bledsoe MD   oxyCODONE-acetaminophen (PERCOCET)  MG per tablet Take 1 tablet by mouth Every 4 (Four) Hours As Needed for Moderate Pain . 12/3/19   Aylin Bledsoe MD   promethazine (PHENERGAN) 50 MG tablet Take 50 mg by mouth Every 6 (Six) Hours As Needed. 12/23/19   Aylin Bledsoe MD   insulin glargine (LANTUS, SEMGLEE) 100 UNIT/ML injection Inject 20 Units under the skin into the appropriate area as directed 2 (Two) Times a Day. 6/23/22 6/29/22  Aylin Bledsoe MD         IMMUNIZATIONS:    There is no immunization history on file for this patient.     ALLERGIES:  Codeine      RESULTS     LABS:  Lab Results (last 24 hours)     Procedure Component Value Units Date/Time    aPTT [[id number]]  (Normal) Collected: 11/15/22 2306    Specimen: Blood Updated: 11/15/22 2341     PTT 63.4 seconds     CBC & Differential [[id number]]  (Abnormal) Collected: 11/16/22 0557    Specimen: Blood Updated: 11/16/22 0613    Narrative:      The following orders were created for panel order CBC & Differential.  Procedure                               Abnormality         Status                     ---------                               -----------         ------                     CBC Auto Differential[[id number]]        Abnormal            Final result                 Please view results for these tests on the individual  orders.    CBC Auto Differential [[id number]]  (Abnormal) Collected: 11/16/22 0557    Specimen: Blood Updated: 11/16/22 0613     WBC 5.90 10*3/mm3      RBC 3.72 10*6/mm3      Hemoglobin 10.0 g/dL      Hematocrit 32.2 %      MCV 86.7 fL      MCH 27.0 pg      MCHC 31.2 g/dL      RDW 16.5 %      RDW-SD 50.8 fl      MPV 7.5 fL      Platelets 170 10*3/mm3      Neutrophil % 64.7 %      Lymphocyte % 28.2 %      Monocyte % 5.2 %      Eosinophil % 1.3 %      Basophil % 0.6 %      Neutrophils, Absolute 3.90 10*3/mm3      Lymphocytes, Absolute 1.70 10*3/mm3      Monocytes, Absolute 0.30 10*3/mm3      Eosinophils, Absolute 0.10 10*3/mm3      Basophils, Absolute 0.00 10*3/mm3      nRBC 0.1 /100 WBC     Potassium [[id number]]  (Normal) Collected: 11/16/22 0557    Specimen: Blood Updated: 11/16/22 0624     Potassium 4.7 mmol/L     Magnesium [[id number]]  (Normal) Collected: 11/16/22 0557    Specimen: Blood Updated: 11/16/22 0624     Magnesium 2.0 mg/dL     aPTT [[id number]]  (Abnormal) Collected: 11/16/22 0557    Specimen: Blood Updated: 11/16/22 0629     PTT 32.8 seconds     POC Glucose Once [[id number]]  (Abnormal) Collected: 11/16/22 0719    Specimen: Blood Updated: 11/16/22 0720     Glucose 132 mg/dL      Comment: Serial Number: [id number]:  [id number]       Blood Culture - Blood, Hand, Right [[id number]] Collected: 11/16/22 0830    Specimen: Blood from Hand, Right Updated: 11/16/22 0837    Blood Culture - Blood, Hand, Left [[id number]] Collected: 11/16/22 0833    Specimen: Blood from Hand, Left Updated: 11/16/22 0837    POC Glucose Once [[id number]]  (Abnormal) Collected: 11/16/22 1137    Specimen: Blood Updated: 11/16/22 1138     Glucose 162 mg/dL      Comment: Serial Number: [id number]:  [id number]       Blood Gas, Arterial - [[id number]]  (Abnormal) Collected: 11/16/22 1255    Specimen: Arterial Blood Updated: 11/16/22 1413     Site Right Radial     Landon's Test Positive     pH, Arterial 7.285 pH units      pCO2,  Arterial 82.2 mm Hg      pO2, Arterial 64.1 mm Hg      HCO3, Arterial 39.1 mmol/L      Base Excess, Arterial 10.2 mmol/L      Comment: Serial Number: [id number]:  [id number]        O2 Saturation, Arterial 87.7 %      CO2 Content 41.6 mmol/L      Barometric Pressure for Blood Gas --     Comment: N/A        Modality Cannula     FIO2 36 %      Hemodilution No    Basic Metabolic Panel [[id number]]  (Abnormal) Collected: 11/16/22 1324    Specimen: Blood Updated: 11/16/22 1429     Glucose 155 mg/dL      BUN 8 mg/dL      Creatinine 1.01 mg/dL      Sodium 141 mmol/L      Potassium 4.3 mmol/L      Comment: Slight hemolysis detected by analyzer. Results may be affected.        Chloride 99 mmol/L      CO2 35.0 mmol/L      Calcium 8.6 mg/dL      BUN/Creatinine Ratio 7.9     Anion Gap 7.0 mmol/L      eGFR 62.7 mL/min/1.73      Comment: National Kidney Foundation and American Society of Nephrology (ASN) Task Force recommended calculation based on the Chronic Kidney Disease Epidemiology Collaboration (CKD-EPI) equation refit without adjustment for race.       Narrative:      GFR Normal >60  Chronic Kidney Disease <60  Kidney Failure <15      Blood Gas, Arterial - [[id number]]  (Abnormal) Collected: 11/16/22 1510    Specimen: Arterial Blood Updated: 11/16/22 1646     Site Right Radial     Landon's Test Positive     pH, Arterial 7.266 pH units      pCO2, Arterial 80.0 mm Hg      pO2, Arterial 76.2 mm Hg      HCO3, Arterial 36.4 mmol/L      Base Excess, Arterial 7.7 mmol/L      Comment: Serial Number: [id number]:  [id number]        O2 Saturation, Arterial 91.9 %      CO2 Content 38.9 mmol/L      Barometric Pressure for Blood Gas --     Comment: N/A        Modality BiPap     FIO2 40 %      Set Tidal Volume 500     Hemodilution No     Respiratory Rate 22    Respiratory Panel PCR w/COVID-19(SARS-CoV-2) [facility]/POPEYE/BEULAH/[facility]/NATHANIEL In-House, NP Swab in UTM/VTM, 3-4 HR TAT - Swab, Nasopharynx [[id number]]  (Normal) Collected: 11/16/22 5059     Specimen: Swab from Nasopharynx Updated: 11/16/22 1709     ADENOVIRUS, PCR Not Detected     Coronavirus 229E Not Detected     Coronavirus HKU1 Not Detected     Coronavirus NL63 Not Detected     Coronavirus OC43 Not Detected     COVID19 Not Detected     Human Metapneumovirus Not Detected     Human Rhinovirus/Enterovirus Not Detected     Influenza A PCR Not Detected     Influenza B PCR Not Detected     Parainfluenza Virus 1 Not Detected     Parainfluenza Virus 2 Not Detected     Parainfluenza Virus 3 Not Detected     Parainfluenza Virus 4 Not Detected     RSV, PCR Not Detected     Bordetella pertussis pcr Not Detected     Bordetella parapertussis PCR Not Detected     Chlamydophila pneumoniae PCR Not Detected     Mycoplasma pneumo by PCR Not Detected    Narrative:      In the setting of a positive respiratory panel with a viral infection PLUS a negative procalcitonin without other underlying concern for bacterial infection, consider observing off antibiotics or discontinuation of antibiotics and continue supportive care. If the respiratory panel is positive for atypical bacterial infection (Bordetella pertussis, Chlamydophila pneumoniae, or Mycoplasma pneumoniae), consider antibiotic de-escalation to target atypical bacterial infection.    POC Glucose Once [[id number]]  (Abnormal) Collected: 11/16/22 1732    Specimen: Blood Updated: 11/16/22 1733     Glucose 126 mg/dL      Comment: Serial Number: [id number]:  [id number]       Blood Gas, Arterial - [[id number]]  (Abnormal) Collected: 11/16/22 1934    Specimen: Arterial Blood Updated: 11/16/22 2014     Site Left Radial     Landon's Test Positive     pH, Arterial 7.293 pH units      pCO2, Arterial 71.8 mm Hg      pO2, Arterial 109.7 mm Hg      HCO3, Arterial 34.8 mmol/L      Base Excess, Arterial 6.7 mmol/L      Comment: Serial Number: [id number]:  [id number]        O2 Saturation, Arterial 97.4 %      CO2 Content 37.0 mmol/L      Barometric Pressure for Blood Gas --      Comment: N/A        Modality BiPap     FIO2 40 %      Set Tidal Volume 500     PEEP 5     PSV 15 cmH2O      Hemodilution No     Respiratory Rate 22            MICRO:  Microbiology Results (last 10 days)     Procedure Component Value - Date/Time    Respiratory Panel PCR w/COVID-19(SARS-CoV-2) [facility]/POPEYE/BEULAH/PAD/[facility]/M[facility]/NATHANIEL In-House, NP Swab in UTM/VTM, 3-4 HR TAT - Swab, Nasopharynx [[id number]]  (Normal) Collected: 11/16/22 1608    Lab Status: Final result Specimen: Swab from Nasopharynx Updated: 11/16/22 1709     ADENOVIRUS, PCR Not Detected     Coronavirus 229E Not Detected     Coronavirus HKU1 Not Detected     Coronavirus NL63 Not Detected     Coronavirus OC43 Not Detected     COVID19 Not Detected     Human Metapneumovirus Not Detected     Human Rhinovirus/Enterovirus Not Detected     Influenza A PCR Not Detected     Influenza B PCR Not Detected     Parainfluenza Virus 1 Not Detected     Parainfluenza Virus 2 Not Detected     Parainfluenza Virus 3 Not Detected     Parainfluenza Virus 4 Not Detected     RSV, PCR Not Detected     Bordetella pertussis pcr Not Detected     Bordetella parapertussis PCR Not Detected     Chlamydophila pneumoniae PCR Not Detected     Mycoplasma pneumo by PCR Not Detected    Narrative:      In the setting of a positive respiratory panel with a viral infection PLUS a negative procalcitonin without other underlying concern for bacterial infection, consider observing off antibiotics or discontinuation of antibiotics and continue supportive care. If the respiratory panel is positive for atypical bacterial infection (Bordetella pertussis, Chlamydophila pneumoniae, or Mycoplasma pneumoniae), consider antibiotic de-escalation to target atypical bacterial infection.    Blood Culture - Blood, Arm, Left [[id number]]  (Normal) Collected: 11/14/22 1306    Lab Status: Preliminary result Specimen: Blood from Arm, Left Updated: 11/16/22 1317     Blood Culture No growth at 2 days    Urine Culture - Urine,  Urine, Catheter [[id number]]  (Abnormal)  (Susceptibility) Collected: 11/14/22 1255    Lab Status: Final result Specimen: Urine, Catheter Updated: 11/16/22 1212     Urine Culture >100,000 CFU/mL Klebsiella pneumoniae ssp pneumoniae    Narrative:      Colonization of the urinary tract without infection is common. Treatment is discouraged unless the patient is symptomatic, pregnant, or undergoing an invasive urologic procedure.    Susceptibility      Klebsiella pneumoniae ssp pneumoniae      FAUSTO      Ampicillin Resistant     Ampicillin + Sulbactam Susceptible      Cefazolin Susceptible      Cefepime Susceptible      Ceftazidime Susceptible      Ceftriaxone Susceptible      Gentamicin Susceptible      Levofloxacin Intermediate      Nitrofurantoin Resistant     Piperacillin + Tazobactam Susceptible      Trimethoprim + Sulfamethoxazole Susceptible                           Blood Culture - Blood, Arm, Left [[id number]]  (Normal) Collected: 11/14/22 1237    Lab Status: Preliminary result Specimen: Blood from Arm, Left Updated: 11/16/22 1245     Blood Culture No growth at 2 days            RADIOLOGY STUDIES:  Imaging Results (Last 72 Hours)     Procedure Component Value Units Date/Time    CT Angiogram Chest Pulmonary Embolism [[id number]] Collected: 11/14/22 1511     Updated: 11/14/22 1519    Narrative:      CT ANGIOGRAM CHEST PULMONARY EMBOLISM-     Date of Exam: 11/14/2022 2:47 PM     Indication: Hypoxia Short of breath elevated D-dimer recent  hospitalization; R06.02-Shortness of breath; R07.9-Chest pain,  unspecified; N39.0-Urinary tract infection, site not specified.     Comparison: CT chest 6/27/2022.     Technique: Serial and axial CT images of the chest were obtained  following the uneventful intravenous administration of 100 cc Isovue-370  contrast. Reconstructions in the coronal and sagittal planes were also  performed. In addition, a 3 D volume rendered image was obtained after  post processing. Automated exposure  control and iterative reconstruction  methods were used.     FINDINGS:     Acute-appearing small distal segmental and subsegmental emboli are  present within the right lower lobe. Small eccentric nonocclusive  embolism is seen in the proximal left lower lobe pulmonary artery, which  appears chronic.     There is no indication of right heart strain. RV LV ratio is  approximately 0.8. Heart size is within normal limits. No pericardial  effusion or pleural effusion is seen.     There are slightly enlarged and mediastinal lymph nodes, including a  right lower paratracheal lymph node measuring 13 mm short axis  (previously 10 mm), prevascular node measuring 13 mm (previously 9 mm),  subcarinal node measuring 14 mm short axis (previously 9 mm). There is  an enlarged right hilar node measuring 17 mm short axis.     Present in both lungs with faint groundglass densities, in a pattern  suggestive of mild pulmonary edema. There is bandlike subsegmental  atelectasis in the left lower lobe and right middle lobe. No dense  consolidations are identified.     No pericardial effusion or pleural effusion is seen.     Cholecystectomy changes are present. The included portions of the upper  abdominal organs are within normal limits. There is mild scarring of the  spleen.     No acute or suspicious osseous abnormalities are identified.                Impression:      1. Small distal segmental and subsegmental emboli are present in the  right lower lobe. No evidence of right heart strain. I notified the  emergency room physician at the time of this dictation.  2. Small eccentric nonocclusive thrombus is demonstrated in the left  lower lobe pulmonary artery proximally. This has a chronic appearance.  3. Features of mild interstitial and alveolar edema.  4. Subsegmental atelectasis in the right middle lobe and left lower  lobe.     Electronically Signed By-Jocelyn Zayas MD On:11/14/2022 3:17 PM  This report was finalized on  [id number] by  Jocelyn Zaays MD.    XR Chest 1 View [[id number]] Collected: 11/14/22 1300     Updated: 11/14/22 1303    Narrative:      DATE OF EXAM:  11/14/2022 12:40 PM     PROCEDURE:  XR CHEST 1 VW-     INDICATIONS:  cp       COMPARISON:  AP portable chest 6/27/2022 CT chest 6/27/2022.     TECHNIQUE:   Single radiographic view of the chest was obtained.     FINDINGS:  No acute airspace disease. Heart size is borderline enlarged but within  normal limits. No pleural effusion, pneumothorax, or acute osseous  abnormalities are identified.       Impression:      No acute chest findings.     Electronically Signed By-Jocelyn Zayas MD On:11/14/2022 1:00 PM  This report was finalized on [id number] by  Jocelyn Zayas MD.              ECHOCARDIOGRAM:  Results for orders placed during the hospital encounter of 03/28/22    Adult Transthoracic Echo Complete W/ Cont if Necessary Per Protocol    Interpretation Summary  · Left ventricular ejection fraction appears to be 66 - 70%.  · The right ventricular cavity is mildly dilated.  · Moderate tricuspid valve regurgitation is present.  · No pericardial effusion noted    I reviewed the patient's new clinical results.    I discussed the patient's findings and my recommendations with patient, family and nursing staff.  I have discussed plan with the attending Pulmonary/Intensivist physician, who agrees with this plan of care.    Appropriate PPE worn during assessment of patient per established guidelines.    Total critical care time (30) minutes.    Electronically signed by CORI Reynolds, 11/16/22, 9:00 PM EST.       Electronically signed by Ana M King APRN at 11/16/22 2221     Arsenio Guy MD at 11/16/22 1704      Consult Orders    1. Inpatient Infectious Diseases Consult [[id number]] ordered by Scottie Colin MD at 11/16/22 1790               Infectious Diseases Consult Note    Referring Provider: Scottie Colin MD    Reason for  Consultation: Fever    Patient Care Team:  Jose Enrique Walters MD as PCP - General (Internal Medicine)  Heraclio Rizzo MD as Consulting Physician (Cardiology)    Chief complaint shortness of breath and fever    Subjective     History of present illness:      This is 62-year-old female who was hospitalized [facility] on November 14, 2022.  Patient presented hospital with shortness of breath and fever.  Patient was found to have pulmonary embolisms.  The patient was recently at [facility] and she spent 6 days there.  She was on a ventilator for 3 days.  The family was told that the patient had sepsis.  Patient did not go home on antibiotics.  Patient presented hospital here with shortness of breath and was found to have pulmonary embolism.  She started spiking fever today.  She was moved to ICU since she became more hypoxic.  Her urine culture grew lower Klebsiella pneumoniae.  The patient is currently on IV ceftriaxone.    Review of Systems   Review of Systems   Constitutional: Positive for fever.   HENT: Negative.    Eyes: Negative.    Respiratory: Positive for shortness of breath.    Cardiovascular: Negative.    Gastrointestinal: Negative.    Genitourinary: Negative.    Musculoskeletal: Negative.    Skin: Negative.    Neurological: Negative.    Hematological: Negative.    Psychiatric/Behavioral: Negative.        Medications  Medications Prior to Admission   Medication Sig Dispense Refill Last Dose   • amLODIPine (NORVASC) 10 MG tablet Take 1 tablet by mouth Daily. 30 tablet 1 11/13/2022   • budesonide (PULMICORT) 0.5 MG/2ML nebulizer solution Take 2 mL by nebulization 2 (Two) Times a Day.   11/13/2022   • cloNIDine (CATAPRES) 0.3 MG tablet Take 1 tablet by mouth Every 8 (Eight) Hours. 90 tablet 1 11/13/2022   • diazePAM (VALIUM) 5 MG tablet Take 1 tablet by mouth 4 (Four) Times a Day As Needed for Anxiety.   11/13/2022   • insulin aspart (novoLOG FLEXPEN) 100 UNIT/ML solution pen-injector sc pen  Inject 10 Units under the skin into the appropriate area as directed 2 (Two) Times a Day.   11/13/2022   • lisinopril (PRINIVIL,ZESTRIL) 40 MG tablet Take 40 mg by mouth Daily.   11/13/2022   • Probiotic Product (PROBIOTIC ADVANCED) capsule Take 1 capsule by mouth Daily.   11/13/2022   • sildenafil (REVATIO) 20 MG tablet Take 0.5 tablets by mouth Every 8 (Eight) Hours. 90 tablet 1 11/13/2022   • topiramate (TOPAMAX) 50 MG tablet Take 50 mg by mouth Daily.   11/13/2022   • traZODone (DESYREL) 100 MG tablet Take 100 mg by mouth Every Night.   11/13/2022   • doxepin (SINEquan) 50 MG capsule Take 50 mg by mouth At Night As Needed.      • hydrALAZINE (APRESOLINE) 25 MG tablet Take 1 tablet by mouth 3 (Three) Times a Day As Needed.      • ipratropium-albuterol (DUO-NEB) 0.5-2.5 mg/3 ml nebulizer Take 3 mL by nebulization 3 (Three) Times a Day As Needed for Wheezing.      • LINZESS 290 MCG capsule capsule Take 290 mcg by mouth Daily As Needed.      • oxyCODONE-acetaminophen (PERCOCET)  MG per tablet Take 1 tablet by mouth Every 4 (Four) Hours As Needed for Moderate Pain .      • promethazine (PHENERGAN) 50 MG tablet Take 50 mg by mouth Every 6 (Six) Hours As Needed.          History  Past Medical History:   Diagnosis Date   • Anxiety    • Arthritis    • Chronic back pain    • Chronic obstructive pulmonary disease (HCC) 01/31/2020   • Chronic respiratory failure with hypoxia ([facility]) 03/06/2018    O2 @ 2 L per NC as needed   • Diabetes mellitus ([facility])    • Dyslipidemia 10/21/2014   • Edema, lower extremity 07/16/2014   • Hypertension    • Insomnia    • Obesity, Class III, BMI 40-49.9 (morbid obesity) ([facility]) 03/29/2022   • Prediabetes 11/06/2018   • Primary hypertension 05/30/2012   • PTSD (post-traumatic stress disorder)    • RLS (restless legs syndrome) 04/22/2020   • Tobacco abuse 10/21/2014   • Vitamin D deficiency 05/30/2012     Past Surgical History:   Procedure Laterality Date   • BACK SURGERY     • CHOLECYSTECTOMY      • HYSTERECTOMY     • TUMOR REMOVAL      benign breats tumor       Family History  Family History   Problem Relation Age of Onset   • Heart disease Mother    • Heart disease Father    • Pancreatic cancer Sister 65   • Lymphoma Brother 65       Social History   reports that she has been smoking cigarettes. She started smoking about 49 years ago. She has been smoking an average of 1 pack per day. She has never used smokeless tobacco. She reports that she does not currently use alcohol. She reports that she does not use drugs.    Allergies  Codeine    Objective     Vital Signs   Vital Signs (last 24 hours)       11/15 0700  11/16 0659 11/16 0700  11/16 1705   Most Recent      Temp (°F) 98 -  101.6    96.9 -  101.1     96.9 (36.1) 11/16 1615    Heart Rate 66 -  115    54 -  89     61 11/16 1700    Resp 14 -  23    18 -  21     21 11/16 1500    /48 -  146/56    88/38 -  140/57     108/56 11/16 1700    SpO2 (%) 90 -  98    91 -  99     97 11/16 1700          Physical Exam:  Physical Exam  Vitals and nursing note reviewed.   Constitutional:       Appearance: She is well-developed. She is ill-appearing.   HENT:      Head: Normocephalic and atraumatic.   Eyes:      Pupils: Pupils are equal, round, and reactive to light.   Cardiovascular:      Rate and Rhythm: Normal rate and regular rhythm.      Heart sounds: Normal heart sounds.   Pulmonary:      Effort: Pulmonary effort is normal. No respiratory distress.      Breath sounds: No wheezing or rales.   Abdominal:      General: Bowel sounds are normal. There is no distension.      Palpations: Abdomen is soft. There is no mass.      Tenderness: There is no abdominal tenderness. There is no guarding or rebound.   Musculoskeletal:         General: No deformity. Normal range of motion.      Cervical back: Normal range of motion and neck supple.   Skin:     General: Skin is warm.      Findings: No erythema or rash.   Neurological:      Mental Status: She is alert. She is  disoriented.      Cranial Nerves: No cranial nerve deficit.         Microbiology  Microbiology Results (last 10 days)     Procedure Component Value - Date/Time    Blood Culture - Blood, Arm, Left [[id number]]  (Normal) Collected: 11/14/22 1306    Lab Status: Preliminary result Specimen: Blood from Arm, Left Updated: 11/16/22 1317     Blood Culture No growth at 2 days    Urine Culture - Urine, Urine, Catheter [[id number]]  (Abnormal)  (Susceptibility) Collected: 11/14/22 1255    Lab Status: Final result Specimen: Urine, Catheter Updated: 11/16/22 1212     Urine Culture >100,000 CFU/mL Klebsiella pneumoniae ssp pneumoniae    Narrative:      Colonization of the urinary tract without infection is common. Treatment is discouraged unless the patient is symptomatic, pregnant, or undergoing an invasive urologic procedure.    Susceptibility      Klebsiella pneumoniae ssp pneumoniae      FAUSTO      Ampicillin Resistant     Ampicillin + Sulbactam Susceptible      Cefazolin Susceptible      Cefepime Susceptible      Ceftazidime Susceptible      Ceftriaxone Susceptible      Gentamicin Susceptible      Levofloxacin Intermediate      Nitrofurantoin Resistant     Piperacillin + Tazobactam Susceptible      Trimethoprim + Sulfamethoxazole Susceptible                           Blood Culture - Blood, Arm, Left [[id number]]  (Normal) Collected: 11/14/22 1237    Lab Status: Preliminary result Specimen: Blood from Arm, Left Updated: 11/16/22 1245     Blood Culture No growth at 2 days          Laboratory  Results from last 7 days   Lab Units 11/16/22  0557   WBC 10*3/mm3 5.90   HEMOGLOBIN g/dL 10.0*   HEMATOCRIT % 32.2*   PLATELETS 10*3/mm3 170     Results from last 7 days   Lab Units 11/16/22  1324   SODIUM mmol/L 141   POTASSIUM mmol/L 4.3   CHLORIDE mmol/L 99   CO2 mmol/L 35.0*   BUN mg/dL 8   CREATININE mg/dL 1.01*   GLUCOSE mg/dL 155*   CALCIUM mg/dL 8.6     Results from last 7 days   Lab Units 11/16/22  1324   SODIUM mmol/L 141    POTASSIUM mmol/L 4.3   CHLORIDE mmol/L 99   CO2 mmol/L 35.0*   BUN mg/dL 8   CREATININE mg/dL 1.01*   GLUCOSE mg/dL 155*   CALCIUM mg/dL 8.6                   Radiology  Imaging Results (Last 72 Hours)     Procedure Component Value Units Date/Time    CT Angiogram Chest Pulmonary Embolism [[id number]] Collected: 11/14/22 1511     Updated: 11/14/22 1519    Narrative:      CT ANGIOGRAM CHEST PULMONARY EMBOLISM-     Date of Exam: 11/14/2022 2:47 PM     Indication: Hypoxia Short of breath elevated D-dimer recent  hospitalization; R06.02-Shortness of breath; R07.9-Chest pain,  unspecified; N39.0-Urinary tract infection, site not specified.     Comparison: CT chest 6/27/2022.     Technique: Serial and axial CT images of the chest were obtained  following the uneventful intravenous administration of 100 cc Isovue-370  contrast. Reconstructions in the coronal and sagittal planes were also  performed. In addition, a 3 D volume rendered image was obtained after  post processing. Automated exposure control and iterative reconstruction  methods were used.     FINDINGS:     Acute-appearing small distal segmental and subsegmental emboli are  present within the right lower lobe. Small eccentric nonocclusive  embolism is seen in the proximal left lower lobe pulmonary artery, which  appears chronic.     There is no indication of right heart strain. RV LV ratio is  approximately 0.8. Heart size is within normal limits. No pericardial  effusion or pleural effusion is seen.     There are slightly enlarged and mediastinal lymph nodes, including a  right lower paratracheal lymph node measuring 13 mm short axis  (previously 10 mm), prevascular node measuring 13 mm (previously 9 mm),  subcarinal node measuring 14 mm short axis (previously 9 mm). There is  an enlarged right hilar node measuring 17 mm short axis.     Present in both lungs with faint groundglass densities, in a pattern  suggestive of mild pulmonary edema. There is bandlike  subsegmental  atelectasis in the left lower lobe and right middle lobe. No dense  consolidations are identified.     No pericardial effusion or pleural effusion is seen.     Cholecystectomy changes are present. The included portions of the upper  abdominal organs are within normal limits. There is mild scarring of the  spleen.     No acute or suspicious osseous abnormalities are identified.                Impression:      1. Small distal segmental and subsegmental emboli are present in the  right lower lobe. No evidence of right heart strain. I notified the  emergency room physician at the time of this dictation.  2. Small eccentric nonocclusive thrombus is demonstrated in the left  lower lobe pulmonary artery proximally. This has a chronic appearance.  3. Features of mild interstitial and alveolar edema.  4. Subsegmental atelectasis in the right middle lobe and left lower  lobe.     Electronically Signed By-Jocelyn Zayas MD On:11/14/2022 3:17 PM  This report was finalized on [id number] by  Jocelyn Zayas MD.    XR Chest 1 View [[id number]] Collected: 11/14/22 1300     Updated: 11/14/22 1303    Narrative:      DATE OF EXAM:  11/14/2022 12:40 PM     PROCEDURE:  XR CHEST 1 VW-     INDICATIONS:  cp       COMPARISON:  AP portable chest 6/27/2022 CT chest 6/27/2022.     TECHNIQUE:   Single radiographic view of the chest was obtained.     FINDINGS:  No acute airspace disease. Heart size is borderline enlarged but within  normal limits. No pleural effusion, pneumothorax, or acute osseous  abnormalities are identified.       Impression:      No acute chest findings.     Electronically Signed By-Jocelyn Zayas MD On:11/14/2022 1:00 PM  This report was finalized on [id number] by  Jocelyn Zayas MD.          Cardiology      Results Review:  I have reviewed all clinical data, test, lab, and imaging results.       Schedule Meds  amLODIPine, 10 mg, Oral, Q24H  apixaban, 10 mg, Oral, BID   Followed by  [START ON  11/23/2022] apixaban, 5 mg, Oral, BID  budesonide-formoterol, 2 puff, Inhalation, BID - RT  cefTRIAXone, 2 g, Intravenous, Q24H  cloNIDine, 0.3 mg, Oral, Q8H  etomidate, , ,   fentaNYL citrate (PF), , ,   insulin lispro, 2-7 Units, Subcutaneous, TID With Meals  lisinopril, 40 mg, Oral, Q24H  midazolam, , ,   Propofol, , ,   senna-docusate sodium, 2 tablet, Oral, BID  sodium chloride, 10 mL, Intravenous, Q12H  topiramate, 50 mg, Oral, Daily  traZODone, 100 mg, Oral, Nightly        Infusion Meds       PRN Meds  •  acetaminophen **OR** acetaminophen **OR** acetaminophen  •  aluminum-magnesium hydroxide-simethicone  •  senna-docusate sodium **AND** polyethylene glycol **AND** bisacodyl **AND** bisacodyl  •  dextrose  •  dextrose  •  diazePAM  •  doxepin  •  glucagon (human recombinant)  •  hydrALAZINE  •  ipratropium-albuterol  •  magnesium sulfate **OR** magnesium sulfate in D5W 1g/100mL (PREMIX)  •  melatonin  •  nitroglycerin  •  ondansetron **OR** ondansetron  •  oxyCODONE  •  oxyCODONE  •  potassium chloride  •  potassium chloride  •  [COMPLETED] Insert peripheral IV **AND** sodium chloride  •  sodium chloride  •  zolpidem      Assessment & Plan       Assessment    High-grade fever.  Probably secondary to UTI and pulmonary embolism.    Pulmonary embolism.  Patient is currently on high flow oxygen    UTI with a Klebsiella pneumoniae.  We need to rule out obstructive uropathy.        Plan    Continue IV ceftriaxone 2 g daily for now  Request renal ultrasound  When patient is more stable and might consider CT scan of abdomen pelvis with stone protocol  Supportive care  A.m. labs  Case was discussed with the patient's family at bedside    Arsenio Guy MD  11/16/22  17:05 EST    Note is dictated utilizing voice recognition software/Dragon      Electronically signed by Arsenio Guy MD at 11/16/22 4114

## 2022-11-17 NOTE — PROGRESS NOTES
Infectious Diseases Progress Note      LOS: 1 day   Patient Care Team:  Jose Enrique Walters MD as PCP - General (Internal Medicine)  Heraclio Rizzo MD as Consulting Physician (Cardiology)    Chief Complaint: Shortness of breath    Subjective     The patient had no high-grade fever during the last 24 hours.  She remained short of breath currently on 50% oxygen.  She is hemodynamically stable requiring no vasopressors.  She is more awake and alert today.    Review of Systems:   Review of Systems   Constitutional: Negative.    HENT: Negative.    Eyes: Negative.    Respiratory: Positive for shortness of breath.    Cardiovascular: Negative.    Gastrointestinal: Negative.    Genitourinary: Negative.    Musculoskeletal: Negative.    Skin: Negative.    Neurological: Negative.    Hematological: Negative.    Psychiatric/Behavioral: Negative.         Objective     Vital Signs  Temp:  [96.9 °F (36.1 °C)-98.4 °F (36.9 °C)] 98.4 °F (36.9 °C)  Heart Rate:  [48-78] 68  Resp:  [16-22] 16  BP: ()/(34-67) 125/52    Physical Exam:  Physical Exam  Vitals and nursing note reviewed.   Constitutional:       Appearance: She is well-developed.   HENT:      Head: Normocephalic and atraumatic.   Eyes:      Pupils: Pupils are equal, round, and reactive to light.   Cardiovascular:      Rate and Rhythm: Normal rate and regular rhythm.      Heart sounds: Normal heart sounds.   Pulmonary:      Effort: Pulmonary effort is normal. No respiratory distress.      Breath sounds: No wheezing or rales.   Abdominal:      General: Bowel sounds are normal. There is no distension.      Palpations: Abdomen is soft. There is no mass.      Tenderness: There is no abdominal tenderness. There is no guarding or rebound.   Musculoskeletal:         General: No deformity. Normal range of motion.      Cervical back: Normal range of motion and neck supple.   Skin:     General: Skin is warm.      Findings: No erythema or rash.   Neurological:      Mental Status: She is  alert and oriented to person, place, and time.      Cranial Nerves: No cranial nerve deficit.          Results Review:    I have reviewed all clinical data, test, lab, and imaging results.     Radiology  XR Chest 1 View    Result Date: 11/17/2022  DATE OF EXAM: 11/17/2022 9:30 AM  PROCEDURE: XR CHEST 1 VW-  INDICATIONS: respiratory failure; R06.02-Shortness of breath; R07.9-Chest pain, unspecified; N39.0-Urinary tract infection, site not specified; I26.99-Other pulmonary embolism without acute cor pulmonale  COMPARISON: No Comparisons Available  TECHNIQUE: Single radiographic AP view of the chest was obtained.  FINDINGS: Heart size borderline. Pulmonary vasculature is somewhat prominent. There is some mild increased interstitial markings in the lower lung zones with atelectasis in the lung bases. Costophrenic angles are sharp      Suspect mild interstitial edema, with bibasilar atelectasis  Electronically Signed ByDerek Riddle On:11/17/2022 10:15 AM This report was finalized on [id number] by  Michael Riddle, .    US Renal Bilateral    Result Date: 11/17/2022  DATE OF EXAM: 11/17/2022 4:24 AM  PROCEDURE: US RENAL BILATERAL-  INDICATIONS: UTI to rule out obstructive uropathy; R06.02-Shortness of breath; R07.9-Chest pain, unspecified; N39.0-Urinary tract infection, site not specified; I26.99-Other pulmonary embolism without acute cor pulmonale  COMPARISON: No Comparisons Available  TECHNIQUE: Grayscale and color Doppler ultrasound evaluation of the kidneys and urinary bladder was performed.   FINDINGS: Right kidney measures 10.6 cm in length. Left kidney measures 12.6 cm in length. Both kidneys are normal in echogenicity with no hydronephrosis. Urinary bladder is incompletely distended. Incidental note is made of increased echogenicity of the liver       1. Normal ultrasound appearance the kidneys with no obstruction 2. Hepatic steatosis  Electronically Signed ByDerek Riddle On:11/17/2022 7:53 AM This report  was finalized on [id number] by  Michael Riddle, .      Cardiology    Laboratory    Results from last 7 days   Lab Units 11/17/22  0311 11/16/22  0557 11/15/22  0035 11/14/22  1237   WBC 10*3/mm3 4.70 5.90 6.10 5.50   HEMOGLOBIN g/dL 8.6* 10.0* 9.9* 10.5*   HEMATOCRIT % 28.4* 32.2* 32.2* 33.4*   PLATELETS 10*3/mm3 149 170 188 222     Results from last 7 days   Lab Units 11/17/22  0311 11/16/22  1324 11/16/22  0557 11/15/22  0035 11/14/22  1237   SODIUM mmol/L 140 141  --  140 139   POTASSIUM mmol/L 4.8 4.3 4.7 4.4 3.9   CHLORIDE mmol/L 102 99  --  98 97*   CO2 mmol/L 34.0* 35.0*  --  36.0* 36.0*   BUN mg/dL 7* 8  --  10 11   CREATININE mg/dL 0.92 1.01*  --  0.75 0.83   GLUCOSE mg/dL 132* 155*  --  131* 142*   ALBUMIN g/dL 3.40*  --   --   --  3.60   BILIRUBIN mg/dL 0.2  --   --   --  0.3   ALK PHOS U/L 51  --   --   --  66   AST (SGOT) U/L 6  --   --   --  9   ALT (SGPT) U/L 6  --   --   --  9   CALCIUM mg/dL 8.9 8.6  --  8.8 9.0                 Microbiology   Microbiology Results (last 10 days)     Procedure Component Value - Date/Time    Respiratory Panel PCR w/COVID-19(SARS-CoV-2) [facility]/POPEYE/BEULAH/[facility]AD/NATHANIEL In-House, NP Swab in [facility]/VTM, 3-4 HR TAT - Swab, Nasopharynx [[id number]]  (Normal) Collected: 11/16/22 1609    Lab Status: Final result Specimen: Swab from Nasopharynx Updated: 11/16/22 6909     ADENOVIRUS, PCR Not Detected     Coronavirus 229E Not Detected     Coronavirus HKU1 Not Detected     Coronavirus NL63 Not Detected     Coronavirus OC43 Not Detected     COVID19 Not Detected     Human Metapneumovirus Not Detected     Human Rhinovirus/Enterovirus Not Detected     Influenza A PCR Not Detected     Influenza B PCR Not Detected     Parainfluenza Virus 1 Not Detected     Parainfluenza Virus 2 Not Detected     Parainfluenza Virus 3 Not Detected     Parainfluenza Virus 4 Not Detected     RSV, PCR Not Detected     Bordetella pertussis pcr Not Detected     Bordetella parapertussis PCR Not Detected      Chlamydophila pneumoniae PCR Not Detected     Mycoplasma pneumo by PCR Not Detected    Narrative:      In the setting of a positive respiratory panel with a viral infection PLUS a negative procalcitonin without other underlying concern for bacterial infection, consider observing off antibiotics or discontinuation of antibiotics and continue supportive care. If the respiratory panel is positive for atypical bacterial infection (Bordetella pertussis, Chlamydophila pneumoniae, or Mycoplasma pneumoniae), consider antibiotic de-escalation to target atypical bacterial infection.    Blood Culture - Blood, Hand, Left [[id number]]  (Normal) Collected: 11/16/22 0833    Lab Status: Preliminary result Specimen: Blood from Hand, Left Updated: 11/17/22 0845     Blood Culture No growth at 24 hours    Blood Culture - Blood, Hand, Right [[id number]]  (Normal) Collected: 11/16/22 0830    Lab Status: Preliminary result Specimen: Blood from Hand, Right Updated: 11/17/22 0845     Blood Culture No growth at 24 hours    Blood Culture - Blood, Arm, Left [[id number]]  (Normal) Collected: 11/14/22 1306    Lab Status: Preliminary result Specimen: Blood from Arm, Left Updated: 11/16/22 1317     Blood Culture No growth at 2 days    Urine Culture - Urine, Urine, Catheter [[id number]]  (Abnormal)  (Susceptibility) Collected: 11/14/22 1255    Lab Status: Final result Specimen: Urine, Catheter Updated: 11/16/22 1212     Urine Culture >100,000 CFU/mL Klebsiella pneumoniae ssp pneumoniae    Narrative:      Colonization of the urinary tract without infection is common. Treatment is discouraged unless the patient is symptomatic, pregnant, or undergoing an invasive urologic procedure.    Susceptibility      Klebsiella pneumoniae ssp pneumoniae      FAUSTO      Ampicillin Resistant     Ampicillin + Sulbactam Susceptible      Cefazolin Susceptible      Cefepime Susceptible      Ceftazidime Susceptible      Ceftriaxone Susceptible      Gentamicin Susceptible       Levofloxacin Intermediate      Nitrofurantoin Resistant     Piperacillin + Tazobactam Susceptible      Trimethoprim + Sulfamethoxazole Susceptible                           Blood Culture - Blood, Arm, Left [[id number]]  (Normal) Collected: 11/14/22 1237    Lab Status: Preliminary result Specimen: Blood from Arm, Left Updated: 11/16/22 1245     Blood Culture No growth at 2 days          Medication Review:       Schedule Meds  amLODIPine, 10 mg, Oral, Q24H  apixaban, 10 mg, Oral, BID   Followed by  [START ON 11/23/2022] apixaban, 5 mg, Oral, BID  budesonide-formoterol, 2 puff, Inhalation, BID - RT  cefTRIAXone, 2 g, Intravenous, Q24H  cloNIDine, 0.3 mg, Oral, Q8H  insulin lispro, 2-7 Units, Subcutaneous, TID With Meals  lisinopril, 40 mg, Oral, Q24H  senna-docusate sodium, 2 tablet, Oral, BID  sodium chloride, 10 mL, Intravenous, Q12H  topiramate, 50 mg, Oral, Daily  traZODone, 100 mg, Oral, Nightly        Infusion Meds       PRN Meds  •  acetaminophen **OR** acetaminophen **OR** acetaminophen  •  aluminum-magnesium hydroxide-simethicone  •  senna-docusate sodium **AND** polyethylene glycol **AND** bisacodyl **AND** bisacodyl  •  dextrose  •  dextrose  •  diazePAM  •  doxepin  •  glucagon (human recombinant)  •  hydrALAZINE  •  ipratropium-albuterol  •  magnesium sulfate **OR** magnesium sulfate in D5W 1g/100mL (PREMIX)  •  melatonin  •  Morphine  •  nitroglycerin  •  ondansetron **OR** ondansetron  •  oxyCODONE  •  oxyCODONE  •  potassium chloride  •  potassium chloride  •  [COMPLETED] Insert peripheral IV **AND** sodium chloride  •  sodium chloride  •  zolpidem        Assessment & Plan       Antimicrobial Therapy   1.  IV ceftriaxone        2.        3.        4.        5.            Assessment    Resolved fever.  Probably secondary to UTI and pulmonary embolism    Pulmonary embolism.  Patient is currently on high flow oxygen    UTI with Klebsiella pneumoniae.  Renal ultrasound showed no obstructive  uropathy      Plan    Continue IV ceftriaxone 2 g daily  May need a CT scan of the abdomen pelvis with stone protocol if fever persist  Continue supportive care  A.m. labs        Arsenio Guy MD  11/17/22  11:46 EST    Note is dictated utilizing voice recognition software/Dragon

## 2022-11-17 NOTE — PROGRESS NOTES
Hematology/Oncology Inpatient Progress Note    PATIENT NAME: Ann Álvarez  : 1960  MRN: [id number]    CHIEF COMPLAINT: Shortness of breath, chest and back pain    HISTORY OF PRESENT ILLNESS:    Ann Álvarez is a 62 y.o. female who presented to [facility] on 2022 with complaints of shortness of breath, left left chest pain that radiates to her back and found to have PE right lower lobe on CT chest.  She has a recent history of UTI with sepsis at [facility] for which she spent a few days on the ventilator. The patient was placed on heparin drip and admitted for further management.       CT chest 2022- small distal segmental and subsegmental emboli RLL, no evidence of heart strain. Small nonocclusive thrombus LLL.       BLE Doppler 22 - normal     11/15/22  Hematology/Oncology was consulted for bilateral PE with recent illness of UTI with sepsis for which she spent 48 hours on the ventilator.  She does not have personal hx of clotting but does have family hx of blood clots with grandmother dying suddenly attributed to thrombus and her mother was treated for thrombus. After her discharge in 6 days she spent approximately 40% of her time in bed and the majority of the time in her recliner.  She has a long history of cigarette smoking and her spouse reported that she consumed about 1 pack/day.  With history of hospitalization and immobility, continued cigarette smoking, elevated BMI, heart thrombosis thought to be provoked.  At this point, no hereditary testing for inherited predisposition to thrombosis is justified.  She has been started on heparin drip and should remain on heparin IV until insurance evaluation of which anticoagulation is covered and then transitioned to oral anticoagulation. Given the appearance of the chronic thrombus in the left pulmonary artery given her high risk of recurrence she might be benefit from long-term anticoagulation.  This will  be discussed with her in follow up appt.  She was also advised to stop smoking.    She  has a past medical history of Anxiety, Arthritis, Chronic back pain, Chronic obstructive pulmonary disease ([facility]) (01/31/2020), Chronic respiratory failure with hypoxia ([facility]) (03/06/2018), Diabetes mellitus ([facility]), Dyslipidemia (10/21/2014), Edema, lower extremity (07/16/2014), Hypertension, Insomnia, Obesity, Class III, BMI 40-49.9 (morbid obesity) ([facility]) (03/29/2022), Prediabetes (11/06/2018), Primary hypertension (05/30/2012), PTSD (post-traumatic stress disorder), RLS (restless legs syndrome) (04/22/2020), Tobacco abuse (10/21/2014), and Vitamin D deficiency (05/30/2012).     PCP: Jose Enrique Walters MD    Subjective   11/17/2022: Feeling about the same. Eating some. Dyspneic but no chest pain. Transferred to the intensive care unit. No bleeding.     ROS:  Review of Systems   Constitutional: Negative for activity change, appetite change, chills, diaphoresis, fatigue, fever and unexpected weight change.   HENT: Negative.  Negative for congestion, dental problem, drooling, ear discharge, ear pain, facial swelling, hearing loss, mouth sores, nosebleeds, postnasal drip, rhinorrhea, sinus pressure, sinus pain, sneezing, sore throat, tinnitus, trouble swallowing and voice change.    Eyes: Negative.  Negative for photophobia, pain, discharge, redness, itching and visual disturbance.   Respiratory: Positive for cough and shortness of breath. Negative for apnea, choking, chest tightness, wheezing and stridor.    Cardiovascular: Negative for chest pain, palpitations and leg swelling.   Gastrointestinal: Negative for abdominal distention, abdominal pain, anal bleeding, blood in stool, constipation, diarrhea, nausea, rectal pain and vomiting.   Endocrine: Negative.  Negative for cold intolerance, heat intolerance, polydipsia and polyuria.   Genitourinary: Negative.  Negative for decreased urine volume, difficulty urinating, dysuria, flank pain,  frequency, genital sores, hematuria and urgency.   Musculoskeletal: Positive for back pain. Negative for arthralgias, gait problem, joint swelling, myalgias, neck pain and neck stiffness.   Skin: Negative.  Negative for color change, pallor and rash.   Neurological: Negative for dizziness, tremors, seizures, syncope, facial asymmetry, speech difficulty, weakness, light-headedness, numbness and headaches.   Hematological: Negative for adenopathy. Does not bruise/bleed easily.   Psychiatric/Behavioral: Negative for agitation, behavioral problems, confusion, decreased concentration, hallucinations, self-injury, sleep disturbance and suicidal ideas. The patient is not nervous/anxious.         MEDICATIONS:    Scheduled Meds:  amLODIPine, 10 mg, Oral, Q24H  apixaban, 10 mg, Oral, BID   Followed by  [START ON 11/23/2022] apixaban, 5 mg, Oral, BID  budesonide-formoterol, 2 puff, Inhalation, BID - RT  cefTRIAXone, 2 g, Intravenous, Q24H  cloNIDine, 0.3 mg, Oral, Q8H  insulin lispro, 2-7 Units, Subcutaneous, TID With Meals  lisinopril, 40 mg, Oral, Q24H  senna-docusate sodium, 2 tablet, Oral, BID  sodium chloride, 10 mL, Intravenous, Q12H  topiramate, 50 mg, Oral, Daily  traZODone, 100 mg, Oral, Nightly       Continuous Infusions:      PRN Meds:  •  acetaminophen **OR** acetaminophen **OR** acetaminophen  •  aluminum-magnesium hydroxide-simethicone  •  senna-docusate sodium **AND** polyethylene glycol **AND** bisacodyl **AND** bisacodyl  •  dextrose  •  dextrose  •  diazePAM  •  doxepin  •  glucagon (human recombinant)  •  hydrALAZINE  •  ipratropium-albuterol  •  magnesium sulfate **OR** magnesium sulfate in D5W 1g/100mL (PREMIX)  •  melatonin  •  Morphine  •  nitroglycerin  •  ondansetron **OR** ondansetron  •  oxyCODONE  •  oxyCODONE  •  potassium chloride  •  potassium chloride  •  [COMPLETED] Insert peripheral IV **AND** sodium chloride  •  sodium chloride  •  zolpidem     ALLERGIES:    Allergies   Allergen Reactions   •  "Codeine Hives, Itching and Nausea And Vomiting       Objective    VITALS:   /49   Pulse 64   Temp 98.4 °F (36.9 °C) (Oral)   Resp 16   Ht 160 cm (63\")   Wt 111 kg (244 lb 0.8 oz)   SpO2 96%   BMI 43.23 kg/m²     PHYSICAL EXAM: (performed by MD)  Physical Exam  Vitals and nursing note reviewed.   Constitutional:       General: She is not in acute distress.     Appearance: She is obese. She is ill-appearing. She is not toxic-appearing or diaphoretic.   HENT:      Head: Normocephalic and atraumatic.      Right Ear: External ear normal.      Left Ear: External ear normal.      Nose: Nose normal.      Mouth/Throat:      Mouth: Mucous membranes are moist.      Pharynx: Oropharynx is clear. No oropharyngeal exudate or posterior oropharyngeal erythema.   Eyes:      General: No scleral icterus.        Right eye: No discharge.         Left eye: No discharge.      Conjunctiva/sclera: Conjunctivae normal.      Pupils: Pupils are equal, round, and reactive to light.   Cardiovascular:      Rate and Rhythm: Normal rate and regular rhythm.      Pulses: Normal pulses.      Heart sounds: No murmur heard.    No friction rub. No gallop.   Pulmonary:      Effort: Pulmonary effort is normal. No respiratory distress.      Breath sounds: No stridor. No wheezing, rhonchi or rales.   Abdominal:      General: Bowel sounds are normal. There is no distension.      Palpations: Abdomen is soft. There is no mass.      Tenderness: There is no abdominal tenderness. There is no right CVA tenderness, left CVA tenderness, guarding or rebound.      Hernia: No hernia is present.      Comments: Protuberant, soft and not tender.    Musculoskeletal:         General: No swelling, tenderness, deformity or signs of injury. Normal range of motion.      Cervical back: Normal range of motion. No rigidity.      Right lower leg: Edema present.      Left lower leg: Edema present.      Comments: Chronic leg swelling   Lymphadenopathy:      Cervical: No " cervical adenopathy.   Skin:     General: Skin is warm and dry.      Coloration: Skin is not jaundiced.      Findings: No bruising, lesion or rash.   Neurological:      General: No focal deficit present.      Mental Status: She is alert and oriented to person, place, and time.      Cranial Nerves: No cranial nerve deficit.      Motor: No weakness.      Gait: Gait normal.   Psychiatric:         Mood and Affect: Mood normal.         Behavior: Behavior normal.         Thought Content: Thought content normal.         Judgment: Judgment normal.      I Javy King MD on 11/17/2022 at 17:10    RECENT LABS:  Lab Results (last 24 hours)     Procedure Component Value Units Date/Time    POC Glucose Once [[id number]]  (Abnormal) Collected: 11/17/22 1659    Specimen: Blood Updated: 11/17/22 1703     Glucose 138 mg/dL      Comment: Serial Number: [id number]:  [id number]       Blood Culture - Blood, Arm, Left [[id number]]  (Normal) Collected: 11/14/22 1306    Specimen: Blood from Arm, Left Updated: 11/17/22 1317     Blood Culture No growth at 3 days    Blood Culture - Blood, Arm, Left [[id number]]  (Normal) Collected: 11/14/22 1237    Specimen: Blood from Arm, Left Updated: 11/17/22 1246     Blood Culture No growth at 3 days    POC Glucose Once [[id number]]  (Abnormal) Collected: 11/17/22 1112    Specimen: Blood Updated: 11/17/22 1113     Glucose 146 mg/dL      Comment: Serial Number: [id number]:  [id number]       Blood Culture - Blood, Hand, Right [[id number]]  (Normal) Collected: 11/16/22 0830    Specimen: Blood from Hand, Right Updated: 11/17/22 0845     Blood Culture No growth at 24 hours    Blood Culture - Blood, Hand, Left [[id number]]  (Normal) Collected: 11/16/22 0833    Specimen: Blood from Hand, Left Updated: 11/17/22 0845     Blood Culture No growth at 24 hours    POC Glucose Once [[id number]]  (Abnormal) Collected: 11/17/22 0752    Specimen: Blood Updated: 11/17/22 0754     Glucose 116 mg/dL      Comment:  Serial Number: [id number]:  [id number]       Blood Gas, Venous - [[id number]]  (Abnormal) Collected: 11/17/22 0350    Specimen: Venous Blood Updated: 11/17/22 0517     Site Left Radial     pH, Venous 7.296 pH Units      pCO2, Venous 73.3 mm Hg      pO2, Venous 33.7 mm Hg      HCO3, Venous 35.8 mmol/L      Base Excess, Venous 7.6 mmol/L      Comment: Serial Number: [id number]:  [id number]        O2 Saturation, Venous 55.4 %      CO2 Content 38.0 mmol/L      Barometric Pressure for Blood Gas --     Comment: N/A        Modality BiPap     FIO2 40 %      Set Tidal Volume 500     PEEP 5     PSV 15 cmH2O     Comprehensive Metabolic Panel [[id number]]  (Abnormal) Collected: 11/17/22 0311    Specimen: Blood Updated: 11/17/22 0403     Glucose 132 mg/dL      BUN 7 mg/dL      Creatinine 0.92 mg/dL      Sodium 140 mmol/L      Potassium 4.8 mmol/L      Chloride 102 mmol/L      CO2 34.0 mmol/L      Calcium 8.9 mg/dL      Total Protein 5.9 g/dL      Albumin 3.40 g/dL      ALT (SGPT) 6 U/L      AST (SGOT) 6 U/L      Alkaline Phosphatase 51 U/L      Total Bilirubin 0.2 mg/dL      Globulin 2.5 gm/dL      A/G Ratio 1.4 g/dL      BUN/Creatinine Ratio 7.6     Anion Gap 4.0 mmol/L      eGFR 70.1 mL/min/1.73      Comment: National Kidney Foundation and American Society of Nephrology (ASN) Task Force recommended calculation based on the Chronic Kidney Disease Epidemiology Collaboration (CKD-EPI) equation refit without adjustment for race.       Narrative:      GFR Normal >60  Chronic Kidney Disease <60  Kidney Failure <15      Magnesium [[id number]]  (Normal) Collected: 11/17/22 0311    Specimen: Blood Updated: 11/17/22 0348     Magnesium 1.9 mg/dL     aPTT [[id number]]  (Abnormal) Collected: 11/17/22 0311    Specimen: Blood Updated: 11/17/22 0334     PTT 29.9 seconds     CBC & Differential [[id number]]  (Abnormal) Collected: 11/17/22 0311    Specimen: Blood Updated: 11/17/22 0324    Narrative:      The following orders were created for  panel order CBC & Differential.  Procedure                               Abnormality         Status                     ---------                               -----------         ------                     CBC Auto Differential[[id number]]        Abnormal            Final result                 Please view results for these tests on the individual orders.    CBC Auto Differential [[id number]]  (Abnormal) Collected: 11/17/22 0311    Specimen: Blood Updated: 11/17/22 0324     WBC 4.70 10*3/mm3      RBC 3.27 10*6/mm3      Hemoglobin 8.6 g/dL      Hematocrit 28.4 %      MCV 86.8 fL      MCH 26.4 pg      MCHC 30.4 g/dL      RDW 16.5 %      RDW-SD 50.8 fl      MPV 7.4 fL      Platelets 149 10*3/mm3      Neutrophil % 67.1 %      Lymphocyte % 24.3 %      Monocyte % 6.4 %      Eosinophil % 1.3 %      Basophil % 0.9 %      Neutrophils, Absolute 3.20 10*3/mm3      Lymphocytes, Absolute 1.20 10*3/mm3      Monocytes, Absolute 0.30 10*3/mm3      Eosinophils, Absolute 0.10 10*3/mm3      Basophils, Absolute 0.00 10*3/mm3      nRBC 0.1 /100 WBC     Blood Gas, Arterial - [[id number]]  (Abnormal) Collected: 11/16/22 1934    Specimen: Arterial Blood Updated: 11/16/22 2014     Site Left Radial     Landon's Test Positive     pH, Arterial 7.293 pH units      pCO2, Arterial 71.8 mm Hg      pO2, Arterial 109.7 mm Hg      HCO3, Arterial 34.8 mmol/L      Base Excess, Arterial 6.7 mmol/L      Comment: Serial Number: [id number]:  [id number]        O2 Saturation, Arterial 97.4 %      CO2 Content 37.0 mmol/L      Barometric Pressure for Blood Gas --     Comment: N/A        Modality BiPap     FIO2 40 %      Set Tidal Volume 500     PEEP 5     PSV 15 cmH2O      Hemodilution No     Respiratory Rate 22    POC Glucose Once [[id number]]  (Abnormal) Collected: 11/16/22 1732    Specimen: Blood Updated: 11/16/22 1733     Glucose 126 mg/dL      Comment: Serial Number: [id number]:  [id number]       Respiratory Panel PCR w/COVID-19(SARS-CoV-2)  SAPNA/POPEYE/BEULAH/PAD/COR/MAD/NATHANIEL In-House, NP Swab in UTM/VTM, 3-4 HR TAT - Swab, Nasopharynx [[id number]]  (Normal) Collected: 11/16/22 1608    Specimen: Swab from Nasopharynx Updated: 11/16/22 1705     ADENOVIRUS, PCR Not Detected     Coronavirus 229E Not Detected     Coronavirus HKU1 Not Detected     Coronavirus NL63 Not Detected     Coronavirus OC43 Not Detected     COVID19 Not Detected     Human Metapneumovirus Not Detected     Human Rhinovirus/Enterovirus Not Detected     Influenza A PCR Not Detected     Influenza B PCR Not Detected     Parainfluenza Virus 1 Not Detected     Parainfluenza Virus 2 Not Detected     Parainfluenza Virus 3 Not Detected     Parainfluenza Virus 4 Not Detected     RSV, PCR Not Detected     Bordetella pertussis pcr Not Detected     Bordetella parapertussis PCR Not Detected     Chlamydophila pneumoniae PCR Not Detected     Mycoplasma pneumo by PCR Not Detected    Narrative:      In the setting of a positive respiratory panel with a viral infection PLUS a negative procalcitonin without other underlying concern for bacterial infection, consider observing off antibiotics or discontinuation of antibiotics and continue supportive care. If the respiratory panel is positive for atypical bacterial infection (Bordetella pertussis, Chlamydophila pneumoniae, or Mycoplasma pneumoniae), consider antibiotic de-escalation to target atypical bacterial infection.          PENDING RESULTS:     IMAGING REVIEWED:  XR Chest 1 View    Result Date: 11/17/2022  Suspect mild interstitial edema, with bibasilar atelectasis  Electronically Signed ByDerek Riddle On:11/17/2022 10:15 AM This report was finalized on [id number] by  Michael Riddle, .    US Renal Bilateral    Result Date: 11/17/2022   1. Normal ultrasound appearance the kidneys with no obstruction 2. Hepatic steatosis  Electronically Signed ByDerek Riddle On:11/17/2022 7:53 AM This report was finalized on [id number] by  Michael Riddle,  .      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  With history of hospitalization and immobility, continued cigarette smoking, and elevated BMI, her thrombosis thought to be provoked.  At this point, no hereditary testing for inherited predisposition to thrombosis is justified.  She will continue on unfractionated heparin and tail oral anticoagulation preferred by her insurance is discovered and she should be transition to oral anticoagulation for discharge.  Given the finding of chronic thrombus in left lower pulmonary artery, and high risk of recurrence, she may benefit from long-term anticoagulation.      Bilateral pulmonary emboli  1. Continue with the same anticoagulation for now. Dicussed with her the plans   2. She will probably receive long term anticoagulaiton  3. Will continue to follow.     Javy King MD on 11/17/2022 at 17:11

## 2022-11-17 NOTE — PROGRESS NOTES
Critical Care Progress Note   Ann Álvarez : 1960 MRN:[id number] LOS:1  Rm: 2311/     Principal Problem: Acute pulmonary embolism without acute cor pulmonale (HCC)     Reason for follow up: All the medical problems listed below    Summary   Patient presented to [facility] on 2022 with complaint of shortness of breath.  Patient presented to emergency room with shortness of breath, and associated chest pain and back pain.  She reported that she was [facility] last week for around 6 days with sepsis secondary to UTI and required intubation for 2 days.  According to PCP notes the UTI had resolved prior to coming to our emergency room.  2 days before coming the emergency room she reported having increased shortness of air, left-sided chest pain, and a tightness that radiates into her shoulder blade and lower back.  At baseline she is on 4 L at home.  She was admitted to the hospitalist group.  CT's chest showed small distal segmental and subsegmental emboli in the right lower lobe.  There was no evidence of right heart strain.  She had small eccentric nonocclusive thrombus in the left lower lobe pulmonary artery proximally, which looks chronic in nature.  Repeat UA was positive for nitrites and 3+ bacteria.  She was started on Rocephin for her UTI.  Her lactic was noted at 0.9 on admission.  Hematology/oncology was consulted on  for her bilateral PEs.  With her recent illness and immobility it is likely that these are provoked thrombus.  Pulmonology was also consulted on 11/15/2022 for her shortness of breath.  On 2022 she spiked a fever and started becoming more hypercapnic and hypoxic.  Urine culture showed Klebsiella pneumoniae.  Apparently at some point patient started become more lethargic and ABG was obtained which showed CO2 in the 90s.  Intensivist was consulted and NP went to intubate patient, however she woke up and stated to the NP, I do not want to be  "intubated\".  Patient was placed on AVAPS and moved to the ICU for closer monitoring.  She discussed with primary RN and intensivist NP that AVAPS was improving her gases, and that we would try to avoid intubation if possible.  His family was at bedside and agreed with plan.    Multidisciplinary Rounds     11/17/22 : The patient feels much better today, shortness of air has improved.  Used AVAPS overnight but now tolerating Airvo.  Very anxious at times but nonspecific.  Stable for transfer out of the ICU    Assessment / Plan   Shortness of air/chest pain  Acute on chronic hypoxemic respiratory failure  -4L O2 NC, baseline  -AVAPS nocturnal and as needed alternating with Airvo as tolerated  -Chest x-ray no acute chest findings.  -D-dimer 1.47  -CTA chest Small distal segmental and subsegmental emboli are present in the right lower lobe. No evidence of right heart strain.   -Echo 11/15/2022 56 to 60%  -Echo 3/29/2022 EF 66 to 70%.  Moderate tricuspid valve regurgitation.  -Troponin negative  -Bilateral lower extremity venous duplex negative  -Heme-onc consult for anticoagulation management     Sepsis secondary to acute UTI  Urine culture positive for Klebsiella pneumonia  -Continue ceftriaxone, first dose in emergency department     COPD  -Continue Symbicort  -DuoNebs PRN     DM2  -A1c 6.8  -Hold home medicines while in the ICU  -Initiate SSI  -Glucose checks ACHS     Hypertension  -Continue home amlodipine   -Continue home lisinopril   -Continue home clonidine  -Continue home hydralazine as needed (prescribed outpatient as prn only)      Restless leg syndrome  -Continue doxepin      Anxiety/depression/PTSD  -Continue home Valium     Obesity (41.79 kg/m²)/tobacco abuse  -Lifestyle modifications  -Smoking cessation     Code Status: FULL  VTE Prophylaxis: Eliquis  PUD Prophylaxis: Not indicated, Diet ordered    Code status:   Level Of Support Discussed With: Patient  Code Status (Patient has no pulse and is not " breathing): CPR (Attempt to Resuscitate)  Medical Interventions (Patient has pulse or is breathing): Full Support       Nutrition: Diet Regular Texture (IDDSI 7); Regular Consistency; Regular/House Diet     DVT prophylaxis:   Mechanical Order History:     None      Pharmalogical Order History:      Ordered     Dose Route Frequency Stop    11/16/22 1013  apixaban (ELIQUIS) tablet 5 mg        See Hyperspace for full Linked Orders Report.    5 mg PO 2 Times Daily --    11/16/22 1013  apixaban (ELIQUIS) tablet 10 mg        See Hyperspace for full Linked Orders Report.    10 mg PO 2 Times Daily 11/23/22 0859    11/14/22 1517  heparin bolus from bag 8,600 Units         80 Units/kg IV Once 11/14/22 1542    11/14/22 1517  heparin 30336 units/250 mL (100 units/mL) in 0.45 % NaCl infusion  14.98 mL/hr,   Status:  Discontinued         14 Units/kg/hr IV Titrated 11/16/22 1013    11/14/22 1517  heparin bolus from bag 8,600 Units  Status:  Discontinued         80 Units/kg IV Every 6 Hours PRN 11/16/22 1022    11/14/22 1517  heparin bolus from bag 4,300 Units  Status:  Discontinued         40 Units/kg IV Every 6 Hours PRN 11/16/22 1022                 Subjective / Review of systems     Review of Systems   Constitutional: Positive for fatigue. Negative for appetite change and diaphoresis.   Respiratory: Positive for shortness of breath. Negative for cough and chest tightness.    Cardiovascular: Positive for leg swelling. Negative for chest pain.   Gastrointestinal: Negative for abdominal distention, abdominal pain, nausea and vomiting.   Musculoskeletal: Positive for arthralgias. Negative for back pain and joint swelling.   Psychiatric/Behavioral: Positive for agitation. The patient is nervous/anxious.         Objective / Physical Exam     Vital signs:  Temp: 98.4 °F (36.9 °C)  BP: 120/49  Heart Rate: 64  Resp: 16  SpO2: 96 %  Weight: 111 kg (244 lb 0.8 oz)    Admission Weight: Weight: 107 kg (235 lb 14.3 oz)  Current Weight:  Weight: 111 kg (244 lb 0.8 oz)    Input/Output in last 24 hours:    Intake/Output Summary (Last 24 hours) at 11/17/2022 1420  Last data filed at 11/17/2022 1100  Gross per 24 hour   Intake 240 ml   Output 600 ml   Net -360 ml      Net IO Since Admission: 1,060 mL [11/17/22 1420]     Physical Exam  Vitals and nursing note reviewed.   Constitutional:       General: She is not in acute distress.     Appearance: Normal appearance. She is obese. She is ill-appearing. She is not diaphoretic.   HENT:      Head: Normocephalic and atraumatic.      Right Ear: External ear normal.      Left Ear: External ear normal.      Nose: Nose normal.   Eyes:      General: No scleral icterus.     Conjunctiva/sclera: Conjunctivae normal.      Pupils: Pupils are equal, round, and reactive to light.   Cardiovascular:      Rate and Rhythm: Normal rate and regular rhythm.      Heart sounds: Murmur heard.     No friction rub. No gallop.   Pulmonary:      Effort: Pulmonary effort is normal. No respiratory distress.      Breath sounds: Wheezing and rhonchi present.   Chest:      Chest wall: No tenderness.   Abdominal:      General: Bowel sounds are normal. There is no distension.      Palpations: Abdomen is soft.      Tenderness: There is no abdominal tenderness. There is no guarding or rebound.      Comments: Morbidly obese   Musculoskeletal:         General: No tenderness.      Cervical back: Normal range of motion and neck supple. No rigidity.      Right lower leg: Edema present.      Left lower leg: Edema present.   Skin:     General: Skin is warm and dry.      Capillary Refill: Capillary refill takes less than 2 seconds.      Coloration: Skin is not jaundiced.      Findings: No rash.   Neurological:      General: No focal deficit present.      Mental Status: She is oriented to person, place, and time.   Psychiatric:      Comments: Anxious          Radiology and Labs     Results from last 7 days   Lab Units 11/17/22  0311 11/16/22  7159  11/15/22  0035 11/14/22  1237   WBC 10*3/mm3 4.70 5.90 6.10 5.50   HEMATOCRIT % 28.4* 32.2* 32.2* 33.4*   PLATELETS 10*3/mm3 149 170 188 222      Results from last 7 days   Lab Units 11/17/22  0311 11/16/22  1324 11/16/22  0557 11/15/22  0035 11/14/22  1237   SODIUM mmol/L 140 141  --  140 139   POTASSIUM mmol/L 4.8 4.3 4.7 4.4 3.9   CHLORIDE mmol/L 102 99  --  98 97*   CO2 mmol/L 34.0* 35.0*  --  36.0* 36.0*   BUN mg/dL 7* 8  --  10 11   CREATININE mg/dL 0.92 1.01*  --  0.75 0.83        Current medications     Scheduled Meds:   amLODIPine, 10 mg, Oral, Q24H  apixaban, 10 mg, Oral, BID   Followed by  [START ON 11/23/2022] apixaban, 5 mg, Oral, BID  budesonide-formoterol, 2 puff, Inhalation, BID - RT  cefTRIAXone, 2 g, Intravenous, Q24H  cloNIDine, 0.3 mg, Oral, Q8H  insulin lispro, 2-7 Units, Subcutaneous, TID With Meals  lisinopril, 40 mg, Oral, Q24H  senna-docusate sodium, 2 tablet, Oral, BID  sodium chloride, 10 mL, Intravenous, Q12H  topiramate, 50 mg, Oral, Daily  traZODone, 100 mg, Oral, Nightly        Continuous Infusions:        Plan discussed with RN. Reviewed all other data in the last 24 hours, including but not limited to vitals, labs, microbiology, imaging and pertinent notes from other providers.       CORI Godoy   Critical Care  11/17/22   14:20 EST

## 2022-11-17 NOTE — PROGRESS NOTES
[facility] Daily Progress Note    Patient Name: Ann Álvarez  : 1960  MRN: [id number]  Primary Care Physician:  Jose Enrique Walters MD  Date of admission: 2022      Subjective      Chief Complaint: Shortness of breath.      Patient Reports:      11/15/2022.  Patient was seen and examined.  Patient reported no overnight events.      2022.  Patient was seen and examined.  Patient reported having fever last night.  Blood cultures were completed.  Infectious disease consult order was placed.      2022.  Patient was seen and examined.  Patient reported no new symptoms.  Patient reported no fever last night.      Review of Systems   Constitutional: Negative for chills and fever.   HENT: Negative.    Eyes: Negative.    Cardiovascular: Negative.    Respiratory: Negative.    Endocrine: Negative.    Hematologic/Lymphatic: Negative.    Skin: Negative.    Musculoskeletal: Negative.    Gastrointestinal: Negative.    Genitourinary: Negative.    Neurological: Negative.    Psychiatric/Behavioral: Negative.    Allergic/Immunologic: Negative.             Objective      Vitals:   Temp:  [96.9 °F (36.1 °C)-98.4 °F (36.9 °C)] 98.4 °F (36.9 °C)  Heart Rate:  [48-78] 64  Resp:  [16-22] 16  BP: ()/(34-67) 120/49  Flow (L/min):  [4-50] 50    Physical Exam  Vitals reviewed.   Constitutional:       General: She is not in acute distress.     Appearance: She is obese.      Comments: Patient is lying comfortably in bed.  Family is at the bedside.   HENT:      Head: Normocephalic and atraumatic.      Nose: Nose normal. No congestion or rhinorrhea.      Mouth/Throat:      Mouth: Mucous membranes are moist.      Pharynx: Oropharynx is clear. No oropharyngeal exudate or posterior oropharyngeal erythema.   Eyes:      Pupils: Pupils are equal, round, and reactive to light.   Cardiovascular:      Pulses: Normal pulses.      Heart sounds: Normal heart sounds. No murmur heard.    No  friction rub. No gallop.      Comments: S1 and S2 present.  No tachycardia.  Pulmonary:      Effort: No respiratory distress.      Breath sounds: No wheezing, rhonchi or rales.      Comments: Decreased air entry bilaterally.  Chest:      Chest wall: No tenderness.   Abdominal:      General: Abdomen is flat. Bowel sounds are normal. There is no distension.      Palpations: Abdomen is soft.      Tenderness: There is no right CVA tenderness.   Musculoskeletal:         General: No swelling, tenderness, deformity or signs of injury.      Cervical back: Neck supple. No tenderness.      Right lower leg: No edema.      Left lower leg: No edema.   Skin:     Capillary Refill: Capillary refill takes less than 2 seconds.      Coloration: Skin is not jaundiced or pale.      Findings: No bruising, erythema, lesion or rash.   Neurological:      Mental Status: She is alert.      Comments: No facial asymmetry noted.  Gait and station not tested.   Psychiatric:      Comments: No agitation.                 Result Review    Result Review:  I have personally reviewed the results from the time of this admission to 11/17/2022 14:32 EST and agree with these findings:  [x]  Laboratory  [x]  Microbiology  [x]  Radiology  []  EKG/Telemetry   []  Cardiology/Vascular   []  Pathology  []  Old records  []  Other:  Most notable findings include:      CT pulmonary angiogram showed:      Comparison: CT chest 6/27/2022.       Technique: Serial and axial CT images of the chest were obtained   following the uneventful intravenous administration of 100 cc Isovue-370   contrast. Reconstructions in the coronal and sagittal planes were also   performed. In addition, a 3 D volume rendered image was obtained after   post processing. Automated exposure control and iterative reconstruction   methods were used.       FINDINGS:       Acute-appearing small distal segmental and subsegmental emboli are   present within the right lower lobe. Small eccentric  nonocclusive   embolism is seen in the proximal left lower lobe pulmonary artery, which   appears chronic.       There is no indication of right heart strain. RV LV ratio is   approximately 0.8. Heart size is within normal limits. No pericardial   effusion or pleural effusion is seen.       There are slightly enlarged and mediastinal lymph nodes, including a   right lower paratracheal lymph node measuring 13 mm short axis   (previously 10 mm), prevascular node measuring 13 mm (previously 9 mm),   subcarinal node measuring 14 mm short axis (previously 9 mm). There is   an enlarged right hilar node measuring 17 mm short axis.       Present in both lungs with faint groundglass densities, in a pattern   suggestive of mild pulmonary edema. There is bandlike subsegmental   atelectasis in the left lower lobe and right middle lobe. No dense   consolidations are identified.       No pericardial effusion or pleural effusion is seen.       Cholecystectomy changes are present. The included portions of the upper   abdominal organs are within normal limits. There is mild scarring of the   spleen.       No acute or suspicious osseous abnormalities are identified.                   Impression:     1. Small distal segmental and subsegmental emboli are present in the   right lower lobe. No evidence of right heart strain. I notified the   emergency room physician at the time of this dictation.   2. Small eccentric nonocclusive thrombus is demonstrated in the left   lower lobe pulmonary artery proximally. This has a chronic appearance.   3. Features of mild interstitial and alveolar edema.   4. Subsegmental atelectasis in the right middle lobe and left lower   lobe.       Electronically Signed By-Jocelyn Zayas MD On:11/14/2022 3:17 PM   This report was finalized on [id number] by  Jocelyn Zayas MD.         Assessment & Plan    From previous notes and with minor updates.      Brief Patient Summary:      Patient is a 62 y.o. female   with past medical history of diabetes mellitus, COPD, chronic respiratory failure with hypoxia on 2 L nasal cannula continuous, hypertension, hyperlipidemia, osteoarthritis, anxiety disorder, chronic low back pain, morbid obesity, insomnia, PTSD, restless leg syndrome tobacco use disorder who presented to [facility] on 11/14/2022 complaining of shortness of air, chest pain and back pain.  Patient reports that she was in [facility] for 6 days around [facility] for UTI, sepsis and was on a ventilator for 2 days.  Since then according to primary care provider records the UTI had cleared.  She reports that 2 days ago she began having shortness of air, left-sided chest pain, that she describes as a tightness, that radiates into her shoulder blade and low back pain.  She is on her baseline home 4 L O2 at 95%.  She states nothing makes her chest tightness or back pain any better.  She denies having any lightheadedness, dizziness or syncopal episode.  She denies having any vomiting but has felt nauseous.  She denies she denies any abdominal pain, constipation or diarrhea.  She reports bilateral lower extremity swelling but feels this is nothing out of the ordinary.  We have been asked to admit this patient for further evaluation and treatment.  Patient was seen in the emergency room and emergency department work-up vital signs stable 4 L nasal cannula at 95%.  UA positive nitrate with 3+ bacteria.  Glucose 142.  Creatinine 0.83.  Chloride 97.  CO2 36.0.  Troponin negative.  D-dimer 1.47.  White blood count 5.50.  Hemoglobin 10.5.  Platelets 222.  Lactate 0.9.  Blood culture pending, urine culture pending.  CTA chest Small distal segmental and subsegmental emboli are present in the right lower lobe. No evidence of right heart strain. Small eccentric nonocclusive thrombus is demonstrated in the left lower lobe pulmonary artery proximally. This has a chronic appearance.Features of mild interstitial and  alveolar edema.Subsegmental atelectasis in the right middle lobe and left lower lobe.  Pulmonary consult was completed.    amLODIPine, 10 mg, Oral, Q24H  apixaban, 10 mg, Oral, BID   Followed by  [START ON 11/23/2022] apixaban, 5 mg, Oral, BID  budesonide-formoterol, 2 puff, Inhalation, BID - RT  cefTRIAXone, 2 g, Intravenous, Q24H  cloNIDine, 0.3 mg, Oral, Q8H  insulin lispro, 2-7 Units, Subcutaneous, TID With Meals  lisinopril, 40 mg, Oral, Q24H  senna-docusate sodium, 2 tablet, Oral, BID  sodium chloride, 10 mL, Intravenous, Q12H  topiramate, 50 mg, Oral, Daily  traZODone, 100 mg, Oral, Nightly             Active Hospital Problems:  Active Hospital Problems    Diagnosis    • **Acute pulmonary embolism without acute cor pulmonale (HCC)    • Acute cystitis without hematuria    • Shortness of breath    • Mixed hyperlipidemia    • KELTON (generalized anxiety disorder)    • Morbid obesity (HCC)    • Chronic back pain    • PTSD (post-traumatic stress disorder)    • RLS (restless legs syndrome)    • Chronic obstructive pulmonary disease (HCC)    • Chronic respiratory failure with hypoxia (HCC)    • Insomnia    • Tobacco use disorder    • Primary hypertension          Plan:      -Continue appropriate patient's home medications for other chronic medical conditions.  -Continue the present level of care.  -Patient and family agreed with the plan of care.  -Treat acute pulmonary embolism with anticoagulation.  -Follow pulmonary recommendations.  -Treat acute cystitis with antibiotics.  -Follow cultures.  -Complete infectious disease consult.  -Continue to monitor blood cultures.          DVT prophylaxis:  Medical DVT prophylaxis orders are present.    CODE STATUS:    Level Of Support Discussed With: Patient  Code Status (Patient has no pulse and is not breathing): CPR (Attempt to Resuscitate)  Medical Interventions (Patient has pulse or is breathing): Full Support      Disposition: Patient can discharge in the next 48  hours.    This patient has been examined wearing appropriate Personal Protective Equipment and discussed with hospital infection control department, [facility] department, infectious disease specialist and pulmonologist. 11/17/22      Electronically signed by Scottie Colin MD, FACP, 11/17/22, 14:32 EST.      [facility] Hospitalist Team

## 2022-11-17 NOTE — PROGRESS NOTES
Daily Progress Note          Assessment      Shortness of breath  Pulmonary embolism: No DVT on lower extremity venous Doppler studies  Pulmonary hypertension post PE  Chronic hypoxemia  Bilateral subsegmental atelectasis  COPD  History of tobacco smoking: Quit 6/27/2022  Obesity  Diabetes mellitus type 2  Dyslipidemia  Hepatic steatosis  Essential hypertension  RLS  History of insomnia and PTSD  Normocytic anemia    Results for orders placed during the hospital encounter of 11/14/22    Adult Transthoracic Echo Complete w/ Color, Spectral and Contrast if Necessary Per Protocol    Interpretation Summary  •  Left ventricular systolic function is normal. Left ventricular ejection fraction appears to be 56 - 60%.  •  Left ventricular diastolic function was normal.  •  Estimated right ventricular systolic pressure from tricuspid regurgitation is markedly elevated (>55 mmHg).         Recommendations:  Oxygen supplement and titration maintain saturation 90 to 95%: Currently improving and tolerating Airvo  Chest x-ray 11/17/2022 with mild interstitial edema and bibasilar atelectasis  Reassess echo in 3 months to evaluate pulmonary hypertension  Bronchodilators  Symbicort  Mucinex  Anticoagulation: IV heparin with plans to switch to oral anticoagulation for lifelong  Blood pressure control  Glucose control  ID consulted for the fever               LOS: 1 day     Subjective     Patient feels better today she is more awake she was afebrile overnight currently tolerating Airvo    Objective     Vital signs for last 24 hours:  Vitals:    11/17/22 1547 11/17/22 1600 11/17/22 1700 11/17/22 1710   BP:  127/52 124/54    Pulse:  62 62    Resp:       Temp:    98 °F (36.7 °C)   TempSrc:    Oral   SpO2: 96% 95% 97%    Weight:       Height:           Intake/Output last 3 shifts:  I/O last 3 completed shifts:  In: 720 [P.O.:720]  Out: 100 [Urine:100]  Intake/Output this shift:  I/O this shift:  In: 439.7 [P.O.:240; I.V.:199.7]  Out: 500  [Urine:500]      Radiology  Imaging Results (Last 24 Hours)     Procedure Component Value Units Date/Time    XR Chest 1 View [[id number]] Collected: 11/17/22 1013     Updated: 11/17/22 1017    Narrative:      DATE OF EXAM:  11/17/2022 9:30 AM     PROCEDURE:  XR CHEST 1 VW-     INDICATIONS:  respiratory failure; R06.02-Shortness of breath; R07.9-Chest pain,  unspecified; N39.0-Urinary tract infection, site not specified;  I26.99-Other pulmonary embolism without acute cor pulmonale     COMPARISON:  No Comparisons Available     TECHNIQUE:   Single radiographic AP view of the chest was obtained.     FINDINGS:  Heart size borderline. Pulmonary vasculature is somewhat prominent.  There is some mild increased interstitial markings in the lower lung  zones with atelectasis in the lung bases. Costophrenic angles are sharp        Impression:      Suspect mild interstitial edema, with bibasilar atelectasis     Electronically Signed By-Michael Riddle On:11/17/2022 10:15 AM  This report was finalized on [id number] by  Michael Riddle, .    US Renal Bilateral [[id number]] Collected: 11/17/22 0752     Updated: 11/17/22 0755    Narrative:      DATE OF EXAM:  11/17/2022 4:24 AM     PROCEDURE:  US RENAL BILATERAL-     INDICATIONS:  UTI to rule out obstructive uropathy; R06.02-Shortness of breath;  R07.9-Chest pain, unspecified; N39.0-Urinary tract infection, site not  specified; I26.99-Other pulmonary embolism without acute cor pulmonale     COMPARISON:  No Comparisons Available     TECHNIQUE:   Grayscale and color Doppler ultrasound evaluation of the kidneys and  urinary bladder was performed.        FINDINGS:  Right kidney measures 10.6 cm in length. Left kidney measures 12.6 cm in  length. Both kidneys are normal in echogenicity with no hydronephrosis.  Urinary bladder is incompletely distended. Incidental note is made of  increased echogenicity of the liver        Impression:         1. Normal ultrasound appearance the kidneys  with no obstruction  2. Hepatic steatosis     Electronically Signed By-Michael Riddle On:11/17/2022 7:53 AM  This report was finalized on [id number] by  Michael Riddle, .          Labs:  Results from last 7 days   Lab Units 11/17/22  0311   WBC 10*3/mm3 4.70   HEMOGLOBIN g/dL 8.6*   HEMATOCRIT % 28.4*   PLATELETS 10*3/mm3 149     Results from last 7 days   Lab Units 11/17/22  0311   SODIUM mmol/L 140   POTASSIUM mmol/L 4.8   CHLORIDE mmol/L 102   CO2 mmol/L 34.0*   BUN mg/dL 7*   CREATININE mg/dL 0.92   CALCIUM mg/dL 8.9   BILIRUBIN mg/dL 0.2   ALK PHOS U/L 51   ALT (SGPT) U/L 6   AST (SGOT) U/L 6   GLUCOSE mg/dL 132*     Results from last 7 days   Lab Units 11/16/22  1934   PH, ARTERIAL pH units 7.293*   PO2 ART mm Hg 109.7*   PCO2, ARTERIAL mm Hg 71.8*   HCO3 ART mmol/L 34.8*     Results from last 7 days   Lab Units 11/17/22  0311 11/14/22  1237   ALBUMIN g/dL 3.40* 3.60     Results from last 7 days   Lab Units 11/15/22  0035 11/14/22  1912 11/14/22  1237   TROPONIN T ng/mL <0.010 <0.010 <0.010         Results from last 7 days   Lab Units 11/17/22  0311   MAGNESIUM mg/dL 1.9     Results from last 7 days   Lab Units 11/17/22  0311 11/16/22  0557 11/15/22  2306 11/14/22  2149 11/14/22  1237   INR   --   --   --   --  0.99   APTT seconds 29.9* 32.8* 63.4   < > 28.8*    < > = values in this interval not displayed.               Meds:   SCHEDULE  amLODIPine, 10 mg, Oral, Q24H  apixaban, 10 mg, Oral, BID   Followed by  [START ON 11/23/2022] apixaban, 5 mg, Oral, BID  budesonide-formoterol, 2 puff, Inhalation, BID - RT  cefTRIAXone, 2 g, Intravenous, Q24H  cloNIDine, 0.3 mg, Oral, Q8H  insulin lispro, 2-7 Units, Subcutaneous, TID With Meals  lisinopril, 40 mg, Oral, Q24H  senna-docusate sodium, 2 tablet, Oral, BID  sodium chloride, 10 mL, Intravenous, Q12H  topiramate, 50 mg, Oral, Daily  traZODone, 100 mg, Oral, Nightly      Infusions     PRNs  •  acetaminophen **OR** acetaminophen **OR** acetaminophen  •   aluminum-magnesium hydroxide-simethicone  •  senna-docusate sodium **AND** polyethylene glycol **AND** bisacodyl **AND** bisacodyl  •  dextrose  •  dextrose  •  diazePAM  •  doxepin  •  glucagon (human recombinant)  •  hydrALAZINE  •  ipratropium-albuterol  •  magnesium sulfate **OR** magnesium sulfate in D5W 1g/100mL (PREMIX)  •  melatonin  •  Morphine  •  nitroglycerin  •  ondansetron **OR** ondansetron  •  oxyCODONE  •  oxyCODONE  •  potassium chloride  •  potassium chloride  •  [COMPLETED] Insert peripheral IV **AND** sodium chloride  •  sodium chloride  •  zolpidem    Physical Exam:  General Appearance:  Alert   HEENT:  Normocephalic, without obvious abnormality, Conjunctiva/corneas clear,.   Nares normal, no drainage     Neck:  Supple, symmetrical, trachea midline.   Lungs /Chest wall: Minimal bilateral basal rhonchi, respirations unlabored, symmetrical wall movement.     Heart:  Regular rate and rhythm, S1 S2 normal  Abdomen: Soft, non-tender, no masses, no organomegaly.    Extremities: No edema, no clubbing or cyanosis constitutional: Negative for     ROS  Constitutional: Negative for chills, positive for fever and malaise/fatigue.   HENT: Negative.    Eyes: Negative.    Cardiovascular: Negative.    Respiratory: Positive for shortness of breath.    Skin: Negative.    Musculoskeletal: Negative.    Gastrointestinal: Negative.    Genitourinary: Negative.    Neurological: Negative.    Psychiatric/Behavioral: Negative.      I reviewed the recent clinical results    Much of this encounter note is an electronic transcription/translation of spoken language to printed text using Dragon Software which might include inadvertent errors in transcription.

## 2022-11-17 NOTE — CONSULTS
[facility] CRITICAL CARE CONSULT NOTE     PATIENT NAME:  Ann Álvarez       :  1960      MRN:  [id number]      ROOM:  [facility] 2311/1     PRIMARY CARE PROVIDER  Jose Enrique Walters MD    REFERRING PROVIDER     Scottie Colin MD     REASON FOR CONSULTATION  Critical care management    SUBJECTIVE     CHIEF COMPLAINT:   Shortness of breath    HISTORY OF PRESENT ILLNESS:  Ann Álvarez is a 62 y.o. female  has a past medical history of Anxiety, Arthritis, Chronic back pain, Chronic obstructive pulmonary disease ([facility]) (2020), Chronic respiratory failure with hypoxia ([facility]) (2018), Diabetes mellitus ([facility]), Dyslipidemia (10/21/2014), Edema, lower extremity (2014), Hypertension, Insomnia, Obesity, Class III, BMI 40-49.9 (morbid obesity) ([facility]) (2022), Prediabetes (2018), Primary hypertension (2012), PTSD (post-traumatic stress disorder), RLS (restless legs syndrome) (2020), Tobacco abuse (10/21/2014), and Vitamin D deficiency (2012).   Patient presented to [facility] on 2022 with complaint of shortness of breath.  Patient presented to emergency room with shortness of breath, and associated chest pain and back pain.  She reported that she was [facility] last week for around 6 days with sepsis secondary to UTI and required intubation for 2 days.  According to PCP notes the UTI had resolved prior to coming to our emergency room.  2 days before coming the emergency room she reported having increased shortness of air, left-sided chest pain, and a tightness that radiates into her shoulder blade and lower back.  At baseline she is on 4 L at home.  She was admitted to the hospitalist group.  CT's chest showed small distal segmental and subsegmental emboli in the right lower lobe.  There was no evidence of right heart strain.  She had small eccentric nonocclusive thrombus in the left lower lobe pulmonary artery proximally, which looks chronic in  "nature.  Repeat UA was positive for nitrites and 3+ bacteria.  She was started on Rocephin for her UTI.  Her lactic was noted at 0.9 on admission.  Hematology/oncology was consulted on 1115 for her bilateral PEs.  With her recent illness and immobility it is likely that these are provoked thrombus.  Pulmonology was also consulted on 11/15/2022 for her shortness of breath.  On 11/16/2022 she spiked a fever and started becoming more hypercapnic and hypoxic.  Urine culture showed Klebsiella pneumoniae.  Apparently at some point patient started become more lethargic and ABG was obtained which showed CO2 in the 90s.  Intensivist was consulted and NP went to intubate patient, however she woke up and stated to the NP, I do not want to be intubated\".  Patient was placed on AVAPS and moved to the ICU for closer monitoring.  She discussed with primary RN and intensivist NP that AVAPS was improving her gases, and that we would try to avoid intubation if possible.  His family was at bedside and agreed with plan.    Intensivist consultation was requested for further evaluation and treatment of patient condition.      Thank you for this consultation, we will follow along on this patient.        REVIEW OF SYSTEMS:  Pertinent items are noted in HPI, all other systems reviewed and negative      ASSESSMENT & PLAN     Shortness of air/chest pain  -4L O2 NC, baseline  -Currently on AVAPS  -At risk for needing intubation  -Chest x-ray no acute chest findings.  -D-dimer 1.47  -CTA chest Small distal segmental and subsegmental emboli are present in the right lower lobe. No evidence of right heart strain.   -Echo, pending  -Echo 3/29/2022 EF 66 to 70%.  Moderate tricuspid valve regurgitation.  -lactate 0.9  -Troponin negative  -Bilateral lower extremity venous duplex  -Heme-onc consult for anticoagulation management     Sepsis 2/2/ to UTI, acute  -UA positive for nitrites and 3+ bacteria  -Rocephin started in emergency department, " continue  -Creatinine 0.83  -BUN 11  -Culture pending     COPD  -Continue Symbicort  -DuoNebs PRN     DM2  -A1c 6.8  -Hold home medicines  -Initiate SSI  -Glucose checks ACHS     Hypertension  -Continue home amlodipine   -Continue home lisinopril   -Continue home clonidine  -Continue home hydralazine once clinically appropriate      Restless leg syndrome  -Continue doxepin      Anxiety/depression/PTSD  -Continue home Valium     Obesity (41.79 kg/m²)/tobacco abuse  -Lifestyle modifications  -Smoking cessation    Code Status: FULL  VTE Prophylaxis: Eliquis  PUD Prophylaxis: Diet ordered    HOSPITAL MEDICATIONS     SCHEDULED MEDICATIONS:  amLODIPine, 10 mg, Oral, Q24H  apixaban, 10 mg, Oral, BID   Followed by  [START ON 11/23/2022] apixaban, 5 mg, Oral, BID  budesonide-formoterol, 2 puff, Inhalation, BID - RT  cefTRIAXone, 2 g, Intravenous, Q24H  cloNIDine, 0.3 mg, Oral, Q8H  etomidate, , ,   fentaNYL citrate (PF), , ,   insulin lispro, 2-7 Units, Subcutaneous, TID With Meals  lisinopril, 40 mg, Oral, Q24H  midazolam, , ,   Propofol, , ,   senna-docusate sodium, 2 tablet, Oral, BID  sodium chloride, 10 mL, Intravenous, Q12H  topiramate, 50 mg, Oral, Daily  traZODone, 100 mg, Oral, Nightly         CONTINUOUS INFUSIONS:          PRN MEDICATIONS:   •  acetaminophen **OR** acetaminophen **OR** acetaminophen  •  aluminum-magnesium hydroxide-simethicone  •  senna-docusate sodium **AND** polyethylene glycol **AND** bisacodyl **AND** bisacodyl  •  dextrose  •  dextrose  •  diazePAM  •  doxepin  •  glucagon (human recombinant)  •  hydrALAZINE  •  ipratropium-albuterol  •  magnesium sulfate **OR** magnesium sulfate in D5W 1g/100mL (PREMIX)  •  melatonin  •  nitroglycerin  •  ondansetron **OR** ondansetron  •  oxyCODONE  •  oxyCODONE  •  potassium chloride  •  potassium chloride  •  [COMPLETED] Insert peripheral IV **AND** sodium chloride  •  sodium chloride  •  zolpidem       OBJECTIVE     VITAL SIGNS:  /62   Pulse 78    "Temp 97.8 °F (36.6 °C) (Axillary)   Resp 22   Ht 160 cm (63\")   Wt 111 kg (244 lb 0.8 oz)   SpO2 (!) 88%   BMI 43.23 kg/m²     Wt Readings from Last 3 Encounters:   11/16/22 111 kg (244 lb 0.8 oz)   07/06/22 101 kg (223 lb)   06/29/22 102 kg (223 lb 15.8 oz)       INTAKE/OUTPUT:  Intake/Output                             11/14/22 0701 - 11/15/22 0700 11/15/22 0701 - 11/16/22 0700 11/16/22 0701 - 11/17/22 0700     4305-4911 3788-9561 Total 4077-1023 0861-2307 Total 5260-2288 7629-0918 Total                    Intake    P.O.  --  -- --  600  240 840  480  -- 480    IV Piggyback  100  -- 100  --  -- --  --  -- --    Total Intake 100 -- 100 600 240 840 480 -- 480       Output    Total Output -- -- -- -- -- -- -- -- --           NET INTAKE/OUTPUT SINCE ADMISSION:  Net IO Since Admission: 1,420 mL [11/16/22 2100]     PHYSICAL EXAM:   Constitutional:  Well developed, well nourished, no acute distress, toxic appearance   Eyes:  PERRL, conjunctiva normal, EOMI   HENT:  Atraumatic, external ears normal, nose normal, oropharynx moist, no pharyngeal exudates. Neck-normal range of motion, no tenderness  Respiratory: Bilateral rhonchi, diminished bases, labored respirations without accessory muscle use  Cardiovascular:  Normal rate, normal rhythm, no murmurs, no gallops, no rubs   GI:  Soft, obese, nondistended, normal bowel sounds, nontender, no rebound or guarding   :  No costovertebral angle tenderness   Musculoskeletal:  No edema, no tenderness, no deformities  Integument:  Well hydrated, no rash, pale, generalized bruising  Neurologic:  Alert & oriented x 3, normal motor function, normal sensory function, no gross motor deficits noted   Psychiatric:  Speech and behavior appropriate      HISTORY     HISTORY:  Past Medical History:   Diagnosis Date   • Anxiety    • Arthritis    • Chronic back pain    • Chronic obstructive pulmonary disease (HCC) 01/31/2020   • Chronic respiratory failure with hypoxia ([facility]) 03/06/2018    " O2 @ 2 L per NC as needed   • Diabetes mellitus (HCC)    • Dyslipidemia 10/21/2014   • Edema, lower extremity 2014   • Hypertension    • Insomnia    • Obesity, Class III, BMI 40-49.9 (morbid obesity) (HCC) 2022   • Prediabetes 2018   • Primary hypertension 2012   • PTSD (post-traumatic stress disorder)    • RLS (restless legs syndrome) 2020   • Tobacco abuse 10/21/2014   • Vitamin D deficiency 2012     Past Surgical History:   Procedure Laterality Date   • BACK SURGERY     • CHOLECYSTECTOMY     • HYSTERECTOMY     • TUMOR REMOVAL      benign breats tumor     Family History   Problem Relation Age of Onset   • Heart disease Mother    • Heart disease Father    • Pancreatic cancer Sister 65   • Lymphoma Brother 65     Social History     Socioeconomic History   • Marital status:    Tobacco Use   • Smoking status: Every Day     Packs/day: 1.00     Types: Cigarettes     Start date:      Last attempt to quit: 2022     Years since quittin.3   • Smokeless tobacco: Never   Vaping Use   • Vaping Use: Never used   Substance and Sexual Activity   • Alcohol use: Not Currently   • Drug use: Never   • Sexual activity: Defer        HOME MEDICATIONS:   Prior to Admission medications    Medication Sig Start Date End Date Taking? Authorizing Provider   amLODIPine (NORVASC) 10 MG tablet Take 1 tablet by mouth Daily. 22  Yes Gabriel Ye MD   budesonide (PULMICORT) 0.5 MG/2ML nebulizer solution Take 2 mL by nebulization 2 (Two) Times a Day.   Yes Aylin Bledsoe MD   cloNIDine (CATAPRES) 0.3 MG tablet Take 1 tablet by mouth Every 8 (Eight) Hours. 22  Yes Gabriel Ye MD   diazePAM (VALIUM) 5 MG tablet Take 1 tablet by mouth 4 (Four) Times a Day As Needed for Anxiety.   Yes Aylin Bledsoe MD   insulin aspart (novoLOG FLEXPEN) 100 UNIT/ML solution pen-injector sc pen Inject 10 Units under the skin into the appropriate area as directed 2 (Two) Times a Day.   Yes  Aylin Bledsoe MD   lisinopril (PRINIVIL,ZESTRIL) 40 MG tablet Take 40 mg by mouth Daily. 7/24/17  Yes Aylin Bledsoe MD   Probiotic Product (PROBIOTIC ADVANCED) capsule Take 1 capsule by mouth Daily. 9/18/17  Yes Aylin Bledsoe MD   sildenafil (REVATIO) 20 MG tablet Take 0.5 tablets by mouth Every 8 (Eight) Hours. 4/5/22  Yes Gabriel Ye MD   topiramate (TOPAMAX) 50 MG tablet Take 50 mg by mouth Daily. 11/17/19  Yes Aylin Bledsoe MD   traZODone (DESYREL) 100 MG tablet Take 100 mg by mouth Every Night. 12/21/19  Yes Aylin Bledsoe MD   doxepin (SINEquan) 50 MG capsule Take 50 mg by mouth At Night As Needed. 12/16/19   Aylin Bledsoe MD   hydrALAZINE (APRESOLINE) 25 MG tablet Take 1 tablet by mouth 3 (Three) Times a Day As Needed.    Aylin Bledsoe MD   ipratropium-albuterol (DUO-NEB) 0.5-2.5 mg/3 ml nebulizer Take 3 mL by nebulization 3 (Three) Times a Day As Needed for Wheezing.    Aylin Bledsoe MD   LINZESS 290 MCG capsule capsule Take 290 mcg by mouth Daily As Needed. 12/21/19   Aylin Bledsoe MD   oxyCODONE-acetaminophen (PERCOCET)  MG per tablet Take 1 tablet by mouth Every 4 (Four) Hours As Needed for Moderate Pain . 12/3/19   Aylin Bledsoe MD   promethazine (PHENERGAN) 50 MG tablet Take 50 mg by mouth Every 6 (Six) Hours As Needed. 12/23/19   Aylin Bledsoe MD   insulin glargine (LANTUS, SEMGLEE) 100 UNIT/ML injection Inject 20 Units under the skin into the appropriate area as directed 2 (Two) Times a Day. 6/23/22 6/29/22  Aylin Bledsoe MD         IMMUNIZATIONS:    There is no immunization history on file for this patient.     ALLERGIES:  Codeine      RESULTS     LABS:  Lab Results (last 24 hours)     Procedure Component Value Units Date/Time    aPTT [[id number]]  (Normal) Collected: 11/15/22 2306    Specimen: Blood Updated: 11/15/22 2341     PTT 63.4 seconds     CBC & Differential [[id number]]  (Abnormal) Collected:  11/16/22 0557    Specimen: Blood Updated: 11/16/22 0613    Narrative:      The following orders were created for panel order CBC & Differential.  Procedure                               Abnormality         Status                     ---------                               -----------         ------                     CBC Auto Differential[[id number]]        Abnormal            Final result                 Please view results for these tests on the individual orders.    CBC Auto Differential [[id number]]  (Abnormal) Collected: 11/16/22 0557    Specimen: Blood Updated: 11/16/22 0613     WBC 5.90 10*3/mm3      RBC 3.72 10*6/mm3      Hemoglobin 10.0 g/dL      Hematocrit 32.2 %      MCV 86.7 fL      MCH 27.0 pg      MCHC 31.2 g/dL      RDW 16.5 %      RDW-SD 50.8 fl      MPV 7.5 fL      Platelets 170 10*3/mm3      Neutrophil % 64.7 %      Lymphocyte % 28.2 %      Monocyte % 5.2 %      Eosinophil % 1.3 %      Basophil % 0.6 %      Neutrophils, Absolute 3.90 10*3/mm3      Lymphocytes, Absolute 1.70 10*3/mm3      Monocytes, Absolute 0.30 10*3/mm3      Eosinophils, Absolute 0.10 10*3/mm3      Basophils, Absolute 0.00 10*3/mm3      nRBC 0.1 /100 WBC     Potassium [[id number]]  (Normal) Collected: 11/16/22 0557    Specimen: Blood Updated: 11/16/22 0624     Potassium 4.7 mmol/L     Magnesium [[id number]]  (Normal) Collected: 11/16/22 0557    Specimen: Blood Updated: 11/16/22 0624     Magnesium 2.0 mg/dL     aPTT [[id number]]  (Abnormal) Collected: 11/16/22 0557    Specimen: Blood Updated: 11/16/22 0629     PTT 32.8 seconds     POC Glucose Once [[id number]]  (Abnormal) Collected: 11/16/22 0719    Specimen: Blood Updated: 11/16/22 0720     Glucose 132 mg/dL      Comment: Serial Number: [id number]:  [id number]       Blood Culture - Blood, Hand, Right [[id number]] Collected: 11/16/22 0830    Specimen: Blood from Hand, Right Updated: 11/16/22 0837    Blood Culture - Blood, Hand, Left [[id number]] Collected: 11/16/22 0833    Specimen:  Blood from Hand, Left Updated: 11/16/22 0837    POC Glucose Once [[id number]]  (Abnormal) Collected: 11/16/22 1137    Specimen: Blood Updated: 11/16/22 1138     Glucose 162 mg/dL      Comment: Serial Number: [id number]:  [id number]       Blood Gas, Arterial - [[id number]]  (Abnormal) Collected: 11/16/22 1255    Specimen: Arterial Blood Updated: 11/16/22 1413     Site Right Radial     Landon's Test Positive     pH, Arterial 7.285 pH units      pCO2, Arterial 82.2 mm Hg      pO2, Arterial 64.1 mm Hg      HCO3, Arterial 39.1 mmol/L      Base Excess, Arterial 10.2 mmol/L      Comment: Serial Number: [id number]:  [id number]        O2 Saturation, Arterial 87.7 %      CO2 Content 41.6 mmol/L      Barometric Pressure for Blood Gas --     Comment: N/A        Modality Cannula     FIO2 36 %      Hemodilution No    Basic Metabolic Panel [[id number]]  (Abnormal) Collected: 11/16/22 1324    Specimen: Blood Updated: 11/16/22 1429     Glucose 155 mg/dL      BUN 8 mg/dL      Creatinine 1.01 mg/dL      Sodium 141 mmol/L      Potassium 4.3 mmol/L      Comment: Slight hemolysis detected by analyzer. Results may be affected.        Chloride 99 mmol/L      CO2 35.0 mmol/L      Calcium 8.6 mg/dL      BUN/Creatinine Ratio 7.9     Anion Gap 7.0 mmol/L      eGFR 62.7 mL/min/1.73      Comment: National Kidney Foundation and American Society of Nephrology (ASN) Task Force recommended calculation based on the Chronic Kidney Disease Epidemiology Collaboration (CKD-EPI) equation refit without adjustment for race.       Narrative:      GFR Normal >60  Chronic Kidney Disease <60  Kidney Failure <15      Blood Gas, Arterial - [[id number]]  (Abnormal) Collected: 11/16/22 1510    Specimen: Arterial Blood Updated: 11/16/22 1646     Site Right Radial     Landon's Test Positive     pH, Arterial 7.266 pH units      pCO2, Arterial 80.0 mm Hg      pO2, Arterial 76.2 mm Hg      HCO3, Arterial 36.4 mmol/L      Base Excess, Arterial 7.7 mmol/L      Comment:  Serial Number: [id number]:  [id number]        O2 Saturation, Arterial 91.9 %      CO2 Content 38.9 mmol/L      Barometric Pressure for Blood Gas --     Comment: N/A        Modality BiPap     FIO2 40 %      Set Tidal Volume 500     Hemodilution No     Respiratory Rate 22    Respiratory Panel PCR w/COVID-19(SARS-CoV-2) SAPNA/POPEYE/BEULAH/PAD/COR/MAD/NATHANIEL In-House, NP Swab in UTM/VTM, 3-4 HR TAT - Swab, Nasopharynx [[id number]]  (Normal) Collected: 11/16/22 1608    Specimen: Swab from Nasopharynx Updated: 11/16/22 1709     ADENOVIRUS, PCR Not Detected     Coronavirus 229E Not Detected     Coronavirus HKU1 Not Detected     Coronavirus NL63 Not Detected     Coronavirus OC43 Not Detected     COVID19 Not Detected     Human Metapneumovirus Not Detected     Human Rhinovirus/Enterovirus Not Detected     Influenza A PCR Not Detected     Influenza B PCR Not Detected     Parainfluenza Virus 1 Not Detected     Parainfluenza Virus 2 Not Detected     Parainfluenza Virus 3 Not Detected     Parainfluenza Virus 4 Not Detected     RSV, PCR Not Detected     Bordetella pertussis pcr Not Detected     Bordetella parapertussis PCR Not Detected     Chlamydophila pneumoniae PCR Not Detected     Mycoplasma pneumo by PCR Not Detected    Narrative:      In the setting of a positive respiratory panel with a viral infection PLUS a negative procalcitonin without other underlying concern for bacterial infection, consider observing off antibiotics or discontinuation of antibiotics and continue supportive care. If the respiratory panel is positive for atypical bacterial infection (Bordetella pertussis, Chlamydophila pneumoniae, or Mycoplasma pneumoniae), consider antibiotic de-escalation to target atypical bacterial infection.    POC Glucose Once [[id number]]  (Abnormal) Collected: 11/16/22 1732    Specimen: Blood Updated: 11/16/22 1733     Glucose 126 mg/dL      Comment: Serial Number: [id number]:  [id number]       Blood Gas, Arterial - [[id number]]   (Abnormal) Collected: 11/16/22 1934    Specimen: Arterial Blood Updated: 11/16/22 2014     Site Left Radial     Landon's Test Positive     pH, Arterial 7.293 pH units      pCO2, Arterial 71.8 mm Hg      pO2, Arterial 109.7 mm Hg      HCO3, Arterial 34.8 mmol/L      Base Excess, Arterial 6.7 mmol/L      Comment: Serial Number: [id number]:  [id number]        O2 Saturation, Arterial 97.4 %      CO2 Content 37.0 mmol/L      Barometric Pressure for Blood Gas --     Comment: N/A        Modality BiPap     FIO2 40 %      Set Tidal Volume 500     PEEP 5     PSV 15 cmH2O      Hemodilution No     Respiratory Rate 22            MICRO:  Microbiology Results (last 10 days)     Procedure Component Value - Date/Time    Respiratory Panel PCR w/COVID-19(SARS-CoV-2) SAPNA/POPEYE/BEULAH/PAD/COR/MAD/NATHANIEL In-House, NP Swab in UTM/VTM, 3-4 HR TAT - Swab, Nasopharynx [[id number]]  (Normal) Collected: 11/16/22 1608    Lab Status: Final result Specimen: Swab from Nasopharynx Updated: 11/16/22 1709     ADENOVIRUS, PCR Not Detected     Coronavirus 229E Not Detected     Coronavirus HKU1 Not Detected     Coronavirus NL63 Not Detected     Coronavirus OC43 Not Detected     COVID19 Not Detected     Human Metapneumovirus Not Detected     Human Rhinovirus/Enterovirus Not Detected     Influenza A PCR Not Detected     Influenza B PCR Not Detected     Parainfluenza Virus 1 Not Detected     Parainfluenza Virus 2 Not Detected     Parainfluenza Virus 3 Not Detected     Parainfluenza Virus 4 Not Detected     RSV, PCR Not Detected     Bordetella pertussis pcr Not Detected     Bordetella parapertussis PCR Not Detected     Chlamydophila pneumoniae PCR Not Detected     Mycoplasma pneumo by PCR Not Detected    Narrative:      In the setting of a positive respiratory panel with a viral infection PLUS a negative procalcitonin without other underlying concern for bacterial infection, consider observing off antibiotics or discontinuation of antibiotics and continue supportive  care. If the respiratory panel is positive for atypical bacterial infection (Bordetella pertussis, Chlamydophila pneumoniae, or Mycoplasma pneumoniae), consider antibiotic de-escalation to target atypical bacterial infection.    Blood Culture - Blood, Arm, Left [[id number]]  (Normal) Collected: 11/14/22 1306    Lab Status: Preliminary result Specimen: Blood from Arm, Left Updated: 11/16/22 1317     Blood Culture No growth at 2 days    Urine Culture - Urine, Urine, Catheter [[id number]]  (Abnormal)  (Susceptibility) Collected: 11/14/22 1255    Lab Status: Final result Specimen: Urine, Catheter Updated: 11/16/22 1212     Urine Culture >100,000 CFU/mL Klebsiella pneumoniae ssp pneumoniae    Narrative:      Colonization of the urinary tract without infection is common. Treatment is discouraged unless the patient is symptomatic, pregnant, or undergoing an invasive urologic procedure.    Susceptibility      Klebsiella pneumoniae ssp pneumoniae      FAUSTO      Ampicillin Resistant     Ampicillin + Sulbactam Susceptible      Cefazolin Susceptible      Cefepime Susceptible      Ceftazidime Susceptible      Ceftriaxone Susceptible      Gentamicin Susceptible      Levofloxacin Intermediate      Nitrofurantoin Resistant     Piperacillin + Tazobactam Susceptible      Trimethoprim + Sulfamethoxazole Susceptible                           Blood Culture - Blood, Arm, Left [[id number]]  (Normal) Collected: 11/14/22 1237    Lab Status: Preliminary result Specimen: Blood from Arm, Left Updated: 11/16/22 1245     Blood Culture No growth at 2 days            RADIOLOGY STUDIES:  Imaging Results (Last 72 Hours)     Procedure Component Value Units Date/Time    CT Angiogram Chest Pulmonary Embolism [[id number]] Collected: 11/14/22 1511     Updated: 11/14/22 1519    Narrative:      CT ANGIOGRAM CHEST PULMONARY EMBOLISM-     Date of Exam: 11/14/2022 2:47 PM     Indication: Hypoxia Short of breath elevated D-dimer recent  hospitalization;  R06.02-Shortness of breath; R07.9-Chest pain,  unspecified; N39.0-Urinary tract infection, site not specified.     Comparison: CT chest 6/27/2022.     Technique: Serial and axial CT images of the chest were obtained  following the uneventful intravenous administration of 100 cc Isovue-370  contrast. Reconstructions in the coronal and sagittal planes were also  performed. In addition, a 3 D volume rendered image was obtained after  post processing. Automated exposure control and iterative reconstruction  methods were used.     FINDINGS:     Acute-appearing small distal segmental and subsegmental emboli are  present within the right lower lobe. Small eccentric nonocclusive  embolism is seen in the proximal left lower lobe pulmonary artery, which  appears chronic.     There is no indication of right heart strain. RV LV ratio is  approximately 0.8. Heart size is within normal limits. No pericardial  effusion or pleural effusion is seen.     There are slightly enlarged and mediastinal lymph nodes, including a  right lower paratracheal lymph node measuring 13 mm short axis  (previously 10 mm), prevascular node measuring 13 mm (previously 9 mm),  subcarinal node measuring 14 mm short axis (previously 9 mm). There is  an enlarged right hilar node measuring 17 mm short axis.     Present in both lungs with faint groundglass densities, in a pattern  suggestive of mild pulmonary edema. There is bandlike subsegmental  atelectasis in the left lower lobe and right middle lobe. No dense  consolidations are identified.     No pericardial effusion or pleural effusion is seen.     Cholecystectomy changes are present. The included portions of the upper  abdominal organs are within normal limits. There is mild scarring of the  spleen.     No acute or suspicious osseous abnormalities are identified.                Impression:      1. Small distal segmental and subsegmental emboli are present in the  right lower lobe. No evidence of right  heart strain. I notified the  emergency room physician at the time of this dictation.  2. Small eccentric nonocclusive thrombus is demonstrated in the left  lower lobe pulmonary artery proximally. This has a chronic appearance.  3. Features of mild interstitial and alveolar edema.  4. Subsegmental atelectasis in the right middle lobe and left lower  lobe.     Electronically Signed By-Jocelyn Zayas MD On:11/14/2022 3:17 PM  This report was finalized on [id number] by  Jocelyn Zayas MD.    XR Chest 1 View [[id number]] Collected: 11/14/22 1300     Updated: 11/14/22 1303    Narrative:      DATE OF EXAM:  11/14/2022 12:40 PM     PROCEDURE:  XR CHEST 1 VW-     INDICATIONS:  cp       COMPARISON:  AP portable chest 6/27/2022 CT chest 6/27/2022.     TECHNIQUE:   Single radiographic view of the chest was obtained.     FINDINGS:  No acute airspace disease. Heart size is borderline enlarged but within  normal limits. No pleural effusion, pneumothorax, or acute osseous  abnormalities are identified.       Impression:      No acute chest findings.     Electronically Signed By-Jocelyn Zayas MD On:11/14/2022 1:00 PM  This report was finalized on [id number] by  Jocelyn Zayas MD.              ECHOCARDIOGRAM:  Results for orders placed during the hospital encounter of 03/28/22    Adult Transthoracic Echo Complete W/ Cont if Necessary Per Protocol    Interpretation Summary  · Left ventricular ejection fraction appears to be 66 - 70%.  · The right ventricular cavity is mildly dilated.  · Moderate tricuspid valve regurgitation is present.  · No pericardial effusion noted    I reviewed the patient's new clinical results.    I discussed the patient's findings and my recommendations with patient, family and nursing staff.  I have discussed plan with the attending Pulmonary/Intensivist physician, who agrees with this plan of care.    Appropriate PPE worn during assessment of patient per established guidelines.    Total critical  care time (30) minutes.    Electronically signed by CORI Reynolds, 11/16/22, 9:00 PM EST.

## 2022-11-17 NOTE — CASE MANAGEMENT/SOCIAL WORK
Continued Stay Note   [facility]     Patient Name: Ann Álvarez  MRN: [id number]  Today's Date: 11/17/2022    Admit Date: 11/14/2022    Plan: DC Plan: From home w/spouse. Eliquis $10 card given. Pending clinical progress.   Discharge Plan     Row Name 11/17/22 1442       Plan    Plan DC Plan: From home w/spouse. Eliquis $10 card given. Pending clinical progress.    Plan Comments temp spike, Airvo 50L/50%, Iv antibiotics.               Chart review only.               Expected Discharge Date and Time     Expected Discharge Date Expected Discharge Time    Nov 18, 2022             Isaac David RN

## 2022-11-18 PROBLEM — J96.02 ACUTE RESPIRATORY FAILURE WITH HYPOXIA AND HYPERCAPNIA: Status: ACTIVE | Noted: 2022-11-18

## 2022-11-18 PROBLEM — J96.01 ACUTE RESPIRATORY FAILURE WITH HYPOXIA AND HYPERCAPNIA (HCC): Status: ACTIVE | Noted: 2022-11-18

## 2022-11-18 LAB
ALBUMIN SERPL-MCNC: 3.3 G/DL (ref 3.5–5.2)
ALBUMIN/GLOB SERPL: 1.2 G/DL
ALP SERPL-CCNC: 54 U/L (ref 39–117)
ALT SERPL W P-5'-P-CCNC: 6 U/L (ref 1–33)
ANION GAP SERPL CALCULATED.3IONS-SCNC: 7 MMOL/L (ref 5–15)
APTT PPP: 30 SECONDS (ref 61–76.5)
AST SERPL-CCNC: 8 U/L (ref 1–32)
BASOPHILS # BLD AUTO: 0 10*3/MM3 (ref 0–0.2)
BASOPHILS NFR BLD AUTO: 0.5 % (ref 0–1.5)
BILIRUB SERPL-MCNC: 0.2 MG/DL (ref 0–1.2)
BUN SERPL-MCNC: 7 MG/DL (ref 8–23)
BUN/CREAT SERPL: 9 (ref 7–25)
CALCIUM SPEC-SCNC: 9 MG/DL (ref 8.6–10.5)
CHLORIDE SERPL-SCNC: 102 MMOL/L (ref 98–107)
CO2 SERPL-SCNC: 32 MMOL/L (ref 22–29)
CREAT SERPL-MCNC: 0.78 MG/DL (ref 0.57–1)
DEPRECATED RDW RBC AUTO: 49.4 FL (ref 37–54)
EGFRCR SERPLBLD CKD-EPI 2021: 85.5 ML/MIN/1.73
EOSINOPHIL # BLD AUTO: 0.1 10*3/MM3 (ref 0–0.4)
EOSINOPHIL NFR BLD AUTO: 1.9 % (ref 0.3–6.2)
ERYTHROCYTE [DISTWIDTH] IN BLOOD BY AUTOMATED COUNT: 16.1 % (ref 12.3–15.4)
GLOBULIN UR ELPH-MCNC: 2.8 GM/DL
GLUCOSE BLDC GLUCOMTR-MCNC: 125 MG/DL (ref 70–105)
GLUCOSE BLDC GLUCOMTR-MCNC: 133 MG/DL (ref 70–105)
GLUCOSE BLDC GLUCOMTR-MCNC: 155 MG/DL (ref 70–105)
GLUCOSE BLDC GLUCOMTR-MCNC: 162 MG/DL (ref 70–105)
GLUCOSE BLDC GLUCOMTR-MCNC: 165 MG/DL (ref 70–105)
GLUCOSE SERPL-MCNC: 126 MG/DL (ref 65–99)
HCT VFR BLD AUTO: 29.8 % (ref 34–46.6)
HGB BLD-MCNC: 9.3 G/DL (ref 12–15.9)
LYMPHOCYTES # BLD AUTO: 1.4 10*3/MM3 (ref 0.7–3.1)
LYMPHOCYTES NFR BLD AUTO: 29.3 % (ref 19.6–45.3)
MAGNESIUM SERPL-MCNC: 2 MG/DL (ref 1.6–2.4)
MCH RBC QN AUTO: 27 PG (ref 26.6–33)
MCHC RBC AUTO-ENTMCNC: 31.2 G/DL (ref 31.5–35.7)
MCV RBC AUTO: 86.6 FL (ref 79–97)
MONOCYTES # BLD AUTO: 0.3 10*3/MM3 (ref 0.1–0.9)
MONOCYTES NFR BLD AUTO: 5.3 % (ref 5–12)
NEUTROPHILS NFR BLD AUTO: 3 10*3/MM3 (ref 1.7–7)
NEUTROPHILS NFR BLD AUTO: 63 % (ref 42.7–76)
NRBC BLD AUTO-RTO: 0.1 /100 WBC (ref 0–0.2)
PLATELET # BLD AUTO: 141 10*3/MM3 (ref 140–450)
PMV BLD AUTO: 7.7 FL (ref 6–12)
POTASSIUM SERPL-SCNC: 4.7 MMOL/L (ref 3.5–5.2)
PROT SERPL-MCNC: 6.1 G/DL (ref 6–8.5)
RBC # BLD AUTO: 3.45 10*6/MM3 (ref 3.77–5.28)
SODIUM SERPL-SCNC: 141 MMOL/L (ref 136–145)
WBC NRBC COR # BLD: 4.8 10*3/MM3 (ref 3.4–10.8)

## 2022-11-18 PROCEDURE — 94799 UNLISTED PULMONARY SVC/PX: CPT

## 2022-11-18 PROCEDURE — 82962 GLUCOSE BLOOD TEST: CPT

## 2022-11-18 PROCEDURE — 83735 ASSAY OF MAGNESIUM: CPT | Performed by: PHYSICIAN ASSISTANT

## 2022-11-18 PROCEDURE — 94761 N-INVAS EAR/PLS OXIMETRY MLT: CPT

## 2022-11-18 PROCEDURE — 63710000001 INSULIN LISPRO (HUMAN) PER 5 UNITS: Performed by: PHYSICIAN ASSISTANT

## 2022-11-18 PROCEDURE — 25010000002 MORPHINE PER 10 MG: Performed by: NURSE PRACTITIONER

## 2022-11-18 PROCEDURE — 36415 COLL VENOUS BLD VENIPUNCTURE: CPT | Performed by: EMERGENCY MEDICINE

## 2022-11-18 PROCEDURE — 94660 CPAP INITIATION&MGMT: CPT

## 2022-11-18 PROCEDURE — 99232 SBSQ HOSP IP/OBS MODERATE 35: CPT | Performed by: INTERNAL MEDICINE

## 2022-11-18 PROCEDURE — 25010000002 CEFTRIAXONE PER 250 MG: Performed by: INTERNAL MEDICINE

## 2022-11-18 PROCEDURE — 94664 DEMO&/EVAL PT USE INHALER: CPT

## 2022-11-18 PROCEDURE — 25010000002 ONDANSETRON PER 1 MG: Performed by: PHYSICIAN ASSISTANT

## 2022-11-18 PROCEDURE — 85025 COMPLETE CBC W/AUTO DIFF WBC: CPT | Performed by: EMERGENCY MEDICINE

## 2022-11-18 PROCEDURE — 85730 THROMBOPLASTIN TIME PARTIAL: CPT | Performed by: EMERGENCY MEDICINE

## 2022-11-18 PROCEDURE — 80053 COMPREHEN METABOLIC PANEL: CPT | Performed by: INTERNAL MEDICINE

## 2022-11-18 RX ADMIN — MORPHINE SULFATE 2 MG: 2 INJECTION, SOLUTION INTRAMUSCULAR; INTRAVENOUS at 09:35

## 2022-11-18 RX ADMIN — AMLODIPINE BESYLATE 10 MG: 5 TABLET ORAL at 08:05

## 2022-11-18 RX ADMIN — APIXABAN 10 MG: 5 TABLET, FILM COATED ORAL at 08:05

## 2022-11-18 RX ADMIN — MORPHINE SULFATE 2 MG: 2 INJECTION, SOLUTION INTRAMUSCULAR; INTRAVENOUS at 04:24

## 2022-11-18 RX ADMIN — Medication 10 ML: at 08:05

## 2022-11-18 RX ADMIN — ONDANSETRON 4 MG: 2 INJECTION INTRAMUSCULAR; INTRAVENOUS at 09:33

## 2022-11-18 RX ADMIN — TOPIRAMATE 50 MG: 25 TABLET, FILM COATED ORAL at 08:05

## 2022-11-18 RX ADMIN — BUDESONIDE AND FORMOTEROL FUMARATE DIHYDRATE 2 PUFF: 80; 4.5 AEROSOL RESPIRATORY (INHALATION) at 20:04

## 2022-11-18 RX ADMIN — ONDANSETRON 4 MG: 2 INJECTION INTRAMUSCULAR; INTRAVENOUS at 00:13

## 2022-11-18 RX ADMIN — TRAZODONE HYDROCHLORIDE 100 MG: 100 TABLET ORAL at 21:37

## 2022-11-18 RX ADMIN — Medication 10 ML: at 21:37

## 2022-11-18 RX ADMIN — MORPHINE SULFATE 2 MG: 2 INJECTION, SOLUTION INTRAMUSCULAR; INTRAVENOUS at 00:13

## 2022-11-18 RX ADMIN — MORPHINE SULFATE 2 MG: 2 INJECTION, SOLUTION INTRAMUSCULAR; INTRAVENOUS at 19:43

## 2022-11-18 RX ADMIN — OXYCODONE 5 MG: 5 TABLET ORAL at 03:19

## 2022-11-18 RX ADMIN — CLONIDINE HYDROCHLORIDE 0.3 MG: 0.1 TABLET ORAL at 21:37

## 2022-11-18 RX ADMIN — CEFTRIAXONE 2 G: 2 INJECTION, POWDER, FOR SOLUTION INTRAMUSCULAR; INTRAVENOUS at 14:04

## 2022-11-18 RX ADMIN — INSULIN LISPRO 2 UNITS: 100 INJECTION, SOLUTION INTRAVENOUS; SUBCUTANEOUS at 13:06

## 2022-11-18 RX ADMIN — OXYCODONE 5 MG: 5 TABLET ORAL at 08:13

## 2022-11-18 RX ADMIN — CLONIDINE HYDROCHLORIDE 0.3 MG: 0.1 TABLET ORAL at 14:04

## 2022-11-18 RX ADMIN — INSULIN LISPRO 2 UNITS: 100 INJECTION, SOLUTION INTRAVENOUS; SUBCUTANEOUS at 17:09

## 2022-11-18 RX ADMIN — BUDESONIDE AND FORMOTEROL FUMARATE DIHYDRATE 2 PUFF: 80; 4.5 AEROSOL RESPIRATORY (INHALATION) at 07:45

## 2022-11-18 RX ADMIN — MORPHINE SULFATE 2 MG: 2 INJECTION, SOLUTION INTRAMUSCULAR; INTRAVENOUS at 14:14

## 2022-11-18 RX ADMIN — APIXABAN 10 MG: 5 TABLET, FILM COATED ORAL at 21:37

## 2022-11-18 RX ADMIN — ONDANSETRON 4 MG: 2 INJECTION INTRAMUSCULAR; INTRAVENOUS at 19:49

## 2022-11-18 NOTE — PROGRESS NOTES
Daily Progress Note          Assessment      Shortness of breath  Pulmonary embolism: No DVT on lower extremity venous Doppler studies  Pulmonary hypertension post PE  Chronic hypoxemia  Bilateral subsegmental atelectasis  COPD  History of tobacco smoking: Quit 6/27/2022  Obesity  Diabetes mellitus type 2  Dyslipidemia  Hepatic steatosis  Essential hypertension  RLS  History of insomnia and PTSD  Normocytic anemia    Results for orders placed during the hospital encounter of 11/14/22    Adult Transthoracic Echo Complete w/ Color, Spectral and Contrast if Necessary Per Protocol    Interpretation Summary  •  Left ventricular systolic function is normal. Left ventricular ejection fraction appears to be 56 - 60%.  •  Left ventricular diastolic function was normal.  •  Estimated right ventricular systolic pressure from tricuspid regurgitation is markedly elevated (>55 mmHg).         Plan:  Oxygen supplement and titration maintain saturation 90 to 95%: Currently improving and tolerating Airvo  Chest x-ray 11/17/2022 with mild interstitial edema and bibasilar atelectasis  Reassess echo in 3 months to evaluate pulmonary hypertension  Bronchodilators  Symbicort  Mucinex  Anticoagulation: Eliquis for lifelong  Blood pressure control  Glucose control  Antibiotics: Rocephin               LOS: 2 days     Subjective     Patient feels better today, she is  Awake, she was afebrile overnight currently tolerating Airvo    Objective     Vital signs for last 24 hours:  Vitals:    11/18/22 1000 11/18/22 1020 11/18/22 1100 11/18/22 1148   BP: 127/46  (!) 115/39    Pulse: 67 67 69 68   Resp:       Temp:       TempSrc:       SpO2: 97% 99% 96% 97%   Weight:       Height:           Intake/Output last 3 shifts:  I/O last 3 completed shifts:  In: 439.7 [P.O.:240; I.V.:199.7]  Out: 600 [Urine:600]  Intake/Output this shift:  I/O this shift:  In: -   Out: 300 [Urine:300]      Radiology  Imaging Results (Last 24 Hours)     ** No results found for  the last 24 hours. **          Labs:  Results from last 7 days   Lab Units 11/18/22  0718   WBC 10*3/mm3 4.80   HEMOGLOBIN g/dL 9.3*   HEMATOCRIT % 29.8*   PLATELETS 10*3/mm3 141     Results from last 7 days   Lab Units 11/18/22  0718   SODIUM mmol/L 141   POTASSIUM mmol/L 4.7   CHLORIDE mmol/L 102   CO2 mmol/L 32.0*   BUN mg/dL 7*   CREATININE mg/dL 0.78   CALCIUM mg/dL 9.0   BILIRUBIN mg/dL 0.2   ALK PHOS U/L 54   ALT (SGPT) U/L 6   AST (SGOT) U/L 8   GLUCOSE mg/dL 126*     Results from last 7 days   Lab Units 11/16/22  1934   PH, ARTERIAL pH units 7.293*   PO2 ART mm Hg 109.7*   PCO2, ARTERIAL mm Hg 71.8*   HCO3 ART mmol/L 34.8*     Results from last 7 days   Lab Units 11/18/22  0718 11/17/22  0311 11/14/22  1237   ALBUMIN g/dL 3.30* 3.40* 3.60     Results from last 7 days   Lab Units 11/15/22  0035 11/14/22  1912 11/14/22  1237   TROPONIN T ng/mL <0.010 <0.010 <0.010         Results from last 7 days   Lab Units 11/18/22  0718   MAGNESIUM mg/dL 2.0     Results from last 7 days   Lab Units 11/18/22  0718 11/17/22  0311 11/16/22  0557 11/14/22  2149 11/14/22  1237   INR   --   --   --   --  0.99   APTT seconds 30.0* 29.9* 32.8*   < > 28.8*    < > = values in this interval not displayed.               Meds:   SCHEDULE  amLODIPine, 10 mg, Oral, Q24H  apixaban, 10 mg, Oral, BID   Followed by  [START ON 11/23/2022] apixaban, 5 mg, Oral, BID  budesonide-formoterol, 2 puff, Inhalation, BID - RT  cefTRIAXone, 2 g, Intravenous, Q24H  cloNIDine, 0.3 mg, Oral, Q8H  insulin lispro, 2-7 Units, Subcutaneous, TID With Meals  lisinopril, 40 mg, Oral, Q24H  senna-docusate sodium, 2 tablet, Oral, BID  sodium chloride, 10 mL, Intravenous, Q12H  topiramate, 50 mg, Oral, Daily  traZODone, 100 mg, Oral, Nightly      Infusions     PRNs  •  acetaminophen **OR** acetaminophen **OR** acetaminophen  •  aluminum-magnesium hydroxide-simethicone  •  senna-docusate sodium **AND** polyethylene glycol **AND** bisacodyl **AND** bisacodyl  •   dextrose  •  dextrose  •  diazePAM  •  doxepin  •  glucagon (human recombinant)  •  hydrALAZINE  •  ipratropium-albuterol  •  magnesium sulfate **OR** magnesium sulfate in D5W 1g/100mL (PREMIX)  •  melatonin  •  Morphine  •  nitroglycerin  •  ondansetron **OR** ondansetron  •  oxyCODONE  •  oxyCODONE  •  potassium chloride  •  potassium chloride  •  [COMPLETED] Insert peripheral IV **AND** sodium chloride  •  sodium chloride  •  zolpidem    Physical Exam:  General Appearance:  Alert   HEENT:  Normocephalic, without obvious abnormality, Conjunctiva/corneas clear,.   Nares normal, no drainage     Neck:  Supple, symmetrical, trachea midline.   Lungs /Chest wall: Minimal bilateral basal rhonchi, respirations unlabored, symmetrical wall movement.     Heart:  Regular rate and rhythm, S1 S2 normal  Abdomen: Soft, non-tender, no masses, no organomegaly.    Extremities: No edema, no clubbing or cyanosis    ROS  Constitutional: Negative for chills, positive for fever and malaise/fatigue.   HENT: Negative.    Eyes: Negative.    Cardiovascular: Negative.    Respiratory: Positive for shortness of breath.    Skin: Negative.    Musculoskeletal: Negative.    Gastrointestinal: Negative.    Genitourinary: Negative.    Neurological: Negative.    Psychiatric/Behavioral: Negative.      I reviewed the recent clinical results    Much of this encounter note is an electronic transcription/translation of spoken language to printed text using Dragon Software which might include inadvertent errors in transcription.

## 2022-11-18 NOTE — PROGRESS NOTES
[facility] Daily Progress Note    Patient Name: Ann Álvarez  : 1960  MRN: [id number]  Primary Care Physician:  Jose Enrique Walters MD  Date of admission: 2022      Subjective      Chief Complaint: Shortness of breath.      Patient Reports:      11/15/2022.  Patient was seen and examined.  Patient reported no overnight events.      2022.  Patient was seen and examined.  Patient reported having fever last night.  Blood cultures were completed.  Infectious disease consult order was placed.      2022.  Patient was seen and examined.  Patient reported no new symptoms.  Patient reported no fever last night.        2022.  Patient was seen and examined.  Patient reported moderate improvement in her symptoms.  Patient is anxious to go home.      Review of Systems   Constitutional: Negative for chills and fever.   HENT: Negative.    Eyes: Negative.    Cardiovascular: Negative.    Respiratory: Negative.    Endocrine: Negative.    Hematologic/Lymphatic: Negative.    Skin: Negative.    Musculoskeletal: Negative.    Gastrointestinal: Negative.    Genitourinary: Negative.    Neurological: Negative.    Psychiatric/Behavioral: Negative.    Allergic/Immunologic: Negative.             Objective      Vitals:   Temp:  [98.1 °F (36.7 °C)-98.6 °F (37 °C)] 98.1 °F (36.7 °C)  Heart Rate:  [60-80] 69  Resp:  [12-18] 15  BP: (100-142)/(36-85) 116/49  Flow (L/min):  [8-50] 8    Physical Exam  Vitals reviewed.   Constitutional:       General: She is not in acute distress.     Appearance: She is obese.      Comments: Patient is lying comfortably in bed.  Family is at the bedside.   HENT:      Head: Normocephalic and atraumatic.      Nose: Nose normal. No congestion or rhinorrhea.      Mouth/Throat:      Mouth: Mucous membranes are moist.      Pharynx: Oropharynx is clear. No oropharyngeal exudate or posterior oropharyngeal erythema.   Eyes:      Pupils: Pupils are equal, round,  and reactive to light.   Cardiovascular:      Pulses: Normal pulses.      Heart sounds: Normal heart sounds. No murmur heard.    No friction rub. No gallop.      Comments: S1 and S2 present.  No tachycardia.  Pulmonary:      Effort: No respiratory distress.      Breath sounds: No wheezing, rhonchi or rales.      Comments: Decreased air entry bilaterally.  Chest:      Chest wall: No tenderness.   Abdominal:      General: Abdomen is flat. Bowel sounds are normal. There is no distension.      Palpations: Abdomen is soft.      Tenderness: There is no right CVA tenderness.   Musculoskeletal:         General: No swelling, tenderness, deformity or signs of injury.      Cervical back: Neck supple. No tenderness.      Right lower leg: No edema.      Left lower leg: No edema.   Skin:     Capillary Refill: Capillary refill takes less than 2 seconds.      Coloration: Skin is not jaundiced or pale.      Findings: No bruising, erythema, lesion or rash.   Neurological:      Mental Status: She is alert.      Comments: No facial asymmetry noted.  Gait and station not tested.   Psychiatric:      Comments: No agitation.                 Result Review    Result Review:  I have personally reviewed the results from the time of this admission to 11/18/2022 17:26 EST and agree with these findings:  [x]  Laboratory  [x]  Microbiology  [x]  Radiology  []  EKG/Telemetry   []  Cardiology/Vascular   []  Pathology  []  Old records  []  Other:  Most notable findings include:      CT pulmonary angiogram showed:      Comparison: CT chest 6/27/2022.       Technique: Serial and axial CT images of the chest were obtained   following the uneventful intravenous administration of 100 cc Isovue-370   contrast. Reconstructions in the coronal and sagittal planes were also   performed. In addition, a 3 D volume rendered image was obtained after   post processing. Automated exposure control and iterative reconstruction   methods were used.       FINDINGS:        Acute-appearing small distal segmental and subsegmental emboli are   present within the right lower lobe. Small eccentric nonocclusive   embolism is seen in the proximal left lower lobe pulmonary artery, which   appears chronic.       There is no indication of right heart strain. RV LV ratio is   approximately 0.8. Heart size is within normal limits. No pericardial   effusion or pleural effusion is seen.       There are slightly enlarged and mediastinal lymph nodes, including a   right lower paratracheal lymph node measuring 13 mm short axis   (previously 10 mm), prevascular node measuring 13 mm (previously 9 mm),   subcarinal node measuring 14 mm short axis (previously 9 mm). There is   an enlarged right hilar node measuring 17 mm short axis.       Present in both lungs with faint groundglass densities, in a pattern   suggestive of mild pulmonary edema. There is bandlike subsegmental   atelectasis in the left lower lobe and right middle lobe. No dense   consolidations are identified.       No pericardial effusion or pleural effusion is seen.       Cholecystectomy changes are present. The included portions of the upper   abdominal organs are within normal limits. There is mild scarring of the   spleen.       No acute or suspicious osseous abnormalities are identified.                   Impression:     1. Small distal segmental and subsegmental emboli are present in the   right lower lobe. No evidence of right heart strain. I notified the   emergency room physician at the time of this dictation.   2. Small eccentric nonocclusive thrombus is demonstrated in the left   lower lobe pulmonary artery proximally. This has a chronic appearance.   3. Features of mild interstitial and alveolar edema.   4. Subsegmental atelectasis in the right middle lobe and left lower   lobe.       Electronically Signed By-Jocelyn Zayas MD On:11/14/2022 3:17 PM   This report was finalized on [id number] by  Jocelyn Zayas MD.          Assessment & Plan    From previous notes and with minor updates.      Brief Patient Summary:      Patient is a 62 y.o. female  with past medical history of morbid obesity, diabetes mellitus, COPD, chronic respiratory failure with hypoxia on 2 L nasal cannula continuous, hypertension, hyperlipidemia, osteoarthritis, anxiety disorder, chronic low back pain, insomnia, PTSD, restless leg syndrome tobacco use disorder who presented to [facility] on 11/14/2022 complaining of shortness of air, chest pain and back pain.  Patient reports that she was in [facility] for 6 days around [facility] for UTI, sepsis and was on a ventilator for 2 days.  Since then according to primary care provider records the UTI had cleared.  She reports that 2 days ago she began having shortness of air, left-sided chest pain, that she describes as a tightness, that radiates into her shoulder blade and low back pain.  She is on her baseline home 4 L O2 at 95%.  She states nothing makes her chest tightness or back pain any better.  She denies having any lightheadedness, dizziness or syncopal episode.  She denies having any vomiting but has felt nauseous.  She denies she denies any abdominal pain, constipation or diarrhea.  She reports bilateral lower extremity swelling but feels this is nothing out of the ordinary.  We have been asked to admit this patient for further evaluation and treatment.  Patient was seen in the emergency room and emergency department work-up vital signs stable 4 L nasal cannula at 95%.  UA positive nitrate with 3+ bacteria.  Glucose 142.  Creatinine 0.83.  Chloride 97.  CO2 36.0.  Troponin negative.  D-dimer 1.47.  White blood count 5.50.  Hemoglobin 10.5.  Platelets 222.  Lactate 0.9.  Blood culture pending, urine culture pending.  CTA chest Small distal segmental and subsegmental emboli are present in the right lower lobe. No evidence of right heart strain. Small eccentric nonocclusive thrombus  is demonstrated in the left lower lobe pulmonary artery proximally. This has a chronic appearance.Features of mild interstitial and alveolar edema.Subsegmental atelectasis in the right middle lobe and left lower lobe.  Pulmonary consult was completed.    amLODIPine, 10 mg, Oral, Q24H  apixaban, 10 mg, Oral, BID   Followed by  [START ON 11/23/2022] apixaban, 5 mg, Oral, BID  budesonide-formoterol, 2 puff, Inhalation, BID - RT  cefTRIAXone, 2 g, Intravenous, Q24H  cloNIDine, 0.3 mg, Oral, Q8H  insulin lispro, 2-7 Units, Subcutaneous, TID With Meals  lisinopril, 40 mg, Oral, Q24H  senna-docusate sodium, 2 tablet, Oral, BID  sodium chloride, 10 mL, Intravenous, Q12H  topiramate, 50 mg, Oral, Daily  traZODone, 100 mg, Oral, Nightly             Active Hospital Problems:  Active Hospital Problems    Diagnosis    • **Acute pulmonary embolism without acute cor pulmonale (HCC)    • Acute respiratory failure with hypoxia and hypercapnia (HCC)    • Acute cystitis without hematuria    • Shortness of breath    • Mixed hyperlipidemia    • KELTON (generalized anxiety disorder)    • Morbid obesity (HCC)    • Chronic back pain    • PTSD (post-traumatic stress disorder)    • RLS (restless legs syndrome)    • Chronic obstructive pulmonary disease (HCC)    • Chronic respiratory failure with hypoxia (HCC)    • Insomnia    • Tobacco use disorder    • Primary hypertension          Plan:      -Continue appropriate patient's home medications for other chronic medical conditions.  -Continue the present level of care.  -Patient and family agreed with the plan of care.  -Treat acute pulmonary embolism with anticoagulation.  -Treat acute respiratory failure with hypoxia and hypercapnia with oxygen therapy.  -Follow pulmonary recommendations.  -Treat acute cystitis with antibiotics.  -Follow cultures.  -Complete infectious disease consult.  -Continue to monitor blood cultures.          DVT prophylaxis:  Medical DVT prophylaxis orders are  present.    CODE STATUS:    Level Of Support Discussed With: Patient  Code Status (Patient has no pulse and is not breathing): CPR (Attempt to Resuscitate)  Medical Interventions (Patient has pulse or is breathing): Full Support      Disposition: Patient can discharge in the next 48 hours.    This patient has been examined wearing appropriate Personal Protective Equipment and discussed with hospital infection control department, [facility], infectious disease specialist and pulmonologist. 11/18/22      Electronically signed by Scottie Colin MD, FACP, 11/18/22, 17:26 EST.      [facility] Hospitalist Team

## 2022-11-18 NOTE — PLAN OF CARE
Goal Outcome Evaluation:      Downgrade from ICU today.  Prn pain meds for chest discomfort.  Plan to discharge home with

## 2022-11-18 NOTE — PROGRESS NOTES
Hematology/Oncology Inpatient Progress Note    PATIENT NAME: Ann Álvarez  : 1960  MRN: [id number]    CHIEF COMPLAINT: Shortness of breath, chest and back pain    HISTORY OF PRESENT ILLNESS:    Ann Álvarez is a 62 y.o. female who presented to [facility] on 2022 with complaints of shortness of breath, left left chest pain that radiates to her back and found to have PE right lower lobe on CT chest.  She has a recent history of UTI with sepsis at [facility] for which she spent a few days on the ventilator. The patient was placed on heparin drip and admitted for further management.       CT chest 2022- small distal segmental and subsegmental emboli RLL, no evidence of heart strain. Small nonocclusive thrombus LLL.       BLE Doppler 22 - normal     11/15/22  Hematology/Oncology was consulted for bilateral PE with recent illness of UTI with sepsis for which she spent 48 hours on the ventilator.  She does not have personal hx of clotting but does have family hx of blood clots with grandmother dying suddenly attributed to thrombus and her mother was treated for thrombus. After her discharge in 6 days she spent approximately 40% of her time in bed and the majority of the time in her recliner.  She has a long history of cigarette smoking and her spouse reported that she consumed about 1 pack/day.  With history of hospitalization and immobility, continued cigarette smoking, elevated BMI, heart thrombosis thought to be provoked.  At this point, no hereditary testing for inherited predisposition to thrombosis is justified.  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 The patient has been transitioned to oral anticoagulation with Eliquis.   She will follow up with oncology after discharge.    She  has a past medical history of Anxiety, Arthritis, Chronic  back pain, Chronic obstructive pulmonary disease ([facility]) (01/31/2020), Chronic respiratory failure with hypoxia ([facility]) (03/06/2018), Diabetes mellitus ([facility]), Dyslipidemia (10/21/2014), Edema, lower extremity (07/16/2014), Hypertension, Insomnia, Obesity, Class III, BMI 40-49.9 (morbid obesity) ([facility]) (03/29/2022), Prediabetes (11/06/2018), Primary hypertension (05/30/2012), PTSD (post-traumatic stress disorder), RLS (restless legs syndrome) (04/22/2020), Tobacco abuse (10/21/2014), and Vitamin D deficiency (05/30/2012).     PCP: Jose Enrique Walters MD    History of present illness was reviewed and is unchanged from the previous visit. 11/18/22      Subjective   Dyspneic but no chest pain. Remains in ICU. No bleeding.     ROS:  Review of Systems   Constitutional: Negative for activity change, appetite change, chills, diaphoresis, fatigue, fever and unexpected weight change.   HENT: Negative.  Negative for congestion, dental problem, drooling, ear discharge, ear pain, facial swelling, hearing loss, mouth sores, nosebleeds, postnasal drip, rhinorrhea, sinus pressure, sinus pain, sneezing, sore throat, tinnitus, trouble swallowing and voice change.    Eyes: Negative.  Negative for photophobia, pain, discharge, redness, itching and visual disturbance.   Respiratory: Positive for shortness of breath. Negative for apnea, cough, choking, chest tightness, wheezing and stridor.    Cardiovascular: Negative for chest pain, palpitations and leg swelling.   Gastrointestinal: Negative for abdominal distention, abdominal pain, anal bleeding, blood in stool, constipation, diarrhea, nausea, rectal pain and vomiting.   Endocrine: Negative.  Negative for cold intolerance, heat intolerance, polydipsia and polyuria.   Genitourinary: Negative.  Negative for decreased urine volume, difficulty urinating, dysuria, flank pain, frequency, genital sores, hematuria and urgency.   Musculoskeletal: Positive for back pain. Negative for arthralgias, gait  problem, joint swelling, myalgias, neck pain and neck stiffness.   Skin: Negative.  Negative for color change, pallor and rash.   Neurological: Negative for dizziness, tremors, seizures, syncope, facial asymmetry, speech difficulty, weakness, light-headedness, numbness and headaches.   Hematological: Negative for adenopathy. Does not bruise/bleed easily.   Psychiatric/Behavioral: Negative for agitation, behavioral problems, confusion, decreased concentration, hallucinations, self-injury, sleep disturbance and suicidal ideas. The patient is not nervous/anxious.         MEDICATIONS:    Scheduled Meds:  amLODIPine, 10 mg, Oral, Q24H  apixaban, 10 mg, Oral, BID   Followed by  [START ON 11/23/2022] apixaban, 5 mg, Oral, BID  budesonide-formoterol, 2 puff, Inhalation, BID - RT  cefTRIAXone, 2 g, Intravenous, Q24H  cloNIDine, 0.3 mg, Oral, Q8H  insulin lispro, 2-7 Units, Subcutaneous, TID With Meals  lisinopril, 40 mg, Oral, Q24H  senna-docusate sodium, 2 tablet, Oral, BID  sodium chloride, 10 mL, Intravenous, Q12H  topiramate, 50 mg, Oral, Daily  traZODone, 100 mg, Oral, Nightly       Continuous Infusions:      PRN Meds:  •  acetaminophen **OR** acetaminophen **OR** acetaminophen  •  aluminum-magnesium hydroxide-simethicone  •  senna-docusate sodium **AND** polyethylene glycol **AND** bisacodyl **AND** bisacodyl  •  dextrose  •  dextrose  •  diazePAM  •  doxepin  •  glucagon (human recombinant)  •  hydrALAZINE  •  ipratropium-albuterol  •  magnesium sulfate **OR** magnesium sulfate in D5W 1g/100mL (PREMIX)  •  melatonin  •  Morphine  •  nitroglycerin  •  ondansetron **OR** ondansetron  •  oxyCODONE  •  oxyCODONE  •  potassium chloride  •  potassium chloride  •  [COMPLETED] Insert peripheral IV **AND** sodium chloride  •  sodium chloride  •  zolpidem     ALLERGIES:    Allergies   Allergen Reactions   • Codeine Hives, Itching and Nausea And Vomiting       Objective    VITALS:   /61   Pulse 65   Temp 98.2 °F (36.8  "°C) (Oral)   Resp 16   Ht 160 cm (63\")   Wt 111 kg (244 lb 0.8 oz)   SpO2 95%   BMI 43.23 kg/m²     PHYSICAL EXAM: (performed by MD)  Physical Exam  Vitals and nursing note reviewed.   Constitutional:       General: She is not in acute distress.     Appearance: She is obese. She is ill-appearing. She is not toxic-appearing or diaphoretic.   HENT:      Head: Normocephalic and atraumatic.      Right Ear: External ear normal.      Left Ear: External ear normal.      Nose: Nose normal.      Mouth/Throat:      Mouth: Mucous membranes are moist.      Pharynx: Oropharynx is clear. No oropharyngeal exudate or posterior oropharyngeal erythema.   Eyes:      General: No scleral icterus.        Right eye: No discharge.         Left eye: No discharge.      Conjunctiva/sclera: Conjunctivae normal.      Pupils: Pupils are equal, round, and reactive to light.   Cardiovascular:      Rate and Rhythm: Normal rate and regular rhythm.      Pulses: Normal pulses.      Heart sounds: No murmur heard.    No friction rub. No gallop.   Pulmonary:      Effort: Pulmonary effort is normal. No respiratory distress.      Breath sounds: No stridor. No wheezing, rhonchi or rales.   Abdominal:      General: Bowel sounds are normal. There is no distension.      Palpations: Abdomen is soft. There is no mass.      Tenderness: There is no abdominal tenderness. There is no right CVA tenderness, left CVA tenderness, guarding or rebound.      Hernia: No hernia is present.      Comments: Protuberant, soft and not tender.    Musculoskeletal:         General: No swelling, tenderness, deformity or signs of injury. Normal range of motion.      Cervical back: Normal range of motion. No rigidity.      Right lower leg: Edema present.      Left lower leg: Edema present.      Comments: Chronic leg swelling   Lymphadenopathy:      Cervical: No cervical adenopathy.   Skin:     General: Skin is warm and dry.      Coloration: Skin is not jaundiced.      Findings: No " bruising, lesion or rash.   Neurological:      General: No focal deficit present.      Mental Status: She is alert and oriented to person, place, and time.      Cranial Nerves: No cranial nerve deficit.      Motor: No weakness.      Gait: Gait normal.   Psychiatric:         Mood and Affect: Mood normal.         Behavior: Behavior normal.         Thought Content: Thought content normal.         Judgment: Judgment normal.      I Javy King MD on 11/17/2022 at 17:10    RECENT LABS:  Lab Results (last 24 hours)     Procedure Component Value Units Date/Time    Blood Culture - Blood, Arm, Left [[id number]]  (Normal) Collected: 11/14/22 1306    Specimen: Blood from Arm, Left Updated: 11/18/22 1315     Blood Culture No growth at 4 days    POC Glucose Once [[id number]]  (Abnormal) Collected: 11/18/22 1254    Specimen: Blood Updated: 11/18/22 1256     Glucose 155 mg/dL      Comment: Serial Number: [id number]:  [id number]       Blood Culture - Blood, Arm, Left [[id number]]  (Normal) Collected: 11/14/22 1237    Specimen: Blood from Arm, Left Updated: 11/18/22 1246     Blood Culture No growth at 4 days    POC Glucose Once [[id number]]  (Abnormal) Collected: 11/18/22 1121    Specimen: Blood Updated: 11/18/22 1122     Glucose 162 mg/dL      Comment: Serial Number: [id number]:  [id number]       Blood Culture - Blood, Hand, Right [[id number]]  (Normal) Collected: 11/16/22 0830    Specimen: Blood from Hand, Right Updated: 11/18/22 0845     Blood Culture No growth at 2 days    Blood Culture - Blood, Hand, Left [[id number]]  (Normal) Collected: 11/16/22 0833    Specimen: Blood from Hand, Left Updated: 11/18/22 0845     Blood Culture No growth at 2 days    Comprehensive Metabolic Panel [[id number]]  (Abnormal) Collected: 11/18/22 0718    Specimen: Blood Updated: 11/18/22 0808     Glucose 126 mg/dL      BUN 7 mg/dL      Creatinine 0.78 mg/dL      Sodium 141 mmol/L      Potassium 4.7 mmol/L      Chloride 102 mmol/L      CO2 32.0  mmol/L      Calcium 9.0 mg/dL      Total Protein 6.1 g/dL      Albumin 3.30 g/dL      ALT (SGPT) 6 U/L      AST (SGOT) 8 U/L      Alkaline Phosphatase 54 U/L      Total Bilirubin 0.2 mg/dL      Globulin 2.8 gm/dL      A/G Ratio 1.2 g/dL      BUN/Creatinine Ratio 9.0     Anion Gap 7.0 mmol/L      eGFR 85.5 mL/min/1.73      Comment: National Kidney Foundation and American Society of Nephrology (ASN) Task Force recommended calculation based on the Chronic Kidney Disease Epidemiology Collaboration (CKD-EPI) equation refit without adjustment for race.       Narrative:      GFR Normal >60  Chronic Kidney Disease <60  Kidney Failure <15      Magnesium [[id number]]  (Normal) Collected: 11/18/22 0718    Specimen: Blood Updated: 11/18/22 0808     Magnesium 2.0 mg/dL     aPTT [[id number]]  (Abnormal) Collected: 11/18/22 0718    Specimen: Blood Updated: 11/18/22 0754     PTT 30.0 seconds     CBC & Differential [[id number]]  (Abnormal) Collected: 11/18/22 0718    Specimen: Blood Updated: 11/18/22 0747    Narrative:      The following orders were created for panel order CBC & Differential.  Procedure                               Abnormality         Status                     ---------                               -----------         ------                     CBC Auto Differential[[id number]]        Abnormal            Final result                 Please view results for these tests on the individual orders.    CBC Auto Differential [[id number]]  (Abnormal) Collected: 11/18/22 0718    Specimen: Blood Updated: 11/18/22 0747     WBC 4.80 10*3/mm3      RBC 3.45 10*6/mm3      Hemoglobin 9.3 g/dL      Hematocrit 29.8 %      MCV 86.6 fL      MCH 27.0 pg      MCHC 31.2 g/dL      RDW 16.1 %      RDW-SD 49.4 fl      MPV 7.7 fL      Platelets 141 10*3/mm3      Neutrophil % 63.0 %      Lymphocyte % 29.3 %      Monocyte % 5.3 %      Eosinophil % 1.9 %      Basophil % 0.5 %      Neutrophils, Absolute 3.00 10*3/mm3      Lymphocytes, Absolute 1.40  10*3/mm3      Monocytes, Absolute 0.30 10*3/mm3      Eosinophils, Absolute 0.10 10*3/mm3      Basophils, Absolute 0.00 10*3/mm3      nRBC 0.1 /100 WBC     POC Glucose Once [[id number]]  (Abnormal) Collected: 11/18/22 0737    Specimen: Blood Updated: 11/18/22 0738     Glucose 125 mg/dL      Comment: Serial Number: [id number]:  [id number]       POC Glucose Once [[id number]]  (Abnormal) Collected: 11/17/22 1945    Specimen: Blood Updated: 11/17/22 1946     Glucose 188 mg/dL      Comment: Serial Number: [id number]:  [id number]       POC Glucose Once [[id number]]  (Abnormal) Collected: 11/17/22 1659    Specimen: Blood Updated: 11/17/22 1703     Glucose 138 mg/dL      Comment: Serial Number: [id number]:  [id number]             PENDING RESULTS:     IMAGING REVIEWED:  XR Chest 1 View    Result Date: 11/17/2022  Suspect mild interstitial edema, with bibasilar atelectasis  Electronically Signed ByDerek Riddle On:11/17/2022 10:15 AM This report was finalized on [id number] by  Michael Riddle, .    US Renal Bilateral    Result Date: 11/17/2022   1. Normal ultrasound appearance the kidneys with no obstruction 2. Hepatic steatosis  Electronically Signed ByDerek Riddle On:11/17/2022 7:53 AM This report was finalized on [id number] by  Michael Riddle, .      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  With history of hospitalization and immobility, continued cigarette smoking, and elevated BMI, her thrombosis is thought to be provoked.  At this point, no hereditary testing for inherited predisposition to thrombosis is justified.    She started Eliquis 11/16/22 at 10 mg BID x 7 days and will transition to 5 mg BID on 11/23/22.  Given the finding of chronic thrombus in left lower  pulmonary artery, and high risk of recurrence, she will probably receive long-term anticoagulation.      Bilateral pulmonary emboli  1. The above plans were discussed with patient  2. She will probably continue with long term anticoagulaiton  3. Will follow up in outpatient setting in 1 week from discharge with CBC       Electronically signed by CORI Conrad, 11/18/22, 1:23 PM EST.    Ms. Álvarez is feeling better and wants to go home but she continues to require high flow oxygen. She also complains of persistent chest pain, though not as severe. She has been able to eat well and has not had any abdominal pain. No diarrhea. No bleeding. On exam she appears chronically ill but in no distress. Conversant and well oriented. Respirations fast but not labored. Tachycardic but regular. The abdomen is protuberant and soft. No edema. The laboratory exams again reveal normocytic anemia. Not particularly worse. For now continue with the same anticoagulant. Discussed with her. Will sign off for now. Please contact me again in case of need  I performed a face to face interview and exam of the patient. I formulated the plan. Discussed with Ms. Joe PERSAUD and concur with her note.     Javy King MD on 11/18/2022 at 16:34

## 2022-11-18 NOTE — PROGRESS NOTES
Infectious Diseases Progress Note      LOS: 2 days   Patient Care Team:  Jose Enrique aWlters MD as PCP - General (Internal Medicine)  Heraclio Rizzo MD as Consulting Physician (Cardiology)    Chief Complaint: Shortness of breath    Subjective     The patient had no high-grade fever during the last 24 hours.  The patient is currently on 10 L oxygen via nasal cannula.  She is hemodynamically stable.    Review of Systems:   Review of Systems   Constitutional: Negative.    HENT: Negative.    Eyes: Negative.    Respiratory: Positive for shortness of breath.    Cardiovascular: Negative.    Gastrointestinal: Negative.    Genitourinary: Negative.    Musculoskeletal: Negative.    Skin: Negative.    Neurological: Negative.    Hematological: Negative.    Psychiatric/Behavioral: Negative.         Objective     Vital Signs  Temp:  [98 °F (36.7 °C)-98.6 °F (37 °C)] 98.2 °F (36.8 °C)  Heart Rate:  [59-80] 65  Resp:  [16-18] 16  BP: (100-142)/(36-85) 134/61    Physical Exam:  Physical Exam  Vitals and nursing note reviewed.   Constitutional:       Appearance: She is well-developed.   HENT:      Head: Normocephalic and atraumatic.   Eyes:      Pupils: Pupils are equal, round, and reactive to light.   Cardiovascular:      Rate and Rhythm: Normal rate and regular rhythm.      Heart sounds: Normal heart sounds.   Pulmonary:      Effort: Pulmonary effort is normal. No respiratory distress.      Breath sounds: No wheezing or rales.   Abdominal:      General: Bowel sounds are normal. There is no distension.      Palpations: Abdomen is soft. There is no mass.      Tenderness: There is no abdominal tenderness. There is no guarding or rebound.   Musculoskeletal:         General: No deformity. Normal range of motion.      Cervical back: Normal range of motion and neck supple.   Skin:     General: Skin is warm.      Findings: No erythema or rash.   Neurological:      Mental Status: She is alert and oriented to person, place, and time.      Cranial  Nerves: No cranial nerve deficit.          Results Review:    I have reviewed all clinical data, test, lab, and imaging results.     Radiology  No Radiology Exams Resulted Within Past 24 Hours    Cardiology    Laboratory    Results from last 7 days   Lab Units 11/18/22  0718 11/17/22  0311 11/16/22  0557 11/15/22  0035 11/14/22  1237   WBC 10*3/mm3 4.80 4.70 5.90 6.10 5.50   HEMOGLOBIN g/dL 9.3* 8.6* 10.0* 9.9* 10.5*   HEMATOCRIT % 29.8* 28.4* 32.2* 32.2* 33.4*   PLATELETS 10*3/mm3 141 149 170 188 222     Results from last 7 days   Lab Units 11/18/22 0718 11/17/22 0311 11/16/22  1324 11/16/22  0557 11/15/22  0035 11/14/22  1237   SODIUM mmol/L 141 140 141  --  140 139   POTASSIUM mmol/L 4.7 4.8 4.3 4.7 4.4 3.9   CHLORIDE mmol/L 102 102 99  --  98 97*   CO2 mmol/L 32.0* 34.0* 35.0*  --  36.0* 36.0*   BUN mg/dL 7* 7* 8  --  10 11   CREATININE mg/dL 0.78 0.92 1.01*  --  0.75 0.83   GLUCOSE mg/dL 126* 132* 155*  --  131* 142*   ALBUMIN g/dL 3.30* 3.40*  --   --   --  3.60   BILIRUBIN mg/dL 0.2 0.2  --   --   --  0.3   ALK PHOS U/L 54 51  --   --   --  66   AST (SGOT) U/L 8 6  --   --   --  9   ALT (SGPT) U/L 6 6  --   --   --  9   CALCIUM mg/dL 9.0 8.9 8.6  --  8.8 9.0                 Microbiology   Microbiology Results (last 10 days)     Procedure Component Value - Date/Time    Respiratory Panel PCR w/COVID-19(SARS-CoV-2) SAPNA/POPEYE/BEULAH/PAD/COR/MAD/NATHANIEL In-House, NP Swab in [facility], 3-4 HR TAT - Swab, Nasopharynx [[id number]]  (Normal) Collected: 11/16/22 9765    Lab Status: Final result Specimen: Swab from Nasopharynx Updated: 11/16/22 1002     ADENOVIRUS, PCR Not Detected     Coronavirus 229E Not Detected     Coronavirus HKU1 Not Detected     Coronavirus NL63 Not Detected     Coronavirus OC43 Not Detected     COVID19 Not Detected     Human Metapneumovirus Not Detected     Human Rhinovirus/Enterovirus Not Detected     Influenza A PCR Not Detected     Influenza B PCR Not Detected     Parainfluenza Virus 1 Not Detected      Parainfluenza Virus 2 Not Detected     Parainfluenza Virus 3 Not Detected     Parainfluenza Virus 4 Not Detected     RSV, PCR Not Detected     Bordetella pertussis pcr Not Detected     Bordetella parapertussis PCR Not Detected     Chlamydophila pneumoniae PCR Not Detected     Mycoplasma pneumo by PCR Not Detected    Narrative:      In the setting of a positive respiratory panel with a viral infection PLUS a negative procalcitonin without other underlying concern for bacterial infection, consider observing off antibiotics or discontinuation of antibiotics and continue supportive care. If the respiratory panel is positive for atypical bacterial infection (Bordetella pertussis, Chlamydophila pneumoniae, or Mycoplasma pneumoniae), consider antibiotic de-escalation to target atypical bacterial infection.    Blood Culture - Blood, Hand, Left [[id number]]  (Normal) Collected: 11/16/22 0833    Lab Status: Preliminary result Specimen: Blood from Hand, Left Updated: 11/18/22 0845     Blood Culture No growth at 2 days    Blood Culture - Blood, Hand, Right [[id number]]  (Normal) Collected: 11/16/22 0830    Lab Status: Preliminary result Specimen: Blood from Hand, Right Updated: 11/18/22 0845     Blood Culture No growth at 2 days    Blood Culture - Blood, Arm, Left [[id number]]  (Normal) Collected: 11/14/22 1306    Lab Status: Preliminary result Specimen: Blood from Arm, Left Updated: 11/17/22 1317     Blood Culture No growth at 3 days    Urine Culture - Urine, Urine, Catheter [[id number]]  (Abnormal)  (Susceptibility) Collected: 11/14/22 1255    Lab Status: Final result Specimen: Urine, Catheter Updated: 11/16/22 1212     Urine Culture >100,000 CFU/mL Klebsiella pneumoniae ssp pneumoniae    Narrative:      Colonization of the urinary tract without infection is common. Treatment is discouraged unless the patient is symptomatic, pregnant, or undergoing an invasive urologic procedure.    Susceptibility      Klebsiella pneumoniae ssp  pneumoniae      FAUSTO      Ampicillin Resistant     Ampicillin + Sulbactam Susceptible      Cefazolin Susceptible      Cefepime Susceptible      Ceftazidime Susceptible      Ceftriaxone Susceptible      Gentamicin Susceptible      Levofloxacin Intermediate      Nitrofurantoin Resistant     Piperacillin + Tazobactam Susceptible      Trimethoprim + Sulfamethoxazole Susceptible                           Blood Culture - Blood, Arm, Left [[id number]]  (Normal) Collected: 11/14/22 1237    Lab Status: Preliminary result Specimen: Blood from Arm, Left Updated: 11/18/22 1246     Blood Culture No growth at 4 days          Medication Review:       Schedule Meds  amLODIPine, 10 mg, Oral, Q24H  apixaban, 10 mg, Oral, BID   Followed by  [START ON 11/23/2022] apixaban, 5 mg, Oral, BID  budesonide-formoterol, 2 puff, Inhalation, BID - RT  cefTRIAXone, 2 g, Intravenous, Q24H  cloNIDine, 0.3 mg, Oral, Q8H  insulin lispro, 2-7 Units, Subcutaneous, TID With Meals  lisinopril, 40 mg, Oral, Q24H  senna-docusate sodium, 2 tablet, Oral, BID  sodium chloride, 10 mL, Intravenous, Q12H  topiramate, 50 mg, Oral, Daily  traZODone, 100 mg, Oral, Nightly        Infusion Meds       PRN Meds  •  acetaminophen **OR** acetaminophen **OR** acetaminophen  •  aluminum-magnesium hydroxide-simethicone  •  senna-docusate sodium **AND** polyethylene glycol **AND** bisacodyl **AND** bisacodyl  •  dextrose  •  dextrose  •  diazePAM  •  doxepin  •  glucagon (human recombinant)  •  hydrALAZINE  •  ipratropium-albuterol  •  magnesium sulfate **OR** magnesium sulfate in D5W 1g/100mL (PREMIX)  •  melatonin  •  Morphine  •  nitroglycerin  •  ondansetron **OR** ondansetron  •  oxyCODONE  •  oxyCODONE  •  potassium chloride  •  potassium chloride  •  [COMPLETED] Insert peripheral IV **AND** sodium chloride  •  sodium chloride  •  zolpidem        Assessment & Plan       Antimicrobial Therapy   1.  IV  ceftriaxone        2.        3.        4.        5.            Assessment    Resolved fever.  Probably secondary to UTI and pulmonary embolism    Pulmonary embolism.  Patient is currently on high flow oxygen    UTI with Klebsiella pneumoniae.  Renal ultrasound showed no obstructive uropathy      Plan    Continue IV ceftriaxone 2 g daily for total of 7 days  Continue supportive care  Pete Guy MD  11/18/22  12:50 EST    Note is dictated utilizing voice recognition software/Dragon

## 2022-11-19 ENCOUNTER — READMISSION MANAGEMENT (OUTPATIENT)
Dept: CALL CENTER | Facility: HOSPITAL | Age: 62
End: 2022-11-19

## 2022-11-19 VITALS
BODY MASS INDEX: 41.8 KG/M2 | RESPIRATION RATE: 16 BRPM | OXYGEN SATURATION: 95 % | HEIGHT: 63 IN | WEIGHT: 235.89 LBS | SYSTOLIC BLOOD PRESSURE: 107 MMHG | DIASTOLIC BLOOD PRESSURE: 48 MMHG | TEMPERATURE: 97.7 F | HEART RATE: 66 BPM

## 2022-11-19 LAB
APTT PPP: 29.1 SECONDS (ref 61–76.5)
BACTERIA SPEC AEROBE CULT: NORMAL
BACTERIA SPEC AEROBE CULT: NORMAL
GLUCOSE BLDC GLUCOMTR-MCNC: 138 MG/DL (ref 70–105)
MAGNESIUM SERPL-MCNC: 1.7 MG/DL (ref 1.6–2.4)
POTASSIUM SERPL-SCNC: 4.8 MMOL/L (ref 3.5–5.2)

## 2022-11-19 PROCEDURE — 36410 VNPNXR 3YR/> PHY/QHP DX/THER: CPT

## 2022-11-19 PROCEDURE — 94761 N-INVAS EAR/PLS OXIMETRY MLT: CPT

## 2022-11-19 PROCEDURE — 25010000002 MORPHINE PER 10 MG: Performed by: INTERNAL MEDICINE

## 2022-11-19 PROCEDURE — 82962 GLUCOSE BLOOD TEST: CPT

## 2022-11-19 PROCEDURE — 94664 DEMO&/EVAL PT USE INHALER: CPT

## 2022-11-19 PROCEDURE — 94799 UNLISTED PULMONARY SVC/PX: CPT

## 2022-11-19 PROCEDURE — C1751 CATH, INF, PER/CENT/MIDLINE: HCPCS

## 2022-11-19 PROCEDURE — 85730 THROMBOPLASTIN TIME PARTIAL: CPT | Performed by: EMERGENCY MEDICINE

## 2022-11-19 PROCEDURE — 84132 ASSAY OF SERUM POTASSIUM: CPT | Performed by: PHYSICIAN ASSISTANT

## 2022-11-19 PROCEDURE — 25010000002 CEFTRIAXONE PER 250 MG: Performed by: INTERNAL MEDICINE

## 2022-11-19 PROCEDURE — 25010000002 KETOROLAC TROMETHAMINE PER 15 MG: Performed by: INTERNAL MEDICINE

## 2022-11-19 PROCEDURE — 83735 ASSAY OF MAGNESIUM: CPT | Performed by: PHYSICIAN ASSISTANT

## 2022-11-19 PROCEDURE — 25010000002 ONDANSETRON PER 1 MG: Performed by: PHYSICIAN ASSISTANT

## 2022-11-19 RX ORDER — MORPHINE SULFATE 2 MG/ML
2 INJECTION, SOLUTION INTRAMUSCULAR; INTRAVENOUS EVERY 4 HOURS PRN
Status: DISCONTINUED | OUTPATIENT
Start: 2022-11-19 | End: 2022-11-19 | Stop reason: HOSPADM

## 2022-11-19 RX ORDER — KETOROLAC TROMETHAMINE 15 MG/ML
15 INJECTION, SOLUTION INTRAMUSCULAR; INTRAVENOUS ONCE
Status: COMPLETED | OUTPATIENT
Start: 2022-11-19 | End: 2022-11-19

## 2022-11-19 RX ORDER — SODIUM CHLORIDE 0.9 % (FLUSH) 0.9 %
10 SYRINGE (ML) INJECTION AS NEEDED
Status: DISCONTINUED | OUTPATIENT
Start: 2022-11-19 | End: 2022-11-19 | Stop reason: HOSPADM

## 2022-11-19 RX ORDER — SODIUM CHLORIDE 0.9 % (FLUSH) 0.9 %
10 SYRINGE (ML) INJECTION EVERY 12 HOURS SCHEDULED
Status: DISCONTINUED | OUTPATIENT
Start: 2022-11-19 | End: 2022-11-19 | Stop reason: HOSPADM

## 2022-11-19 RX ADMIN — Medication 10 ML: at 09:06

## 2022-11-19 RX ADMIN — CEFTRIAXONE 2 G: 2 INJECTION, POWDER, FOR SOLUTION INTRAMUSCULAR; INTRAVENOUS at 13:29

## 2022-11-19 RX ADMIN — AMLODIPINE BESYLATE 10 MG: 5 TABLET ORAL at 08:56

## 2022-11-19 RX ADMIN — ONDANSETRON 4 MG: 2 INJECTION INTRAMUSCULAR; INTRAVENOUS at 04:56

## 2022-11-19 RX ADMIN — KETOROLAC TROMETHAMINE 15 MG: 15 INJECTION, SOLUTION INTRAMUSCULAR; INTRAVENOUS at 01:57

## 2022-11-19 RX ADMIN — BUDESONIDE AND FORMOTEROL FUMARATE DIHYDRATE 2 PUFF: 80; 4.5 AEROSOL RESPIRATORY (INHALATION) at 08:24

## 2022-11-19 RX ADMIN — ZOLPIDEM TARTRATE 5 MG: 5 TABLET, FILM COATED ORAL at 00:33

## 2022-11-19 RX ADMIN — LISINOPRIL 40 MG: 20 TABLET ORAL at 00:24

## 2022-11-19 RX ADMIN — MORPHINE SULFATE 2 MG: 2 INJECTION, SOLUTION INTRAMUSCULAR; INTRAVENOUS at 04:48

## 2022-11-19 RX ADMIN — Medication 5 MG: at 00:32

## 2022-11-19 RX ADMIN — OXYCODONE 5 MG: 5 TABLET ORAL at 00:24

## 2022-11-19 RX ADMIN — OXYCODONE 5 MG: 5 TABLET ORAL at 09:13

## 2022-11-19 RX ADMIN — CLONIDINE HYDROCHLORIDE 0.3 MG: 0.1 TABLET ORAL at 13:29

## 2022-11-19 RX ADMIN — APIXABAN 10 MG: 5 TABLET, FILM COATED ORAL at 08:56

## 2022-11-19 RX ADMIN — TOPIRAMATE 50 MG: 25 TABLET, FILM COATED ORAL at 08:56

## 2022-11-19 NOTE — PROGRESS NOTES
Daily Progress Note          Assessment      Shortness of breath  Pulmonary embolism: No DVT on lower extremity venous Doppler studies  Pulmonary hypertension post PE  Chronic hypoxemia  Bilateral subsegmental atelectasis  COPD  History of tobacco smoking: Quit 6/27/2022  Obesity  Diabetes mellitus type 2  Dyslipidemia  Hepatic steatosis  Essential hypertension  RLS  History of insomnia and PTSD  Normocytic anemia    Results for orders placed during the hospital encounter of 11/14/22    Adult Transthoracic Echo Complete w/ Color, Spectral and Contrast if Necessary Per Protocol    Interpretation Summary  •  Left ventricular systolic function is normal. Left ventricular ejection fraction appears to be 56 - 60%.  •  Left ventricular diastolic function was normal.  •  Estimated right ventricular systolic pressure from tricuspid regurgitation is markedly elevated (>55 mmHg).         Plan:  Oxygen supplement and titration maintain saturation 90 to 95%: Currently improving and and requiring 4 L per nasal cannula which is her home setting  If patient is discharged then we will see her in office in 3 weeks and will assess for an outpatient home sleep test  Reassess echo in 3 months to evaluate pulmonary hypertension  Bronchodilators  Symbicort  Mucinex  Anticoagulation: Eliquis for lifelong  Blood pressure control  Glucose control  Antibiotics: Rocephin               LOS: 3 days     Subjective     Patient feels better today and feels her breathing is back to her baseline    Objective     Vital signs for last 24 hours:  Vitals:    11/19/22 0827 11/19/22 0856 11/19/22 0912 11/19/22 1024   BP:  102/46 104/50 107/48   BP Location:       Patient Position:       Pulse: 59 64  66   Resp: 20   16   Temp:    97.7 °F (36.5 °C)   TempSrc:    Oral   SpO2: 98%  97% 95%   Weight:       Height:           Intake/Output last 3 shifts:  I/O last 3 completed shifts:  In: 240 [P.O.:240]  Out: 300 [Urine:300]  Intake/Output this shift:  No  intake/output data recorded.      Radiology  Imaging Results (Last 24 Hours)     ** No results found for the last 24 hours. **          Labs:  Results from last 7 days   Lab Units 11/18/22  0718   WBC 10*3/mm3 4.80   HEMOGLOBIN g/dL 9.3*   HEMATOCRIT % 29.8*   PLATELETS 10*3/mm3 141     Results from last 7 days   Lab Units 11/19/22  0017 11/18/22  0718   SODIUM mmol/L  --  141   POTASSIUM mmol/L 4.8 4.7   CHLORIDE mmol/L  --  102   CO2 mmol/L  --  32.0*   BUN mg/dL  --  7*   CREATININE mg/dL  --  0.78   CALCIUM mg/dL  --  9.0   BILIRUBIN mg/dL  --  0.2   ALK PHOS U/L  --  54   ALT (SGPT) U/L  --  6   AST (SGOT) U/L  --  8   GLUCOSE mg/dL  --  126*     Results from last 7 days   Lab Units 11/16/22  1934   PH, ARTERIAL pH units 7.293*   PO2 ART mm Hg 109.7*   PCO2, ARTERIAL mm Hg 71.8*   HCO3 ART mmol/L 34.8*     Results from last 7 days   Lab Units 11/18/22  0718 11/17/22  0311 11/14/22  1237   ALBUMIN g/dL 3.30* 3.40* 3.60     Results from last 7 days   Lab Units 11/15/22  0035 11/14/22  1912 11/14/22  1237   TROPONIN T ng/mL <0.010 <0.010 <0.010         Results from last 7 days   Lab Units 11/19/22  0017   MAGNESIUM mg/dL 1.7     Results from last 7 days   Lab Units 11/19/22  0017 11/18/22 0718 11/17/22 0311 11/14/22  2149 11/14/22  1237   INR   --   --   --   --  0.99   APTT seconds 29.1* 30.0* 29.9*   < > 28.8*    < > = values in this interval not displayed.               Meds:   SCHEDULE  amLODIPine, 10 mg, Oral, Q24H  apixaban, 10 mg, Oral, BID   Followed by  [START ON 11/23/2022] apixaban, 5 mg, Oral, BID  budesonide-formoterol, 2 puff, Inhalation, BID - RT  cefTRIAXone, 2 g, Intravenous, Q24H  cloNIDine, 0.3 mg, Oral, Q8H  insulin lispro, 2-7 Units, Subcutaneous, TID With Meals  lisinopril, 40 mg, Oral, Q24H  senna-docusate sodium, 2 tablet, Oral, BID  sodium chloride, 10 mL, Intravenous, Q12H  topiramate, 50 mg, Oral, Daily  traZODone, 100 mg, Oral, Nightly      Infusions     PRNs  •  acetaminophen **OR**  acetaminophen **OR** acetaminophen  •  aluminum-magnesium hydroxide-simethicone  •  senna-docusate sodium **AND** polyethylene glycol **AND** bisacodyl **AND** bisacodyl  •  dextrose  •  dextrose  •  diazePAM  •  doxepin  •  glucagon (human recombinant)  •  hydrALAZINE  •  ipratropium-albuterol  •  magnesium sulfate **OR** magnesium sulfate in D5W 1g/100mL (PREMIX)  •  melatonin  •  Morphine  •  nitroglycerin  •  ondansetron **OR** ondansetron  •  oxyCODONE  •  oxyCODONE  •  potassium chloride  •  potassium chloride  •  [COMPLETED] Insert peripheral IV **AND** sodium chloride  •  sodium chloride  •  zolpidem    Physical Exam:  General Appearance:  Alert   HEENT:  Normocephalic, without obvious abnormality, Conjunctiva/corneas clear,.   Nares normal, no drainage     Neck:  Supple, symmetrical, trachea midline.   Lungs /Chest wall: Minimal bilateral basal rhonchi, respirations unlabored, symmetrical wall movement.     Heart:  Regular rate and rhythm, S1 S2 normal  Abdomen: Soft, non-tender, no masses, no organomegaly.    Extremities: No edema, no clubbing or cyanosis    ROS  Constitutional: Negative for chills, negative for fever and malaise/fatigue.   HENT: Negative.    Eyes: Negative.    Cardiovascular: Negative.    Respiratory: Positive for shortness of breath.    Skin: Negative.    Musculoskeletal: Negative.    Gastrointestinal: Negative.    Genitourinary: Negative.    Neurological: Negative.    Psychiatric/Behavioral: Negative.      I reviewed the recent clinical results    Much of this encounter note is an electronic transcription/translation of spoken language to printed text using Dragon Software which might include inadvertent errors in transcription.

## 2022-11-19 NOTE — DISCHARGE INSTR - APPOINTMENTS
Ambulatory Care [facility]  Arrive at 0830 for IV antibiotic    [phone]

## 2022-11-19 NOTE — NURSING NOTE
Per Dr. Colin, it's okay for the patient to be discharged. The patient will receive 1 IV rocephin before they leave and  Will need two more does of IV rocephin  Outpatient. anitbiotics will finish on the 21st of November. Making at total of 3 more doses

## 2022-11-19 NOTE — DISCHARGE INSTRUCTIONS
Patient was advised to follow-up with her primary care physician who will review her current medications.    Patient was advised to follow-up with her pulmonologist who will reassess her pulmonary function.    Patient was advised to return to the emergency department if she experiences any recurrence of her symptoms.

## 2022-11-19 NOTE — CONSULTS
Midline Line Insertion Procedure Note    Procedure: Insertion of #18G/10CM PowerGlide    Indications:  Home IV therapy    Procedure Details:   Sterile technique was used including antiseptics, cap, gloves, gown, hand hygiene, mask and sheet.    #18G/10CM PowerGlide inserted to the R Basilic vein per hospital protocol by MARY Orta.     Non-pulsatile blood return: yes    Ultrasound Guidance: Yes    Lot #: [id number]  Expiration date: 10/31/2023    Complications:  No immediate complications noted.     Findings:  Catheter inserted to 10 cm, with 0 cm exposed.   Mid upper arm circumference is 42 cm.  Catheter was flushed with 10 cc NS and sterile dressing applied.   Patient tolerated procedure well.    Recommendations:  Verbal and/or written Care/Maintenance instructions provided to patient.   Primary nurse notified that midline is okay to use at this time.     Jourdan Orta RN  11/19/22  13:36 EST

## 2022-11-19 NOTE — CASE MANAGEMENT/SOCIAL WORK
Continued Stay Note   [facility]     Patient Name: Ann Álvarez  MRN: [id number]  Today's Date: 11/19/2022    Admit Date: 11/14/2022    Plan: DC plan: home with spouse, eliquis card given, pending clinical progress. Ambulatory for IV abx. Rocephin 2gm   Discharge Plan     Row Name 11/19/22 1237       Plan    Plan Comments ambulatory referral placed.  RN clarified with Dr. anders that last dose to be 11/21.  Left message for ambulatory with that information.    Row Name 11/19/22 1144       Plan    Plan DC plan: home with spouse, eliquis card given, pending clinical progress. Ambulatory for IV abx. Rocephin 2gm    Plan Comments CM contacted by nursing that pt has d/c orders and needs IV abx. Rocephin 2gm qd x 7 days per ID note.  1st dose was 11/14.  SC sent to Dr. Guy to verify last dose of med as per epic it is listed as 11/21- discussed with pharmacist.  Pt and spouse agreeable to come to ambulatory care for IV abx.  Instructed rn to give dose today prior to d/c. left message with ambulatory with pt information.                Expected Discharge Date and Time     Expected Discharge Date Expected Discharge Time    Nov 19, 2022         Met with patient in room wearing PPE: mask..      Maintained distance greater than six feet and spent less than 15 minutes in the room.          Tanika Randall RN

## 2022-11-20 PROBLEM — J96.21 ACUTE ON CHRONIC RESPIRATORY FAILURE WITH HYPOXIA AND HYPERCAPNIA: Status: ACTIVE | Noted: 2022-11-20

## 2022-11-20 PROBLEM — J96.01 ACUTE RESPIRATORY FAILURE WITH HYPOXIA AND HYPERCAPNIA (HCC): Status: RESOLVED | Noted: 2022-11-18 | Resolved: 2022-11-20

## 2022-11-20 PROBLEM — J96.02 ACUTE RESPIRATORY FAILURE WITH HYPOXIA AND HYPERCAPNIA (HCC): Status: RESOLVED | Noted: 2022-11-18 | Resolved: 2022-11-20

## 2022-11-20 PROBLEM — J96.22 ACUTE ON CHRONIC RESPIRATORY FAILURE WITH HYPOXIA AND HYPERCAPNIA: Status: ACTIVE | Noted: 2022-11-20

## 2022-11-20 NOTE — DISCHARGE SUMMARY
[facility]  DISCHARGE SUMMARY    Patient Name: Ann Álvarez  : 1960  MRN: [id number]    Date of Admission: 2022  Discharge Diagnosis: Acute pulmonary embolism./Acute on chronic respiratory failure with hypoxia and hypercapnia.  Date of Discharge: 2022  Primary Care Physician: Jose Enrique Walters MD      Presenting Problem:   Shortness of breath [R06.02]  Urinary tract infection without hematuria, site unspecified [N39.0]  Chest pain, unspecified type [R07.9]  Other acute pulmonary embolism, unspecified whether acute cor pulmonale present (HCC) [I26.99]    Active and Resolved Hospital Problems:  Active Hospital Problems    Diagnosis POA   • **Acute pulmonary embolism without acute cor pulmonale (HCC) [I26.99] Yes     Priority: High   • Acute on chronic respiratory failure with hypoxia and hypercapnia (HCC) [J96.21, J96.22] Yes     Priority: High   • Acute cystitis without hematuria [N30.00] Yes     Priority: High   • Shortness of breath [R06.02] Yes     Priority: Low   • Mixed hyperlipidemia [E78.2] Yes   • KELTON (generalized anxiety disorder) [F41.1] Yes   • Morbid obesity (HCC) [E66.01] Yes   • Chronic back pain [M54.9, G89.29] Yes   • PTSD (post-traumatic stress disorder) [F43.10] Yes   • RLS (restless legs syndrome) [G25.81] Yes   • Chronic obstructive pulmonary disease (HCC) [J44.9] Yes   • Chronic respiratory failure with hypoxia (HCC) [J96.11] Yes   • Insomnia [G47.00] Yes   • Tobacco use disorder [F17.200] Yes   • Primary hypertension [I10] Yes      Resolved Hospital Problems    Diagnosis POA   • Acute respiratory failure with hypoxia and hypercapnia (HCC) [J96.01, J96.02] Yes     Priority: High         Hospital Course   From previous notes and with minor updates.    Hospital Course:      Patient is a 62 y.o. female  with past medical history of morbid obesity, diabetes mellitus, COPD, chronic respiratory failure with hypoxia on 2 L nasal cannula  continuous, hypertension, hyperlipidemia, osteoarthritis, anxiety disorder, chronic low back pain, insomnia, PTSD, restless leg syndrome tobacco use disorder who presented to [facility] on 11/14/2022 complaining of shortness of air, chest pain and back pain.  Patient reports that she was in [facility] for 6 days around [facility] for UTI, sepsis and was on a ventilator for 2 days.  Since then according to primary care provider records the UTI had cleared.  She reports that 2 days ago she began having shortness of air, left-sided chest pain, that she describes as a tightness, that radiates into her shoulder blade and low back pain.  She is on her baseline home 4 L O2 at 95%.  She states nothing makes her chest tightness or back pain any better.  She denies having any lightheadedness, dizziness or syncopal episode.  She denies having any vomiting but has felt nauseous.  She denies she denies any abdominal pain, constipation or diarrhea.  She reports bilateral lower extremity swelling but feels this is nothing out of the ordinary.  We have been asked to admit this patient for further evaluation and treatment.  Patient was seen in the emergency room and emergency department work-up vital signs stable 4 L nasal cannula at 95%.  UA positive nitrate with 3+ bacteria.  Glucose 142.  Creatinine 0.83.  Chloride 97.  CO2 36.0.  Troponin negative.  D-dimer 1.47.  White blood count 5.50.  Hemoglobin 10.5.  Platelets 222.  Lactate 0.9.  Blood culture pending, urine culture pending.  CTA chest Small distal segmental and subsegmental emboli are present in the right lower lobe. No evidence of right heart strain. Small eccentric nonocclusive thrombus is demonstrated in the left lower lobe pulmonary artery proximally. This has a chronic appearance.Features of mild interstitial and alveolar edema.Subsegmental atelectasis in the right middle lobe and left lower lobe.  Pulmonary consult was completed.  Pulmonary  embolism was treated with anticoagulation, Eliquis.  Hematology/oncology consult was completed.  Acute on chronic respiratory failure with hypoxia and hypercapnia was treated with oxygen therapy.  Hypertension was treated with lisinopril.  Acute cystitis was treated with antibiotics.  COPD was treated with nebs.  Appropriate patient's home medications were resumed in the hospital for other chronic medical conditions.  Patient reported being at her baseline after over 5 days in the hospital and requested to be discharged home.  Patient was advised to take her medications as prescribed.  The discharge medications are as per medication reconciliation list.  Patient was advised to follow-up with her primary care physician within 3 to 5 days of discharge.  Patient was advised to follow-up with her pulmonologist within 14 days of discharge.  Patient was advised to follow-up with her hematologist/oncologist within 14 days of discharge.  Patient was advised to return to the emergency department if she experiences any recurrence of her symptoms.  Patient and family agreed with the plan and patient was discharged in a stable condition.      DISCHARGE Follow Up Recommendations for labs and diagnostics:     Patient was advised to follow-up with her primary care physician who will review her current medications.    Patient was advised to follow-up with her pulmonologist to reassess her pulmonary function.    Patient was advised to follow-up with her hematologist/oncologist who will reassess hypercoagulability state and her treatment for pulmonary embolism.          Reasons For Change In Medications and Indications for New Medications:      Day of Discharge     Vital Signs:  Temp:  [97.7 °F (36.5 °C)] 97.7 °F (36.5 °C)  Heart Rate:  [59-68] 66  Resp:  [16-20] 16  BP: (102-107)/(46-50) 107/48  Flow (L/min):  [4-8] 4    Physical Exam:  Physical Exam  Constitutional:       General: She is not in acute distress.  HENT:      Head:  Normocephalic and atraumatic.      Nose: Nose normal. No congestion or rhinorrhea.      Mouth/Throat:      Mouth: Mucous membranes are moist.      Pharynx: Oropharynx is clear. No oropharyngeal exudate or posterior oropharyngeal erythema.   Eyes:      Pupils: Pupils are equal, round, and reactive to light.   Cardiovascular:      Rate and Rhythm: Normal rate and regular rhythm.      Pulses: Normal pulses.      Heart sounds: Normal heart sounds. No murmur heard.    No friction rub. No gallop.   Pulmonary:      Effort: No respiratory distress.      Breath sounds: No wheezing, rhonchi or rales.   Chest:      Chest wall: No tenderness.   Abdominal:      General: Abdomen is flat. Bowel sounds are normal. There is no distension.      Palpations: Abdomen is soft.      Tenderness: There is no right CVA tenderness.   Musculoskeletal:         General: No swelling, tenderness, deformity or signs of injury.      Cervical back: Neck supple. No tenderness.      Right lower leg: No edema.      Left lower leg: No edema.   Skin:     Capillary Refill: Capillary refill takes less than 2 seconds.      Coloration: Skin is not jaundiced.      Findings: No bruising, lesion or rash.   Neurological:      General: No focal deficit present.      Mental Status: She is alert.   Psychiatric:         Behavior: Behavior normal.              Pertinent  and/or Most Recent Results     LAB RESULTS:      Lab 11/19/22  0017 11/18/22  0718 11/17/22  0311 11/16/22  0557 11/15/22  2306 11/15/22  1608 11/15/22  0819 11/15/22  0713 11/15/22  0035 11/14/22  2149 11/14/22  1241 11/14/22  1237   WBC  --  4.80 4.70 5.90  --   --   --   --  6.10  --   --  5.50   HEMOGLOBIN  --  9.3* 8.6* 10.0*  --   --   --   --  9.9*  --   --  10.5*   HEMATOCRIT  --  29.8* 28.4* 32.2*  --   --   --   --  32.2*  --   --  33.4*   PLATELETS  --  141 149 170  --   --   --   --  188  --   --  222   NEUTROS ABS  --  3.00 3.20 3.90  --   --   --   --  4.10  --   --  3.60   LYMPHS ABS   --  1.40 1.20 1.70  --   --   --   --  1.60  --   --  1.60   MONOS ABS  --  0.30 0.30 0.30  --   --   --   --  0.30  --   --  0.30   EOS ABS  --  0.10 0.10 0.10  --   --   --   --  0.10  --   --  0.10   MCV  --  86.6 86.8 86.7  --   --   --   --  85.9  --   --  86.1   LACTATE  --   --   --   --   --   --  0.7  --   --   --  0.9  --    PROTIME  --   --   --   --   --   --   --   --   --   --   --  10.2   APTT 29.1* 30.0* 29.9* 32.8* 63.4   < >  --    < > 31.8*   < >  --  28.8*    < > = values in this interval not displayed.         Lab 11/19/22  0017 11/18/22  0718 11/17/22  0311 11/16/22  1324 11/16/22  0557 11/15/22  0035 11/14/22  1912 11/14/22  1237   SODIUM  --  141 140 141  --  140  --  139   POTASSIUM 4.8 4.7 4.8 4.3 4.7 4.4  --  3.9   CHLORIDE  --  102 102 99  --  98  --  97*   CO2  --  32.0* 34.0* 35.0*  --  36.0*  --  36.0*   ANION GAP  --  7.0 4.0* 7.0  --  6.0  --  6.0   BUN  --  7* 7* 8  --  10  --  11   CREATININE  --  0.78 0.92 1.01*  --  0.75  --  0.83   EGFR  --  85.5 70.1 62.7  --  89.6  --  79.3   GLUCOSE  --  126* 132* 155*  --  131*  --  142*   CALCIUM  --  9.0 8.9 8.6  --  8.8  --  9.0   MAGNESIUM 1.7 2.0 1.9  --  2.0 1.8  --   --    HEMOGLOBIN A1C  --   --   --   --   --   --  6.8*  --          Lab 11/18/22  0718 11/17/22  0311 11/14/22  1237   TOTAL PROTEIN 6.1 5.9* 6.4   ALBUMIN 3.30* 3.40* 3.60   GLOBULIN 2.8 2.5 2.8   ALT (SGPT) 6 6 9   AST (SGOT) 8 6 9   BILIRUBIN 0.2 0.2 0.3   ALK PHOS 54 51 66         Lab 11/15/22  0035 11/14/22  1912 11/14/22  1237   PROBNP  --   --  404.5   TROPONIN T <0.010 <0.010 <0.010   PROTIME  --   --  10.2   INR  --   --  0.99                 Lab 11/17/22  0350 11/16/22  1934 11/16/22  1510 11/16/22  1255   PH, ARTERIAL  --  7.293* 7.266* 7.285*   PCO2, ARTERIAL  --  71.8* 80.0* 82.2*   PO2 ART  --  109.7* 76.2* 64.1*   O2 SATURATION ART  --  97.4 91.9* 87.7*   FIO2 40 40 40 36   HCO3 ART  --  34.8* 36.4* 39.1*   BASE EXCESS ART  --  6.7* 7.7* 10.2*     Brief  Urine Lab Results  (Last result in the past 365 days)      Color   Clarity   Blood   Leuk Est   Nitrite   Protein   CREAT   Urine HCG        11/14/22 1255 Yellow   Clear   Negative   Small (1+)   Positive   Trace               Microbiology Results (last 10 days)     Procedure Component Value - Date/Time    Respiratory Panel PCR w/COVID-19(SARS-CoV-2) [facility]/POPEYE/[facility]/COR/MAD/NATHANIEL In-House, NP Swab in UTM/VTM, 3-4 HR TAT - Swab, Nasopharynx [[id number]]  (Normal) Collected: 11/16/22 1608    Lab Status: Final result Specimen: Swab from Nasopharynx Updated: 11/16/22 1709     ADENOVIRUS, PCR Not Detected     Coronavirus 229E Not Detected     Coronavirus HKU1 Not Detected     Coronavirus NL63 Not Detected     Coronavirus OC43 Not Detected     COVID19 Not Detected     Human Metapneumovirus Not Detected     Human Rhinovirus/Enterovirus Not Detected     Influenza A PCR Not Detected     Influenza B PCR Not Detected     Parainfluenza Virus 1 Not Detected     Parainfluenza Virus 2 Not Detected     Parainfluenza Virus 3 Not Detected     Parainfluenza Virus 4 Not Detected     RSV, PCR Not Detected     Bordetella pertussis pcr Not Detected     Bordetella parapertussis PCR Not Detected     Chlamydophila pneumoniae PCR Not Detected     Mycoplasma pneumo by PCR Not Detected    Narrative:      In the setting of a positive respiratory panel with a viral infection PLUS a negative procalcitonin without other underlying concern for bacterial infection, consider observing off antibiotics or discontinuation of antibiotics and continue supportive care. If the respiratory panel is positive for atypical bacterial infection (Bordetella pertussis, Chlamydophila pneumoniae, or Mycoplasma pneumoniae), consider antibiotic de-escalation to target atypical bacterial infection.    Blood Culture - Blood, Hand, Left [[id number]]  (Normal) Collected: 11/16/22 0891    Lab Status: Preliminary result Specimen: Blood from Hand, Left Updated: 11/19/22 0197      Blood Culture No growth at 3 days    Blood Culture - Blood, Hand, Right [[id number]]  (Normal) Collected: 11/16/22 0830    Lab Status: Preliminary result Specimen: Blood from Hand, Right Updated: 11/19/22 0846     Blood Culture No growth at 3 days    Blood Culture - Blood, Arm, Left [[id number]]  (Normal) Collected: 11/14/22 1306    Lab Status: Final result Specimen: Blood from Arm, Left Updated: 11/19/22 1317     Blood Culture No growth at 5 days    Urine Culture - Urine, Urine, Catheter [[id number]]  (Abnormal)  (Susceptibility) Collected: 11/14/22 1255    Lab Status: Final result Specimen: Urine, Catheter Updated: 11/16/22 1212     Urine Culture >100,000 CFU/mL Klebsiella pneumoniae ssp pneumoniae    Narrative:      Colonization of the urinary tract without infection is common. Treatment is discouraged unless the patient is symptomatic, pregnant, or undergoing an invasive urologic procedure.    Susceptibility      Klebsiella pneumoniae ssp pneumoniae      FAUSTO      Ampicillin Resistant     Ampicillin + Sulbactam Susceptible      Cefazolin Susceptible      Cefepime Susceptible      Ceftazidime Susceptible      Ceftriaxone Susceptible      Gentamicin Susceptible      Levofloxacin Intermediate      Nitrofurantoin Resistant     Piperacillin + Tazobactam Susceptible      Trimethoprim + Sulfamethoxazole Susceptible                           Blood Culture - Blood, Arm, Left [[id number]]  (Normal) Collected: 11/14/22 1237    Lab Status: Final result Specimen: Blood from Arm, Left Updated: 11/19/22 1245     Blood Culture No growth at 5 days          XR Chest 1 View    Result Date: 11/17/2022  Impression: Suspect mild interstitial edema, with bibasilar atelectasis  Electronically Signed By-Michael Riddle On:11/17/2022 10:15 AM This report was finalized on [id number] by  Michael Riddle, .    XR Chest 1 View    Result Date: 11/14/2022  Impression: No acute chest findings.  Electronically Signed By-Jocelyn Zayas MD  On:11/14/2022 1:00 PM This report was finalized on [id number] by  Jocelyn Zayas MD.    CT Angiogram Chest Pulmonary Embolism    Result Date: 11/14/2022  Impression: 1. Small distal segmental and subsegmental emboli are present in the right lower lobe. No evidence of right heart strain. I notified the emergency room physician at the time of this dictation. 2. Small eccentric nonocclusive thrombus is demonstrated in the left lower lobe pulmonary artery proximally. This has a chronic appearance. 3. Features of mild interstitial and alveolar edema. 4. Subsegmental atelectasis in the right middle lobe and left lower lobe.  Electronically Signed By-Jocelyn Zayas MD On:11/14/2022 3:17 PM This report was finalized on [id number] by  Jocelyn Zayas MD.    US Renal Bilateral    Result Date: 11/17/2022  Impression:  1. Normal ultrasound appearance the kidneys with no obstruction 2. Hepatic steatosis  Electronically Signed By-Michael Riddle On:11/17/2022 7:53 AM This report was finalized on [id number] by  Michael Riddle, .      Results for orders placed during the hospital encounter of 11/14/22    Duplex Venous Lower Extremity - Bilateral CAR    Interpretation Summary  •  Normal bilateral lower extremity venous duplex scan.      Results for orders placed during the hospital encounter of 11/14/22    Duplex Venous Lower Extremity - Bilateral CAR    Interpretation Summary  •  Normal bilateral lower extremity venous duplex scan.      Results for orders placed during the hospital encounter of 11/14/22    Adult Transthoracic Echo Complete w/ Color, Spectral and Contrast if Necessary Per Protocol    Interpretation Summary  •  Left ventricular systolic function is normal. Left ventricular ejection fraction appears to be 56 - 60%.  •  Left ventricular diastolic function was normal.  •  Estimated right ventricular systolic pressure from tricuspid regurgitation is markedly elevated (>55 mmHg).      Labs Pending at  Discharge:  Pending Labs     Order Current Status    Blood Culture - Blood, Hand, Left Preliminary result    Blood Culture - Blood, Hand, Right Preliminary result          Procedures Performed           Consults:   Consults     Date and Time Order Name Status Description    11/16/2022  7:54 AM Inpatient Infectious Diseases Consult Completed     11/15/2022  7:21 AM Inpatient Pulmonology Consult Completed             Discharge Details        Discharge Medications      New Medications      Instructions Start Date   apixaban 5 MG tablet tablet  Commonly known as: ELIQUIS   10 mg, Oral, 2 Times Daily      Eliquis 5 MG tablet tablet  Generic drug: apixaban   Take 2 tablets by mouth 2 (Two) Times a Day for 7 doses, followed by 1 tablet twice daily thereafter.   Start Date: November 23, 2022     cefTRIAXone 2 g in sodium chloride 0.9 % 100 mL IVPB   2 g, Intravenous, Every 24 Hours         Continue These Medications      Instructions Start Date   amLODIPine 10 MG tablet  Commonly known as: NORVASC   10 mg, Oral, Every 24 Hours Scheduled      budesonide 0.5 MG/2ML nebulizer solution  Commonly known as: PULMICORT   0.5 mg, Nebulization, 2 Times Daily      cloNIDine 0.3 MG tablet  Commonly known as: CATAPRES   0.3 mg, Oral, Every 8 Hours Scheduled      diazePAM 5 MG tablet  Commonly known as: VALIUM   5 mg, Oral, 4 Times Daily PRN      doxepin 50 MG capsule  Commonly known as: SINEquan   50 mg, Oral, Nightly PRN      hydrALAZINE 25 MG tablet  Commonly known as: APRESOLINE   25 mg, Oral, 3 Times Daily PRN      insulin aspart 100 UNIT/ML solution pen-injector sc pen  Commonly known as: novoLOG FLEXPEN   10 Units, Subcutaneous, 2 Times Daily      ipratropium-albuterol 0.5-2.5 mg/3 ml nebulizer  Commonly known as: DUO-NEB   3 mL, Nebulization, 3 Times Daily PRN      Linzess 290 MCG capsule capsule  Generic drug: linaclotide   290 mcg, Oral, Daily PRN      lisinopril 40 MG tablet  Commonly known as: PRINIVIL,ZESTRIL   40 mg, Oral,  Every 24 Hours      oxyCODONE-acetaminophen  MG per tablet  Commonly known as: PERCOCET   1 tablet, Oral, Every 4 Hours PRN      Probiotic Advanced capsule   1 capsule, Oral, Daily      promethazine 50 MG tablet  Commonly known as: PHENERGAN   50 mg, Oral, Every 6 Hours PRN      sildenafil 20 MG tablet  Commonly known as: REVATIO   10 mg, Oral, Every 8 Hours Scheduled      topiramate 50 MG tablet  Commonly known as: TOPAMAX   50 mg, Oral, Daily      traZODone 100 MG tablet  Commonly known as: DESYREL   100 mg, Oral, Nightly             Allergies   Allergen Reactions   • Codeine Hives, Itching and Nausea And Vomiting         Discharge Disposition: Stable.  Home-Health Care Svc    Diet:  Hospital:No active diet order        Discharge Activity: As tolerated        CODE STATUS:  Code Status and Medical Interventions:   Ordered at: 11/14/22 1545     Level Of Support Discussed With:    Patient     Code Status (Patient has no pulse and is not breathing):    CPR (Attempt to Resuscitate)     Medical Interventions (Patient has pulse or is breathing):    Full Support         Future Appointments   Date Time Provider Department Center   12/20/2022  9:30 AM Maxime Hernandez MD [facility] None   5/17/2023  1:20 PM Heraclio Rizzo MD [facility]       Additional Instructions for the Follow-ups that You Need to Schedule     Ambulatory Referral to [facility] For Infusion Treatment   As directed       Place medication orders in the Therapy Plan section of [facility].     Rocephin 2 gm daily. LAST DOSE 11/21.     IV infusion, line care and maintenance. Remove midline after last dose of antibiotic.    What is the duration of the infusion treatment?: .5 Hr               Time spent on Discharge including face to face service: 55 minutes    This patient has been examined wearing appropriate Personal Protective Equipment and discussed with hospital infection control department, [facility], infectious disease specialist and  pulmonologist. 11/20/22      Signature: Electronically signed by Scottie Colin MD, FACP, 11/20/22, 7:28 AM EST.

## 2022-11-20 NOTE — OUTREACH NOTE
Prep Survey    Flowsheet Row Responses   Religious facility patient discharged from? [facility]   Is LACE score < 7 ? No   Emergency Room discharge w/ pulse ox? No   Eligibility Readm Mgmt   Discharge diagnosis Acute pulmonary embolism without acute cor pulmonale    Does the patient have one of the following disease processes/diagnoses(primary or secondary)? Other   Does the patient have Home health ordered? No   Is there a DME ordered? No   Prep survey completed? Yes          JAMAL LEWIS - Registered Nurse

## 2022-11-21 ENCOUNTER — HOSPITAL ENCOUNTER (OUTPATIENT)
Dept: INFUSION THERAPY | Facility: HOSPITAL | Age: 62
Setting detail: INFUSION SERIES
Discharge: HOME | End: 2022-11-21

## 2022-11-21 VITALS
TEMPERATURE: 98.1 F | SYSTOLIC BLOOD PRESSURE: 178 MMHG | RESPIRATION RATE: 16 BRPM | OXYGEN SATURATION: 100 % | HEART RATE: 84 BPM | DIASTOLIC BLOOD PRESSURE: 87 MMHG

## 2022-11-21 DIAGNOSIS — N39.0 ACUTE UTI (URINARY TRACT INFECTION): ICD-10-CM

## 2022-11-21 LAB
BACTERIA SPEC AEROBE CULT: NORMAL
BACTERIA SPEC AEROBE CULT: NORMAL
QT INTERVAL: 416 MS

## 2022-11-21 PROCEDURE — 25010000002 CEFTRIAXONE PER 250 MG: Performed by: INTERNAL MEDICINE

## 2022-11-21 PROCEDURE — 96365 THER/PROPH/DIAG IV INF INIT: CPT

## 2022-11-21 RX ADMIN — CEFTRIAXONE 2 G: 2 INJECTION, POWDER, FOR SOLUTION INTRAMUSCULAR; INTRAVENOUS at 14:12

## 2022-11-21 NOTE — CASE MANAGEMENT/SOCIAL WORK
Case Management Discharge Note      Final Note: Home         Selected Continued Care - Discharged on 11/19/2022 Admission date: 11/14/2022 - Discharge disposition: Home-Health Care c     Transportation Services  Private: Car    Final Discharge Disposition Code: 01 - home or self-care

## 2022-11-21 NOTE — DISCHARGE PLACEMENT REQUEST
"Ann Álvarez (62 y.o. Female)     Date of Birth   1960    Social Security Number       Address   [address] [place] [address] [place] [facility]    Home Phone   [phone]    MRN   [id number]       Buddhist   Other    Marital Status                               Admission Date   22    Admission Type   Emergency    Admitting Provider   Scottie Colin MD    Attending Provider       Department, Room/Bed   [facility] CARE,        Discharge Date   2022    Discharge Disposition   Home-Health Care [facility]    Discharge Destination                               Attending Provider: (none)   Allergies: Codeine    Isolation: None   Infection: None   Code Status: Prior    Ht: 160 cm (63\")   Wt: 107 kg (235 lb 14.3 oz)    Admission Cmt: None   Principal Problem: Acute pulmonary embolism without acute cor pulmonale (HCC) [I26.99]                 Active Insurance as of 2022     Primary Coverage     Payor Plan Insurance Group Employer/Plan Group    [employer] BLUE CROSS AN[employer] BLUE CROSS BLUE [employer]IELD PPO [id number]     Payor Plan Address Payor Plan Phone Number Payor Plan Fax Number Effective Dates    [address] [phone]  2022 - None Entered    [place] [address]       Subscriber Name Subscriber Birth Date Member ID       ANN ÁLVAREZ 1960 [id number]                 Emergency Contacts      (Rel.) Home Phone Work Phone Mobile Phone    ANGY ÁLVAREZ (Spouse) [phone] -- [phone]               History & Physical      Nanci Walter PA-C at 22 1434     Attestation signed by Scottie Colin MD at 22 1716    Electronically signed by Scottie Colin MD, 22, 5:16 PM EST.         I have reviewed this documentation and agree.                      [facility]      Patient Name: Ann Álvarez  : 1960  MRN: [id number]  Primary Care Physician:  Jose Enrique Walters MD  Date of admission: " 11/14/2022      Subjective       Chief Complaint: Shortness of breath, chest pain and back pain    History of Present Illness: Ann Álvarez is a 62 y.o. female who presented to [facility] on 11/14/2022 complaining of shortness of air, chest pain and back pain.  Patient reports that she was in [facility] for 6 days around [facility] for UTI, sepsis and was on a ventilator for 2 days.  Since then according to primary care provider records the UTI had cleared.  She reports that 2 days ago she began having shortness of air, left-sided chest pain, that she describes as a tightness, that radiates into her shoulder blade and low back pain.  She is on her baseline home 4 L O2 at 95%.  She states nothing makes her chest tightness or back pain any better.  She denies having any lightheadedness, dizziness or syncopal episode.  She denies having any vomiting but has felt nauseous.  She denies she denies any abdominal pain, constipation or diarrhea.  She reports bilateral lower extremity swelling but feels this is nothing out of the ordinary.  We have been asked to admit this patient for further evaluation and treatment.    Emergency department work-up vital signs stable 4 L nasal cannula at 95%.  UA positive nitrate with 3+ bacteria.  Glucose 142.  Creatinine 0.83.  Chloride 97.  CO2 36.0.  Troponin negative.  D-dimer 1.47.  White blood count 5.50.  Hemoglobin 10.5.  Platelets 222.  Lactate 0.9.  Blood culture pending, urine culture pending.    CTA chest Small distal segmental and subsegmental emboli are present in the right lower lobe. No evidence of right heart strain. Small eccentric nonocclusive thrombus is demonstrated in the left lower lobe pulmonary artery proximally. This has a chronic appearance.Features of mild interstitial and alveolar edema.Subsegmental atelectasis in the right middle lobe and left lower lobe.    Chest x-ray no acute chest findings.    Review of Systems   Constitutional:  Negative for chills, fever and malaise/fatigue.   HENT: Negative.    Eyes: Negative.    Cardiovascular: Positive for chest pain, dyspnea on exertion and leg swelling. Negative for syncope.   Respiratory: Positive for shortness of breath and sputum production.    Skin: Negative.    Musculoskeletal: Positive for back pain.   Gastrointestinal: Positive for nausea. Negative for abdominal pain, constipation, diarrhea, hematemesis, hematochezia and vomiting.   Genitourinary: Negative for dysuria, flank pain and hematuria.   Neurological: Negative.    Psychiatric/Behavioral: Negative.           Personal History     Past Medical History:   Diagnosis Date   • Anxiety    • Arthritis    • Chronic back pain    • Chronic obstructive pulmonary disease (Former[facility]) 01/31/2020   • Chronic respiratory failure with hypoxia (Formerly [facility]) 03/06/2018    O2 @ 2 L per NC as needed   • Diabetes mellitus (Formerly [facility])    • Dyslipidemia 10/21/2014   • Edema, lower extremity 07/16/2014   • Hypertension    • Insomnia    • Obesity, Class III, BMI 40-49.9 (morbid obesity) (Formerly [facility]) 03/29/2022   • Prediabetes 11/06/2018   • Primary hypertension 05/30/2012   • PTSD (post-traumatic stress disorder)    • RLS (restless legs syndrome) 04/22/2020   • Tobacco abuse 10/21/2014   • Vitamin D deficiency 05/30/2012       Past Surgical History:   Procedure Laterality Date   • BACK SURGERY     • CHOLECYSTECTOMY     • HYSTERECTOMY     • TUMOR REMOVAL      benign breats tumor       Family History: family history includes Cancer in her brother and sister; Heart disease in her father and mother. Otherwise pertinent FHx was reviewed and not pertinent to current issue.    Social History:  reports that she quit smoking about 4 months ago. Her smoking use included cigarettes. She smoked an average of .25 packs per day. She has never used smokeless tobacco. She reports that she does not currently use alcohol. She reports that she does not use drugs.    Home Medications:  Prior to Admission  Medications     Prescriptions Last Dose Informant Patient Reported? Taking?    amLODIPine (NORVASC) 10 MG tablet   No No    Take 1 tablet by mouth Daily.    budesonide (PULMICORT) 0.5 MG/2ML nebulizer solution   No No    use 1 vial by nebulization 2 (Two) Times a Day for 14 days.    cloNIDine (CATAPRES) 0.3 MG tablet   No No    Take 1 tablet by mouth Every 8 (Eight) Hours.    doxepin (SINEquan) 50 MG capsule   Yes No    Take 50 mg by mouth At Night As Needed.    hydrALAZINE (APRESOLINE) 25 MG tablet   No No    Take 3 tablets by mouth Every 12 (Twelve) Hours.    Insulin Glargine-yfgn (Semglee, yfgn,) 100 UNIT/ML solution pen-injector   No No    Inject 20 Units under the skin into the appropriate area as directed 2 (Two) Times a Day.    ipratropium-albuterol (DUO-NEB) 0.5-2.5 mg/3 ml nebulizer   No No    use 1 vial via nebulizer 3 (Three) Times a Day    LINZESS 290 MCG capsule capsule   Yes No    Take 290 mcg by mouth Daily As Needed.    lisinopril (PRINIVIL,ZESTRIL) 40 MG tablet   Yes No    Take 40 mg by mouth Daily.    metoprolol tartrate (LOPRESSOR) 100 MG tablet   No No    Take 1 tablet by mouth Every 12 (Twelve) Hours.    Multiple Vitamin (MULTIVITAMIN) tablet   Yes No    Take 1 tablet by mouth Daily.    oxyCODONE-acetaminophen (PERCOCET)  MG per tablet   Yes No    Take 1 tablet by mouth Every 4 (Four) Hours As Needed for Moderate Pain .    predniSONE (DELTASONE) 10 MG tablet   No No    take 3 tablets once daily x 2 days, 2 daily x 2 days, 1 daily x 2 days    Probiotic Product (PROBIOTIC ADVANCED) capsule   Yes No    Take 1 capsule by mouth Daily.    promethazine (PHENERGAN) 50 MG tablet   Yes No    Take 50 mg by mouth Every 6 (Six) Hours As Needed.    sildenafil (REVATIO) 20 MG tablet   No No    Take 0.5 tablets by mouth Every 8 (Eight) Hours.    topiramate (TOPAMAX) 50 MG tablet   Yes No    Take 50 mg by mouth Daily.    traZODone (DESYREL) 100 MG tablet   Yes No    Take 100 mg by mouth Every Night.     zolpidem CR (AMBIEN CR) 12.5 MG CR tablet   Yes No    Take 12.5 mg by mouth At Night As Needed for Sleep.            Allergies:  Allergies   Allergen Reactions   • Codeine Hives, Itching and Nausea And Vomiting       Objective       Vitals:   Temp:  [98.2 °F (36.8 °C)] 98.2 °F (36.8 °C)  Heart Rate:  [63-84] 70  Resp:  [17-18] 18  BP: (103-161)/(49-68) 103/61  Flow (L/min):  [4] 4    Physical Exam  Constitutional:       General: She is not in acute distress.     Appearance: She is obese.   HENT:      Head: Normocephalic and atraumatic.   Cardiovascular:      Rate and Rhythm: Normal rate and regular rhythm.      Pulses: Normal pulses.      Heart sounds: Normal heart sounds.   Pulmonary:      Effort: Pulmonary effort is normal.      Breath sounds: Wheezing and rhonchi present.   Abdominal:      General: Bowel sounds are normal.      Palpations: Abdomen is soft.      Tenderness: There is no abdominal tenderness.   Musculoskeletal:      Cervical back: Normal range of motion and neck supple.      Right lower leg: Edema present.      Left lower leg: Edema present.   Skin:     General: Skin is warm and dry.   Neurological:      General: No focal deficit present.      Mental Status: She is alert and oriented to person, place, and time.   Psychiatric:         Mood and Affect: Mood normal.            Result Review    Result Review:  I have personally reviewed the results from the time of this admission to 11/14/2022 16:18 EST and agree with these findings:  [x]  Laboratory  [x]  Microbiology  [x]  Radiology  [x]  EKG/Telemetry   []  Cardiology/Vascular   []  Pathology  []  Old records  []  Other:        Assessment & Plan        Active Hospital Problems:  Active Hospital Problems    Diagnosis    • **Shortness of breath      Plan:     Home meds not verified at the time of assessment and plan    Shortness of air/chest pain  -4 L nasal cannula, 95% on baseline O2  -Troponin negative and trend  -EKG Sinus rhythm Abnrm T, consider  ischemia, anterolateral lds When compared with ECG of 14-Nov-2022 11:47:11, Nonspecific significant change  -Chest x-ray no acute chest findings.  -D-dimer 1.47  -CTA chest Small distal segmental and subsegmental emboli are present in the right lower lobe. No evidence of right heart strain. Small eccentric nonocclusive thrombus is demonstrated in the left lower lobe pulmonary artery proximally. This has a chronic appearance.Features of mild interstitial and alveolar edema.Subsegmental atelectasis in the right middle lobe and left lower lobe.  -Patient has been initiated on heparin gtt.  -Echo, pending  -Echo 3/29/2022 EF 66 to 70%.  Moderate tricuspid valve regurgitation.  -proBNP pending  -lactate 0.9  -Bilateral lower extremity venous duplex  -Heme-onc consult for anticoagulation management    UTI, acute  -UA positive for nitrites and 3+ bacteria  -Rocephin started in emergency department, continue  -Creatinine 0.83  -BUN 11  -Culture pending    COPD  -Continue Pulmicort and DuoNebs once verified    DM2  -Glucose 142  -A1c, pending  -Hold home medicines  -Initiate SSI  -Check blood glucose before meals and at bedtime    Hypertension  -Current /61  -Continue home meds once verified  -Check blood pressure before administering home meds      Restless leg syndrome  -Continue doxepin once verified    Anxiety/depression/PTSD  -Continue home meds once verified    Obesity (41.79 kg/m²)/tobacco abuse  -Lifestyle modifications  -Smoking cessation    DVT prophylaxis:  Medical DVT prophylaxis orders are present.    CODE STATUS:    Level Of Support Discussed With: Patient  Code Status (Patient has no pulse and is not breathing): CPR (Attempt to Resuscitate)  Medical Interventions (Patient has pulse or is breathing): Full Support    Admission Status:  I believe this patient meets observation status.    I discussed the patient's findings and my recommendations with patient.        Signature: `Electronically signed by Nanci  GENNARO Walter PA-C, 11/14/22, 4:18 PM EST.    Electronically signed by Scottie Colin MD at 11/14/22 6726         No current facility-administered medications for this encounter.     Current Outpatient Medications   Medication Sig Dispense Refill   • amLODIPine (NORVASC) 10 MG tablet Take 1 tablet by mouth Daily. 30 tablet 1   • apixaban (ELIQUIS) 5 MG tablet tablet Take 2 tablets by mouth 2 (Two) Times a Day for 7 doses. Indications: DVT/PE (active thrombosis) 14 tablet 0   • [START ON 11/23/2022] apixaban (ELIQUIS) 5 MG tablet tablet Take 2 tablets by mouth 2 (Two) Times a Day for 7 doses, followed by 1 tablet twice daily thereafter. 74 tablet 2   • budesonide (PULMICORT) 0.5 MG/2ML nebulizer solution Take 2 mL by nebulization 2 (Two) Times a Day.     • cefTRIAXone 2 g in sodium chloride 0.9 % 100 mL IVPB Infuse 2 g into a venous catheter Daily for 3 doses. Indications: Urinary Tract Infection     • cloNIDine (CATAPRES) 0.3 MG tablet Take 1 tablet by mouth Every 8 (Eight) Hours. 90 tablet 1   • diazePAM (VALIUM) 5 MG tablet Take 1 tablet by mouth 4 (Four) Times a Day As Needed for Anxiety.     • insulin aspart (novoLOG FLEXPEN) 100 UNIT/ML solution pen-injector sc pen Inject 10 Units under the skin into the appropriate area as directed 2 (Two) Times a Day.     • lisinopril (PRINIVIL,ZESTRIL) 40 MG tablet Take 40 mg by mouth Daily.     • Probiotic Product (PROBIOTIC ADVANCED) capsule Take 1 capsule by mouth Daily.     • sildenafil (REVATIO) 20 MG tablet Take 0.5 tablets by mouth Every 8 (Eight) Hours. 90 tablet 1   • topiramate (TOPAMAX) 50 MG tablet Take 50 mg by mouth Daily.     • traZODone (DESYREL) 100 MG tablet Take 100 mg by mouth Every Night.     • doxepin (SINEquan) 50 MG capsule Take 50 mg by mouth At Night As Needed.     • hydrALAZINE (APRESOLINE) 25 MG tablet Take 1 tablet by mouth 3 (Three) Times a Day As Needed.     • ipratropium-albuterol (DUO-NEB) 0.5-2.5 mg/3 ml nebulizer Take 3 mL by nebulization 3  (Three) Times a Day As Needed for Wheezing.     • LINZESS 290 MCG capsule capsule Take 290 mcg by mouth Daily As Needed.     • oxyCODONE-acetaminophen (PERCOCET)  MG per tablet Take 1 tablet by mouth Every 4 (Four) Hours As Needed for Moderate Pain .     • promethazine (PHENERGAN) 50 MG tablet Take 50 mg by mouth Every 6 (Six) Hours As Needed.          Physician Progress Notes (most recent note)      Sury Rivas MD at 11/19/22 0900          Daily Progress Note          Assessment      Shortness of breath  Pulmonary embolism: No DVT on lower extremity venous Doppler studies  Pulmonary hypertension post PE  Chronic hypoxemia  Bilateral subsegmental atelectasis  COPD  History of tobacco smoking: Quit 6/27/2022  Obesity  Diabetes mellitus type 2  Dyslipidemia  Hepatic steatosis  Essential hypertension  RLS  History of insomnia and PTSD  Normocytic anemia    Results for orders placed during the hospital encounter of 11/14/22    Adult Transthoracic Echo Complete w/ Color, Spectral and Contrast if Necessary Per Protocol    Interpretation Summary  •  Left ventricular systolic function is normal. Left ventricular ejection fraction appears to be 56 - 60%.  •  Left ventricular diastolic function was normal.  •  Estimated right ventricular systolic pressure from tricuspid regurgitation is markedly elevated (>55 mmHg).         Plan:  Oxygen supplement and titration maintain saturation 90 to 95%: Currently improving and and requiring 4 L per nasal cannula which is her home setting  If patient is discharged then we will see her in office in 3 weeks and will assess for an outpatient home sleep test  Reassess echo in 3 months to evaluate pulmonary hypertension  Bronchodilators  Symbicort  Mucinex  Anticoagulation: Eliquis for lifelong  Blood pressure control  Glucose control  Antibiotics: Rocephin               LOS: 3 days     Subjective     Patient feels better today and feels her breathing is back to her  baseline    Objective     Vital signs for last 24 hours:  Vitals:    11/19/22 0827 11/19/22 0856 11/19/22 0912 11/19/22 1024   BP:  102/46 104/50 107/48   BP Location:       Patient Position:       Pulse: 59 64  66   Resp: 20   16   Temp:    97.7 °F (36.5 °C)   TempSrc:    Oral   SpO2: 98%  97% 95%   Weight:       Height:           Intake/Output last 3 shifts:  I/O last 3 completed shifts:  In: 240 [P.O.:240]  Out: 300 [Urine:300]  Intake/Output this shift:  No intake/output data recorded.      Radiology  Imaging Results (Last 24 Hours)     ** No results found for the last 24 hours. **          Labs:  Results from last 7 days   Lab Units 11/18/22  0718   WBC 10*3/mm3 4.80   HEMOGLOBIN g/dL 9.3*   HEMATOCRIT % 29.8*   PLATELETS 10*3/mm3 141     Results from last 7 days   Lab Units 11/19/22  0017 11/18/22  0718   SODIUM mmol/L  --  141   POTASSIUM mmol/L 4.8 4.7   CHLORIDE mmol/L  --  102   CO2 mmol/L  --  32.0*   BUN mg/dL  --  7*   CREATININE mg/dL  --  0.78   CALCIUM mg/dL  --  9.0   BILIRUBIN mg/dL  --  0.2   ALK PHOS U/L  --  54   ALT (SGPT) U/L  --  6   AST (SGOT) U/L  --  8   GLUCOSE mg/dL  --  126*     Results from last 7 days   Lab Units 11/16/22  1934   PH, ARTERIAL pH units 7.293*   PO2 ART mm Hg 109.7*   PCO2, ARTERIAL mm Hg 71.8*   HCO3 ART mmol/L 34.8*     Results from last 7 days   Lab Units 11/18/22  0718 11/17/22  0311 11/14/22  1237   ALBUMIN g/dL 3.30* 3.40* 3.60     Results from last 7 days   Lab Units 11/15/22  0035 11/14/22  1912 11/14/22  1237   TROPONIN T ng/mL <0.010 <0.010 <0.010         Results from last 7 days   Lab Units 11/19/22  0017   MAGNESIUM mg/dL 1.7     Results from last 7 days   Lab Units 11/19/22  0017 11/18/22  0718 11/17/22  0311 11/14/22  2149 11/14/22  1237   INR   --   --   --   --  0.99   APTT seconds 29.1* 30.0* 29.9*   < > 28.8*    < > = values in this interval not displayed.               Meds:   SCHEDULE  amLODIPine, 10 mg, Oral, Q24H  apixaban, 10 mg, Oral, BID    Followed by  [START ON 11/23/2022] apixaban, 5 mg, Oral, BID  budesonide-formoterol, 2 puff, Inhalation, BID - RT  cefTRIAXone, 2 g, Intravenous, Q24H  cloNIDine, 0.3 mg, Oral, Q8H  insulin lispro, 2-7 Units, Subcutaneous, TID With Meals  lisinopril, 40 mg, Oral, Q24H  senna-docusate sodium, 2 tablet, Oral, BID  sodium chloride, 10 mL, Intravenous, Q12H  topiramate, 50 mg, Oral, Daily  traZODone, 100 mg, Oral, Nightly      Infusions     PRNs  •  acetaminophen **OR** acetaminophen **OR** acetaminophen  •  aluminum-magnesium hydroxide-simethicone  •  senna-docusate sodium **AND** polyethylene glycol **AND** bisacodyl **AND** bisacodyl  •  dextrose  •  dextrose  •  diazePAM  •  doxepin  •  glucagon (human recombinant)  •  hydrALAZINE  •  ipratropium-albuterol  •  magnesium sulfate **OR** magnesium sulfate in D5W 1g/100mL (PREMIX)  •  melatonin  •  Morphine  •  nitroglycerin  •  ondansetron **OR** ondansetron  •  oxyCODONE  •  oxyCODONE  •  potassium chloride  •  potassium chloride  •  [COMPLETED] Insert peripheral IV **AND** sodium chloride  •  sodium chloride  •  zolpidem    Physical Exam:  General Appearance:  Alert   HEENT:  Normocephalic, without obvious abnormality, Conjunctiva/corneas clear,.   Nares normal, no drainage     Neck:  Supple, symmetrical, trachea midline.   Lungs /Chest wall: Minimal bilateral basal rhonchi, respirations unlabored, symmetrical wall movement.     Heart:  Regular rate and rhythm, S1 S2 normal  Abdomen: Soft, non-tender, no masses, no organomegaly.    Extremities: No edema, no clubbing or cyanosis    ROS  Constitutional: Negative for chills, negative for fever and malaise/fatigue.   HENT: Negative.    Eyes: Negative.    Cardiovascular: Negative.    Respiratory: Positive for shortness of breath.    Skin: Negative.    Musculoskeletal: Negative.    Gastrointestinal: Negative.    Genitourinary: Negative.    Neurological: Negative.    Psychiatric/Behavioral: Negative.      I reviewed the  recent clinical results    Much of this encounter note is an electronic transcription/translation of spoken language to printed text using Dragon Software which might include inadvertent errors in transcription.      Electronically signed by Sury Rivas MD at 22 1202          Discharge Summary      Scottie Colin MD at 22 1445                       [facility]  DISCHARGE SUMMARY    Patient Name: Ann Álvarez  : 1960  MRN: [id number]    Date of Admission: 2022  Discharge Diagnosis: Acute pulmonary embolism./Acute on chronic respiratory failure with hypoxia and hypercapnia.  Date of Discharge: 2022  Primary Care Physician: Jose Enrique Walters MD      Presenting Problem:   Shortness of breath [R06.02]  Urinary tract infection without hematuria, site unspecified [N39.0]  Chest pain, unspecified type [R07.9]  Other acute pulmonary embolism, unspecified whether acute cor pulmonale present (HCC) [I26.99]    Active and Resolved Hospital Problems:  Active Hospital Problems    Diagnosis POA   • **Acute pulmonary embolism without acute cor pulmonale (HCC) [I26.99] Yes     Priority: High   • Acute on chronic respiratory failure with hypoxia and hypercapnia (HCC) [J96.21, J96.22] Yes     Priority: High   • Acute cystitis without hematuria [N30.00] Yes     Priority: High   • Shortness of breath [R06.02] Yes     Priority: Low   • Mixed hyperlipidemia [E78.2] Yes   • KELTON (generalized anxiety disorder) [F41.1] Yes   • Morbid obesity (HCC) [E66.01] Yes   • Chronic back pain [M54.9, G89.29] Yes   • PTSD (post-traumatic stress disorder) [F43.10] Yes   • RLS (restless legs syndrome) [G25.81] Yes   • Chronic obstructive pulmonary disease (HCC) [J44.9] Yes   • Chronic respiratory failure with hypoxia (HCC) [J96.11] Yes   • Insomnia [G47.00] Yes   • Tobacco use disorder [F17.200] Yes   • Primary hypertension [I10] Yes      Resolved Hospital Problems    Diagnosis POA   •  Acute respiratory failure with hypoxia and hypercapnia (HCC) [J96.01, J96.02] Yes     Priority: High         Hospital Course   From previous notes and with minor updates.    Hospital Course:      Patient is a 62 y.o. female  with past medical history of morbid obesity, diabetes mellitus, COPD, chronic respiratory failure with hypoxia on 2 L nasal cannula continuous, hypertension, hyperlipidemia, osteoarthritis, anxiety disorder, chronic low back pain, insomnia, PTSD, restless leg syndrome tobacco use disorder who presented to [facility] on 11/14/2022 complaining of shortness of air, chest pain and back pain.  Patient reports that she was in [facility] for 6 days around [facility] for UTI, sepsis and was on a ventilator for 2 days.  Since then according to primary care provider records the UTI had cleared.  She reports that 2 days ago she began having shortness of air, left-sided chest pain, that she describes as a tightness, that radiates into her shoulder blade and low back pain.  She is on her baseline home 4 L O2 at 95%.  She states nothing makes her chest tightness or back pain any better.  She denies having any lightheadedness, dizziness or syncopal episode.  She denies having any vomiting but has felt nauseous.  She denies she denies any abdominal pain, constipation or diarrhea.  She reports bilateral lower extremity swelling but feels this is nothing out of the ordinary.  We have been asked to admit this patient for further evaluation and treatment.  Patient was seen in the emergency room and emergency department work-up vital signs stable 4 L nasal cannula at 95%.  UA positive nitrate with 3+ bacteria.  Glucose 142.  Creatinine 0.83.  Chloride 97.  CO2 36.0.  Troponin negative.  D-dimer 1.47.  White blood count 5.50.  Hemoglobin 10.5.  Platelets 222.  Lactate 0.9.  Blood culture pending, urine culture pending.  CTA chest Small distal segmental and subsegmental emboli are present in the  right lower lobe. No evidence of right heart strain. Small eccentric nonocclusive thrombus is demonstrated in the left lower lobe pulmonary artery proximally. This has a chronic appearance.Features of mild interstitial and alveolar edema.Subsegmental atelectasis in the right middle lobe and left lower lobe.  Pulmonary consult was completed.  Pulmonary embolism was treated with anticoagulation, Eliquis.  Hematology/oncology consult was completed.  Acute on chronic respiratory failure with hypoxia and hypercapnia was treated with oxygen therapy.  Hypertension was treated with lisinopril.  Acute cystitis was treated with antibiotics.  COPD was treated with nebs.  Appropriate patient's home medications were resumed in the hospital for other chronic medical conditions.  Patient reported being at her baseline after over 5 days in the hospital and requested to be discharged home.  Patient was advised to take her medications as prescribed.  The discharge medications are as per medication reconciliation list.  Patient was advised to follow-up with her primary care physician within 3 to 5 days of discharge.  Patient was advised to follow-up with her pulmonologist within 14 days of discharge.  Patient was advised to follow-up with her hematologist/oncologist within 14 days of discharge.  Patient was advised to return to the emergency department if she experiences any recurrence of her symptoms.  Patient and family agreed with the plan and patient was discharged in a stable condition.      DISCHARGE Follow Up Recommendations for labs and diagnostics:     Patient was advised to follow-up with her primary care physician who will review her current medications.    Patient was advised to follow-up with her pulmonologist to reassess her pulmonary function.    Patient was advised to follow-up with her hematologist/oncologist who will reassess hypercoagulability state and her treatment for pulmonary embolism.          Reasons For  Change In Medications and Indications for New Medications:      Day of Discharge     Vital Signs:  Temp:  [97.7 °F (36.5 °C)] 97.7 °F (36.5 °C)  Heart Rate:  [59-68] 66  Resp:  [16-20] 16  BP: (102-107)/(46-50) 107/48  Flow (L/min):  [4-8] 4    Physical Exam:  Physical Exam  Constitutional:       General: She is not in acute distress.  HENT:      Head: Normocephalic and atraumatic.      Nose: Nose normal. No congestion or rhinorrhea.      Mouth/Throat:      Mouth: Mucous membranes are moist.      Pharynx: Oropharynx is clear. No oropharyngeal exudate or posterior oropharyngeal erythema.   Eyes:      Pupils: Pupils are equal, round, and reactive to light.   Cardiovascular:      Rate and Rhythm: Normal rate and regular rhythm.      Pulses: Normal pulses.      Heart sounds: Normal heart sounds. No murmur heard.    No friction rub. No gallop.   Pulmonary:      Effort: No respiratory distress.      Breath sounds: No wheezing, rhonchi or rales.   Chest:      Chest wall: No tenderness.   Abdominal:      General: Abdomen is flat. Bowel sounds are normal. There is no distension.      Palpations: Abdomen is soft.      Tenderness: There is no right CVA tenderness.   Musculoskeletal:         General: No swelling, tenderness, deformity or signs of injury.      Cervical back: Neck supple. No tenderness.      Right lower leg: No edema.      Left lower leg: No edema.   Skin:     Capillary Refill: Capillary refill takes less than 2 seconds.      Coloration: Skin is not jaundiced.      Findings: No bruising, lesion or rash.   Neurological:      General: No focal deficit present.      Mental Status: She is alert.   Psychiatric:         Behavior: Behavior normal.              Pertinent  and/or Most Recent Results     LAB RESULTS:      Lab 11/19/22  0017 11/18/22  0718 11/17/22  0311 11/16/22  0557 11/15/22  2306 11/15/22  1608 11/15/22  0819 11/15/22  0713 11/15/22  0035 11/14/22  2149 11/14/22  1241 11/14/22  1237   WBC  --  4.80 4.70  5.90  --   --   --   --  6.10  --   --  5.50   HEMOGLOBIN  --  9.3* 8.6* 10.0*  --   --   --   --  9.9*  --   --  10.5*   HEMATOCRIT  --  29.8* 28.4* 32.2*  --   --   --   --  32.2*  --   --  33.4*   PLATELETS  --  141 149 170  --   --   --   --  188  --   --  222   NEUTROS ABS  --  3.00 3.20 3.90  --   --   --   --  4.10  --   --  3.60   LYMPHS ABS  --  1.40 1.20 1.70  --   --   --   --  1.60  --   --  1.60   MONOS ABS  --  0.30 0.30 0.30  --   --   --   --  0.30  --   --  0.30   EOS ABS  --  0.10 0.10 0.10  --   --   --   --  0.10  --   --  0.10   MCV  --  86.6 86.8 86.7  --   --   --   --  85.9  --   --  86.1   LACTATE  --   --   --   --   --   --  0.7  --   --   --  0.9  --    PROTIME  --   --   --   --   --   --   --   --   --   --   --  10.2   APTT 29.1* 30.0* 29.9* 32.8* 63.4   < >  --    < > 31.8*   < >  --  28.8*    < > = values in this interval not displayed.         Lab 11/19/22  0017 11/18/22  0718 11/17/22  0311 11/16/22  1324 11/16/22  0557 11/15/22  0035 11/14/22  1912 11/14/22  1237   SODIUM  --  141 140 141  --  140  --  139   POTASSIUM 4.8 4.7 4.8 4.3 4.7 4.4  --  3.9   CHLORIDE  --  102 102 99  --  98  --  97*   CO2  --  32.0* 34.0* 35.0*  --  36.0*  --  36.0*   ANION GAP  --  7.0 4.0* 7.0  --  6.0  --  6.0   BUN  --  7* 7* 8  --  10  --  11   CREATININE  --  0.78 0.92 1.01*  --  0.75  --  0.83   EGFR  --  85.5 70.1 62.7  --  89.6  --  79.3   GLUCOSE  --  126* 132* 155*  --  131*  --  142*   CALCIUM  --  9.0 8.9 8.6  --  8.8  --  9.0   MAGNESIUM 1.7 2.0 1.9  --  2.0 1.8  --   --    HEMOGLOBIN A1C  --   --   --   --   --   --  6.8*  --          Lab 11/18/22  0718 11/17/22  0311 11/14/22  1237   TOTAL PROTEIN 6.1 5.9* 6.4   ALBUMIN 3.30* 3.40* 3.60   GLOBULIN 2.8 2.5 2.8   ALT (SGPT) 6 6 9   AST (SGOT) 8 6 9   BILIRUBIN 0.2 0.2 0.3   ALK PHOS 54 51 66         Lab 11/15/22  0035 11/14/22  1912 11/14/22  1237   PROBNP  --   --  404.5   TROPONIN T <0.010 <0.010 <0.010   PROTIME  --   --  10.2   INR  --    --  0.99                 Lab 11/17/22  0350 11/16/22  1934 11/16/22  1510 11/16/22  1255   PH, ARTERIAL  --  7.293* 7.266* 7.285*   PCO2, ARTERIAL  --  71.8* 80.0* 82.2*   PO2 ART  --  109.7* 76.2* 64.1*   O2 SATURATION ART  --  97.4 91.9* 87.7*   FIO2 40 40 40 36   HCO3 ART  --  34.8* 36.4* 39.1*   BASE EXCESS ART  --  6.7* 7.7* 10.2*     Brief Urine Lab Results  (Last result in the past 365 days)      Color   Clarity   Blood   Leuk Est   Nitrite   Protein   CREAT   Urine HCG        11/14/22 1255 Yellow   Clear   Negative   Small (1+)   Positive   Trace               Microbiology Results (last 10 days)     Procedure Component Value - Date/Time    Respiratory Panel PCR w/COVID-19(SARS-CoV-2) [facility] In-House, NP Swab in UTM/VTM, 3-4 HR TAT - Swab, Nasopharynx [[id number]]  (Normal) Collected: 11/16/22 1608    Lab Status: Final result Specimen: Swab from Nasopharynx Updated: 11/16/22 1701     ADENOVIRUS, PCR Not Detected     Coronavirus 229E Not Detected     Coronavirus HKU1 Not Detected     Coronavirus NL63 Not Detected     Coronavirus OC43 Not Detected     COVID19 Not Detected     Human Metapneumovirus Not Detected     Human Rhinovirus/Enterovirus Not Detected     Influenza A PCR Not Detected     Influenza B PCR Not Detected     Parainfluenza Virus 1 Not Detected     Parainfluenza Virus 2 Not Detected     Parainfluenza Virus 3 Not Detected     Parainfluenza Virus 4 Not Detected     RSV, PCR Not Detected     Bordetella pertussis pcr Not Detected     Bordetella parapertussis PCR Not Detected     Chlamydophila pneumoniae PCR Not Detected     Mycoplasma pneumo by PCR Not Detected    Narrative:      In the setting of a positive respiratory panel with a viral infection PLUS a negative procalcitonin without other underlying concern for bacterial infection, consider observing off antibiotics or discontinuation of antibiotics and continue supportive care. If the respiratory panel is positive for  atypical bacterial infection (Bordetella pertussis, Chlamydophila pneumoniae, or Mycoplasma pneumoniae), consider antibiotic de-escalation to target atypical bacterial infection.    Blood Culture - Blood, Hand, Left [[id number]]  (Normal) Collected: 11/16/22 0833    Lab Status: Preliminary result Specimen: Blood from Hand, Left Updated: 11/19/22 0846     Blood Culture No growth at 3 days    Blood Culture - Blood, Hand, Right [[id number]]  (Normal) Collected: 11/16/22 0830    Lab Status: Preliminary result Specimen: Blood from Hand, Right Updated: 11/19/22 0846     Blood Culture No growth at 3 days    Blood Culture - Blood, Arm, Left [[id number]]  (Normal) Collected: 11/14/22 1306    Lab Status: Final result Specimen: Blood from Arm, Left Updated: 11/19/22 1317     Blood Culture No growth at 5 days    Urine Culture - Urine, Urine, Catheter [[id number]]  (Abnormal)  (Susceptibility) Collected: 11/14/22 1255    Lab Status: Final result Specimen: Urine, Catheter Updated: 11/16/22 1212     Urine Culture >100,000 CFU/mL Klebsiella pneumoniae ssp pneumoniae    Narrative:      Colonization of the urinary tract without infection is common. Treatment is discouraged unless the patient is symptomatic, pregnant, or undergoing an invasive urologic procedure.    Susceptibility      Klebsiella pneumoniae ssp pneumoniae      FAUSTO      Ampicillin Resistant     Ampicillin + Sulbactam Susceptible      Cefazolin Susceptible      Cefepime Susceptible      Ceftazidime Susceptible      Ceftriaxone Susceptible      Gentamicin Susceptible      Levofloxacin Intermediate      Nitrofurantoin Resistant     Piperacillin + Tazobactam Susceptible      Trimethoprim + Sulfamethoxazole Susceptible                           Blood Culture - Blood, Arm, Left [[id number]]  (Normal) Collected: 11/14/22 1237    Lab Status: Final result Specimen: Blood from Arm, Left Updated: 11/19/22 1245     Blood Culture No growth at 5 days          XR Chest 1 View    Result  Date: 11/17/2022  Impression: Suspect mild interstitial edema, with bibasilar atelectasis  Electronically Signed By-Michael Riddle On:11/17/2022 10:15 AM This report was finalized on [id number] by  Michael iRddle, .    XR Chest 1 View    Result Date: 11/14/2022  Impression: No acute chest findings.  Electronically Signed By-Jocelyn Zayas MD On:11/14/2022 1:00 PM This report was finalized on [id number] by  Jocelyn Zayas MD.    CT Angiogram Chest Pulmonary Embolism    Result Date: 11/14/2022  Impression: 1. Small distal segmental and subsegmental emboli are present in the right lower lobe. No evidence of right heart strain. I notified the emergency room physician at the time of this dictation. 2. Small eccentric nonocclusive thrombus is demonstrated in the left lower lobe pulmonary artery proximally. This has a chronic appearance. 3. Features of mild interstitial and alveolar edema. 4. Subsegmental atelectasis in the right middle lobe and left lower lobe.  Electronically Signed By-Jocelyn Zayas MD On:11/14/2022 3:17 PM This report was finalized on [id number] by  Jocelyn Zayas MD.    US Renal Bilateral    Result Date: 11/17/2022  Impression:  1. Normal ultrasound appearance the kidneys with no obstruction 2. Hepatic steatosis  Electronically Signed ByDerek Riddle On:11/17/2022 7:53 AM This report was finalized on [id number] by  Michael Riddle, .      Results for orders placed during the hospital encounter of 11/14/22    Duplex Venous Lower Extremity - Bilateral CAR    Interpretation Summary  •  Normal bilateral lower extremity venous duplex scan.      Results for orders placed during the hospital encounter of 11/14/22    Duplex Venous Lower Extremity - Bilateral CAR    Interpretation Summary  •  Normal bilateral lower extremity venous duplex scan.      Results for orders placed during the hospital encounter of 11/14/22    Adult Transthoracic Echo Complete w/ Color, Spectral and Contrast if  Necessary Per Protocol    Interpretation Summary  •  Left ventricular systolic function is normal. Left ventricular ejection fraction appears to be 56 - 60%.  •  Left ventricular diastolic function was normal.  •  Estimated right ventricular systolic pressure from tricuspid regurgitation is markedly elevated (>55 mmHg).      Labs Pending at Discharge:  Pending Labs     Order Current Status    Blood Culture - Blood, Hand, Left Preliminary result    Blood Culture - Blood, Hand, Right Preliminary result          Procedures Performed           Consults:   Consults     Date and Time Order Name Status Description    11/16/2022  7:54 AM Inpatient Infectious Diseases Consult Completed     11/15/2022  7:21 AM Inpatient Pulmonology Consult Completed             Discharge Details        Discharge Medications      New Medications      Instructions Start Date   apixaban 5 MG tablet tablet  Commonly known as: ELIQUIS   10 mg, Oral, 2 Times Daily      Eliquis 5 MG tablet tablet  Generic drug: apixaban   Take 2 tablets by mouth 2 (Two) Times a Day for 7 doses, followed by 1 tablet twice daily thereafter.   Start Date: November 23, 2022     cefTRIAXone 2 g in sodium chloride 0.9 % 100 mL IVPB   2 g, Intravenous, Every 24 Hours         Continue These Medications      Instructions Start Date   amLODIPine 10 MG tablet  Commonly known as: NORVASC   10 mg, Oral, Every 24 Hours Scheduled      budesonide 0.5 MG/2ML nebulizer solution  Commonly known as: PULMICORT   0.5 mg, Nebulization, 2 Times Daily      cloNIDine 0.3 MG tablet  Commonly known as: CATAPRES   0.3 mg, Oral, Every 8 Hours Scheduled      diazePAM 5 MG tablet  Commonly known as: VALIUM   5 mg, Oral, 4 Times Daily PRN      doxepin 50 MG capsule  Commonly known as: SINEquan   50 mg, Oral, Nightly PRN      hydrALAZINE 25 MG tablet  Commonly known as: APRESOLINE   25 mg, Oral, 3 Times Daily PRN      insulin aspart 100 UNIT/ML solution pen-injector sc pen  Commonly known as:  novoLOG FLEXPEN   10 Units, Subcutaneous, 2 Times Daily      ipratropium-albuterol 0.5-2.5 mg/3 ml nebulizer  Commonly known as: DUO-NEB   3 mL, Nebulization, 3 Times Daily PRN      Linzess 290 MCG capsule capsule  Generic drug: linaclotide   290 mcg, Oral, Daily PRN      lisinopril 40 MG tablet  Commonly known as: PRINIVIL,ZESTRIL   40 mg, Oral, Every 24 Hours      oxyCODONE-acetaminophen  MG per tablet  Commonly known as: PERCOCET   1 tablet, Oral, Every 4 Hours PRN      Probiotic Advanced capsule   1 capsule, Oral, Daily      promethazine 50 MG tablet  Commonly known as: PHENERGAN   50 mg, Oral, Every 6 Hours PRN      sildenafil 20 MG tablet  Commonly known as: REVATIO   10 mg, Oral, Every 8 Hours Scheduled      topiramate 50 MG tablet  Commonly known as: TOPAMAX   50 mg, Oral, Daily      traZODone 100 MG tablet  Commonly known as: DESYREL   100 mg, Oral, Nightly             Allergies   Allergen Reactions   • Codeine Hives, Itching and Nausea And Vomiting         Discharge Disposition: Stable.  Home-Health Care c    Diet:  Hospital:No active diet order        Discharge Activity: As tolerated        CODE STATUS:  Code Status and Medical Interventions:   Ordered at: 11/14/22 1545     Level Of Support Discussed With:    Patient     Code Status (Patient has no pulse and is not breathing):    CPR (Attempt to Resuscitate)     Medical Interventions (Patient has pulse or is breathing):    Full Support         Future Appointments   Date Time Provider Department Center   12/20/2022  9:30 AM Maxime Hernandez MD [facility] None   5/17/2023  1:20 PM Heraclio Rizzo MD [facility]       Additional Instructions for the Follow-ups that You Need to Schedule     Ambulatory Referral to [facility] For Infusion Treatment   As directed       Place medication orders in the Therapy Plan section of Epic.     Rocephin 2 gm daily. LAST DOSE 11/21.     IV infusion, line care and maintenance. Remove midline after last dose of  antibiotic.    What is the duration of the infusion treatment?: .5 Hr               Time spent on Discharge including face to face service: 55 minutes    This patient has been examined wearing appropriate Personal Protective Equipment and discussed with hospital infection control department, [facility] health department, infectious disease specialist and pulmonologist. 11/20/22      Signature: Electronically signed by Scottie Colin MD, FACP, 11/20/22, 7:28 AM EST.      Electronically signed by Scottie Colin MD at 11/20/22 0700

## 2022-11-22 ENCOUNTER — HOSPITAL ENCOUNTER (OUTPATIENT)
Dept: INFUSION THERAPY | Facility: HOSPITAL | Age: 62
Setting detail: INFUSION SERIES
Discharge: HOME | End: 2022-11-22

## 2022-11-22 VITALS
SYSTOLIC BLOOD PRESSURE: 141 MMHG | DIASTOLIC BLOOD PRESSURE: 70 MMHG | OXYGEN SATURATION: 100 % | TEMPERATURE: 98.8 F | RESPIRATION RATE: 15 BRPM | HEART RATE: 50 BPM

## 2022-11-22 DIAGNOSIS — N39.0 ACUTE UTI (URINARY TRACT INFECTION): Primary | ICD-10-CM

## 2022-11-22 PROCEDURE — 25010000002 CEFTRIAXONE PER 250 MG: Performed by: INTERNAL MEDICINE

## 2022-11-22 PROCEDURE — 96365 THER/PROPH/DIAG IV INF INIT: CPT

## 2022-11-22 RX ADMIN — CEFTRIAXONE 2 G: 2 INJECTION, POWDER, FOR SOLUTION INTRAMUSCULAR; INTRAVENOUS at 15:10

## 2022-11-22 NOTE — PAYOR COMM NOTE
"DISCHARGE NOTICE --  AUTH [id number]      This patient discharged Home with Home Health on 11/19/22.  Please advise if additional information is needed to finalize this request.    Thank you!      Zuleyma Galvez  Utilization Review Coordinator  [address] [place]  [address]  Ph: [phone]  Fx: [phone]    APPROVED INPATIENT 11/17-11/30/22, AUTH [id number]    Ann Álvarez (62 y.o. Female)     Date of Birth   1960    Social Security Number       Address   [address] [place] [address] [place] [address]    Home Phone   [phone]    MRN   [id number]       Christianity   Other    Marital Status                               Admission Date   11/14/22    Admission Type   Emergency    Admitting Provider   Scottie Colin MD    Attending Provider       Department, Room/Bed   [facility] PROGRESS CARE, 2119/1       Discharge Date   11/19/2022    Discharge Disposition   Home-Health Care Svc    Discharge Destination                               Attending Provider: (none)   Allergies: Codeine    Isolation: None   Infection: None   Code Status: Prior    Ht: 160 cm (63\")   Wt: 107 kg (235 lb 14.3 oz)    Admission Cmt: None   Principal Problem: Acute pulmonary embolism without acute cor pulmonale (HCC) [I26.99]                 Active Insurance as of 11/14/2022     Primary Coverage     Payor Plan Insurance Group Employer/Plan Group    [employer] [facility] PPO [id number]     Payor Plan Address Payor Plan Phone Number Payor Plan Fax Number Effective Dates    [address] [phone]  2/1/2022 - None Entered    [place] [address]       Subscriber Name Subscriber Birth Date Member ID       ANN ÁLVAREZ 1960 [id number]                 Emergency Contacts      (Rel.) Home Phone Work Phone Mobile Phone    ANGY ÁLVAREZ (Spouse) [phone] -- [phone]               Discharge Summary      Scottie Colin MD at 11/19/22 1445      "                  [facility] Medicine Services  DISCHARGE SUMMARY    Patient Name: Ann Álvarez  : 1960  MRN: [id number]    Date of Admission: 2022  Discharge Diagnosis: Acute pulmonary embolism./Acute on chronic respiratory failure with hypoxia and hypercapnia.  Date of Discharge: 2022  Primary Care Physician: Jose Enrique Walters MD      Presenting Problem:   Shortness of breath [R06.02]  Urinary tract infection without hematuria, site unspecified [N39.0]  Chest pain, unspecified type [R07.9]  Other acute pulmonary embolism, unspecified whether acute cor pulmonale present (HCC) [I26.99]    Active and Resolved Hospital Problems:  Active Hospital Problems    Diagnosis POA   • **Acute pulmonary embolism without acute cor pulmonale (HCC) [I26.99] Yes     Priority: High   • Acute on chronic respiratory failure with hypoxia and hypercapnia (HCC) [J96.21, J96.22] Yes     Priority: High   • Acute cystitis without hematuria [N30.00] Yes     Priority: High   • Shortness of breath [R06.02] Yes     Priority: Low   • Mixed hyperlipidemia [E78.2] Yes   • KELTON (generalized anxiety disorder) [F41.1] Yes   • Morbid obesity (HCC) [E66.01] Yes   • Chronic back pain [M54.9, G89.29] Yes   • PTSD (post-traumatic stress disorder) [F43.10] Yes   • RLS (restless legs syndrome) [G25.81] Yes   • Chronic obstructive pulmonary disease (HCC) [J44.9] Yes   • Chronic respiratory failure with hypoxia (HCC) [J96.11] Yes   • Insomnia [G47.00] Yes   • Tobacco use disorder [F17.200] Yes   • Primary hypertension [I10] Yes      Resolved Hospital Problems    Diagnosis POA   • Acute respiratory failure with hypoxia and hypercapnia (HCC) [J96.01, J96.02] Yes     Priority: High         Hospital Course   From previous notes and with minor updates.    Hospital Course:      Patient is a 62 y.o. female  with past medical history of morbid obesity, diabetes mellitus, COPD, chronic respiratory failure with hypoxia on 2 L nasal  cannula continuous, hypertension, hyperlipidemia, osteoarthritis, anxiety disorder, chronic low back pain, insomnia, PTSD, restless leg syndrome tobacco use disorder who presented to [facility] on 11/14/2022 complaining of shortness of air, chest pain and back pain.  Patient reports that she was in [facility] for 6 days around [facility] for UTI, sepsis and was on a ventilator for 2 days.  Since then according to primary care provider records the UTI had cleared.  She reports that 2 days ago she began having shortness of air, left-sided chest pain, that she describes as a tightness, that radiates into her shoulder blade and low back pain.  She is on her baseline home 4 L O2 at 95%.  She states nothing makes her chest tightness or back pain any better.  She denies having any lightheadedness, dizziness or syncopal episode.  She denies having any vomiting but has felt nauseous.  She denies she denies any abdominal pain, constipation or diarrhea.  She reports bilateral lower extremity swelling but feels this is nothing out of the ordinary.  We have been asked to admit this patient for further evaluation and treatment.  Patient was seen in the emergency room and emergency department work-up vital signs stable 4 L nasal cannula at 95%.  UA positive nitrate with 3+ bacteria.  Glucose 142.  Creatinine 0.83.  Chloride 97.  CO2 36.0.  Troponin negative.  D-dimer 1.47.  White blood count 5.50.  Hemoglobin 10.5.  Platelets 222.  Lactate 0.9.  Blood culture pending, urine culture pending.  CTA chest Small distal segmental and subsegmental emboli are present in the right lower lobe. No evidence of right heart strain. Small eccentric nonocclusive thrombus is demonstrated in the left lower lobe pulmonary artery proximally. This has a chronic appearance.Features of mild interstitial and alveolar edema.Subsegmental atelectasis in the right middle lobe and left lower lobe.  Pulmonary consult was  completed.  Pulmonary embolism was treated with anticoagulation, Eliquis.  Hematology/oncology consult was completed.  Acute on chronic respiratory failure with hypoxia and hypercapnia was treated with oxygen therapy.  Hypertension was treated with lisinopril.  Acute cystitis was treated with antibiotics.  COPD was treated with nebs.  Appropriate patient's home medications were resumed in the hospital for other chronic medical conditions.  Patient reported being at her baseline after over 5 days in the hospital and requested to be discharged home.  Patient was advised to take her medications as prescribed.  The discharge medications are as per medication reconciliation list.  Patient was advised to follow-up with her primary care physician within 3 to 5 days of discharge.  Patient was advised to follow-up with her pulmonologist within 14 days of discharge.  Patient was advised to follow-up with her hematologist/oncologist within 14 days of discharge.  Patient was advised to return to the emergency department if she experiences any recurrence of her symptoms.  Patient and family agreed with the plan and patient was discharged in a stable condition.      DISCHARGE Follow Up Recommendations for labs and diagnostics:     Patient was advised to follow-up with her primary care physician who will review her current medications.    Patient was advised to follow-up with her pulmonologist to reassess her pulmonary function.    Patient was advised to follow-up with her hematologist/oncologist who will reassess hypercoagulability state and her treatment for pulmonary embolism.          Reasons For Change In Medications and Indications for New Medications:      Day of Discharge     Vital Signs:  Temp:  [97.7 °F (36.5 °C)] 97.7 °F (36.5 °C)  Heart Rate:  [59-68] 66  Resp:  [16-20] 16  BP: (102-107)/(46-50) 107/48  Flow (L/min):  [4-8] 4    Physical Exam:  Physical Exam  Constitutional:       General: She is not in acute  distress.  HENT:      Head: Normocephalic and atraumatic.      Nose: Nose normal. No congestion or rhinorrhea.      Mouth/Throat:      Mouth: Mucous membranes are moist.      Pharynx: Oropharynx is clear. No oropharyngeal exudate or posterior oropharyngeal erythema.   Eyes:      Pupils: Pupils are equal, round, and reactive to light.   Cardiovascular:      Rate and Rhythm: Normal rate and regular rhythm.      Pulses: Normal pulses.      Heart sounds: Normal heart sounds. No murmur heard.    No friction rub. No gallop.   Pulmonary:      Effort: No respiratory distress.      Breath sounds: No wheezing, rhonchi or rales.   Chest:      Chest wall: No tenderness.   Abdominal:      General: Abdomen is flat. Bowel sounds are normal. There is no distension.      Palpations: Abdomen is soft.      Tenderness: There is no right CVA tenderness.   Musculoskeletal:         General: No swelling, tenderness, deformity or signs of injury.      Cervical back: Neck supple. No tenderness.      Right lower leg: No edema.      Left lower leg: No edema.   Skin:     Capillary Refill: Capillary refill takes less than 2 seconds.      Coloration: Skin is not jaundiced.      Findings: No bruising, lesion or rash.   Neurological:      General: No focal deficit present.      Mental Status: She is alert.   Psychiatric:         Behavior: Behavior normal.              Pertinent  and/or Most Recent Results     LAB RESULTS:      Lab 11/19/22  0017 11/18/22  0718 11/17/22  0311 11/16/22  0557 11/15/22  2306 11/15/22  1608 11/15/22  0819 11/15/22  0713 11/15/22  0035 11/14/22  2149 11/14/22  1241 11/14/22  1237   WBC  --  4.80 4.70 5.90  --   --   --   --  6.10  --   --  5.50   HEMOGLOBIN  --  9.3* 8.6* 10.0*  --   --   --   --  9.9*  --   --  10.5*   HEMATOCRIT  --  29.8* 28.4* 32.2*  --   --   --   --  32.2*  --   --  33.4*   PLATELETS  --  141 149 170  --   --   --   --  188  --   --  222   NEUTROS ABS  --  3.00 3.20 3.90  --   --   --   --  4.10   --   --  3.60   LYMPHS ABS  --  1.40 1.20 1.70  --   --   --   --  1.60  --   --  1.60   MONOS ABS  --  0.30 0.30 0.30  --   --   --   --  0.30  --   --  0.30   EOS ABS  --  0.10 0.10 0.10  --   --   --   --  0.10  --   --  0.10   MCV  --  86.6 86.8 86.7  --   --   --   --  85.9  --   --  86.1   LACTATE  --   --   --   --   --   --  0.7  --   --   --  0.9  --    PROTIME  --   --   --   --   --   --   --   --   --   --   --  10.2   APTT 29.1* 30.0* 29.9* 32.8* 63.4   < >  --    < > 31.8*   < >  --  28.8*    < > = values in this interval not displayed.         Lab 11/19/22  0017 11/18/22  0718 11/17/22  0311 11/16/22  1324 11/16/22  0557 11/15/22  0035 11/14/22  1912 11/14/22  1237   SODIUM  --  141 140 141  --  140  --  139   POTASSIUM 4.8 4.7 4.8 4.3 4.7 4.4  --  3.9   CHLORIDE  --  102 102 99  --  98  --  97*   CO2  --  32.0* 34.0* 35.0*  --  36.0*  --  36.0*   ANION GAP  --  7.0 4.0* 7.0  --  6.0  --  6.0   BUN  --  7* 7* 8  --  10  --  11   CREATININE  --  0.78 0.92 1.01*  --  0.75  --  0.83   EGFR  --  85.5 70.1 62.7  --  89.6  --  79.3   GLUCOSE  --  126* 132* 155*  --  131*  --  142*   CALCIUM  --  9.0 8.9 8.6  --  8.8  --  9.0   MAGNESIUM 1.7 2.0 1.9  --  2.0 1.8  --   --    HEMOGLOBIN A1C  --   --   --   --   --   --  6.8*  --          Lab 11/18/22  0718 11/17/22  0311 11/14/22  1237   TOTAL PROTEIN 6.1 5.9* 6.4   ALBUMIN 3.30* 3.40* 3.60   GLOBULIN 2.8 2.5 2.8   ALT (SGPT) 6 6 9   AST (SGOT) 8 6 9   BILIRUBIN 0.2 0.2 0.3   ALK PHOS 54 51 66         Lab 11/15/22  0035 11/14/22  1912 11/14/22  1237   PROBNP  --   --  404.5   TROPONIN T <0.010 <0.010 <0.010   PROTIME  --   --  10.2   INR  --   --  0.99                 Lab 11/17/22  0350 11/16/22  1934 11/16/22  1510 11/16/22  1255   PH, ARTERIAL  --  7.293* 7.266* 7.285*   PCO2, ARTERIAL  --  71.8* 80.0* 82.2*   PO2 ART  --  109.7* 76.2* 64.1*   O2 SATURATION ART  --  97.4 91.9* 87.7*   FIO2 40 40 40 36   HCO3 ART  --  34.8* 36.4* 39.1*   BASE EXCESS ART  --   6.7* 7.7* 10.2*     Brief Urine Lab Results  (Last result in the past 365 days)      Color   Clarity   Blood   Leuk Est   Nitrite   Protein   CREAT   Urine HCG        11/14/22 1255 Yellow   Clear   Negative   Small (1+)   Positive   Trace               Microbiology Results (last 10 days)     Procedure Component Value - Date/Time    Respiratory Panel PCR w/COVID-19(SARS-CoV-2) SAPNA/POPEYE/[facility]/PAD/COR/MAD/NATHANIEL In-House, NP Swab in UTM/VTM, 3-4 HR TAT - Swab, Nasopharynx [[id number]]  (Normal) Collected: 11/16/22 1608    Lab Status: Final result Specimen: Swab from Nasopharynx Updated: 11/16/22 1705     ADENOVIRUS, PCR Not Detected     Coronavirus 229E Not Detected     Coronavirus HKU1 Not Detected     Coronavirus NL63 Not Detected     Coronavirus OC43 Not Detected     COVID19 Not Detected     Human Metapneumovirus Not Detected     Human Rhinovirus/Enterovirus Not Detected     Influenza A PCR Not Detected     Influenza B PCR Not Detected     Parainfluenza Virus 1 Not Detected     Parainfluenza Virus 2 Not Detected     Parainfluenza Virus 3 Not Detected     Parainfluenza Virus 4 Not Detected     RSV, PCR Not Detected     Bordetella pertussis pcr Not Detected     Bordetella parapertussis PCR Not Detected     Chlamydophila pneumoniae PCR Not Detected     Mycoplasma pneumo by PCR Not Detected    Narrative:      In the setting of a positive respiratory panel with a viral infection PLUS a negative procalcitonin without other underlying concern for bacterial infection, consider observing off antibiotics or discontinuation of antibiotics and continue supportive care. If the respiratory panel is positive for atypical bacterial infection (Bordetella pertussis, Chlamydophila pneumoniae, or Mycoplasma pneumoniae), consider antibiotic de-escalation to target atypical bacterial infection.    Blood Culture - Blood, Hand, Left [[id number]]  (Normal) Collected: 11/16/22 0871    Lab Status: Preliminary result Specimen: Blood from Hand, Left  Updated: 11/19/22 0846     Blood Culture No growth at 3 days    Blood Culture - Blood, Hand, Right [[id number]]  (Normal) Collected: 11/16/22 0830    Lab Status: Preliminary result Specimen: Blood from Hand, Right Updated: 11/19/22 0846     Blood Culture No growth at 3 days    Blood Culture - Blood, Arm, Left [[id number]]  (Normal) Collected: 11/14/22 1306    Lab Status: Final result Specimen: Blood from Arm, Left Updated: 11/19/22 1317     Blood Culture No growth at 5 days    Urine Culture - Urine, Urine, Catheter [[id number]]  (Abnormal)  (Susceptibility) Collected: 11/14/22 1255    Lab Status: Final result Specimen: Urine, Catheter Updated: 11/16/22 1212     Urine Culture >100,000 CFU/mL Klebsiella pneumoniae ssp pneumoniae    Narrative:      Colonization of the urinary tract without infection is common. Treatment is discouraged unless the patient is symptomatic, pregnant, or undergoing an invasive urologic procedure.    Susceptibility      Klebsiella pneumoniae ssp pneumoniae      FAUSTO      Ampicillin Resistant     Ampicillin + Sulbactam Susceptible      Cefazolin Susceptible      Cefepime Susceptible      Ceftazidime Susceptible      Ceftriaxone Susceptible      Gentamicin Susceptible      Levofloxacin Intermediate      Nitrofurantoin Resistant     Piperacillin + Tazobactam Susceptible      Trimethoprim + Sulfamethoxazole Susceptible                           Blood Culture - Blood, Arm, Left [[id number]]  (Normal) Collected: 11/14/22 1237    Lab Status: Final result Specimen: Blood from Arm, Left Updated: 11/19/22 1245     Blood Culture No growth at 5 days          XR Chest 1 View    Result Date: 11/17/2022  Impression: Suspect mild interstitial edema, with bibasilar atelectasis  Electronically Signed By-Michael Riddle On:11/17/2022 10:15 AM This report was finalized on [id number] by  Michael Riddle, .    XR Chest 1 View    Result Date: 11/14/2022  Impression: No acute chest findings.  Electronically Signed  By-Jcoelyn Zayas MD On:11/14/2022 1:00 PM This report was finalized on [id number] by  Jocelyn Zayas MD.    CT Angiogram Chest Pulmonary Embolism    Result Date: 11/14/2022  Impression: 1. Small distal segmental and subsegmental emboli are present in the right lower lobe. No evidence of right heart strain. I notified the emergency room physician at the time of this dictation. 2. Small eccentric nonocclusive thrombus is demonstrated in the left lower lobe pulmonary artery proximally. This has a chronic appearance. 3. Features of mild interstitial and alveolar edema. 4. Subsegmental atelectasis in the right middle lobe and left lower lobe.  Electronically Signed By-Jocelyn Zayas MD On:11/14/2022 3:17 PM This report was finalized on [id number] by  Jocelyn Zayas MD.    US Renal Bilateral    Result Date: 11/17/2022  Impression:  1. Normal ultrasound appearance the kidneys with no obstruction 2. Hepatic steatosis  Electronically Signed By-Michael Riddle On:11/17/2022 7:53 AM This report was finalized on [id number] by  Michael Riddle, .      Results for orders placed during the hospital encounter of 11/14/22    Duplex Venous Lower Extremity - Bilateral CAR    Interpretation Summary  •  Normal bilateral lower extremity venous duplex scan.      Results for orders placed during the hospital encounter of 11/14/22    Duplex Venous Lower Extremity - Bilateral CAR    Interpretation Summary  •  Normal bilateral lower extremity venous duplex scan.      Results for orders placed during the hospital encounter of 11/14/22    Adult Transthoracic Echo Complete w/ Color, Spectral and Contrast if Necessary Per Protocol    Interpretation Summary  •  Left ventricular systolic function is normal. Left ventricular ejection fraction appears to be 56 - 60%.  •  Left ventricular diastolic function was normal.  •  Estimated right ventricular systolic pressure from tricuspid regurgitation is markedly elevated (>55 mmHg).      Labs  Pending at Discharge:  Pending Labs     Order Current Status    Blood Culture - Blood, Hand, Left Preliminary result    Blood Culture - Blood, Hand, Right Preliminary result          Procedures Performed           Consults:   Consults     Date and Time Order Name Status Description    11/16/2022  7:54 AM Inpatient Infectious Diseases Consult Completed     11/15/2022  7:21 AM Inpatient Pulmonology Consult Completed             Discharge Details        Discharge Medications      New Medications      Instructions Start Date   apixaban 5 MG tablet tablet  Commonly known as: ELIQUIS   10 mg, Oral, 2 Times Daily      Eliquis 5 MG tablet tablet  Generic drug: apixaban   Take 2 tablets by mouth 2 (Two) Times a Day for 7 doses, followed by 1 tablet twice daily thereafter.   Start Date: November 23, 2022     cefTRIAXone 2 g in sodium chloride 0.9 % 100 mL IVPB   2 g, Intravenous, Every 24 Hours         Continue These Medications      Instructions Start Date   amLODIPine 10 MG tablet  Commonly known as: NORVASC   10 mg, Oral, Every 24 Hours Scheduled      budesonide 0.5 MG/2ML nebulizer solution  Commonly known as: PULMICORT   0.5 mg, Nebulization, 2 Times Daily      cloNIDine 0.3 MG tablet  Commonly known as: CATAPRES   0.3 mg, Oral, Every 8 Hours Scheduled      diazePAM 5 MG tablet  Commonly known as: VALIUM   5 mg, Oral, 4 Times Daily PRN      doxepin 50 MG capsule  Commonly known as: SINEquan   50 mg, Oral, Nightly PRN      hydrALAZINE 25 MG tablet  Commonly known as: APRESOLINE   25 mg, Oral, 3 Times Daily PRN      insulin aspart 100 UNIT/ML solution pen-injector sc pen  Commonly known as: novoLOG FLEXPEN   10 Units, Subcutaneous, 2 Times Daily      ipratropium-albuterol 0.5-2.5 mg/3 ml nebulizer  Commonly known as: DUO-NEB   3 mL, Nebulization, 3 Times Daily PRN      Linzess 290 MCG capsule capsule  Generic drug: linaclotide   290 mcg, Oral, Daily PRN      lisinopril 40 MG tablet  Commonly known as: PRINIVIL,ZESTRIL   40  mg, Oral, Every 24 Hours      oxyCODONE-acetaminophen  MG per tablet  Commonly known as: PERCOCET   1 tablet, Oral, Every 4 Hours PRN      Probiotic Advanced capsule   1 capsule, Oral, Daily      promethazine 50 MG tablet  Commonly known as: PHENERGAN   50 mg, Oral, Every 6 Hours PRN      sildenafil 20 MG tablet  Commonly known as: REVATIO   10 mg, Oral, Every 8 Hours Scheduled      topiramate 50 MG tablet  Commonly known as: TOPAMAX   50 mg, Oral, Daily      traZODone 100 MG tablet  Commonly known as: DESYREL   100 mg, Oral, Nightly             Allergies   Allergen Reactions   • Codeine Hives, Itching and Nausea And Vomiting         Discharge Disposition: Stable.  Home-Health Care Svc    Diet:  Hospital:No active diet order        Discharge Activity: As tolerated        CODE STATUS:  Code Status and Medical Interventions:   Ordered at: 11/14/22 1545     Level Of Support Discussed With:    Patient     Code Status (Patient has no pulse and is not breathing):    CPR (Attempt to Resuscitate)     Medical Interventions (Patient has pulse or is breathing):    Full Support         Future Appointments   Date Time Provider Department Center   12/20/2022  9:30 AM Maxime Hernandez MD [facility] None   5/17/2023  1:20 PM Heraclio Rizzo MD [facility]       Additional Instructions for the Follow-ups that You Need to Schedule     Ambulatory Referral to [facility] For Infusion Treatment   As directed       Place medication orders in the Therapy Plan section of Epic.     Rocephin 2 gm daily. LAST DOSE 11/21.     IV infusion, line care and maintenance. Remove midline after last dose of antibiotic.    What is the duration of the infusion treatment?: .5 Hr               Time spent on Discharge including face to face service: 55 minutes    This patient has been examined wearing appropriate Personal Protective Equipment and discussed with hospital infection control department, [facility], infectious disease  specialist and pulmonologist. 11/20/22      Signature: Electronically signed by Scottie Colin MD, FACP, 11/20/22, 7:28 AM EST.      Electronically signed by Scottie Colin MD at 11/20/22 0780

## 2022-11-29 ENCOUNTER — READMISSION MANAGEMENT (OUTPATIENT)
Dept: CALL CENTER | Facility: HOSPITAL | Age: 62
End: 2022-11-29

## 2022-11-29 NOTE — OUTREACH NOTE
Medical Week 1 Survey    Flowsheet Row Responses   [facility] facility patient discharged from? [facility]   Does the patient have one of the following disease processes/diagnoses(primary or secondary)? Other   Week 1 attempt successful? No   Unsuccessful attempts Attempt 1          VIVIANA SEBASTIAN - Registered Nurse

## 2022-11-30 ENCOUNTER — TELEPHONE (OUTPATIENT)
Dept: ONCOLOGY | Facility: CLINIC | Age: 62
End: 2022-11-30

## 2022-11-30 ENCOUNTER — APPOINTMENT (OUTPATIENT)
Dept: LAB | Facility: HOSPITAL | Age: 62
End: 2022-11-30

## 2022-11-30 NOTE — TELEPHONE ENCOUNTER
Caller: JANY    Relationship to patient:  SELF    Best call back number: [phone]    Patient is needing: TO R/S LAB AND NEW PT HOSP F/U APPT TODAY, 11-. SHE IS NOT FEELING WELL.    HUB TRIED TO WT 2X AND WAS UNSUCCESSFUL.

## 2022-12-06 ENCOUNTER — READMISSION MANAGEMENT (OUTPATIENT)
Dept: CALL CENTER | Facility: HOSPITAL | Age: 62
End: 2022-12-06

## 2022-12-06 NOTE — OUTREACH NOTE
Medical Week 2 Survey    Flowsheet Row Responses   [facility] patient discharged from? [facility]   Does the patient have one of the following disease processes/diagnoses(primary or secondary)? Other   Week 2 attempt successful? Yes   Call start time 1241   Discharge diagnosis Acute pulmonary embolism without acute cor pulmonale    Call end time 1242   Person spoke with today (if not patient) and relationship ashwini Saldaña   Mikhail reviewed with patient/caregiver? Yes   Is the patient having any side effects they believe may be caused by any medication additions or changes? No   Does the patient have all medications ordered at discharge? Yes   Is the patient taking all medications as directed (includes completed medication regime)? Yes   Does the patient have a primary care provider?  Yes   Does the patient have an appointment with their PCP within 7 days of discharge? Yes   Has the patient kept scheduled appointments due by today? Yes   Psychosocial issues? No   Did the patient receive a copy of their discharge instructions? Yes   Nursing interventions Reviewed instructions with patient   What is the patient's perception of their health status since discharge? Improving   Is the patient/caregiver able to teach back signs and symptoms related to disease process for when to call PCP? Yes   Is the patient/caregiver able to teach back signs and symptoms related to disease process for when to call 911? Yes   Is the patient/caregiver able to teach back the hierarchy of who to call/visit for symptoms/problems? PCP, Specialist, Home health nurse, Urgent Care, ED, 911 Yes   If the patient is a current smoker, are they able to teach back resources for cessation? Not a smoker   Week 2 Call Completed? Yes   Graduated Yes   Did the patient feel the follow up calls were helpful during their recovery period? Yes   Was the number of calls appropriate? Yes   Graduated/Revoked comments Patient is still really tired. No questions or  concerns.          JAYLEN LONG - Registered Nurse

## 2025-03-06 NOTE — PLAN OF CARE
Problem: Skin Injury Risk Increased  Goal: Skin Health and Integrity  Outcome: Adequate for Care Transition  Intervention: Optimize Skin Protection  Recent Flowsheet Documentation  Taken 4/5/2022 0830 by Alla Esquivel RN  Pressure Reduction Techniques: frequent weight shift encouraged  Head of Bed (HOB) Positioning: HOB elevated     Problem: Fall Injury Risk  Goal: Absence of Fall and Fall-Related Injury  Outcome: Adequate for Care Transition  Intervention: Promote Injury-Free Environment  Recent Flowsheet Documentation  Taken 4/5/2022 1600 by Alla Esquivel RN  Safety Promotion/Fall Prevention:   assistive device/personal items within reach   clutter free environment maintained   safety round/check completed   nonskid shoes/slippers when out of bed   fall prevention program maintained  Taken 4/5/2022 1400 by Alla Esquivel RN  Safety Promotion/Fall Prevention:   assistive device/personal items within reach   clutter free environment maintained   safety round/check completed   nonskid shoes/slippers when out of bed   fall prevention program maintained  Taken 4/5/2022 1200 by Alla Esquivel RN  Safety Promotion/Fall Prevention:   assistive device/personal items within reach   clutter free environment maintained   safety round/check completed   nonskid shoes/slippers when out of bed   fall prevention program maintained  Taken 4/5/2022 1000 by Alla Esquivel RN  Safety Promotion/Fall Prevention:   assistive device/personal items within reach   clutter free environment maintained   safety round/check completed   nonskid shoes/slippers when out of bed   fall prevention program maintained  Taken 4/5/2022 0830 by Alla Esquivel RN  Safety Promotion/Fall Prevention:   assistive device/personal items within reach   clutter free environment maintained   safety round/check completed   nonskid shoes/slippers when out of bed   fall prevention program maintained   Goal Outcome Evaluation:          Refill requested for:     Name from pharmacy: ZOLPIDEM 5MG TABLETS          Will file in chart as: zolpidem (AMBIEN) 5 MG tablet    Sig: TAKE 1 TABLET BY MOUTH EVERY NIGHT AS NEEDED FOR SLEEP    Disp: 30 tablet    Refills: Not specified    Start: 3/5/2025    Class: Eprescribe    PDMP Review May Be Needed    Non-formulary For: Insomnia, unspecified type    Last ordered: 4 weeks ago (2/6/2025) by Tricia WARE DO    Last refill: 2/6/2025    Rx #: [id number]    Controlled Substances Refill Protocol - 12 Month Protocol - ALWAYS forward to ordering provider [id number]/05/2025 10:02 PM   Protocol Details FORWARD TO PROVIDER - Refill requests for these medications must ALWAYS be forwarded to the ordering provider, even if all criteria are met    PDMP has been reviewed in last 24 hours    Urine/serum drug screen on file in the appropriate time frame    Active/up-to-date controlled substances agreement/consent on file    Medication (including dose and sig) on current med list    Seen by prescribing provider or same department within the last 6 months or has a future appt in 6 months - IF FAILED PLEASE LOOK AT CHART REVIEW FOR LAST VISIT AND PROCEED ACCORDINGLY      To be filled at: [id number] [place] [id number] AT [facility] [address]     Last visit: 2/12/2025 with: Tricia Morocho DO  Upcoming visit: 6/17/2025 with: Tricia Morocho DO     Request forwarded to Tricia Morocho DO  Medication can not be filled by protocol. Physician authorization required.  Please advise on refills.